# Patient Record
Sex: FEMALE | Race: BLACK OR AFRICAN AMERICAN | NOT HISPANIC OR LATINO | Employment: FULL TIME | URBAN - METROPOLITAN AREA
[De-identification: names, ages, dates, MRNs, and addresses within clinical notes are randomized per-mention and may not be internally consistent; named-entity substitution may affect disease eponyms.]

---

## 2017-02-13 DIAGNOSIS — G89.29 CHRONIC LEFT-SIDED LOW BACK PAIN WITH LEFT-SIDED SCIATICA: ICD-10-CM

## 2017-02-13 DIAGNOSIS — I10 UNCONTROLLED HYPERTENSION: ICD-10-CM

## 2017-02-13 DIAGNOSIS — M54.42 CHRONIC LEFT-SIDED LOW BACK PAIN WITH LEFT-SIDED SCIATICA: ICD-10-CM

## 2017-02-13 RX ORDER — GABAPENTIN 300 MG/1
300 CAPSULE ORAL 2 TIMES DAILY
Qty: 60 CAPSULE | Refills: 0 | Status: SHIPPED | OUTPATIENT
Start: 2017-02-13 | End: 2017-04-07

## 2017-02-13 RX ORDER — HYDROCHLOROTHIAZIDE 25 MG/1
25 TABLET ORAL DAILY
Qty: 90 TABLET | Refills: 0 | Status: SHIPPED | OUTPATIENT
Start: 2017-02-13 | End: 2017-03-03 | Stop reason: SDUPTHER

## 2017-02-13 RX ORDER — AMLODIPINE BESYLATE 10 MG/1
10 TABLET ORAL DAILY
Qty: 90 TABLET | Refills: 0 | Status: SHIPPED | OUTPATIENT
Start: 2017-02-13 | End: 2017-03-03 | Stop reason: SDUPTHER

## 2017-02-13 NOTE — TELEPHONE ENCOUNTER
----- Message from Dulce Maria Morel sent at 2/13/2017  9:29 AM CST -----  Contact: self  Refill : amlodipine (NORVASC) 10 MG tablet              gabapentin (NEURONTIN) 300 MG capsule     hydrochlorothiazide (HYDRODIURIL) 25 MG tablet     Pharmacy : [place] [id number] [address]

## 2017-02-14 DIAGNOSIS — I10 UNCONTROLLED HYPERTENSION: ICD-10-CM

## 2017-02-14 RX ORDER — LISINOPRIL 40 MG/1
40 TABLET ORAL DAILY
Qty: 90 TABLET | Refills: 0 | Status: SHIPPED | OUTPATIENT
Start: 2017-02-14 | End: 2017-03-03 | Stop reason: SDUPTHER

## 2017-02-14 NOTE — TELEPHONE ENCOUNTER
----- Message from Bladimir Corral sent at 2/13/2017  9:41 AM CST -----  Contact: Self/[phone]  Patient is requesting a Referral to schedule with Opthalmology. Thank you.

## 2017-02-14 NOTE — TELEPHONE ENCOUNTER
Pt requesting referral to opthalmology for routine eye exam; also need refill of lisinopril; LOV- 9/22/16; has OV scheduled for 3/3/17

## 2017-02-16 ENCOUNTER — TELEPHONE (OUTPATIENT)
Dept: FAMILY MEDICINE | Facility: CLINIC | Age: 57
End: 2017-02-16

## 2017-02-22 ENCOUNTER — OFFICE VISIT (OUTPATIENT)
Dept: OPTOMETRY | Facility: CLINIC | Age: 57
End: 2017-02-22
Payer: OTHER GOVERNMENT

## 2017-02-22 DIAGNOSIS — H25.13 NUCLEAR SCLEROTIC CATARACT OF BOTH EYES: ICD-10-CM

## 2017-02-22 DIAGNOSIS — H35.033 HYPERTENSIVE RETINOPATHY OF BOTH EYES: ICD-10-CM

## 2017-02-22 DIAGNOSIS — H52.4 BILATERAL PRESBYOPIA: ICD-10-CM

## 2017-02-22 DIAGNOSIS — H16.142 SPK (SUPERFICIAL PUNCTATE KERATITIS), LEFT: Primary | ICD-10-CM

## 2017-02-22 DIAGNOSIS — H47.393 CUP TO DISC ASYMMETRY, BILATERAL: ICD-10-CM

## 2017-02-22 PROCEDURE — 99212 OFFICE O/P EST SF 10 MIN: CPT | Mod: PBBFAC | Performed by: OPTOMETRIST

## 2017-02-22 PROCEDURE — 99999 PR PBB SHADOW E&M-EST. PATIENT-LVL II: CPT | Mod: PBBFAC,,, | Performed by: OPTOMETRIST

## 2017-02-22 PROCEDURE — 92004 COMPRE OPH EXAM NEW PT 1/>: CPT | Mod: S$PBB,,, | Performed by: OPTOMETRIST

## 2017-02-22 RX ORDER — ERYTHROMYCIN 5 MG/G
OINTMENT OPHTHALMIC 3 TIMES DAILY
Qty: 3.5 G | Refills: 0 | Status: SHIPPED | OUTPATIENT
Start: 2017-02-22 | End: 2017-03-01

## 2017-02-22 NOTE — PROGRESS NOTES
"HPI     Ms. Linda was referred by Yajaira Soria MD for hypertensive eye exam     However, she is also experiencing an eye problem OS that she needs   addressed today.    Patient complains of yesterday around noon of feeling a sharp pain in OS,   follow by the feeling of something is in eye and the feeling of pressure   on the back of the eye. She reports feeling "tightness" of left eye, pain   around and behind left eye, pain is worse in the sunlight.    Pt also reports blurry near vision without glasses, improved with OTC   readers (she uses about +1.50 to +2.00 OTC readers), pt is concerned if   the OTC readers is best for her eyes.    Upon questioning, she does report eye strain and burning of eyes during   sustained computer use at work. She has to remove glasses and walk around   a little.    (-)drops  (+)pain 8 OS   (-)flashes  (-)floaters  (-)diplopia    Diabetic no  LBS n/a  No results found for: HGBA1C    OCULAR HISTORY  Last Eye Exam 02/08/13 Dr. Davenport   (-)eye surgery   (-)eye injury   (-)diagnosed or treated for any eye conditions or diseases none     FAMILY HISTORY  (-)Glaucoma none  (-)Macular Degeneration none          Last edited by Dagmar Vargas, OD on 2/22/2017  4:51 PM.         Assessment /Plan     For exam results, see Encounter Report.    SPK (superficial punctate keratitis), left   Cause of pain and photophobia OS. Start erythromycin ointment TID OS (alternatively, can use Systane 1-2 times per day and erythromycin QHS OS if jorge makes vision too blurry) for 1-2 weeks. RTC 2 weeks for f/u, or sooner if symptoms worsen.   Also recommended pt use artificial tears prn during sustained computer use.  -     erythromycin (ROMYCIN) ophthalmic ointment; Place into the left eye 3 (three) times daily.  Dispense: 3.5 g; Refill: 0    Hypertensive retinopathy of both eyes   Uncontrolled HTN. Mild retinopathy based on arteriolar narrowing and a/v crossing changes, not previously noted in 2013. Patient " educated on findings and discussed risk of retinal vascular occlusions. Monitor yearly.    Nuclear sclerotic cataract of both eyes   Very mild OU. Monitor.    Bilateral presbyopia   Refraction not performed today because of SPK OS. RTC in about 2 weeks for refraction. Okay to continue OTC readers, but try increasing to +2.25 if needed.d    Cup to disc asymmetry, bilateral   C/d ratio OD>OS, stable vs 2013. Patient educated on findings, explained nature of glaucoma and potential to progress to permanent loss of peripheral vision if untreated, offered baseline HVF/OCT vs monitoring, pt opts to monitor. Consider HVF/OCT in the future if changes in clinical appearance noted. Monitor in 1 year.       RTC 2 weeks for f/u and refraction (scheduled for 03/06/17)

## 2017-02-22 NOTE — Clinical Note
Dear Dr. Soria,  Thank you for referring Ms. Linda for a hypertensive eye examination. She has mild hypertensive retinopathy in both eyes that was not noted at her last eye exam in 2013. Please let me know if you have questions.  Sincerely, Dagmar Vargas OD

## 2017-02-22 NOTE — LETTER
February 22, 2017      Yajaira Soria MD  [address] [place] [address]           [facility]  [address]  [facility] [address]  Phone: [phone]          Patient: Erma Linda   MR Number: [id number]   YOB: 1960   Date of Visit: 2/22/2017       Dear Dr. Yajaira Soria:    Thank you for referring Erma Linda to me for evaluation. Attached you will find relevant portions of my assessment and plan of care.    If you have questions, please do not hesitate to call me. I look forward to following Erma Linda along with you.    Sincerely,    Dagmar Vargas, OD    Enclosure  CC:  No Recipients    If you would like to receive this communication electronically, please contact [email or web address] or [phone] to request more information on [employer] access.    For providers and/or their staff who would like to refer a patient to [facility], please contact us through our one-stop-shop provider referral line, [facility], at [phone].    If you feel you have received this communication in error or would no longer like to receive these types of communications, please e-mail [email or web address]

## 2017-02-22 NOTE — PATIENT INSTRUCTIONS
Please use the erythromycin ointment in the left eye: 3 times per day, for 1 week. If this makes your vision too blurry, then just use it at bedtime. Alternatively you can use Systane drops: 1 drop, 2-3 times per day, in the left eye.     Please let me know if your eye does not feel better in 1 week.      Dagmar Vargas, NICKOLAS

## 2017-02-22 NOTE — MR AVS SNAPSHOT
[facility]  [address]  [facility] [address]  Phone: [phone]                  Erma Linda   2017 3:40 PM   Office Visit    Description:  Female : 1960   Provider:  Dagmar Vargas OD   Department:  [facility]           Reason for Visit     Hypertensive Eye Exam           Diagnoses this Visit        Comments    SPK (superficial punctate keratitis), left    -  Primary            To Do List           Future Appointments        Provider Department Dept Phone    3/3/2017 3:00 PM Yajaira Soria MD [facility] [phone]      Goals (5 Years of Data)     None       These Medications        Disp Refills Start End    erythromycin (ROMYCIN) ophthalmic ointment 3.5 g 0 2017 3/1/2017    Place into the left eye 3 (three) times daily. - Left Eye    Pharmacy: [facility] - [place], [place] - [address] Ph #: [phone]         [facility] On Call     [facility] On Call Nurse Care Line -  Assistance  Registered nurses in the [facility] On Call Center provide clinical advisement, health education, appointment booking, and other advisory services.  Call for this free service at [phone].             Medications           Message regarding Medications     Verify the changes and/or additions to your medication regime listed below are the same as discussed with your clinician today.  If any of these changes or additions are incorrect, please notify your healthcare provider.        START taking these NEW medications        Refills    erythromycin (ROMYCIN) ophthalmic ointment 0    Sig: Place into the left eye 3 (three) times daily.    Class: Normal    Route: Left Eye           Verify that the below list of medications is an accurate representation of the medications you are currently taking.  If none reported, the list may be blank. If incorrect, please contact your healthcare provider. Carry this list with you in  case of emergency.           Current Medications     amlodipine (NORVASC) 10 MG tablet Take 1 tablet (10 mg total) by mouth once daily.    clobetasol 0.05% (TEMOVATE) 0.05 % Oint Apply topically 2 (two) times daily.    conjugated estrogens (PREMARIN) vaginal cream Place 0.5 g vaginally twice a week.    hydrochlorothiazide (HYDRODIURIL) 25 MG tablet Take 1 tablet (25 mg total) by mouth once daily.    lisinopril (PRINIVIL,ZESTRIL) 40 MG tablet Take 1 tablet (40 mg total) by mouth once daily.    erythromycin (ROMYCIN) ophthalmic ointment Place into the left eye 3 (three) times daily.    gabapentin (NEURONTIN) 300 MG capsule Take 1 capsule (300 mg total) by mouth 2 (two) times daily.           Clinical Reference Information           Allergies as of 2/22/2017     Hydralazine Analogues      Immunizations Administered on Date of Encounter - 2/22/2017     None      Instructions    Please use the erythromycin ointment in the left eye: 3 times per day, for 1 week. If this makes your vision too blurry, then just use it at bedtime. Alternatively you can use Systane drops: 1 drop, 2-3 times per day, in the left eye.     Please let me know if your eye does not feel better in 1 week.      Dagmar Vargas OD        Language Assistance Services     ATTENTION: Language assistance services are available, free of charge. Please call [phone].      ATENCIÓN: Si habla español, tiene a becerra disposición servicios gratuitos de asistencia lingüística. Llame al [phone].     [facility] Ý: N?u b?n nói Ti?ng Vi?t, có các d?ch v? h? tr? ngôn ng? mi?n phí dành cho b?n. G?i s? [phone].         [facility] complies with applicable Federal civil rights laws and does not discriminate on the basis of race, color, national origin, age, disability, or sex.

## 2017-03-03 ENCOUNTER — OFFICE VISIT (OUTPATIENT)
Dept: FAMILY MEDICINE | Facility: CLINIC | Age: 57
End: 2017-03-03
Payer: OTHER GOVERNMENT

## 2017-03-03 VITALS
DIASTOLIC BLOOD PRESSURE: 74 MMHG | BODY MASS INDEX: 29.5 KG/M2 | HEIGHT: 64 IN | SYSTOLIC BLOOD PRESSURE: 128 MMHG | TEMPERATURE: 98 F | WEIGHT: 172.81 LBS | RESPIRATION RATE: 16 BRPM | HEART RATE: 100 BPM | OXYGEN SATURATION: 97 %

## 2017-03-03 DIAGNOSIS — Z00.00 WELL ADULT EXAM: Primary | ICD-10-CM

## 2017-03-03 DIAGNOSIS — I10 ESSENTIAL HYPERTENSION, BENIGN: ICD-10-CM

## 2017-03-03 DIAGNOSIS — Z12.31 ENCOUNTER FOR SCREENING MAMMOGRAM FOR BREAST CANCER: ICD-10-CM

## 2017-03-03 DIAGNOSIS — B18.2 HEP C W/O COMA, CHRONIC: ICD-10-CM

## 2017-03-03 DIAGNOSIS — I10 UNCONTROLLED HYPERTENSION: ICD-10-CM

## 2017-03-03 PROCEDURE — 99213 OFFICE O/P EST LOW 20 MIN: CPT | Mod: PBBFAC,PO | Performed by: FAMILY MEDICINE

## 2017-03-03 PROCEDURE — 99396 PREV VISIT EST AGE 40-64: CPT | Mod: S$PBB,,, | Performed by: FAMILY MEDICINE

## 2017-03-03 PROCEDURE — 99999 PR PBB SHADOW E&M-EST. PATIENT-LVL III: CPT | Mod: PBBFAC,,, | Performed by: FAMILY MEDICINE

## 2017-03-03 RX ORDER — HYDROCHLOROTHIAZIDE 25 MG/1
25 TABLET ORAL DAILY
Qty: 90 TABLET | Refills: 1 | Status: SHIPPED | OUTPATIENT
Start: 2017-03-03 | End: 2017-07-07 | Stop reason: SDUPTHER

## 2017-03-03 RX ORDER — LISINOPRIL 40 MG/1
40 TABLET ORAL DAILY
Qty: 90 TABLET | Refills: 1 | Status: SHIPPED | OUTPATIENT
Start: 2017-03-03 | End: 2017-07-07 | Stop reason: SDUPTHER

## 2017-03-03 RX ORDER — AMLODIPINE BESYLATE 10 MG/1
10 TABLET ORAL DAILY
Qty: 90 TABLET | Refills: 1 | Status: SHIPPED | OUTPATIENT
Start: 2017-03-03 | End: 2017-07-07 | Stop reason: SDUPTHER

## 2017-03-03 NOTE — PROGRESS NOTES
Subjective:       Patient ID: Erma Linda is a 56 y.o. female.    Chief Complaint: Annual Exam    HPI: Patient is a 56-year-old female here for annual exam.  She has chronic stable hypertension.  No cardiac complaints.  Patient went untreated hepatitis C for several years.  Patient failed to follow-up for treatment.  Review of Systems   Constitutional: Positive for fatigue. Negative for appetite change, chills, diaphoresis and fever.   HENT: Negative for hearing loss, sinus pressure and trouble swallowing.    Eyes: Positive for pain and visual disturbance.   Respiratory: Negative for cough, chest tightness, shortness of breath and wheezing.    Cardiovascular: Negative for chest pain, palpitations and leg swelling.   Gastrointestinal: Negative for abdominal pain, blood in stool, constipation, diarrhea, nausea and vomiting.   Genitourinary: Negative for difficulty urinating, dysuria, hematuria, menstrual problem, pelvic pain and vaginal discharge.   Musculoskeletal: Positive for back pain. Negative for joint swelling and neck pain.   Skin: Negative for rash.   Neurological: Negative for dizziness, numbness and headaches.        Left index finger pain   Hematological: Negative for adenopathy. Does not bruise/bleed easily.   Psychiatric/Behavioral: Negative for dysphoric mood and sleep disturbance. The patient is not nervous/anxious.        Objective:      Physical Exam   Constitutional: She is oriented to person, place, and time. She appears well-developed and well-nourished.   Eyes: No scleral icterus.   Neck: Normal range of motion. Neck supple. No thyromegaly present.   Cardiovascular: Normal rate and regular rhythm.  Exam reveals no gallop and no friction rub.    No murmur heard.  Pulmonary/Chest: Effort normal and breath sounds normal. She has no wheezes. She has no rales.   Abdominal: Soft. Bowel sounds are normal. She exhibits no distension and no mass. There is no hepatosplenomegaly. There is no  tenderness.   Musculoskeletal: She exhibits no edema.   Lymphadenopathy:     She has no cervical adenopathy.     She has no axillary adenopathy.        Right: No supraclavicular adenopathy present.        Left: No supraclavicular adenopathy present.   Neurological: She is alert and oriented to person, place, and time.   Skin: Skin is warm and dry. No rash noted.   Psychiatric: She has a normal mood and affect. Her behavior is normal.         Assessment:       1. Well adult exam    2. Hep C w/o coma, chronic    3. Essential hypertension, benign    4. Encounter for screening mammogram for breast cancer    5. Uncontrolled hypertension        Plan:       Well adult exam  Encourage diet and exercise.  Scheduled screening mammogram    Hep C w/o coma, chronic  Untreated hepatitis C.  -     Ambulatory Referral to Hepatology  -     HEPATITIS C RNA, QUANTITATIVE, PCR; Future; Expected date: 3/3/17  -     Hepatic function panel; Future; Expected date: 3/3/17    Essential hypertension, benign  -     Lipid panel; Future; Expected date: 3/3/17    Uncontrolled hypertension  -     amlodipine (NORVASC) 10 MG tablet; Take 1 tablet (10 mg total) by mouth once daily.  Dispense: 90 tablet; Refill: 1  -     hydrochlorothiazide (HYDRODIURIL) 25 MG tablet; Take 1 tablet (25 mg total) by mouth once daily.  Dispense: 90 tablet; Refill: 1  -     lisinopril (PRINIVIL,ZESTRIL) 40 MG tablet; Take 1 tablet (40 mg total) by mouth once daily.  Dispense: 90 tablet; Refill: 1            No Follow-up on file.

## 2017-03-07 ENCOUNTER — DOCUMENTATION ONLY (OUTPATIENT)
Dept: TRANSPLANT | Facility: CLINIC | Age: 57
End: 2017-03-07

## 2017-03-07 ENCOUNTER — TELEPHONE (OUTPATIENT)
Dept: HEPATOLOGY | Facility: CLINIC | Age: 57
End: 2017-03-07

## 2017-03-07 NOTE — NURSING
Pt records reviewed.   Pt will be referred to Hepatology.    Initial referral received  from the [facility].   Referring Provider/diagnosis   Yajaira Soria MD   Diagnosis: Hep C w/o coma, chronic     Pt was followed in  2015 by Ruth.    Referral letter sent to provider and patient.

## 2017-03-07 NOTE — TELEPHONE ENCOUNTER
----- Message from Sari Redmond sent at 3/7/2017  9:02 AM CST -----  Pt records reviewed.   Pt will be referred to Hepatology.    [facility]   Referring Provider/diagnosis   Yajaira Soria MD  Diagnosis: Hep C w/o coma, chronic    Pt was followed in  2015 by Ruth.    Referral letter sent to provider and patient.

## 2017-03-07 NOTE — TELEPHONE ENCOUNTER
Chart reviewed. HCV RNA and genotype 1a noted. Pt was initially referred in 8/2015. Pre-visit tests were done but pt was a no show for consult. (She was also a no show in 2012).    Spoke to pt. Offered to schedule her for consult. Pt states she had a death in her family and asked that I call back in 2 weeks.  Will try calling pt again to schedule consult.

## 2017-03-07 NOTE — LETTER
March 7, 2017    Yajaira Soria MD  [address] [place] [address]      Dear Dr. Soria    Patient: Erma Linda   MR Number: [id number]   YOB: 1960     Thank you for the referral of Erma Linda to the [facility] program. An initial appointment will be scheduled for your patient with one of our Hepatologists.      Thank you again for your trust in our program.  If there is anything we can do for you or your staff, please feel free to contact us.        Sincerely,        [facility]  [address] [place] [id number]  [phone]

## 2017-03-07 NOTE — LETTER
March 7, 2017    Erma Linda  [address]   [facility] [address]      Dear Erma Linda:    Your doctor has referred you to the [facility]. You will be contacted by our office and an initial appointment will then be scheduled for you.    We look forward to seeing you soon. If you have any further questions, please contact us at [phone].       Sincerely,        [facility]   [address] [place] [address]  [phone]

## 2017-03-08 ENCOUNTER — TELEPHONE (OUTPATIENT)
Dept: FAMILY MEDICINE | Facility: CLINIC | Age: 57
End: 2017-03-08

## 2017-03-09 ENCOUNTER — TELEPHONE (OUTPATIENT)
Dept: OPTOMETRY | Facility: CLINIC | Age: 57
End: 2017-03-09

## 2017-03-09 NOTE — TELEPHONE ENCOUNTER
----- Message from Dagmar Vargas OD sent at 3/9/2017  3:40 PM CST -----  Regarding: call pt  Hello,    This patient did not show for her f/u appointment and refraction on 03/06/17. Please call her to offer to reschedule.    Thank you,  Dagmar Vargas OD

## 2017-03-13 ENCOUNTER — OFFICE VISIT (OUTPATIENT)
Dept: FAMILY MEDICINE | Facility: CLINIC | Age: 57
End: 2017-03-13
Payer: OTHER GOVERNMENT

## 2017-03-13 VITALS
DIASTOLIC BLOOD PRESSURE: 72 MMHG | HEIGHT: 64 IN | HEART RATE: 82 BPM | SYSTOLIC BLOOD PRESSURE: 130 MMHG | WEIGHT: 169.56 LBS | TEMPERATURE: 98 F | OXYGEN SATURATION: 98 % | BODY MASS INDEX: 28.95 KG/M2 | RESPIRATION RATE: 12 BRPM

## 2017-03-13 DIAGNOSIS — F43.21 GRIEVING: Primary | ICD-10-CM

## 2017-03-13 DIAGNOSIS — F41.9 ANXIETY: ICD-10-CM

## 2017-03-13 DIAGNOSIS — M62.838 TRAPEZIUS MUSCLE SPASM: ICD-10-CM

## 2017-03-13 PROCEDURE — 99999 PR PBB SHADOW E&M-EST. PATIENT-LVL III: CPT | Mod: PBBFAC,,, | Performed by: FAMILY MEDICINE

## 2017-03-13 PROCEDURE — 99214 OFFICE O/P EST MOD 30 MIN: CPT | Mod: S$PBB,,, | Performed by: FAMILY MEDICINE

## 2017-03-13 PROCEDURE — 99213 OFFICE O/P EST LOW 20 MIN: CPT | Mod: PBBFAC,PO | Performed by: FAMILY MEDICINE

## 2017-03-13 RX ORDER — TRAZODONE HYDROCHLORIDE 50 MG/1
50 TABLET ORAL NIGHTLY
Qty: 30 TABLET | Refills: 11 | Status: SHIPPED | OUTPATIENT
Start: 2017-03-13 | End: 2017-09-05

## 2017-03-13 RX ORDER — NAPROXEN 500 MG/1
500 TABLET ORAL 2 TIMES DAILY WITH MEALS
Qty: 30 TABLET | Refills: 0 | Status: SHIPPED | OUTPATIENT
Start: 2017-03-13 | End: 2017-09-05

## 2017-03-13 NOTE — MR AVS SNAPSHOT
[place] [facility] [address] [place] [address]  Phone: [phone]  Fax: [phone]                  Erma Linda   3/13/2017 10:00 AM   Office Visit    Description:  Female : 1960   Provider:  Sabino Burton MD   Department:  [facility]           Reason for Visit     Stress           Diagnoses this Visit        Comments    Grieving    -  Primary     Anxiety         Trapezius muscle spasm                To Do List           Goals (5 Years of Data)     None      Follow-Up and Disposition     Return in about 4 weeks (around 4/10/2017).       These Medications        Disp Refills Start End    trazodone (DESYREL) 50 MG tablet 30 tablet 11 3/13/2017 3/13/2018    Take 1 tablet (50 mg total) by mouth every evening. - Oral    Pharmacy: [facility] - [place], [place] - [address] Ph #: [phone]       naproxen (NAPROSYN) 500 MG tablet 30 tablet 0 3/13/2017     Take 1 tablet (500 mg total) by mouth 2 (two) times daily with meals. - Oral    Pharmacy: [facility] - [place], [place] - [address] Ph #: [phone]         [facility] On Call     [facility] On Call Nurse Care Line -  Assistance  Registered nurses in the [facility] On Call Center provide clinical advisement, health education, appointment booking, and other advisory services.  Call for this free service at [phone].             Medications           Message regarding Medications     Verify the changes and/or additions to your medication regime listed below are the same as discussed with your clinician today.  If any of these changes or additions are incorrect, please notify your healthcare provider.        START taking these NEW medications        Refills    trazodone (DESYREL) 50 MG tablet 11    Sig: Take 1 tablet (50 mg total) by mouth every evening.    Class: Normal    Route: Oral    naproxen (NAPROSYN) 500 MG tablet 0    Sig: Take 1 tablet (500 mg total) by  "mouth 2 (two) times daily with meals.    Class: Normal    Route: Oral           Verify that the below list of medications is an accurate representation of the medications you are currently taking.  If none reported, the list may be blank. If incorrect, please contact your healthcare provider. Carry this list with you in case of emergency.           Current Medications     amlodipine (NORVASC) 10 MG tablet Take 1 tablet (10 mg total) by mouth once daily.    clobetasol 0.05% (TEMOVATE) 0.05 % Oint Apply topically 2 (two) times daily.    conjugated estrogens (PREMARIN) vaginal cream Place 0.5 g vaginally twice a week.    gabapentin (NEURONTIN) 300 MG capsule Take 1 capsule (300 mg total) by mouth 2 (two) times daily.    hydrochlorothiazide (HYDRODIURIL) 25 MG tablet Take 1 tablet (25 mg total) by mouth once daily.    lisinopril (PRINIVIL,ZESTRIL) 40 MG tablet Take 1 tablet (40 mg total) by mouth once daily.    naproxen (NAPROSYN) 500 MG tablet Take 1 tablet (500 mg total) by mouth 2 (two) times daily with meals.    trazodone (DESYREL) 50 MG tablet Take 1 tablet (50 mg total) by mouth every evening.           Clinical Reference Information           Your Vitals Were     BP Pulse Temp Resp Height Weight    130/72 (BP Location: Left arm, Patient Position: Sitting, BP Method: Manual) 82 97.9 °F (36.6 °C) (Oral) 12 5' 4" (1.626 m) 76.9 kg (169 lb 8.5 oz)    SpO2 BMI             98% 29.1 kg/m2         Blood Pressure          Most Recent Value    BP  130/72      Allergies as of 3/13/2017     Hydralazine Analogues      Immunizations Administered on Date of Encounter - 3/13/2017     None      Language Assistance Services     ATTENTION: Language assistance services are available, free of charge. Please call [phone].      ATENCIÓN: Si michelle karon, tiene a becerra disposición servicios gratuitos de asistencia lingüística. Llame al [phone].     CHÚ Ý: N?u b?n nói Ti?ng Vi?t, có các d?ch v? h? tr? ngôn ng? mi?n phí dành " benita bustos?nKsenia RIVERA?i s? [phone].         [facility] complies with applicable Federal civil rights laws and does not discriminate on the basis of race, color, national origin, age, disability, or sex.

## 2017-03-13 NOTE — PROGRESS NOTES
"Subjective:       Patient ID: Erma Linda is a 57 y.o. female.    Chief Complaint: Stress (found sister )    HPI    Pt found her sister dead in her house 9 days ago, and since then, she has been grieving a/w feeling nervous, and unable to sleep. No issues with anxiety or depression before.  The nervous or anxious feeling is constant.     She also feels a "tightness" in her L shoulder, back and chest that started yesterday. This worsened this morning. This tightness is constant, and is not aggravated by exertion or improve with rest.   Moderate relief with tylenol.     Current Outpatient Prescriptions on File Prior to Visit   Medication Sig Dispense Refill    amlodipine (NORVASC) 10 MG tablet Take 1 tablet (10 mg total) by mouth once daily. 90 tablet 1    clobetasol 0.05% (TEMOVATE) 0.05 % Oint Apply topically 2 (two) times daily. 30 g 3    conjugated estrogens (PREMARIN) vaginal cream Place 0.5 g vaginally twice a week. 45 g 1    gabapentin (NEURONTIN) 300 MG capsule Take 1 capsule (300 mg total) by mouth 2 (two) times daily. 60 capsule 0    hydrochlorothiazide (HYDRODIURIL) 25 MG tablet Take 1 tablet (25 mg total) by mouth once daily. 90 tablet 1    lisinopril (PRINIVIL,ZESTRIL) 40 MG tablet Take 1 tablet (40 mg total) by mouth once daily. 90 tablet 1    [DISCONTINUED] lisinopril-hydrochlorothiazide (PRINZIDE,ZESTORETIC) 10-12.5 mg per tablet TAKE TWO TABLETS BY MOUTH EVERY DAY 60 tablet 0     No current facility-administered medications on file prior to visit.        Review of Systems   Constitutional: Negative for chills and fever.   Respiratory: Positive for chest tightness. Negative for choking and shortness of breath.    Cardiovascular: Positive for chest pain. Negative for palpitations and leg swelling.       Objective:     Vitals:    17 0952   BP: 130/72   Pulse: 82   Resp: 12   Temp: 97.9 °F (36.6 °C)        Physical Exam   Constitutional: She appears well-developed. No " distress.   HENT:   Head: Normocephalic and atraumatic.   Eyes: Conjunctivae are normal. Right eye exhibits no discharge. Left eye exhibits no discharge. No scleral icterus.   Cardiovascular: Normal rate, regular rhythm and normal heart sounds.  Exam reveals no gallop and no friction rub.    No murmur heard.  Pulmonary/Chest: Effort normal and breath sounds normal. No respiratory distress. She has no wheezes. She has no rales.   Musculoskeletal:        Cervical back: She exhibits tenderness and spasm. She exhibits normal range of motion, no bony tenderness, no swelling, no edema, no deformity, no laceration, no pain and normal pulse.        Back:         Arms:  Neurological: She is alert.   Skin: She is not diaphoretic.   Psychiatric: She has a normal mood and affect.   Vitals reviewed.      Assessment:       1. Grieving    2. Anxiety    3. Trapezius muscle spasm        Plan:       Erma was seen today for stress.    Diagnoses and all orders for this visit:    Grieving  Pt is going through the natural grieving process, but is having anxiousness that is greatly inhibiting her ability to take care of things that she must due to her sisters demise.     Anxiety  -     trazodone (DESYREL) 50 MG tablet; Take 1 tablet (50 mg total) by mouth every evening.  Pt has significant stressors resulting in anxiousness and inability to sleep. Short term rx for trazadone to help with this difficult time.     Trapezius muscle spasm  -     naproxen (NAPROSYN) 500 MG tablet; Take 1 tablet (500 mg total) by mouth 2 (two) times daily with meals.  - Pts hx and pex suggest muscle strain/spasm of L trapezius and L pec muscles. Pt advised to take NSAIDs and magnesium to help relieve sxs. Pt to return if sxs have not improved in 2 weeks.   - ER precautions given for signs consistent with angina. Cp or tightness that worsens with exertion or is a/w SOB.           Return in about 4 weeks (around 4/10/2017).        Pt verbalized understanding and  agreed with our plan.

## 2017-03-24 ENCOUNTER — OFFICE VISIT (OUTPATIENT)
Dept: FAMILY MEDICINE | Facility: CLINIC | Age: 57
End: 2017-03-24
Payer: OTHER GOVERNMENT

## 2017-03-24 VITALS
BODY MASS INDEX: 28.64 KG/M2 | HEART RATE: 97 BPM | HEIGHT: 64 IN | WEIGHT: 167.75 LBS | RESPIRATION RATE: 16 BRPM | SYSTOLIC BLOOD PRESSURE: 130 MMHG | DIASTOLIC BLOOD PRESSURE: 96 MMHG | OXYGEN SATURATION: 99 % | TEMPERATURE: 98 F

## 2017-03-24 DIAGNOSIS — F32.A DEPRESSION, UNSPECIFIED DEPRESSION TYPE: Primary | ICD-10-CM

## 2017-03-24 DIAGNOSIS — F43.21 GRIEVING: ICD-10-CM

## 2017-03-24 PROCEDURE — 99213 OFFICE O/P EST LOW 20 MIN: CPT | Mod: PBBFAC,PO | Performed by: FAMILY MEDICINE

## 2017-03-24 PROCEDURE — 99999 PR PBB SHADOW E&M-EST. PATIENT-LVL III: CPT | Mod: PBBFAC,,, | Performed by: FAMILY MEDICINE

## 2017-03-24 PROCEDURE — 99214 OFFICE O/P EST MOD 30 MIN: CPT | Mod: S$PBB,,, | Performed by: FAMILY MEDICINE

## 2017-03-24 NOTE — LETTER
March 24, 2017    Erma Linda  [address]   [place] [address]             [facility] [address] [place] [address]  Phone: [phone]  Fax: [phone] To Whom It May Concern,      Erma Linda    Was treated here on 03/24/2017    Cleared to return to work on 3/27/17    No Restrictions    Please feel free to contact my office if you have any questions or concerns.             Sabino Burton MD

## 2017-03-24 NOTE — MR AVS SNAPSHOT
[facility]  [address]  [place] [address]  Phone: [phone]  Fax: [phone]                  Erma Linda   3/24/2017 8:40 AM   Office Visit    Description:  Female : 1960   Provider:  Sabino Burton MD   Department:  [facility]           Reason for Visit     Anxiety     Dizziness                To Do List           Goals (5 Years of Data)     None      [facility]     [facility] On Call Nurse Care Line -  Assistance  Registered nurses in the [facility] On Call Center provide clinical advisement, health education, appointment booking, and other advisory services.  Call for this free service at [phone].             Medications           Message regarding Medications     Verify the changes and/or additions to your medication regime listed below are the same as discussed with your clinician today.  If any of these changes or additions are incorrect, please notify your healthcare provider.             Verify that the below list of medications is an accurate representation of the medications you are currently taking.  If none reported, the list may be blank. If incorrect, please contact your healthcare provider. Carry this list with you in case of emergency.           Current Medications     amlodipine (NORVASC) 10 MG tablet Take 1 tablet (10 mg total) by mouth once daily.    clobetasol 0.05% (TEMOVATE) 0.05 % Oint Apply topically 2 (two) times daily.    hydrochlorothiazide (HYDRODIURIL) 25 MG tablet Take 1 tablet (25 mg total) by mouth once daily.    lisinopril (PRINIVIL,ZESTRIL) 40 MG tablet Take 1 tablet (40 mg total) by mouth once daily.    naproxen (NAPROSYN) 500 MG tablet Take 1 tablet (500 mg total) by mouth 2 (two) times daily with meals.    trazodone (DESYREL) 50 MG tablet Take 1 tablet (50 mg total) by mouth every evening.    conjugated estrogens (PREMARIN) vaginal cream Place 0.5 g vaginally twice a week.    gabapentin  "(NEURONTIN) 300 MG capsule Take 1 capsule (300 mg total) by mouth 2 (two) times daily.           Clinical Reference Information           Your Vitals Were     BP Pulse Temp Resp Height Weight    130/96 (BP Location: Left arm, Patient Position: Sitting, BP Method: Manual) 97 98.2 °F (36.8 °C) (Oral) 16 5' 4" (1.626 m) 76.1 kg (167 lb 12.3 oz)    SpO2 BMI             99% 28.8 kg/m2         Blood Pressure          Most Recent Value    BP  (!)  130/96      Allergies as of 3/24/2017     Hydralazine Analogues      Immunizations Administered on Date of Encounter - 3/24/2017     None      Language Assistance Services     ATTENTION: Language assistance services are available, free of charge. Please call [phone].      ATENCIÓN: Si michelle karon, tiene a becerra disposición servicios gratuitos de asistencia lingüística. Llame al [phone].     CHÚ Ý: N?u b?n nói Ti?ng Vi?t, có các d?ch v? h? tr? ngôn ng? mi?n phí dành cho b?n. G?i s? [phone].         [facility] complies with applicable Federal civil rights laws and does not discriminate on the basis of race, color, national origin, age, disability, or sex.        "

## 2017-03-24 NOTE — PROGRESS NOTES
Subjective:       Patient ID: Erma Linda is a 57 y.o. female.    Chief Complaint: Anxiety (follow up ) and Dizziness (follow up)    HPI     Anxiety with sleep disturbance - pt is doing better with her sleep with trazodone. She is still having difficulty with resuming her normal activity. She does not want to get up, or eat, so is suffering from anhedonia. Pt is currently divulging to her friends and counselor which helps.     Now, the burden of handling her sister's affairs has been her biggest problem. She excused herself from this process yesterday.           Current Outpatient Prescriptions on File Prior to Visit   Medication Sig Dispense Refill    amlodipine (NORVASC) 10 MG tablet Take 1 tablet (10 mg total) by mouth once daily. 90 tablet 1    clobetasol 0.05% (TEMOVATE) 0.05 % Oint Apply topically 2 (two) times daily. 30 g 3    hydrochlorothiazide (HYDRODIURIL) 25 MG tablet Take 1 tablet (25 mg total) by mouth once daily. 90 tablet 1    lisinopril (PRINIVIL,ZESTRIL) 40 MG tablet Take 1 tablet (40 mg total) by mouth once daily. 90 tablet 1    naproxen (NAPROSYN) 500 MG tablet Take 1 tablet (500 mg total) by mouth 2 (two) times daily with meals. 30 tablet 0    trazodone (DESYREL) 50 MG tablet Take 1 tablet (50 mg total) by mouth every evening. 30 tablet 11    conjugated estrogens (PREMARIN) vaginal cream Place 0.5 g vaginally twice a week. 45 g 1    gabapentin (NEURONTIN) 300 MG capsule Take 1 capsule (300 mg total) by mouth 2 (two) times daily. 60 capsule 0    [DISCONTINUED] lisinopril-hydrochlorothiazide (PRINZIDE,ZESTORETIC) 10-12.5 mg per tablet TAKE TWO TABLETS BY MOUTH EVERY DAY 60 tablet 0     No current facility-administered medications on file prior to visit.        Review of Systems   Psychiatric/Behavioral: Positive for dysphoric mood. Negative for self-injury and suicidal ideas. The patient is nervous/anxious.        Objective:     Vitals:    03/24/17 0854   BP: (!) 130/96   Pulse:  97   Resp: 16   Temp: 98.2 °F (36.8 °C)        Physical Exam   Constitutional: She appears well-developed. No distress.   HENT:   Head: Normocephalic and atraumatic.   Eyes: Conjunctivae are normal. No scleral icterus.   Pulmonary/Chest: Effort normal.   Neurological: She is alert.   Skin: She is not diaphoretic.   Psychiatric: Her behavior is normal.   Depressed mood   Vitals reviewed.      Assessment:       1. Depression, unspecified depression type    2. Grieving        Plan:       Erma was seen today for anxiety and dizziness.    Diagnoses and all orders for this visit:    Depression, unspecified depression type    Grieving    Normal process of grieving was reviewed. I was encouraged that patient has begun to delegate some of the estate affairs to others to lessen the burden on herself, and that she wants to return to work. I offered counseling or mediccation support which she declined.      Will keep close follow up. No Si or HI        Return in about 2 weeks (around 4/7/2017) for anxiety, grieving .        Pt verbalized understanding and agreed with our plan.     At least 25 minutes were spent today with the patient in the office, which more than 50% of the time was spent on evaluation and counseling regarding The primary encounter diagnosis was Depression, unspecified depression type. A diagnosis of Grieving was also pertinent to this visit..

## 2017-04-04 ENCOUNTER — TELEPHONE (OUTPATIENT)
Dept: HEPATOLOGY | Facility: CLINIC | Age: 57
End: 2017-04-04

## 2017-04-07 ENCOUNTER — OFFICE VISIT (OUTPATIENT)
Dept: FAMILY MEDICINE | Facility: CLINIC | Age: 57
End: 2017-04-07
Payer: COMMERCIAL

## 2017-04-07 VITALS
DIASTOLIC BLOOD PRESSURE: 84 MMHG | BODY MASS INDEX: 28.95 KG/M2 | RESPIRATION RATE: 16 BRPM | TEMPERATURE: 98 F | HEART RATE: 85 BPM | WEIGHT: 169.56 LBS | HEIGHT: 64 IN | SYSTOLIC BLOOD PRESSURE: 120 MMHG | OXYGEN SATURATION: 98 %

## 2017-04-07 DIAGNOSIS — F43.21 GRIEVING: Primary | ICD-10-CM

## 2017-04-07 DIAGNOSIS — F41.9 ANXIETY: ICD-10-CM

## 2017-04-07 PROCEDURE — 3079F DIAST BP 80-89 MM HG: CPT | Mod: S$GLB,,, | Performed by: FAMILY MEDICINE

## 2017-04-07 PROCEDURE — 1160F RVW MEDS BY RX/DR IN RCRD: CPT | Mod: S$GLB,,, | Performed by: FAMILY MEDICINE

## 2017-04-07 PROCEDURE — 3074F SYST BP LT 130 MM HG: CPT | Mod: S$GLB,,, | Performed by: FAMILY MEDICINE

## 2017-04-07 PROCEDURE — 99214 OFFICE O/P EST MOD 30 MIN: CPT | Mod: S$GLB,,, | Performed by: FAMILY MEDICINE

## 2017-04-07 PROCEDURE — 99999 PR PBB SHADOW E&M-EST. PATIENT-LVL III: CPT | Mod: PBBFAC,,, | Performed by: FAMILY MEDICINE

## 2017-04-07 RX ORDER — AMLODIPINE BESYLATE 10 MG/1
TABLET ORAL
COMMUNITY
Start: 2017-04-01 | End: 2017-04-07 | Stop reason: SDUPTHER

## 2017-04-07 NOTE — MR AVS SNAPSHOT
[facility]  [address]  [place] [address]  Phone: [phone]  Fax: [phone]                  Erma Linda   2017 8:40 AM   Office Visit    Description:  Female : 1960   Provider:  Sabino Burton MD   Department:  [facility]           Reason for Visit     Anxiety     Grieving           Diagnoses this Visit        Comments    Grieving    -  Primary     Anxiety                To Do List           Goals (5 Years of Data)     None      Follow-Up and Disposition     Return if symptoms worsen or fail to improve.      [facility] On Call     [facility] On Call Nurse Care Line -  Assistance  Unless otherwise directed by your provider, please contact [facility], our nurse care line that is available for  assistance.     Registered nurses in the [facility] On Call Center provide: appointment scheduling, clinical advisement, health education, and other advisory services.  Call: [phone] (toll free)               Medications           Message regarding Medications     Verify the changes and/or additions to your medication regime listed below are the same as discussed with your clinician today.  If any of these changes or additions are incorrect, please notify your healthcare provider.        STOP taking these medications     gabapentin (NEURONTIN) 300 MG capsule Take 1 capsule (300 mg total) by mouth 2 (two) times daily.           Verify that the below list of medications is an accurate representation of the medications you are currently taking.  If none reported, the list may be blank. If incorrect, please contact your healthcare provider. Carry this list with you in case of emergency.           Current Medications     amlodipine (NORVASC) 10 MG tablet Take 1 tablet (10 mg total) by mouth once daily.    clobetasol 0.05% (TEMOVATE) 0.05 % Oint Apply topically 2 (two) times daily.    conjugated estrogens (PREMARIN) vaginal cream Place 0.5 g  "vaginally twice a week.    hydrochlorothiazide (HYDRODIURIL) 25 MG tablet Take 1 tablet (25 mg total) by mouth once daily.    lisinopril (PRINIVIL,ZESTRIL) 40 MG tablet Take 1 tablet (40 mg total) by mouth once daily.    naproxen (NAPROSYN) 500 MG tablet Take 1 tablet (500 mg total) by mouth 2 (two) times daily with meals.    trazodone (DESYREL) 50 MG tablet Take 1 tablet (50 mg total) by mouth every evening.           Clinical Reference Information           Your Vitals Were     BP Pulse Temp Resp Height Weight    120/84 (BP Location: Left arm, Patient Position: Sitting, BP Method: Manual) 85 97.8 °F (36.6 °C) (Oral) 16 5' 4" (1.626 m) 76.9 kg (169 lb 8.5 oz)    SpO2 BMI             98% 29.1 kg/m2         Blood Pressure          Most Recent Value    BP  120/84      Allergies as of 4/7/2017     Hydralazine Analogues      Immunizations Administered on Date of Encounter - 4/7/2017     None      Language Assistance Services     ATTENTION: Language assistance services are available, free of charge. Please call [phone].      ATENCIÓN: Si michelle brantley, tiene a becerra disposición servicios gratuitos de asistencia lingüística. Llame al [phone].     HEATH Ý: N?u b?n nói Ti?ng Vi?t, có các d?ch v? h? tr? ngôn ng? mi?n phí dành cho b?n. G?i s? [phone].         [facility] complies with applicable Federal civil rights laws and does not discriminate on the basis of race, color, national origin, age, disability, or sex.        "

## 2017-04-07 NOTE — PROGRESS NOTES
Subjective:       Patient ID: Erma Linda is a 57 y.o. female.    Chief Complaint: Anxiety (2 weeks follow up ) and Grieving    HPI     Anxiety and Grieving -  Pt has gone back to work, and she is doing much better. Work has helped to distract her in a positive way. She has plenty of support throught friends and family.     She is here today for [facility] paperwork to be filled out for the time that she missed.     Pt is no longer taking trazodone nightly, but on a prn basis.           Current Outpatient Prescriptions on File Prior to Visit   Medication Sig Dispense Refill    amlodipine (NORVASC) 10 MG tablet Take 1 tablet (10 mg total) by mouth once daily. 90 tablet 1    clobetasol 0.05% (TEMOVATE) 0.05 % Oint Apply topically 2 (two) times daily. 30 g 3    conjugated estrogens (PREMARIN) vaginal cream Place 0.5 g vaginally twice a week. 45 g 1    hydrochlorothiazide (HYDRODIURIL) 25 MG tablet Take 1 tablet (25 mg total) by mouth once daily. 90 tablet 1    lisinopril (PRINIVIL,ZESTRIL) 40 MG tablet Take 1 tablet (40 mg total) by mouth once daily. 90 tablet 1    naproxen (NAPROSYN) 500 MG tablet Take 1 tablet (500 mg total) by mouth 2 (two) times daily with meals. 30 tablet 0    trazodone (DESYREL) 50 MG tablet Take 1 tablet (50 mg total) by mouth every evening. 30 tablet 11    [DISCONTINUED] gabapentin (NEURONTIN) 300 MG capsule Take 1 capsule (300 mg total) by mouth 2 (two) times daily. 60 capsule 0    [DISCONTINUED] lisinopril-hydrochlorothiazide (PRINZIDE,ZESTORETIC) 10-12.5 mg per tablet TAKE TWO TABLETS BY MOUTH EVERY DAY 60 tablet 0     No current facility-administered medications on file prior to visit.        Review of Systems    Objective:     Vitals:    04/07/17 0850   BP: 120/84   Pulse: 85   Resp: 16   Temp: 97.8 °F (36.6 °C)        Physical Exam    Assessment:       1. Grieving    2. Anxiety        Plan:       Erma was seen today for anxiety and grieving.    Diagnoses and all orders for  this visit:    Overall, pt is doing well as can be expected after such a tragic loss. She is back at work and is surrounded by a healthy support system. No other intervention needed today.     Continue trazodone prn.     FMLA paperwork filled out today.     Grieving    Anxiety          Return if symptoms worsen or fail to improve.        Pt verbalized understanding and agreed with our plan.     At least 25 minutes were spent today with the patient in the office, which more than 50% of the time was spent on evaluation and counseling regarding The primary encounter diagnosis was Grieving. A diagnosis of Anxiety was also pertinent to this visit..

## 2017-04-11 ENCOUNTER — TELEPHONE (OUTPATIENT)
Dept: FAMILY MEDICINE | Facility: CLINIC | Age: 57
End: 2017-04-11

## 2017-04-11 NOTE — TELEPHONE ENCOUNTER
----- Message from Arianna Wheatley sent at 4/11/2017 10:13 AM CDT -----  Patient is calling regarding FMLA forms. There is some information she needs added. Please call patient at [phone] Thank you!

## 2017-04-11 NOTE — TELEPHONE ENCOUNTER
Pt was seen 4/7/17, [place] papers were [place]; pt states section for diagnosis wasn't filled out; I informed her Dr Burton is on vacation this week and will be back Monday; pt verbalized understanding; states she will bring paperwork in on monday

## 2017-07-07 ENCOUNTER — OFFICE VISIT (OUTPATIENT)
Dept: FAMILY MEDICINE | Facility: CLINIC | Age: 57
End: 2017-07-07
Payer: COMMERCIAL

## 2017-07-07 ENCOUNTER — LAB VISIT (OUTPATIENT)
Dept: LAB | Facility: HOSPITAL | Age: 57
End: 2017-07-07
Attending: FAMILY MEDICINE
Payer: COMMERCIAL

## 2017-07-07 VITALS
RESPIRATION RATE: 16 BRPM | BODY MASS INDEX: 29.13 KG/M2 | SYSTOLIC BLOOD PRESSURE: 136 MMHG | HEART RATE: 88 BPM | OXYGEN SATURATION: 98 % | HEIGHT: 64 IN | WEIGHT: 170.63 LBS | DIASTOLIC BLOOD PRESSURE: 86 MMHG | TEMPERATURE: 99 F

## 2017-07-07 DIAGNOSIS — R07.89 CHEST TIGHTNESS: ICD-10-CM

## 2017-07-07 DIAGNOSIS — I10 BENIGN ESSENTIAL HYPERTENSION: ICD-10-CM

## 2017-07-07 DIAGNOSIS — R53.1 WEAKNESS: Primary | ICD-10-CM

## 2017-07-07 DIAGNOSIS — R11.0 NAUSEA: ICD-10-CM

## 2017-07-07 DIAGNOSIS — B18.2 HEP C W/O COMA, CHRONIC: ICD-10-CM

## 2017-07-07 DIAGNOSIS — I10 ESSENTIAL HYPERTENSION, BENIGN: ICD-10-CM

## 2017-07-07 DIAGNOSIS — R53.1 WEAKNESS: ICD-10-CM

## 2017-07-07 LAB
ALBUMIN SERPL BCP-MCNC: 3.4 G/DL
ALP SERPL-CCNC: 86 U/L
ALT SERPL W/O P-5'-P-CCNC: 74 U/L
ANION GAP SERPL CALC-SCNC: 11 MMOL/L
AST SERPL-CCNC: 75 U/L
BASOPHILS # BLD AUTO: 0.06 K/UL
BASOPHILS NFR BLD: 0.7 %
BILIRUB SERPL-MCNC: 0.4 MG/DL
BUN SERPL-MCNC: 13 MG/DL
CALCIUM SERPL-MCNC: 10.2 MG/DL
CHLORIDE SERPL-SCNC: 104 MMOL/L
CHOLEST/HDLC SERPL: 3 {RATIO}
CO2 SERPL-SCNC: 28 MMOL/L
CREAT SERPL-MCNC: 1 MG/DL
DIFFERENTIAL METHOD: ABNORMAL
EOSINOPHIL # BLD AUTO: 0.2 K/UL
EOSINOPHIL NFR BLD: 2.6 %
ERYTHROCYTE [DISTWIDTH] IN BLOOD BY AUTOMATED COUNT: 13.5 %
EST. GFR  (AFRICAN AMERICAN): >60 ML/MIN/1.73 M^2
EST. GFR  (NON AFRICAN AMERICAN): >60 ML/MIN/1.73 M^2
GLUCOSE SERPL-MCNC: 76 MG/DL
HCT VFR BLD AUTO: 38.8 %
HDL/CHOLESTEROL RATIO: 33.3 %
HDLC SERPL-MCNC: 180 MG/DL
HDLC SERPL-MCNC: 60 MG/DL
HGB BLD-MCNC: 12.5 G/DL
LDLC SERPL CALC-MCNC: 105.6 MG/DL
LYMPHOCYTES # BLD AUTO: 2.4 K/UL
LYMPHOCYTES NFR BLD: 28.8 %
MCH RBC QN AUTO: 30.3 PG
MCHC RBC AUTO-ENTMCNC: 32.2 %
MCV RBC AUTO: 94 FL
MONOCYTES # BLD AUTO: 1.1 K/UL
MONOCYTES NFR BLD: 13 %
NEUTROPHILS # BLD AUTO: 4.5 K/UL
NEUTROPHILS NFR BLD: 54.5 %
NONHDLC SERPL-MCNC: 120 MG/DL
PLATELET # BLD AUTO: 269 K/UL
PMV BLD AUTO: 11 FL
POTASSIUM SERPL-SCNC: 4.2 MMOL/L
PROT SERPL-MCNC: 9.2 G/DL
RBC # BLD AUTO: 4.12 M/UL
SODIUM SERPL-SCNC: 143 MMOL/L
TRIGL SERPL-MCNC: 72 MG/DL
WBC # BLD AUTO: 8.23 K/UL

## 2017-07-07 PROCEDURE — 80061 LIPID PANEL: CPT

## 2017-07-07 PROCEDURE — 93010 ELECTROCARDIOGRAM REPORT: CPT | Mod: S$GLB,,, | Performed by: INTERNAL MEDICINE

## 2017-07-07 PROCEDURE — 93005 ELECTROCARDIOGRAM TRACING: CPT | Mod: S$GLB,,, | Performed by: FAMILY MEDICINE

## 2017-07-07 PROCEDURE — 87522 HEPATITIS C REVRS TRNSCRPJ: CPT

## 2017-07-07 PROCEDURE — 99214 OFFICE O/P EST MOD 30 MIN: CPT | Mod: PBBFAC,PO | Performed by: PHYSICIAN ASSISTANT

## 2017-07-07 PROCEDURE — 81002 URINALYSIS NONAUTO W/O SCOPE: CPT | Mod: S$GLB,,, | Performed by: PHYSICIAN ASSISTANT

## 2017-07-07 PROCEDURE — 85025 COMPLETE CBC W/AUTO DIFF WBC: CPT

## 2017-07-07 PROCEDURE — 99999 PR PBB SHADOW E&M-EST. PATIENT-LVL IV: CPT | Mod: PBBFAC,,, | Performed by: PHYSICIAN ASSISTANT

## 2017-07-07 PROCEDURE — 36415 COLL VENOUS BLD VENIPUNCTURE: CPT | Mod: PO

## 2017-07-07 PROCEDURE — 99214 OFFICE O/P EST MOD 30 MIN: CPT | Mod: S$GLB,,, | Performed by: PHYSICIAN ASSISTANT

## 2017-07-07 PROCEDURE — 80053 COMPREHEN METABOLIC PANEL: CPT

## 2017-07-07 RX ORDER — LISINOPRIL 40 MG/1
40 TABLET ORAL DAILY
Qty: 90 TABLET | Refills: 1 | Status: SHIPPED | OUTPATIENT
Start: 2017-07-07 | End: 2018-05-03 | Stop reason: SDUPTHER

## 2017-07-07 RX ORDER — AMLODIPINE BESYLATE 10 MG/1
10 TABLET ORAL DAILY
Qty: 90 TABLET | Refills: 1 | Status: SHIPPED | OUTPATIENT
Start: 2017-07-07 | End: 2018-05-03 | Stop reason: SDUPTHER

## 2017-07-07 RX ORDER — HYDROCHLOROTHIAZIDE 25 MG/1
25 TABLET ORAL DAILY
Qty: 90 TABLET | Refills: 1 | Status: SHIPPED | OUTPATIENT
Start: 2017-07-07 | End: 2018-05-03 | Stop reason: SDUPTHER

## 2017-07-07 RX ORDER — ONDANSETRON 4 MG/1
4 TABLET, FILM COATED ORAL 2 TIMES DAILY
Qty: 30 TABLET | Refills: 0 | Status: SHIPPED | OUTPATIENT
Start: 2017-07-07 | End: 2017-09-05

## 2017-07-07 NOTE — PROGRESS NOTES
Subjective:       Patient ID: Erma Linda is a 57 y.o. female.    Chief Complaint: Dizziness (x5 days with chest tightness and headache )    Dizziness:   Chronicity:  New  Onset:  In the past 7 days (3 days)  Frequency:  Constantly  Dizziness characteristics: weakness, tired.   Associated symptoms: headaches, nausea, weakness and visual disturbances.no fever, no panic, no facial weakness, no slurred speech, no numbness in extremities and no chest pain.  Aggravated by:  Nothing  Treatments tried: NSAIDs.no head trauma, no head trauma and no environmental allergies.    Review of Systems   Constitutional: Positive for fatigue. Negative for fever.   Cardiovascular: Negative for chest pain.   Gastrointestinal: Positive for nausea.   Allergic/Immunologic: Negative for environmental allergies.   Neurological: Positive for dizziness, weakness and headaches.   Psychiatric/Behavioral: Positive for sleep disturbance. The patient is nervous/anxious.        Objective:      Physical Exam   Constitutional: She appears well-developed and well-nourished.   HENT:   Head: Normocephalic and atraumatic.   Eyes: Conjunctivae and EOM are normal. Pupils are equal, round, and reactive to light.   Cardiovascular: Normal rate and regular rhythm.    No murmur heard.  Pulmonary/Chest: Effort normal and breath sounds normal. She exhibits no tenderness.   Abdominal: Soft. Bowel sounds are normal. She exhibits no distension. There is no tenderness.   Neurological: She displays a negative Romberg sign. Coordination and gait normal.   Tongue midline, facial expressions equal bilaterally,  normal heel to shin,      Skin: Skin is warm and dry. She is not diaphoretic.   Psychiatric: She has a normal mood and affect. Her behavior is normal.   Vitals reviewed.      Assessment:       1. Weakness    2. Chest tightness    3. Nausea    4. Benign essential hypertension        Plan:         Erma was seen today for dizziness.    Diagnoses and all  orders for this visit:    Weakness  -     POCT URINE DIPSTICK WITHOUT MICROSCOPE  -     IN OFFICE EKG 12-LEAD (to Muse)  -     Comprehensive metabolic panel; Future  -     CBC auto differential; Future  -     Normal physical exam. No orthostatic hypotension. Will assess labs. Pt advised to go to ED over the weekend if symptoms worsen or she may go to [facility] if things change  -     Advised pt to take trazodone for sleep, she refused needing medication for anxiety     Chest tightness  -     IN OFFICE EKG 12-LEAD (to Muse), no change from prior     Nausea  -     ondansetron (ZOFRAN) 4 MG tablet; Take 1 tablet (4 mg total) by mouth 2 (two) times daily as needed    Benign essential hypertension  -     amlodipine (NORVASC) 10 MG tablet; Take 1 tablet (10 mg total) by mouth once daily.  -     hydrochlorothiazide (HYDRODIURIL) 25 MG tablet; Take 1 tablet (25 mg total) by mouth once daily.  -     lisinopril (PRINIVIL,ZESTRIL) 40 MG tablet; Take 1 tablet (40 mg total) by mouth once daily.  -     Refills today, controlled

## 2017-07-10 ENCOUNTER — TELEPHONE (OUTPATIENT)
Dept: FAMILY MEDICINE | Facility: CLINIC | Age: 57
End: 2017-07-10

## 2017-07-10 ENCOUNTER — LAB VISIT (OUTPATIENT)
Dept: LAB | Facility: HOSPITAL | Age: 57
End: 2017-07-10
Attending: FAMILY MEDICINE
Payer: COMMERCIAL

## 2017-07-10 DIAGNOSIS — R77.9 ELEVATED SERUM PROTEIN LEVEL: Primary | ICD-10-CM

## 2017-07-10 DIAGNOSIS — R77.9 ELEVATED SERUM PROTEIN LEVEL: ICD-10-CM

## 2017-07-10 DIAGNOSIS — R82.998 URINE LEUKOCYTES: Primary | ICD-10-CM

## 2017-07-10 LAB
BILIRUB SERPL-MCNC: ABNORMAL MG/DL
BLOOD URINE, POC: ABNORMAL
COLOR, POC UA: ABNORMAL
GLUCOSE UR QL STRIP: NORMAL
KETONES UR QL STRIP: ABNORMAL
LEUKOCYTE ESTERASE URINE, POC: ABNORMAL
NITRITE, POC UA: ABNORMAL
PH, POC UA: 7
PROTEIN, POC: ABNORMAL
SPECIFIC GRAVITY, POC UA: 1.01
UROBILINOGEN, POC UA: 1

## 2017-07-10 PROCEDURE — 84165 PROTEIN E-PHORESIS SERUM: CPT | Mod: 26,,, | Performed by: PATHOLOGY

## 2017-07-10 PROCEDURE — 36415 COLL VENOUS BLD VENIPUNCTURE: CPT | Mod: PO

## 2017-07-10 PROCEDURE — 84165 PROTEIN E-PHORESIS SERUM: CPT

## 2017-07-10 NOTE — TELEPHONE ENCOUNTER
Pt returned call; informed of results and recommendations; verbalized understanding; will be in today to do labs; pt states she is still having symptoms but it has improved

## 2017-07-10 NOTE — TELEPHONE ENCOUNTER
Please inform pt her protein level is elevated, further lab work is needed. Also her liver enzymes have elevated further, recommend f/u with her hepatologist. Verify if patient is still having symptoms. She is not anemic

## 2017-07-11 ENCOUNTER — TELEPHONE (OUTPATIENT)
Dept: FAMILY MEDICINE | Facility: CLINIC | Age: 57
End: 2017-07-11

## 2017-07-11 LAB
ALBUMIN SERPL ELPH-MCNC: 3.73 G/DL
ALPHA1 GLOB SERPL ELPH-MCNC: 0.42 G/DL
ALPHA2 GLOB SERPL ELPH-MCNC: 1.08 G/DL
B-GLOBULIN SERPL ELPH-MCNC: 1.09 G/DL
GAMMA GLOB SERPL ELPH-MCNC: 2.38 G/DL
PATHOLOGIST INTERPRETATION SPE: NORMAL
PROT SERPL-MCNC: 8.7 G/DL

## 2017-07-11 NOTE — TELEPHONE ENCOUNTER
----- Message from Kaylie Rasmussen sent at 7/11/2017  3:37 PM CDT -----  Contact: self  Pt returning call. Please call [phone]... Thanks

## 2017-07-11 NOTE — TELEPHONE ENCOUNTER
----- Message from Alma Mosqueda PA-C sent at 7/11/2017  2:10 PM CDT -----  Protein levels abnormal Dr. Soria recommends referral to hematology/oncology

## 2017-07-12 LAB
HCV LOG: 5.8 LOG (10) IU/ML
HCV RNA QUANT PCR: ABNORMAL IU/ML
HCV, QUALITATIVE: DETECTED IU/ML

## 2017-07-13 ENCOUNTER — TELEPHONE (OUTPATIENT)
Dept: FAMILY MEDICINE | Facility: CLINIC | Age: 57
End: 2017-07-13

## 2017-07-13 NOTE — TELEPHONE ENCOUNTER
..Patient notified of results as noted below , patient verbalized understanding. I gave patient number to hepatology to schedule

## 2017-07-13 NOTE — TELEPHONE ENCOUNTER
----- Message from Alma Mosqueda PA-C sent at 7/13/2017  7:57 AM CDT -----  Patient needs to get in with hepatology. Liver function is worsening, needs consult for Hep C treatment. Looks like they tried calling her multiple times to schedule

## 2017-07-17 ENCOUNTER — TELEPHONE (OUTPATIENT)
Dept: HEPATOLOGY | Facility: CLINIC | Age: 57
End: 2017-07-17

## 2017-07-17 DIAGNOSIS — B18.2 HEP C W/O COMA, CHRONIC: Primary | ICD-10-CM

## 2017-07-17 NOTE — TELEPHONE ENCOUNTER
----- Message from Selena Jeffers RN sent at 7/14/2017  8:42 AM CDT -----  Calling to schedule appt please call

## 2017-07-17 NOTE — TELEPHONE ENCOUNTER
Chart reviewed. HCV RNA and genotype 1a noted. CBC and CMP done 7/7. Pt was initially referred in 8/2015. Pre-visit tests were done but pt was a no show for consult. (She was also a no show in 2012). Another referral was received in 3/2017 but I was unable to reach pt.    Returned call to pt today about scheduling consult.  U/s, fibroscan, and clinic visit scheduled on 8/11. Reminder mailed.

## 2017-08-02 ENCOUNTER — LAB VISIT (OUTPATIENT)
Dept: LAB | Facility: HOSPITAL | Age: 57
End: 2017-08-02
Attending: FAMILY MEDICINE
Payer: COMMERCIAL

## 2017-08-02 ENCOUNTER — NURSE TRIAGE (OUTPATIENT)
Dept: ADMINISTRATIVE | Facility: CLINIC | Age: 57
End: 2017-08-02

## 2017-08-02 ENCOUNTER — OFFICE VISIT (OUTPATIENT)
Dept: FAMILY MEDICINE | Facility: CLINIC | Age: 57
End: 2017-08-02
Payer: COMMERCIAL

## 2017-08-02 VITALS
HEART RATE: 80 BPM | TEMPERATURE: 98 F | OXYGEN SATURATION: 99 % | SYSTOLIC BLOOD PRESSURE: 146 MMHG | DIASTOLIC BLOOD PRESSURE: 84 MMHG

## 2017-08-02 DIAGNOSIS — R07.9 LEFT SIDED CHEST PAIN: ICD-10-CM

## 2017-08-02 DIAGNOSIS — R07.9 LEFT SIDED CHEST PAIN: Primary | ICD-10-CM

## 2017-08-02 DIAGNOSIS — R07.89 CHEST WALL PAIN: Primary | ICD-10-CM

## 2017-08-02 LAB
ALBUMIN SERPL BCP-MCNC: 3.6 G/DL
ALP SERPL-CCNC: 81 U/L
ALT SERPL W/O P-5'-P-CCNC: 56 U/L
ANION GAP SERPL CALC-SCNC: 8 MMOL/L
AST SERPL-CCNC: 52 U/L
BASOPHILS # BLD AUTO: 0.04 K/UL
BASOPHILS NFR BLD: 0.5 %
BILIRUB SERPL-MCNC: 0.5 MG/DL
BUN SERPL-MCNC: 12 MG/DL
CALCIUM SERPL-MCNC: 10.3 MG/DL
CHLORIDE SERPL-SCNC: 104 MMOL/L
CO2 SERPL-SCNC: 32 MMOL/L
CREAT SERPL-MCNC: 0.9 MG/DL
D DIMER PPP IA.FEU-MCNC: 0.32 MG/L FEU
DIFFERENTIAL METHOD: NORMAL
EOSINOPHIL # BLD AUTO: 0.1 K/UL
EOSINOPHIL NFR BLD: 1.9 %
ERYTHROCYTE [DISTWIDTH] IN BLOOD BY AUTOMATED COUNT: 13.4 %
EST. GFR  (AFRICAN AMERICAN): >60 ML/MIN/1.73 M^2
EST. GFR  (NON AFRICAN AMERICAN): >60 ML/MIN/1.73 M^2
GLUCOSE SERPL-MCNC: 76 MG/DL
HCT VFR BLD AUTO: 38.8 %
HGB BLD-MCNC: 12.4 G/DL
LYMPHOCYTES # BLD AUTO: 2.2 K/UL
LYMPHOCYTES NFR BLD: 29.4 %
MCH RBC QN AUTO: 30.3 PG
MCHC RBC AUTO-ENTMCNC: 32 G/DL
MCV RBC AUTO: 95 FL
MONOCYTES # BLD AUTO: 0.9 K/UL
MONOCYTES NFR BLD: 11.7 %
NEUTROPHILS # BLD AUTO: 4.2 K/UL
NEUTROPHILS NFR BLD: 56.2 %
PLATELET # BLD AUTO: 276 K/UL
PMV BLD AUTO: 10.2 FL
POTASSIUM SERPL-SCNC: 4.1 MMOL/L
PROT SERPL-MCNC: 9.8 G/DL
RBC # BLD AUTO: 4.09 M/UL
SODIUM SERPL-SCNC: 144 MMOL/L
TROPONIN I SERPL DL<=0.01 NG/ML-MCNC: <0.006 NG/ML
WBC # BLD AUTO: 7.41 K/UL

## 2017-08-02 PROCEDURE — 99213 OFFICE O/P EST LOW 20 MIN: CPT | Mod: PBBFAC,PN,25 | Performed by: FAMILY MEDICINE

## 2017-08-02 PROCEDURE — 99999 PR PBB SHADOW E&M-EST. PATIENT-LVL III: CPT | Mod: PBBFAC,,, | Performed by: FAMILY MEDICINE

## 2017-08-02 PROCEDURE — 93010 ELECTROCARDIOGRAM REPORT: CPT | Mod: S$GLB,,, | Performed by: INTERNAL MEDICINE

## 2017-08-02 PROCEDURE — 99214 OFFICE O/P EST MOD 30 MIN: CPT | Mod: S$GLB,,, | Performed by: FAMILY MEDICINE

## 2017-08-02 PROCEDURE — 85379 FIBRIN DEGRADATION QUANT: CPT

## 2017-08-02 PROCEDURE — 93005 ELECTROCARDIOGRAM TRACING: CPT | Mod: PBBFAC,PN | Performed by: FAMILY MEDICINE

## 2017-08-02 PROCEDURE — 36415 COLL VENOUS BLD VENIPUNCTURE: CPT

## 2017-08-02 PROCEDURE — 85025 COMPLETE CBC W/AUTO DIFF WBC: CPT

## 2017-08-02 PROCEDURE — 80053 COMPREHEN METABOLIC PANEL: CPT

## 2017-08-02 PROCEDURE — 84484 ASSAY OF TROPONIN QUANT: CPT

## 2017-08-02 RX ORDER — METHYLPREDNISOLONE 4 MG/1
TABLET ORAL
Qty: 1 PACKAGE | Refills: 0 | Status: SHIPPED | OUTPATIENT
Start: 2017-08-02 | End: 2017-08-25

## 2017-08-02 RX ORDER — TIZANIDINE 4 MG/1
4 TABLET ORAL EVERY 6 HOURS PRN
Qty: 30 TABLET | Refills: 0 | Status: SHIPPED | OUTPATIENT
Start: 2017-08-02 | End: 2017-08-12

## 2017-08-02 NOTE — PROGRESS NOTES
Routine Office Visit    Patient Name: Erma Linda    : 1960  MRN: [id number]    Subjective:  Erma is a 57 y.o. female who presents today for:    1. Chest pain  Patient presenting with 5 hours of continuous left sided chest pain that started at 6AM this morning when she reached for juice.  She has not had any diaphoresis, shortness of breath, weakness, or numbness.  She states that the pain is made worse with deep inspiration and will actually bring her to tears.  She has not taken anything to relieve the pain.  She did not go to be evaluated in the ED.  She has listed in her medications that she is on estrogen cream, but she states she never took the medication.     Past Medical History  Past Medical History:   Diagnosis Date    Hypertension        Past Surgical History  Past Surgical History:   Procedure Laterality Date     SECTION, CLASSIC      KNEE ARTHROSCOPY W/ LASER      R    TUBAL LIGATION         Family History  Family History   Problem Relation Age of Onset    Heart disease Mother     Glaucoma Neg Hx        Social History  Social History     Social History    Marital status:      Spouse name: N/A    Number of children: N/A    Years of education: N/A     Occupational History    Not on file.     Social History Main Topics    Smoking status: Never Smoker    Smokeless tobacco: Never Used    Alcohol use No    Drug use: No    Sexual activity: Not Currently     Partners: Male     Birth control/ protection: Post-menopausal      Comment: 16  with same partner 35 years      Other Topics Concern    Not on file     Social History Narrative    No narrative on file       Current Medications  Current Outpatient Prescriptions on File Prior to Visit   Medication Sig Dispense Refill    amlodipine (NORVASC) 10 MG tablet Take 1 tablet (10 mg total) by mouth once daily. 90 tablet 1    clobetasol 0.05% (TEMOVATE) 0.05 % Oint Apply topically 2 (two) times daily. 30 g  3    hydrochlorothiazide (HYDRODIURIL) 25 MG tablet Take 1 tablet (25 mg total) by mouth once daily. 90 tablet 1    lisinopril (PRINIVIL,ZESTRIL) 40 MG tablet Take 1 tablet (40 mg total) by mouth once daily. 90 tablet 1    naproxen (NAPROSYN) 500 MG tablet Take 1 tablet (500 mg total) by mouth 2 (two) times daily with meals. 30 tablet 0    ondansetron (ZOFRAN) 4 MG tablet Take 1 tablet (4 mg total) by mouth 2 (two) times daily. 30 tablet 0    trazodone (DESYREL) 50 MG tablet Take 1 tablet (50 mg total) by mouth every evening. 30 tablet 11    [DISCONTINUED] conjugated estrogens (PREMARIN) vaginal cream Place 0.5 g vaginally twice a week. 45 g 1    [DISCONTINUED] lisinopril-hydrochlorothiazide (PRINZIDE,ZESTORETIC) 10-12.5 mg per tablet TAKE TWO TABLETS BY MOUTH EVERY DAY 60 tablet 0     No current facility-administered medications on file prior to visit.        Allergies   Review of patient's allergies indicates:   Allergen Reactions    Hydralazine analogues Palpitations       Review of Systems (Pertinent positives)  Review of Systems   Constitutional: Negative.    HENT: Negative.    Eyes: Negative.    Respiratory: Negative.    Cardiovascular: Positive for chest pain.   Gastrointestinal: Negative.    Skin: Negative.    Neurological: Negative.          BP (!) 146/84 (BP Location: Left arm, Patient Position: Sitting, BP Method: Manual)   Pulse 80   Temp 98.2 °F (36.8 °C) (Oral)     GENERAL APPEARANCE: in no apparent distress and well developed and well nourished  HEENT: PERRL, EOMI, Sclera clear, anicteric, Oropharynx clear, no lesions, Neck supple with midline trachea  NECK: normal, supple, no adenopathy, thyroid normal in size  RESPIRATORY: appears well, vitals normal, no respiratory distress, acyanotic, normal RR, chest clear, no wheezing, crepitations, rhonchi, normal symmetric air entry  HEART: regular rate and rhythm, S1, S2 normal, no murmur, click, rub or gallop.    ABDOMEN: abdomen is soft without  tenderness, no masses, no hernias, no organomegaly, no rebound, no guarding. Suprapubic tenderness absent. No CVA tenderness.  MS:  No pain on palpation of back, side or chest; pain with deep inspiration  SKIN: no rashes, no wounds, no other lesions  PSYCH: Alert, oriented x 3, thought content appropriate, speech normal, pleasant and cooperative, good eye contact, well groomed    Assessment/Plan:  Erma Linda is a 57 y.o. female who presents today for :    Erma was seen today for chest pain.    Diagnoses and all orders for this visit:    Left sided chest pain  -     EKG 12-lead  -     CBC auto differential; Future  -     Comprehensive metabolic panel; Future  -     D dimer, quantitative; Future  -     Cancel: CTA Chest Non Coronary; Future  -     Troponin I; Future      1.  EKG shows no acute changes when compared with previous EKG's  2.  Labs to be done today  3.  CT discussed, but patient would rather do labs first  4.  VSS  5.  Patient told that labs and imaging were needed to make sure nothing serious was going on  7.  She is to go to the ED should symptoms worsen.  Patient was not pleased that I could not give her pain medication in the office.  I explained to her that I had to r/o the more critical causes of her pain before giving her something that could mask symptoms      Arley Maynard MD

## 2017-08-02 NOTE — TELEPHONE ENCOUNTER
"   Started in chest this am when bent over. Left side of breast hurting. Took aspirin. easied up after thirty minutes. Unsure if above or bwlow nipple line. Took naproxen.     Reason for Disposition   [1] Chest pain lasts > 5 minutes AND [2] described as crushing, pressure-like, or heavy    Answer Assessment - Initial Assessment Questions  1. LOCATION: "Where does it hurt?"        Chest feels tight. No SOB. Hurts with movmt.   2. RADIATION: "Does the pain go anywhere else?" (e.g., into neck, jaw, arms, back)      Radiated to back   3. ONSET: "When did the chest pain begin?" (Minutes, hours or days)      0635  4. PATTERN "Does the pain come and go, or has it been constant since it started?"  "Does it get worse with exertion?"      Back pain now constant  5. DURATION: "How long does it last" (e.g., seconds, minutes, hours)     CP constant   6. SEVERITY: "How bad is the pain?"  (e.g., Scale 1-10; mild, moderate, or severe)     - MILD (1-3): doesn't interfere with normal activities      - MODERATE (4-7): interferes with normal activities or awakens from sleep     - SEVERE (8-10): excruciating pain, unable to do any normal activities       10   7. CARDIAC RISK FACTORS: "Do you have any history of heart problems or risk factors for heart disease?" (e.g., prior heart attack, angina; high blood pressure, diabetes, being overweight, high cholesterol, smoking, or strong family history of heart disease)     HBP, parents and sibs pblm with heart   8. PULMONARY RISK FACTORS: "Do you have any history of lung disease?"  (e.g., blood clots in lung, asthma, emphysema, birth control pills)    No   9. CAUSE: "What do you think is causing the chest pain?"     Unsure   10. OTHER SYMPTOMS: "Do you have any other symptoms?" (e.g., dizziness, nausea, vomiting, sweating, fever, difficulty breathing, cough)     Lightheaded. Dizzy    Protocols used: ST CHEST PAIN-A-AH  rec EMS due to crushing CP, level 10. pt states that her  can " drive her over to ER. Call back with questions.

## 2017-08-03 ENCOUNTER — TELEPHONE (OUTPATIENT)
Dept: FAMILY MEDICINE | Facility: CLINIC | Age: 57
End: 2017-08-03

## 2017-08-03 DIAGNOSIS — R77.9 ELEVATED SERUM PROTEIN LEVEL: Primary | ICD-10-CM

## 2017-08-03 NOTE — TELEPHONE ENCOUNTER
I see a referral for the hepatologist that is scheduled 8/11/2017. I don't see a referral for Hem.

## 2017-08-03 NOTE — TELEPHONE ENCOUNTER
----- Message from Yajaira Soria MD sent at 7/24/2017 10:23 AM CDT -----  Please verify if hematology referral scheduled

## 2017-08-03 NOTE — TELEPHONE ENCOUNTER
----- Message from Arianna Wheatley sent at 8/2/2017  3:47 PM CDT -----  Patient would like to speak with nurse regarding labs. Please call at [phone] thank you!

## 2017-08-03 NOTE — TELEPHONE ENCOUNTER
----- Message from Arley Maynard MD sent at 8/2/2017  2:38 PM CDT -----  Please let patient know that all of her labs were normal.  I will send in a prescription for her pain.    Thanks,  Dr. Maynard

## 2017-08-11 ENCOUNTER — TELEPHONE (OUTPATIENT)
Dept: HEPATOLOGY | Facility: CLINIC | Age: 57
End: 2017-08-11

## 2017-08-11 ENCOUNTER — OFFICE VISIT (OUTPATIENT)
Dept: HEPATOLOGY | Facility: CLINIC | Age: 57
End: 2017-08-11
Payer: COMMERCIAL

## 2017-08-11 ENCOUNTER — PROCEDURE VISIT (OUTPATIENT)
Dept: HEPATOLOGY | Facility: CLINIC | Age: 57
End: 2017-08-11
Attending: INTERNAL MEDICINE
Payer: COMMERCIAL

## 2017-08-11 ENCOUNTER — HOSPITAL ENCOUNTER (OUTPATIENT)
Dept: RADIOLOGY | Facility: HOSPITAL | Age: 57
Discharge: HOME | End: 2017-08-11
Attending: INTERNAL MEDICINE
Payer: COMMERCIAL

## 2017-08-11 VITALS
WEIGHT: 168.88 LBS | TEMPERATURE: 98 F | SYSTOLIC BLOOD PRESSURE: 146 MMHG | BODY MASS INDEX: 28.83 KG/M2 | HEART RATE: 95 BPM | HEIGHT: 64 IN | OXYGEN SATURATION: 100 % | DIASTOLIC BLOOD PRESSURE: 65 MMHG

## 2017-08-11 DIAGNOSIS — B18.2 HEP C W/O COMA, CHRONIC: ICD-10-CM

## 2017-08-11 DIAGNOSIS — Z71.89 ENCOUNTER FOR MEDICATION COUNSELING: Primary | ICD-10-CM

## 2017-08-11 DIAGNOSIS — B18.2 CHRONIC HEPATITIS C WITHOUT HEPATIC COMA: Primary | ICD-10-CM

## 2017-08-11 DIAGNOSIS — B18.2 CHRONIC HEPATITIS C WITHOUT HEPATIC COMA: ICD-10-CM

## 2017-08-11 PROCEDURE — 3008F BODY MASS INDEX DOCD: CPT | Mod: S$GLB,,, | Performed by: PHYSICIAN ASSISTANT

## 2017-08-11 PROCEDURE — 91200 LIVER ELASTOGRAPHY: CPT | Mod: S$GLB,,, | Performed by: PHYSICIAN ASSISTANT

## 2017-08-11 PROCEDURE — 3077F SYST BP >= 140 MM HG: CPT | Mod: S$GLB,,, | Performed by: PHYSICIAN ASSISTANT

## 2017-08-11 PROCEDURE — 99204 OFFICE O/P NEW MOD 45 MIN: CPT | Mod: 25,S$GLB,, | Performed by: PHYSICIAN ASSISTANT

## 2017-08-11 PROCEDURE — 99999 PR PBB SHADOW E&M-EST. PATIENT-LVL III: CPT | Mod: PBBFAC,,, | Performed by: PHYSICIAN ASSISTANT

## 2017-08-11 PROCEDURE — 76700 US EXAM ABDOM COMPLETE: CPT | Mod: 26,,, | Performed by: RADIOLOGY

## 2017-08-11 PROCEDURE — 76700 US EXAM ABDOM COMPLETE: CPT | Mod: TC

## 2017-08-11 PROCEDURE — 3078F DIAST BP <80 MM HG: CPT | Mod: S$GLB,,, | Performed by: PHYSICIAN ASSISTANT

## 2017-08-11 RX ORDER — LEDIPASVIR AND SOFOSBUVIR 90; 400 MG/1; MG/1
1 TABLET, FILM COATED ORAL DAILY
Qty: 28 TABLET | Refills: 2 | Status: SHIPPED | OUTPATIENT
Start: 2017-08-11 | End: 2017-09-01 | Stop reason: SDUPTHER

## 2017-08-11 NOTE — LETTER
August 11, 2017      Yajaira Soria MD  [address] [facility] [place] [address]           [facility]  [address]  [facility] [address]  Phone: [phone]  Fax: [phone]          Patient: Erma Linda   MR Number: [id number]   YOB: 1960   Date of Visit: 8/11/2017       Dear Dr. Yajaira Soria:    Thank you for referring Erma Linda to me for evaluation. Attached you will find relevant portions of my assessment and plan of care.    If you have questions, please do not hesitate to call me. I look forward to following Erma Linda along with you.    Sincerely,    Socorro Sebastian PA-C    Enclosure  CC:  No Recipients    If you would like to receive this communication electronically, please contact [email or web address] or [phone] to request more information on National Institutes of Health (NIH) Link access.    For providers and/or their staff who would like to refer a patient to [facility], please contact us through our one-stop-shop provider referral line, [facility], at [phone].    If you feel you have received this communication in error or would no longer like to receive these types of communications, please e-mail [email or web address]

## 2017-08-11 NOTE — PROGRESS NOTES
HEPATOLOGY CLINIC VISIT NOTE    REFERRING PROVIDER:  Yajaira Soria MD    CHIEF COMPLAINT: Hepatitis C    HISTORY: Patient is a 57 y.o. who has been re-referred to hepatology by her PCP.  Referred in 2015, but never seen.  She reports she has known about her Hepatitis C for a long while, but no liver evaluation until now.  Pt has treatment naive Genotype 1a Chronic Hepatitis C w/ viral load = 625,357 IU/mL.  Fibroscan done today suggests mild scarring at F2 fibrosis.  Fibrosure  in 2015 = F0-1.   Abdominal Ultrasound today is unremarkable.  Recent labs show mildly elevated transaminases and normal synthetic liver function (no INR)     Alcohol assessment:  No & no history of heavy drinking in the past.     Pt has no complaints today and feels well he denies fevers, weight change,  lymphadenopathy,  rashes, joint pains, dark urine, abdominal fluid accumulation, yellowing/jaundice of skin or eyes, vomiting of blood, black stool, confusion or  tremors.      Hepatitis C risk factors:   --h/o blood transfusion: no   --IVDrug use:  No   --nasal drug use:  No   --tattoos: no  --contact with anyone known to have HCV:   had hep C and is cured.   --hi risk occupation:  No      PMH/PSH/Social history:  Entered into epic  Past Medical History:   Diagnosis Date    Hypertension      Past Surgical History:   Procedure Laterality Date     SECTION, CLASSIC      KNEE ARTHROSCOPY W/ LASER      R    TUBAL LIGATION       Social   Lives w/  and 2 daughters (27 & 28)      FHX:   No fhx of liver cancer or liver disease.     ALLERGIES AND MEDICATIONS: reviewed in epic    ROS: see HPI     PE:  WDWN, NAD  Alert, oriented and pleasant.   There were no vitals filed for this visit.  HEENT: ncat, eomi, no scleral icterus; normal rom of neck, no lymphadenopathy  Heart: regular rate and rhythm  Lungs: clear bilaterally, chest symmetric with respirations. No wheezes rhonchi or rales.   Abdomen: + bs, ntnd. No  organomegaly  Neuro/psych: no asterixis, oriented x3, mood and affect appropriate.   Skin: warm and dry, no c/c/e. No mccormick erythema.  No spider telangectasia     RECENT LABS:  Lab Results   Component Value Date    WBC 7.41 2017    HGB 12.4 2017     2017    AST 52 (H) 2017    ALT 56 (H) 2017    CREATININE 0.9 2017    BILITOT 0.5 2017    INR 1.0 2016    ALBUMIN 3.6 2017     No results found for: HEPAIGG    Hepatitis B Surface Ag   Date Value Ref Range Status   2012 Negative Negative Final     Comment:     If further testing is needed, please contact the Blood Bank  within 3 days.     No results found for: HEPBCAB  No results found for: HEPBSAB    There is no immunization history on file for this patient.    Results for CHAD SMITH (MRN [id number]) as of 2017 10:34   Ref. Range 2012 08:04 2015 08:40 2015 08:41 2017 15:33   HCV Log Latest Ref Range: <1.08 Log (10) IU/mL  5.89 (H)  5.80 (H)   HCV RNA Quant PCR Latest Ref Range: <12 IU/mL  771,798 (H)  625,357 (H)   HCV, Qualitative Latest Ref Range: Not detected IU/mL  DETECTED (A)  DETECTED (A)   Hepatitis C Genotype Unknown 1a      Hepatitis C RNA-PCR Latest Ref Range: NEGATIVE  POSITIVE (A)        RECENT IMAGING:  Impression       Normal exam.    ______________________________________     Electronically signed by resident: SURAJ ROBLES MD  Date: 17  Time: 10:01     PROCEDURES:   Procedures Fibroscan Procedure      Name: Chad Smith  Date of Procedure : 2017   :: Socorro Sebastian PA-C  Diagnosis: HCV     Probe: M     Fibroscan readin.8 KPa     Fibrosis:F2      CAP readin dB/m     Steatosis: < S1          Electronically signed by Socorro Sebastian PA-C at 2017 10:30 AM      ASSESSMENT: 58yo female w/   1. CHRONIC HEPATITIS C, GENOTYPE 1a, tx naive   hcvrna quant = 625,357 IU/mL   normal synthetic liver function (no INR) & elevated  transaminases   Fibroscan = F2  Duration of infection:  Unclear   Concurrent alcohol use: none                    EDUCATION DISCUSSION    The natural history of Hepatitis C, including potential progression to cirrhosis was reviewed.   We discussed the increased progression of liver disease secondary to alcohol use; patient was advised to avoid alcohol completely.   Transmission of Hepatitis C was reviewed, including possible sexual transmission. Sexual contacts should be screened.   Risk of vertical transmission of Hepatitis C from mother to baby was reviewed. Children should be screened.   Patient should avoid sharing personal products such as razors, toothbrushes, etc.   We reviewed that vaccination against Hepatitis A and Hepatitis B is recommended if patient does not already have immunity.  Recommend avoiding raw seafood.  Limit acetaminophen to 2000mg daily.     This pt is interested in pursuing Hep C treatment and  I think is a great candidate. particularly in light of their co-morbidities     Today we reviewed the following basic information about the regimen  Harvoni (combination pill of 2 DAA's) for genotype 1 Hepatitis C   Discussed that HCV tx will not reverse cirrhosis  Dosing:  once daily,  with or without food, same time each day, compliance w/ dosing highly stressed!  Duration of tx:  12  weeks.   Discussed SVR rates and relapse rates    Most common side effects in clinical trials: fatigue, headache, nausea, diarrhea and insomina.   Discussed must discuss w/ us before using any medications for heartburn--OTC or prescribed.   -- Antacids - must be  from Harvoni by 4 hours  --Herbal / alternative therapies must be avoided    We reviewed the Patient information in the [employer] PI and  was given a copy of this as well.     We discussed that medications will be sent to pharmacy here at [facility] and they will request insurance authorization.    Discussed this process could take 2-3 weeks.    Pharmacist will notify when they here back from insurance.   If request for treatment is approved:   --Pt will contact me before starting so that labs and f/u can be arranged and appropriate times.   --Labs 6 weeks after starting treatment and wk 12.      Cure is defined when Hepatitis C remains negative 3 months following end of treatment      PLAN:   1. Request insurance authorization for HCV treatment w/ Harvoni 12 week regimen  --pt is given handout on harvoni  --request pharmacy to review for DDI  &  Notify us of insurance decision   --if treatment approved, pt will contact me back w/ treatment start date so appropriate labs can be ordered.   --labs will be arranged at week 6 & 12 of treatment (standing orders entered)  2. Labs due INR,  HIV (consented) HAVIGG, HBsAg, HBsAb, HepBcore Ab-- Vaccines for Hepatitis A and B will be recommended if not immune  3. F/U appt: next week to complete PREP C     Thank you for your kind consult, will keep you updated on our progress.

## 2017-08-11 NOTE — PROCEDURES
Procedures Fibroscan Procedure     Name: Erma Linda  Date of Procedure : 2017   :: Socorro Sebastian PA-C  Diagnosis: HCV    Probe: M    Fibroscan readin.8 KPa    Fibrosis:F2     CAP readin dB/m    Steatosis: < S1

## 2017-08-11 NOTE — TELEPHONE ENCOUNTER
Please call pt.   Recommend vaccines for Hep B, pls arrange to start on day of clinic visit---she should be returning to see me next week to do prepC   hiv screen negative.

## 2017-08-14 ENCOUNTER — EPISODE CHANGES (OUTPATIENT)
Dept: HEPATOLOGY | Facility: CLINIC | Age: 57
End: 2017-08-14

## 2017-08-14 ENCOUNTER — TELEPHONE (OUTPATIENT)
Dept: PHARMACY | Facility: CLINIC | Age: 57
End: 2017-08-14

## 2017-08-14 DIAGNOSIS — B18.2 CHRONIC HEPATITIS C WITHOUT HEPATIC COMA: ICD-10-CM

## 2017-08-14 NOTE — TELEPHONE ENCOUNTER
Message from provider mailed to patient.  Vaccine series scheduled as ordered; reminder notices mailed.

## 2017-08-16 NOTE — TELEPHONE ENCOUNTER
Per Elvia/BR:     1)  Erma Maddi; MRN [id number]  *Called [facility] on 8/15/17 @ [phone] @ 3:44 PM  *PA submitted verbally; spoke with Slime  *Case ID # [id number]  *Case is pending for 12 weeks, gave additional information for 12 weeks of treatment  *[facility] fax number given for all communication

## 2017-08-22 NOTE — TELEPHONE ENCOUNTER
Socorro,    Specialty Pharmacy has requested authorization for Harvoni x 12 weeks for Ms Linda which has been denied. She HAS however been approved for 8 weeks course treatment through 10/10/17. In order to receive 12 weeks course treatment, her plan requires pretreatment VL >6million.      Please advise on how you wish to proceed.      Mateo Mata, PharmD, [facility]  [facility]  [phone]

## 2017-08-24 DIAGNOSIS — Z12.11 COLON CANCER SCREENING: ICD-10-CM

## 2017-08-25 ENCOUNTER — OFFICE VISIT (OUTPATIENT)
Dept: OPTOMETRY | Facility: CLINIC | Age: 57
End: 2017-08-25
Payer: COMMERCIAL

## 2017-08-25 DIAGNOSIS — H16.142 SPK (SUPERFICIAL PUNCTATE KERATITIS), LEFT: ICD-10-CM

## 2017-08-25 DIAGNOSIS — H02.889 MEIBOMIAN GLAND DYSFUNCTION: Primary | ICD-10-CM

## 2017-08-25 PROCEDURE — 99999 PR PBB SHADOW E&M-EST. PATIENT-LVL II: CPT | Mod: PBBFAC,,, | Performed by: OPTOMETRIST

## 2017-08-25 PROCEDURE — 92012 INTRM OPH EXAM EST PATIENT: CPT | Mod: S$GLB,,, | Performed by: OPTOMETRIST

## 2017-08-25 RX ORDER — ERYTHROMYCIN 5 MG/G
OINTMENT OPHTHALMIC NIGHTLY
Qty: 3.5 G | Refills: 2 | Status: SHIPPED | OUTPATIENT
Start: 2017-08-25 | End: 2017-09-01

## 2017-08-25 NOTE — PATIENT INSTRUCTIONS
MEIBOMITIS    Your eyes look dry today. Your eyes are dry not because you're missing the watery portion of your tear film but because you're missing an oily component. The oil component keeps the tears from evaporating and provides stability to the tears.    This portion of the tears is produced by the Meibomian glands which are located just behind the base of the eyelashes. Your Meibomian glands are clogged because of a condition called Meibomitis. Meibomitis is caused by chronic inflammation inside the glands thought to be a reaction to the normal bacteria that live on your skin.     As this is a chronic condition the goal of treatment is to reduce your symptoms and the inflammation under control. The initial treatment is as follows:     - Warm Compresses: Heat a wash cloth by running it under hot water. Then hold this wash cloth over your closed eyelids and allow your eyelids to absorb the heat. After 20-30 seconds once the wash cloth cools down you'll need to heat it up again.  (An alternative heat source is a gel pack or microwavable eye mask, warmed in the microwave or in a dish of water,  wrapped with a warm wet washcloth). You want to get 10 minutes of warmth to your eyelids, so if the compress feels cool before the end of 10 minutes you should reheat it. You should consistently do this 2 times a day.     - Artificial tears: Some good Artificial tear drops to try are Blink, Refresh Optive, Systane Balance, Thera Tears or Genteal. You should use these consistently 2-3 times a day more if you need them. It's important to use these when in breezy environments or when doing prolonged near work such as reading or computer. The goal is to prevent your eyes from drying rather than instilling them once your eye is already dry.     - Omega 3 Supplement: 1000 mg of good quality fish oil (labeled DHA and/or EPA).   Fish oil, flax seed oil or omega 3 supplements have found to be beneficial in Meibomitis and dry eye  syndrome. There are a lot of choices out there and not one single product has been shown to be more beneficial than others.    It usually takes 1-2 months of treatment before you'll notice a difference. However some will continue to have problems even with this therapy. If you continue to have problems please let me know.

## 2017-08-25 NOTE — TELEPHONE ENCOUNTER
Appeal is submitted  Urgent request; response is pending    Mateo Mata, PharmD, [facility]  [facility]  [phone]

## 2017-08-25 NOTE — PROGRESS NOTES
HPI     Ms. Erma Linda is here for an urgent visit (possible foreign   body OS)    She has foreign body sensation OS for the past few hours, started while   she was reading at work. She rubbed her eye and symptoms worsened, now   experiencing pain 6/10 severity and light sensitivity. She does not   remember getting anything in her eye. Pt states for relief pt put a few   drops of Visine in OS.    Pt also c/o blurry near vision in the last 2 weeks while she was taking a   z-pack pt states she had to increase her OTC reader power to +3.00.    +Drops: Visine used only once today OS   +Pain score level 6 OS  -Tearing  +Sensitivity to light OS  -Flashes  -Floaters    OCULAR HISTORY  Last Eye Exam 02/22/17 with Dr. Vargas   (-)eye surgery   H/O SPK (superficial punctate keratitis), left  Hypertensive retinopathy OU  Cataracts OU  Cup to disc asymmetry (OD>OS)    FAMILY HISTORY  (-)Glaucoma none         Last edited by Dagmar Vargas, OD on 8/25/2017 12:14 PM. (History)            Assessment /Plan     For exam results, see Encounter Report.    Meibomian gland dysfunction  SPK (superficial punctate keratitis), left   Bilateral. Capped glands and SPK OS cause of foreign body sensation. Recommended pt re-start oil-based artificial tears TID-QID OU. Also start warm compresses for 10 minutes BID OU, and Erythromycin ointment QHS-BID OS for a few days (discontinue when symptoms resolve). RTC 2 weeks for f/u, or RTC if symptoms have not improved in a few days.    -     erythromycin (ROMYCIN) ophthalmic ointment; Place into the left eye every evening.  Dispense: 3.5 g; Refill: 2      RTC 2 weeks for f/u and refraction (scheduled for 09/15/17)

## 2017-08-31 NOTE — TELEPHONE ENCOUNTER
DOCUMENTATION ONLY  [employer] appeal overturned on 08/31/2017 x 12 weeks.  Approval date: 08/15/2017 to 11/06/2017  PA# [id number] & [id number]  Co-pay: $150    Preferred Specialty Pharmacy:   [facility]:   Telephone: [phone] or [phone]  Fax: [phone]    **Forward to patient assistance for co-pay**

## 2017-09-01 ENCOUNTER — EPISODE CHANGES (OUTPATIENT)
Dept: HEPATOLOGY | Facility: CLINIC | Age: 57
End: 2017-09-01

## 2017-09-01 RX ORDER — LEDIPASVIR AND SOFOSBUVIR 90; 400 MG/1; MG/1
1 TABLET, FILM COATED ORAL DAILY
Qty: 28 TABLET | Refills: 2 | Status: SHIPPED | OUTPATIENT
Start: 2017-09-01 | End: 2018-02-01 | Stop reason: ALTCHOICE

## 2017-09-01 RX ORDER — LEDIPASVIR AND SOFOSBUVIR 90; 400 MG/1; MG/1
1 TABLET, FILM COATED ORAL DAILY
Qty: 28 TABLET | Refills: 1 | Status: SHIPPED | OUTPATIENT
Start: 2017-09-01 | End: 2017-09-01 | Stop reason: SDUPTHER

## 2017-09-01 NOTE — TELEPHONE ENCOUNTER
[employer] Pay Card  $5.00  Effective 9-1-17 though 3-1-18  ID [id number]  RXBIN [id number]  [employer]  [facility] [id number]

## 2017-09-05 RX ORDER — MULTIVITAMIN
1 TABLET ORAL DAILY
COMMUNITY

## 2017-09-05 NOTE — TELEPHONE ENCOUNTER
Initial Harvoni 90/400mg consult completed on 17. Harvoni 90/400mg will be shipped on date to be determined by [facility] to arrive at patient's home $5 copay. Patient will start Harvoni 90/400mg on date to be determined. Address confirmed, CC on file. Confirmed 2 patient identifiers - name and . Therapy Appropriate.    Harvoni- Take one tablet by mouth daily x 12 weeks  Counseling was reviewed:   1. Patient MUST take Harvoni at the SAME time every day.   2. Patient MUST avoid acid reducers without consulting with myself or provider first. Antacids are to be spaced out at least 4 hours apart from Harvoni.    3. Side effects include headaches and fatigue.   Headache: Patient may treat with OTC remedies. If Tylenol is used, dose should  not exceed 2000mg per day.    DDI: Medication list reviewed and potential DDIs addressed. No DDIs or allergies noted. Patient MUST contact myself or provider prior to starting any new OTC, herbal, or prescription drugs to avoid potential DDIs.    Discussed the importance of staying well hydrated while on therapy. Compliance stressed - patient to take missed doses as soon as remembered, but NOT to take 2 doses in one day. Patient will report questions or concerns to myself or practitioner. Patient verbalizes understanding. Patient plans to start Harvoni on date to be determined. Consultation included: indication; goals of treatment; administration; storage and handling; side effects; how to handle side effects; the importance of compliance; how to handle missed doses; the importance of laboratory monitoring; the importance of keeping all follow up appointments.  Patient understands to report any medication changes to [facility] and provider. All questions answered and addressed to patients satisfaction.   Patient is aware to contact provider office for final clearance prior to medication initiation.      Mateo Mata, Tamela, [facility]  [facility]  [phone]

## 2017-09-06 ENCOUNTER — EPISODE CHANGES (OUTPATIENT)
Dept: HEPATOLOGY | Facility: CLINIC | Age: 57
End: 2017-09-06

## 2017-09-06 ENCOUNTER — TELEPHONE (OUTPATIENT)
Dept: HEPATOLOGY | Facility: CLINIC | Age: 57
End: 2017-09-06

## 2017-09-06 NOTE — TELEPHONE ENCOUNTER
----- Message from Liseth Sanders sent at 9/6/2017 10:37 AM CDT -----  Contact: Pt  can be reached at [phone]  Pt is calling to reschedule her appt.  Sorry it showed no schedules available.      Thank you!

## 2017-09-07 ENCOUNTER — OFFICE VISIT (OUTPATIENT)
Dept: HEPATOLOGY | Facility: CLINIC | Age: 57
End: 2017-09-07
Payer: COMMERCIAL

## 2017-09-07 VITALS
DIASTOLIC BLOOD PRESSURE: 68 MMHG | RESPIRATION RATE: 18 BRPM | WEIGHT: 169.06 LBS | HEIGHT: 64 IN | OXYGEN SATURATION: 99 % | TEMPERATURE: 98 F | SYSTOLIC BLOOD PRESSURE: 140 MMHG | HEART RATE: 103 BPM | BODY MASS INDEX: 28.86 KG/M2

## 2017-09-07 DIAGNOSIS — B18.2 CHRONIC HEPATITIS C WITHOUT HEPATIC COMA: Primary | ICD-10-CM

## 2017-09-07 DIAGNOSIS — Z71.89 ENCOUNTER FOR MEDICATION COUNSELING: ICD-10-CM

## 2017-09-07 PROCEDURE — 99999 PR PBB SHADOW E&M-EST. PATIENT-LVL III: CPT | Mod: PBBFAC,,, | Performed by: PHYSICIAN ASSISTANT

## 2017-09-07 PROCEDURE — 3008F BODY MASS INDEX DOCD: CPT | Mod: S$GLB,,, | Performed by: PHYSICIAN ASSISTANT

## 2017-09-07 PROCEDURE — 3078F DIAST BP <80 MM HG: CPT | Mod: S$GLB,,, | Performed by: PHYSICIAN ASSISTANT

## 2017-09-07 PROCEDURE — 99213 OFFICE O/P EST LOW 20 MIN: CPT | Mod: S$GLB,,, | Performed by: PHYSICIAN ASSISTANT

## 2017-09-07 PROCEDURE — 3077F SYST BP >= 140 MM HG: CPT | Mod: S$GLB,,, | Performed by: PHYSICIAN ASSISTANT

## 2017-09-07 NOTE — PROGRESS NOTES
HEPATOLOGY CLINIC VISIT NOTE    REFERRING PROVIDER:  Yajaira Soria MD    CHIEF COMPLAINT: Hepatitis C    HISTORY: Patient is a 57 y.o. AA female  who has been re-referred to hepatology by her PCP.  Referred in 2015, but never seen.  She reports she has known about her Hepatitis C for a long while, but no liver evaluation until now.  Pt has treatment naive Genotype 1a Chronic Hepatitis C w/ viral load = 625,357 IU/mL.  Fibroscan done at last visit suggests mild scarring at F2 fibrosis.  Fibrosure  in 2015 = F0-1.   Recent Abdominal Ultrasound unremarkable.  Recent labs show mildly elevated transaminases and normal synthetic liver function.      Alcohol assessment:  No & no history of heavy drinking in the past.     We had requested 12 wks of Harvoni, but only approved for 8wks, so appeal was written on her behalf and decision was overturned approving 12 weeks.  She is here today to complete for medication discussion and to complete  PREP C.  The Rx has been faxed to [facility] specialty pharmacy.      She  has no complaints today and feels well he denies fevers, weight change,  lymphadenopathy,  rashes, joint pains, dark urine, abdominal fluid accumulation, yellowing/jaundice of skin or eyes, vomiting of blood, black stool, confusion or  tremors.      Hepatitis C risk factors:   --h/o blood transfusion: no   --IVDrug use:  No   --nasal drug use:  No   --tattoos: no  --contact with anyone known to have HCV:   had hep C and is cured.   --hi risk occupation:  No      PMH/PSH/Social history:  Entered into epic  Past Medical History:   Diagnosis Date    Hypertension      Past Surgical History:   Procedure Laterality Date     SECTION, CLASSIC      KNEE ARTHROSCOPY W/ LASER      R    TUBAL LIGATION       Social   Lives w/  and 2 daughters (27 & 28)      FHX:   No fhx of liver cancer or liver disease.     ALLERGIES AND MEDICATIONS: reviewed in epic    ROS: see HPI     PE:  MARIBELN, NAD  Alert,  oriented and pleasant.   Vitals:    17 0911   BP: (!) 140/68   Pulse: 103   Resp: 18   Temp: 97.6 °F (36.4 °C)   HEENT: ncat, eomi, no scleral icterus; normal rom of neck  Lungs:  chest symmetric with respirations.   Neuro/psych: no asterixis, oriented x3, mood and affect appropriate.   Skin: warm and dry, no c/c/e. No mccormick erythema.  No spider telangectasia     RECENT LABS:  Lab Results   Component Value Date    WBC 7.41 2017    HGB 12.4 2017     2017    AST 52 (H) 2017    ALT 56 (H) 2017    CREATININE 0.9 2017    BILITOT 0.5 2017    INR 1.0 2017    ALBUMIN 3.6 2017     Hepatitis A Antibody IgG   Date Value Ref Range Status   2017 Positive (A)  Final       Hepatitis B Surface Ag   Date Value Ref Range Status   2017 Negative  Final     Hep B Core Total Ab   Date Value Ref Range Status   2017 Negative  Final     Hep B S Ab   Date Value Ref Range Status   2017 Negative  Final       There is no immunization history on file for this patient.    Results for CHAD SMITH (MRN [id number]) as of 2017 10:34   Ref. Range 2012 08:04 2015 08:40 2015 08:41 2017 15:33   HCV Log Latest Ref Range: <1.08 Log (10) IU/mL  5.89 (H)  5.80 (H)   HCV RNA Quant PCR Latest Ref Range: <12 IU/mL  771,798 (H)  625,357 (H)   HCV, Qualitative Latest Ref Range: Not detected IU/mL  DETECTED (A)  DETECTED (A)   Hepatitis C Genotype Unknown 1a      Hepatitis C RNA-PCR Latest Ref Range: NEGATIVE  POSITIVE (A)        RECENT IMAGING:  Impression       Normal exam.    ______________________________________     Electronically signed by resident: SURAJ ROBLES MD  Date: 17  Time: 10:01     PROCEDURES:   Procedures Fibroscan Procedure      Name: Chad Smith  Date of Procedure : 2017   :: Socorro Sebastian PA-C  Diagnosis: HCV     Probe: M     Fibroscan readin.8 KPa     Fibrosis:F2      CAP readin  dB/m     Steatosis: < S1          Electronically signed by Socorro Sebastian PA-C at 8/11/2017 10:30 AM      ASSESSMENT: 58yo female w/   1. CHRONIC HEPATITIS C, GENOTYPE 1a, tx naive   hcvrna quant = 625,357 IU/mL   normal synthetic liver function (no INR) & elevated transaminases   Fibroscan = F2  Has immunity to Hep A, but lacks immunity to Hep B   Duration of infection:  Unclear   Concurrent alcohol use: none                    EDUCATION DISCUSSION    Today we reviewed the following basic information about the regimen  Harvoni (combination pill of 2 DAA's) for genotype 1 Hepatitis C   Discussed that HCV tx will not reverse cirrhosis  Dosing:  once daily,  with or without food, same time each day, compliance w/ dosing highly stressed!  Duration of tx:  12  weeks.   Discussed SVR rates and relapse rates    Most common side effects in clinical trials: fatigue, headache, nausea, diarrhea and insomina.   Discussed must discuss w/ us before using any medications for heartburn--OTC or prescribed.   -- Antacids - must be  from Harvoni by 4 hours  --Herbal / alternative therapies must be avoided    We reviewed the Patient information in the Benjamin PI and  was given a copy of this as well.       --Pt will contact me before starting so that labs and f/u can be arranged and appropriate times.   --Labs 6 weeks after starting treatment and wk 12.      Cure is defined when Hepatitis C remains negative 3 months following end of treatment      We completed the Psychosocial Readiness Evaluation and Preparation for Hepatitis C treatment online today in clinic.(30minute evaluation)      Pt demonstrates treatment readiness and I strongly believe she will  adhere to the treatment regimen.      In my opinion, i have no reservations regarding moving forward with treatment and I am highly confident that she will go on to achieve sustained virologic response.     PLAN:   1.  Pt will call me when in receipt of medication.  (Benjamin was faxed to [facility] pharmacy, pt has the number)   --labs to be arranged at week 6 & 12 of treatment (standing orders in)   2.  Start and continue w/ vaccines for Hepatitis B   3. F/u appt 3 months out from treatment, sooner if needed.       Thank you for your kind consult, will keep you updated on our progress.

## 2017-09-08 ENCOUNTER — INITIAL CONSULT (OUTPATIENT)
Dept: HEMATOLOGY/ONCOLOGY | Facility: CLINIC | Age: 57
End: 2017-09-08
Payer: COMMERCIAL

## 2017-09-08 ENCOUNTER — LAB VISIT (OUTPATIENT)
Dept: LAB | Facility: HOSPITAL | Age: 57
End: 2017-09-08
Attending: INTERNAL MEDICINE
Payer: COMMERCIAL

## 2017-09-08 VITALS
BODY MASS INDEX: 29.18 KG/M2 | TEMPERATURE: 98 F | HEART RATE: 91 BPM | SYSTOLIC BLOOD PRESSURE: 126 MMHG | WEIGHT: 170 LBS | OXYGEN SATURATION: 99 % | DIASTOLIC BLOOD PRESSURE: 65 MMHG

## 2017-09-08 DIAGNOSIS — D89.0 POLYCLONAL HYPERGAMMAGLOBULINEMIA: Primary | ICD-10-CM

## 2017-09-08 DIAGNOSIS — D89.0 POLYCLONAL HYPERGAMMAGLOBULINEMIA: ICD-10-CM

## 2017-09-08 LAB
ALBUMIN SERPL BCP-MCNC: 3.5 G/DL
ALP SERPL-CCNC: 105 U/L
ALT SERPL W/O P-5'-P-CCNC: 58 U/L
ANION GAP SERPL CALC-SCNC: 11 MMOL/L
AST SERPL-CCNC: 62 U/L
BASOPHILS # BLD AUTO: 0.07 K/UL
BASOPHILS NFR BLD: 0.7 %
BILIRUB SERPL-MCNC: 0.4 MG/DL
BUN SERPL-MCNC: 18 MG/DL
CALCIUM SERPL-MCNC: 10.2 MG/DL
CHLORIDE SERPL-SCNC: 100 MMOL/L
CO2 SERPL-SCNC: 30 MMOL/L
CREAT SERPL-MCNC: 0.9 MG/DL
DIFFERENTIAL METHOD: ABNORMAL
EOSINOPHIL # BLD AUTO: 0.1 K/UL
EOSINOPHIL NFR BLD: 1.4 %
ERYTHROCYTE [DISTWIDTH] IN BLOOD BY AUTOMATED COUNT: 13.4 %
EST. GFR  (AFRICAN AMERICAN): >60 ML/MIN/1.73 M^2
EST. GFR  (NON AFRICAN AMERICAN): >60 ML/MIN/1.73 M^2
GLUCOSE SERPL-MCNC: 82 MG/DL
HCT VFR BLD AUTO: 39.2 %
HGB BLD-MCNC: 12.8 G/DL
LYMPHOCYTES # BLD AUTO: 2.8 K/UL
LYMPHOCYTES NFR BLD: 28.7 %
MCH RBC QN AUTO: 30.3 PG
MCHC RBC AUTO-ENTMCNC: 32.7 G/DL
MCV RBC AUTO: 93 FL
MONOCYTES # BLD AUTO: 1.1 K/UL
MONOCYTES NFR BLD: 11.7 %
NEUTROPHILS # BLD AUTO: 5.5 K/UL
NEUTROPHILS NFR BLD: 57.3 %
PLATELET # BLD AUTO: 281 K/UL
PMV BLD AUTO: 10.6 FL
POTASSIUM SERPL-SCNC: 3.6 MMOL/L
PROT SERPL-MCNC: 9.4 G/DL
RBC # BLD AUTO: 4.23 M/UL
SODIUM SERPL-SCNC: 141 MMOL/L
WBC # BLD AUTO: 9.59 K/UL

## 2017-09-08 PROCEDURE — 99204 OFFICE O/P NEW MOD 45 MIN: CPT | Mod: S$GLB,,, | Performed by: INTERNAL MEDICINE

## 2017-09-08 PROCEDURE — 3078F DIAST BP <80 MM HG: CPT | Mod: S$GLB,,, | Performed by: INTERNAL MEDICINE

## 2017-09-08 PROCEDURE — 82784 ASSAY IGA/IGD/IGG/IGM EACH: CPT | Mod: 59

## 2017-09-08 PROCEDURE — 84165 PROTEIN E-PHORESIS SERUM: CPT | Mod: 26,,, | Performed by: PATHOLOGY

## 2017-09-08 PROCEDURE — 99999 PR PBB SHADOW E&M-EST. PATIENT-LVL III: CPT | Mod: PBBFAC,,, | Performed by: INTERNAL MEDICINE

## 2017-09-08 PROCEDURE — 36415 COLL VENOUS BLD VENIPUNCTURE: CPT

## 2017-09-08 PROCEDURE — 83520 IMMUNOASSAY QUANT NOS NONAB: CPT

## 2017-09-08 PROCEDURE — 3008F BODY MASS INDEX DOCD: CPT | Mod: S$GLB,,, | Performed by: INTERNAL MEDICINE

## 2017-09-08 PROCEDURE — 85025 COMPLETE CBC W/AUTO DIFF WBC: CPT

## 2017-09-08 PROCEDURE — 84165 PROTEIN E-PHORESIS SERUM: CPT

## 2017-09-08 PROCEDURE — 3074F SYST BP LT 130 MM HG: CPT | Mod: S$GLB,,, | Performed by: INTERNAL MEDICINE

## 2017-09-08 PROCEDURE — 80053 COMPREHEN METABOLIC PANEL: CPT

## 2017-09-08 NOTE — PROGRESS NOTES
Subjective:       Patient ID: Erma Linda is a 57 y.o. female.    Chief Complaint: Follow-up    HPI   REASON FOR CONSULTATION; Abnormal SPEP  REFERRING  PHYSICIAN: Yajaira Soria MD    Patient is a 49-year-old female past medical history of HTN and  hepatitis C seen today in consultation for abnormal SPEP.  Patient is unaware of abnormal lab findings.  Patient initially thought she was here for follow-up for hepatology.  He is followed by hepatology at [facility] and Harvoni  therapy is planned.  She has no complaints today.  Appetite and weight are stable.  No fevers or night sweats.  No recent infections.  No history of anemia.  No kidney dysfunction.  She has never undergone a screening colonoscopy and is not interested in undergoing the procedure.  Her screening mammograms are up-to-date.  Review of laboratory data reveals abnormal SPEP dating back to at least  which reveals a polyclonal hypergammaglobulinemia. She  is here for further evaluation    Past Medical History:   Diagnosis Date    Hypertension          Past Surgical History:   Procedure Laterality Date     SECTION, CLASSIC      KNEE ARTHROSCOPY W/ LASER  2000    R    TUBAL LIGATION       Review of Systems   Constitutional: Negative for appetite change, fatigue, fever and unexpected weight change.   HENT: Negative for mouth sores and nosebleeds.    Eyes: Negative for visual disturbance.   Respiratory: Negative for cough and shortness of breath.    Cardiovascular: Negative for chest pain and leg swelling.   Gastrointestinal: Negative for abdominal pain, blood in stool, constipation, diarrhea, nausea and vomiting.   Genitourinary: Negative for frequency and hematuria.   Musculoskeletal: Negative for arthralgias and back pain.   Skin: Negative for rash.   Neurological: Negative for dizziness, seizures, weakness, light-headedness, numbness and headaches.   Hematological: Negative for adenopathy.   Psychiatric/Behavioral: The patient is  not nervous/anxious.        Objective:       Vitals:    09/08/17 1314   BP: 126/65   BP Location: Right arm   Patient Position: Sitting   BP Method: Medium (Automatic)   Pulse: 91   Temp: 98.3 °F (36.8 °C)   TempSrc: Oral   SpO2: 99%   Weight: 77.1 kg (169 lb 15.6 oz)       Physical Exam   Constitutional: She is oriented to person, place, and time. She appears well-developed and well-nourished.   HENT:   Head: Normocephalic.   Mouth/Throat: No oropharyngeal exudate.   Eyes: Conjunctivae and EOM are normal.   Neck: Normal range of motion. Neck supple.   Cardiovascular: Normal rate, regular rhythm, normal heart sounds and intact distal pulses.    No murmur heard.  Pulmonary/Chest: Breath sounds normal.   Abdominal: Soft. Bowel sounds are normal. She exhibits no distension. There is no tenderness.   Musculoskeletal: Normal range of motion. She exhibits no edema.   Neurological: She is alert and oriented to person, place, and time. No cranial nerve deficit.   Skin: Skin is warm and dry. No rash noted. No erythema.   Psychiatric: She has a normal mood and affect.       SPEP 7/10/2017   Increased total protein.   Increased gamma globulin, polyclonal  Results for CHAD SMITH (MRN [id number]) as of 9/17/2017 17:43   Ref. Range 8/2/2017 12:58   WBC Latest Ref Range: 3.90 - 12.70 K/uL 7.41   RBC Latest Ref Range: 4.00 - 5.40 M/uL 4.09   Hemoglobin Latest Ref Range: 12.0 - 16.0 g/dL 12.4   Hematocrit Latest Ref Range: 37.0 - 48.5 % 38.8   MCV Latest Ref Range: 82 - 98 fL 95   MCH Latest Ref Range: 27.0 - 31.0 pg 30.3   MCHC Latest Ref Range: 32.0 - 36.0 g/dL 32.0   RDW Latest Ref Range: 11.5 - 14.5 % 13.4   Platelets Latest Ref Range: 150 - 350 K/uL 276     Assessment:       1. Polyclonal hypergammaglobulinemia        Plan:    In summary, she is a 57-year-old past medical history of hypertension, chronic hepatitis C with a polyclonal gammopathy likely secondary to underlying chronic liver disease.  She has a  long-standing history of polyclonal gammopathy. Reactive and inflammatory processes are common causes. Liver disease is  the most common cause of polyclonal gammopathy.  Other less common causes including infections and hematological malignancies.   Plan testing today including SPEP, quantitative immunoglobulin CBC and CMP .  Patient will be notified of results.  Otherwise, no intervention recommended. She will  follow up prn. All  questions posed answered to patient's satisfaction    Thank you for allowing me to  participate in the care  of your patient    Cc: Yajaira Soria M.D.

## 2017-09-08 NOTE — LETTER
September 11, 2017      Yajaira Soria MD  [address] [place] [address]           [facility]  [address] [facility] [address] [place] [address]  Phone: [phone]          Patient: Erma Linda   MR Number: [id number]   YOB: 1960   Date of Visit: 9/8/2017       Dear Dr. Yajaira Soria:    Thank you for referring Erma Linda to me for evaluation. Attached you will find relevant portions of my assessment and plan of care.    If you have questions, please do not hesitate to call me. I look forward to following Erma Linda along with you.    Sincerely,    Oseas Jamil    Enclosure  CC:  No Recipients    If you would like to receive this communication electronically, please contact [email or web address] or [phone] to request more information on [employer] access.    For providers and/or their staff who would like to refer a patient to [facility], please contact us through our one-stop-shop provider referral line, Blossom Hagen, at [phone].    If you feel you have received this communication in error or would no longer like to receive these types of communications, please e-mail [email or web address]

## 2017-09-09 LAB
IGA SERPL-MCNC: 377 MG/DL
IGG SERPL-MCNC: 2339 MG/DL
IGM SERPL-MCNC: 189 MG/DL

## 2017-09-11 LAB
ALBUMIN SERPL ELPH-MCNC: 4 G/DL
ALPHA1 GLOB SERPL ELPH-MCNC: 0.45 G/DL
ALPHA2 GLOB SERPL ELPH-MCNC: 1.14 G/DL
B-GLOBULIN SERPL ELPH-MCNC: 1.14 G/DL
GAMMA GLOB SERPL ELPH-MCNC: 2.27 G/DL
KAPPA LC SER QL IA: 3.86 MG/DL
KAPPA LC/LAMBDA SER IA: 1.26
LAMBDA LC SER QL IA: 3.06 MG/DL
PATHOLOGIST INTERPRETATION SPE: NORMAL
PROT SERPL-MCNC: 9 G/DL

## 2017-09-15 ENCOUNTER — OFFICE VISIT (OUTPATIENT)
Dept: OPTOMETRY | Facility: CLINIC | Age: 57
End: 2017-09-15
Payer: COMMERCIAL

## 2017-09-15 ENCOUNTER — CLINICAL SUPPORT (OUTPATIENT)
Dept: INFECTIOUS DISEASES | Facility: CLINIC | Age: 57
End: 2017-09-15
Payer: COMMERCIAL

## 2017-09-15 DIAGNOSIS — H02.889 MEIBOMIAN GLAND DYSFUNCTION: Primary | ICD-10-CM

## 2017-09-15 DIAGNOSIS — B18.2 CHRONIC HEPATITIS C WITHOUT HEPATIC COMA: ICD-10-CM

## 2017-09-15 DIAGNOSIS — H16.143 SPK (SUPERFICIAL PUNCTATE KERATITIS), BILATERAL: ICD-10-CM

## 2017-09-15 PROCEDURE — 99999 PR PBB SHADOW E&M-EST. PATIENT-LVL II: CPT | Mod: PBBFAC,,, | Performed by: OPTOMETRIST

## 2017-09-15 PROCEDURE — 90746 HEPB VACCINE 3 DOSE ADULT IM: CPT | Mod: S$GLB,,, | Performed by: INTERNAL MEDICINE

## 2017-09-15 PROCEDURE — 92012 INTRM OPH EXAM EST PATIENT: CPT | Mod: S$GLB,,, | Performed by: OPTOMETRIST

## 2017-09-15 PROCEDURE — 90471 IMMUNIZATION ADMIN: CPT | Mod: S$GLB,,, | Performed by: INTERNAL MEDICINE

## 2017-09-15 NOTE — PROGRESS NOTES
HPI     Ms. Erma Linda is here for 2 week dry eye follow up and   refraction    Pt still c/o OU feeling scratchy and itchy OS>OD   Pt states that she disconiniued use of the erythromycin jorge because it   seem to edgard her itchy eye symptoms worse, Pt using the systane BID OU     Pt used ointment for about 1 week, but it caused itchiness.  Light sensitivity improved, but has not fully resolved.     +Drops: Systane Ultra BID OU  +Pain score level 3 OS only last visit at level 6  -Tearing  +Sensitivity to light OS has improved since last visit   -Flashes  -Floaters    OCULAR HISTORY  Last Eye Exam 08/25/17 with Dr. Vargas   (-)eye surgery   +H/O SPK (superficial punctate keratitis), left  +Hypertensive retinopathy OU  +Cataracts OU  +Cup to disc asymmetry (OD>OS)  +Meibomian gland dysfunction  +SPK     FAMILY HISTORY  (-)Glaucoma none         Last edited by Dagmar Vargas, OD on 9/15/2017 11:59 AM. (History)            Assessment /Plan     For exam results, see Encounter Report.    Meibomian gland dysfunction   Bilateral. Cause of superficial punctate keratitis OU (worsened vs last visit).    Recommended she switch to Systane Balance and increase to TID-QID OU. Also continue warm compresses for 10 minutes BID OU. Also recommended omega-3 supplement.   Pt defers refraction today due to dry eyes. Recheck in 1 month.      RTC 1 month for dry eye f/u and refraction (scheduled for 10/16/17)

## 2017-09-15 NOTE — PROGRESS NOTES
Pt received the Hepatitis B vaccination (the first dose in the series). Vaccination handout provided. Pt tolerated injection well. Return appts for the remainder of the series provided and printed. Pt left the unit in NAD.

## 2017-09-18 ENCOUNTER — EPISODE CHANGES (OUTPATIENT)
Dept: HEPATOLOGY | Facility: CLINIC | Age: 57
End: 2017-09-18

## 2017-09-19 ENCOUNTER — TELEPHONE (OUTPATIENT)
Dept: HEPATOLOGY | Facility: CLINIC | Age: 57
End: 2017-09-19

## 2017-09-19 NOTE — TELEPHONE ENCOUNTER
Pt w/ GT1a HCV   fibroscan = F2   Previously naive to tx  Starting harvoni 12wks  on  9/18/17     Rx sent to [facility]     Please arrange labs   Wk 6 --oct 27, cmp & hcv   Wk 12 -- dec 11, cmp & hcv     Reminders :   Take Harvoni once daily , same time each day   -- Antacids (TUMS/ ROLAIDS / OSCAR SELTZER)  - must be  from Harvoni by 4 hours  --Herbal / alternative therapies must be avoided  --notify us of any new medications

## 2017-10-27 ENCOUNTER — LAB VISIT (OUTPATIENT)
Dept: LAB | Facility: HOSPITAL | Age: 57
End: 2017-10-27
Attending: FAMILY MEDICINE
Payer: COMMERCIAL

## 2017-10-27 ENCOUNTER — EPISODE CHANGES (OUTPATIENT)
Dept: HEPATOLOGY | Facility: CLINIC | Age: 57
End: 2017-10-27

## 2017-10-27 DIAGNOSIS — B18.2 CHRONIC HEPATITIS C WITHOUT HEPATIC COMA: ICD-10-CM

## 2017-10-27 LAB
ALBUMIN SERPL BCP-MCNC: 3.4 G/DL
ALP SERPL-CCNC: 93 U/L
ALT SERPL W/O P-5'-P-CCNC: 17 U/L
ANION GAP SERPL CALC-SCNC: 11 MMOL/L
AST SERPL-CCNC: 20 U/L
BILIRUB SERPL-MCNC: 0.4 MG/DL
BUN SERPL-MCNC: 14 MG/DL
CALCIUM SERPL-MCNC: 10.4 MG/DL
CHLORIDE SERPL-SCNC: 102 MMOL/L
CO2 SERPL-SCNC: 27 MMOL/L
CREAT SERPL-MCNC: 1 MG/DL
EST. GFR  (AFRICAN AMERICAN): >60 ML/MIN/1.73 M^2
EST. GFR  (NON AFRICAN AMERICAN): >60 ML/MIN/1.73 M^2
GLUCOSE SERPL-MCNC: 96 MG/DL
POTASSIUM SERPL-SCNC: 3.5 MMOL/L
PROT SERPL-MCNC: 9.2 G/DL
SODIUM SERPL-SCNC: 140 MMOL/L

## 2017-10-27 PROCEDURE — 80053 COMPREHEN METABOLIC PANEL: CPT

## 2017-10-27 PROCEDURE — 87522 HEPATITIS C REVRS TRNSCRPJ: CPT

## 2017-10-27 PROCEDURE — 36415 COLL VENOUS BLD VENIPUNCTURE: CPT | Mod: PO

## 2017-11-02 ENCOUNTER — TELEPHONE (OUTPATIENT)
Dept: HEPATOLOGY | Facility: CLINIC | Age: 57
End: 2017-11-02

## 2017-11-02 DIAGNOSIS — B18.2 CHRONIC HEPATITIS C WITHOUT HEPATIC COMA: ICD-10-CM

## 2017-11-02 LAB
HCV LOG: <1.08 LOG (10) IU/ML
HCV RNA QUANT PCR: <12 IU/ML
HCV, QUALITATIVE: DETECTED IU/ML

## 2017-11-02 NOTE — TELEPHONE ENCOUNTER
Pt is currently week  6  Harvoni recommended  12  Week regimen  GT1a, Fibroscan = F2    Lab Results   Component Value Date    AST 20 10/27/2017    ALT 17 10/27/2017    CREATININE 1.0 10/27/2017     hcv < 12 DETECTED   No components found for: HCVQUAL    Please call pt.   Liver enzymes now normal. Medication is working.   HEP C Viral count is almost completely negative!!  Continue w/  Harvoni once daily. Same time each day, no skipping or missing doses.   -- Antacids (TUMS/ ROLAIDS / OSCAR SELTZER)  - must be  from Harvoni by 4 hours  --nothing additional for heartburn w/o talking with us first.   --no herbals.   Upcoming labs:   Wk 12 -- dec 11,   Arrange labs--  12/22, 6 wks out:  cmp & hcv  3/5, 12 wks out: cmp & hcv    thanks

## 2017-11-06 ENCOUNTER — EPISODE CHANGES (OUTPATIENT)
Dept: HEPATOLOGY | Facility: CLINIC | Age: 57
End: 2017-11-06

## 2017-11-06 NOTE — TELEPHONE ENCOUNTER
Attempt made to reach patient.  Message from provider left on her VM and mailed to her.  Unable to sched 1/22 and 3/5 labs because account blocked.  I asked patient to contact financial ASAP.

## 2017-11-13 ENCOUNTER — TELEPHONE (OUTPATIENT)
Dept: HEPATOLOGY | Facility: CLINIC | Age: 57
End: 2017-11-13

## 2017-11-13 NOTE — TELEPHONE ENCOUNTER
----- Message from Selena Beatty sent at 11/13/2017  1:00 PM CST -----  Contact: [facility] pharmacy   Calling to get the total therapy length for the Harvoni         Please call    Ask for a pharmacist

## 2017-11-13 NOTE — TELEPHONE ENCOUNTER
[facility] contacted and Rx info reviewed; auth needs extension because it  on .  I spoke with Haresh Valentino and this info relayed.  She will attempt to extend auth.  I spoke with patient and this info relayed.  I stressed that therapy was ordered X 12 wks and that she should setup shipment ASAP.

## 2017-12-01 ENCOUNTER — TELEPHONE (OUTPATIENT)
Dept: HEPATOLOGY | Facility: CLINIC | Age: 57
End: 2017-12-01

## 2017-12-01 NOTE — TELEPHONE ENCOUNTER
----- Message from Roxy Pena sent at 12/1/2017  9:09 AM CST -----  Contact: Pt  Pt is calling to speak with nurse in regards to labs and can be reached at [phone].    Thank you

## 2017-12-11 ENCOUNTER — LAB VISIT (OUTPATIENT)
Dept: LAB | Facility: HOSPITAL | Age: 57
End: 2017-12-11
Attending: FAMILY MEDICINE
Payer: COMMERCIAL

## 2017-12-11 DIAGNOSIS — B18.2 CHRONIC HEPATITIS C WITHOUT HEPATIC COMA: ICD-10-CM

## 2017-12-11 LAB
ALBUMIN SERPL BCP-MCNC: 3.4 G/DL
ALP SERPL-CCNC: 97 U/L
ALT SERPL W/O P-5'-P-CCNC: 13 U/L
ANION GAP SERPL CALC-SCNC: 12 MMOL/L
AST SERPL-CCNC: 21 U/L
BILIRUB SERPL-MCNC: 0.4 MG/DL
BUN SERPL-MCNC: 14 MG/DL
CALCIUM SERPL-MCNC: 10.2 MG/DL
CHLORIDE SERPL-SCNC: 107 MMOL/L
CO2 SERPL-SCNC: 26 MMOL/L
CREAT SERPL-MCNC: 0.9 MG/DL
EST. GFR  (AFRICAN AMERICAN): >60 ML/MIN/1.73 M^2
EST. GFR  (NON AFRICAN AMERICAN): >60 ML/MIN/1.73 M^2
GLUCOSE SERPL-MCNC: 78 MG/DL
POTASSIUM SERPL-SCNC: 3.8 MMOL/L
PROT SERPL-MCNC: 9.4 G/DL
SODIUM SERPL-SCNC: 145 MMOL/L

## 2017-12-11 PROCEDURE — 87522 HEPATITIS C REVRS TRNSCRPJ: CPT

## 2017-12-11 PROCEDURE — 80053 COMPREHEN METABOLIC PANEL: CPT

## 2017-12-14 LAB
HCV LOG: <1.08 LOG (10) IU/ML
HCV RNA QUANT PCR: <12 IU/ML
HCV, QUALITATIVE: NOT DETECTED IU/ML

## 2017-12-15 ENCOUNTER — TELEPHONE (OUTPATIENT)
Dept: HEPATOLOGY | Facility: CLINIC | Age: 57
End: 2017-12-15

## 2017-12-15 NOTE — TELEPHONE ENCOUNTER
Pt is currently week  12 of  Harvoni recommended  12  Week regimen  GT1a, Fibroscan = F2    Lab Results   Component Value Date    AST 21 12/11/2017    ALT 13 12/11/2017    CREATININE 0.9 12/11/2017     hcv < 12 DETECTED   No components found for: HCVQUAL    Please call pt.   Liver enzymes remain normal &   HEP C Viral count is completely negative  Should be about done w/ treatment   If not take one daily until all are gone.   Small risk that Hep C can return beyond this point.   Remind of  labs--  12/22, 6 wks out:  cmp & hcv  3/5, 12 wks out: cmp & hcv    thanks

## 2017-12-19 ENCOUNTER — EPISODE CHANGES (OUTPATIENT)
Dept: HEPATOLOGY | Facility: CLINIC | Age: 57
End: 2017-12-19

## 2017-12-19 NOTE — TELEPHONE ENCOUNTER
Pt made aware of this via msg left onher VM also new lab appts cannot be made at this time due to the fact her account is blocked however we will send out those appts when we can make them to the pt along with this note, episode updated in pts chart also today.

## 2017-12-20 ENCOUNTER — TELEPHONE (OUTPATIENT)
Dept: HEPATOLOGY | Facility: CLINIC | Age: 57
End: 2017-12-20

## 2017-12-20 NOTE — TELEPHONE ENCOUNTER
Called pt to let her know her results from [facility] recent labwork however pt states she was having some issues with blurry vision discussed with the nurse and [facility], msg sent to provider also on this matter.

## 2018-01-31 ENCOUNTER — LAB VISIT (OUTPATIENT)
Dept: LAB | Facility: HOSPITAL | Age: 58
End: 2018-01-31
Attending: FAMILY MEDICINE
Payer: COMMERCIAL

## 2018-01-31 DIAGNOSIS — B18.2 CHRONIC HEPATITIS C WITHOUT HEPATIC COMA: ICD-10-CM

## 2018-01-31 LAB
ALBUMIN SERPL BCP-MCNC: 3.6 G/DL
ALP SERPL-CCNC: 79 U/L
ALT SERPL W/O P-5'-P-CCNC: 27 U/L
ANION GAP SERPL CALC-SCNC: 11 MMOL/L
AST SERPL-CCNC: 31 U/L
BILIRUB SERPL-MCNC: 0.5 MG/DL
BUN SERPL-MCNC: 8 MG/DL
CALCIUM SERPL-MCNC: 10.3 MG/DL
CHLORIDE SERPL-SCNC: 103 MMOL/L
CO2 SERPL-SCNC: 27 MMOL/L
CREAT SERPL-MCNC: 0.9 MG/DL
EST. GFR  (AFRICAN AMERICAN): >60 ML/MIN/1.73 M^2
EST. GFR  (NON AFRICAN AMERICAN): >60 ML/MIN/1.73 M^2
GLUCOSE SERPL-MCNC: 82 MG/DL
POTASSIUM SERPL-SCNC: 3.8 MMOL/L
PROT SERPL-MCNC: 9.4 G/DL
SODIUM SERPL-SCNC: 141 MMOL/L

## 2018-01-31 PROCEDURE — 87522 HEPATITIS C REVRS TRNSCRPJ: CPT

## 2018-01-31 PROCEDURE — 36415 COLL VENOUS BLD VENIPUNCTURE: CPT | Mod: PO

## 2018-01-31 PROCEDURE — 80053 COMPREHEN METABOLIC PANEL: CPT

## 2018-02-01 ENCOUNTER — TELEPHONE (OUTPATIENT)
Dept: HEPATOLOGY | Facility: CLINIC | Age: 58
End: 2018-02-01

## 2018-02-01 LAB
HCV LOG: <1.08 LOG (10) IU/ML
HCV RNA QUANT PCR: <12 IU/ML
HCV, QUALITATIVE: NOT DETECTED IU/ML

## 2018-02-02 NOTE — TELEPHONE ENCOUNTER
Pt informed via msg left on pts VM today asked pt to cotnact us with any further questions or concerns

## 2018-03-16 ENCOUNTER — LAB VISIT (OUTPATIENT)
Dept: LAB | Facility: HOSPITAL | Age: 58
End: 2018-03-16
Payer: COMMERCIAL

## 2018-03-16 DIAGNOSIS — B18.2 CHRONIC HEPATITIS C WITHOUT HEPATIC COMA: ICD-10-CM

## 2018-03-16 LAB
ALBUMIN SERPL BCP-MCNC: 3.5 G/DL
ALP SERPL-CCNC: 91 U/L
ALT SERPL W/O P-5'-P-CCNC: 14 U/L
ANION GAP SERPL CALC-SCNC: 13 MMOL/L
AST SERPL-CCNC: 18 U/L
BILIRUB SERPL-MCNC: 0.3 MG/DL
BUN SERPL-MCNC: 14 MG/DL
CALCIUM SERPL-MCNC: 10.9 MG/DL
CHLORIDE SERPL-SCNC: 103 MMOL/L
CO2 SERPL-SCNC: 29 MMOL/L
CREAT SERPL-MCNC: 0.9 MG/DL
EST. GFR  (AFRICAN AMERICAN): >60 ML/MIN/1.73 M^2
EST. GFR  (NON AFRICAN AMERICAN): >60 ML/MIN/1.73 M^2
GLUCOSE SERPL-MCNC: 93 MG/DL
POTASSIUM SERPL-SCNC: 4.7 MMOL/L
PROT SERPL-MCNC: 9.4 G/DL
SODIUM SERPL-SCNC: 145 MMOL/L

## 2018-03-16 PROCEDURE — 80053 COMPREHEN METABOLIC PANEL: CPT

## 2018-03-16 PROCEDURE — 87522 HEPATITIS C REVRS TRNSCRPJ: CPT

## 2018-03-22 ENCOUNTER — TELEPHONE (OUTPATIENT)
Dept: HEPATOLOGY | Facility: CLINIC | Age: 58
End: 2018-03-22

## 2018-03-22 DIAGNOSIS — Z86.19 HISTORY OF HEPATITIS C: Primary | ICD-10-CM

## 2018-03-22 LAB
HCV LOG: <1.08 LOG (10) IU/ML
HCV RNA QUANT PCR: <12 IU/ML
HCV, QUALITATIVE: NOT DETECTED IU/ML

## 2018-03-22 NOTE — TELEPHONE ENCOUNTER
HCV LAB REVIEW  SVR 12    Pertinent labs:  HCV RNA: <12, not detected    Results discussed with patient     Next labs due / pls schedule:  HCV RNA in 12 weeks- SVR 24: 05/28/18  HCV RNA in 1 year: 03/05/19

## 2018-03-23 ENCOUNTER — EPISODE CHANGES (OUTPATIENT)
Dept: HEPATOLOGY | Facility: CLINIC | Age: 58
End: 2018-03-23

## 2018-04-09 ENCOUNTER — EPISODE CHANGES (OUTPATIENT)
Dept: HEPATOLOGY | Facility: CLINIC | Age: 58
End: 2018-04-09

## 2018-05-03 ENCOUNTER — OFFICE VISIT (OUTPATIENT)
Dept: FAMILY MEDICINE | Facility: CLINIC | Age: 58
End: 2018-05-03
Payer: COMMERCIAL

## 2018-05-03 VITALS
BODY MASS INDEX: 28.24 KG/M2 | HEIGHT: 64 IN | SYSTOLIC BLOOD PRESSURE: 160 MMHG | TEMPERATURE: 98 F | OXYGEN SATURATION: 99 % | DIASTOLIC BLOOD PRESSURE: 98 MMHG | WEIGHT: 165.38 LBS | HEART RATE: 88 BPM | RESPIRATION RATE: 20 BRPM

## 2018-05-03 DIAGNOSIS — I10 BENIGN ESSENTIAL HYPERTENSION: ICD-10-CM

## 2018-05-03 DIAGNOSIS — H93.19 TINNITUS, UNSPECIFIED LATERALITY: ICD-10-CM

## 2018-05-03 DIAGNOSIS — Z12.39 BREAST CANCER SCREENING: Primary | ICD-10-CM

## 2018-05-03 DIAGNOSIS — I10 UNCONTROLLED HYPERTENSION: ICD-10-CM

## 2018-05-03 PROCEDURE — 3080F DIAST BP >= 90 MM HG: CPT | Mod: CPTII,S$GLB,, | Performed by: FAMILY MEDICINE

## 2018-05-03 PROCEDURE — 99999 PR PBB SHADOW E&M-EST. PATIENT-LVL III: CPT | Mod: PBBFAC,,, | Performed by: FAMILY MEDICINE

## 2018-05-03 PROCEDURE — 3008F BODY MASS INDEX DOCD: CPT | Mod: CPTII,S$GLB,, | Performed by: FAMILY MEDICINE

## 2018-05-03 PROCEDURE — 3077F SYST BP >= 140 MM HG: CPT | Mod: CPTII,S$GLB,, | Performed by: FAMILY MEDICINE

## 2018-05-03 PROCEDURE — 99214 OFFICE O/P EST MOD 30 MIN: CPT | Mod: S$GLB,,, | Performed by: FAMILY MEDICINE

## 2018-05-03 RX ORDER — LOSARTAN POTASSIUM 100 MG/1
100 TABLET ORAL DAILY
Qty: 30 TABLET | Refills: 2 | Status: SHIPPED | OUTPATIENT
Start: 2018-05-03 | End: 2018-05-03 | Stop reason: SDUPTHER

## 2018-05-03 RX ORDER — AMLODIPINE BESYLATE 10 MG/1
10 TABLET ORAL DAILY
Qty: 90 TABLET | Refills: 1 | Status: SHIPPED | OUTPATIENT
Start: 2018-05-03 | End: 2019-01-24 | Stop reason: SDUPTHER

## 2018-05-03 RX ORDER — HYDROCHLOROTHIAZIDE 25 MG/1
25 TABLET ORAL DAILY
Qty: 90 TABLET | Refills: 1 | Status: SHIPPED | OUTPATIENT
Start: 2018-05-03 | End: 2018-06-19

## 2018-05-03 RX ORDER — LISINOPRIL 40 MG/1
40 TABLET ORAL DAILY
Qty: 90 TABLET | Refills: 1 | Status: SHIPPED | OUTPATIENT
Start: 2018-05-03 | End: 2018-05-03

## 2018-05-03 RX ORDER — LOSARTAN POTASSIUM 100 MG/1
100 TABLET ORAL DAILY
Qty: 30 TABLET | Refills: 2 | Status: SHIPPED | OUTPATIENT
Start: 2018-05-03 | End: 2018-06-19

## 2018-05-03 NOTE — PROGRESS NOTES
"Chief Complaint   Patient presents with    Tinnitus     right    Hypertension    Medication Refill       HPI  Erma Linda is a 58 y.o. female with multiple medical diagnoses as listed in the medical history and problem list that presents for tinnitus.     Tinnitus - 1 month hx, sporadic, "humming sensation", no pain, no travel, no URI symptoms.     HTN - BP well controlled at home, out of meds today. No CP, HA, followed by [facility] for meibomian gland dysfunction, although states vision has worsened since.     Pt is known to me and was last seen by me on Visit date not found.    PAST MEDICAL HISTORY:  Past Medical History:   Diagnosis Date    History of hepatitis C; S/p RX with SVR 12 documented 2018 3/22/2018    Hypertension        PAST SURGICAL HISTORY:  Past Surgical History:   Procedure Laterality Date     SECTION, CLASSIC      KNEE ARTHROSCOPY W/ LASER      R    TUBAL LIGATION         SOCIAL HISTORY:  Social History     Social History    Marital status:      Spouse name: N/A    Number of children: N/A    Years of education: N/A     Occupational History    Not on file.     Social History Main Topics    Smoking status: Never Smoker    Smokeless tobacco: Never Used    Alcohol use No    Drug use: No    Sexual activity: Not Currently     Partners: Male     Birth control/ protection: Post-menopausal      Comment: 16  with same partner 35 years      Other Topics Concern    Not on file     Social History Narrative    No narrative on file       FAMILY HISTORY:  Family History   Problem Relation Age of Onset    Heart disease Mother     Glaucoma Neg Hx        ALLERGIES AND MEDICATIONS: updated and reviewed.  Review of patient's allergies indicates:   Allergen Reactions    Hydralazine analogues Palpitations     Current Outpatient Prescriptions   Medication Sig Dispense Refill    amLODIPine (NORVASC) 10 MG tablet Take 1 tablet (10 mg total) by mouth once daily. " "90 tablet 1    hydroCHLOROthiazide (HYDRODIURIL) 25 MG tablet Take 1 tablet (25 mg total) by mouth once daily. 90 tablet 1    multivitamin (ONE DAILY MULTIVITAMIN) per tablet Take 1 tablet by mouth once daily.      losartan (COZAAR) 100 MG tablet Take 1 tablet (100 mg total) by mouth once daily. 30 tablet 2     No current facility-administered medications for this visit.        ROS  Review of Systems   Constitutional: Negative for activity change, appetite change and fever.   HENT: Positive for tinnitus. Negative for congestion and sore throat.    Eyes: Positive for visual disturbance.   Respiratory: Negative for cough and shortness of breath.    Cardiovascular: Negative for chest pain.   Gastrointestinal: Negative for abdominal pain, diarrhea, nausea and vomiting.   Endocrine: Negative.    Genitourinary: Negative for dysuria.   Musculoskeletal: Negative for arthralgias and back pain.   Skin: Negative for rash.   Allergic/Immunologic: Negative.    Neurological: Negative for dizziness, weakness and headaches.   Hematological: Negative.    Psychiatric/Behavioral: Negative for agitation and confusion.       Physical Exam  Vitals:    05/03/18 1513   BP: (!) 160/98   Pulse: 88   Resp: 20   Temp: 98.4 °F (36.9 °C)    Body mass index is 28.38 kg/m².  Weight: 75 kg (165 lb 5.5 oz)   Height: 5' 4" (162.6 cm)     Physical Exam   Constitutional: She is oriented to person, place, and time. She appears well-developed and well-nourished.   HENT:   Head: Normocephalic and atraumatic.   Eyes: Conjunctivae and EOM are normal. Pupils are equal, round, and reactive to light.   Neck: Normal range of motion. Neck supple.   Cardiovascular: Normal rate, regular rhythm and normal heart sounds.    Pulmonary/Chest: Effort normal and breath sounds normal.   Abdominal: Soft. Bowel sounds are normal.   Musculoskeletal: Normal range of motion.   Neurological: She is alert and oriented to person, place, and time. She has normal reflexes. "   Skin: Skin is warm and dry.   Psychiatric: She has a normal mood and affect. Her behavior is normal. Judgment and thought content normal.       Health Maintenance       Date Due Completion Date    Mammogram 08/25/2017 8/25/2015    Override on 8/28/2014: Declined (patient will wait till end of year)    TETANUS VACCINE 05/03/2019 (Originally 3/11/1978) ---    Influenza Vaccine 08/01/2018 3/13/2017 (Declined)    Override on 3/13/2017: Declined    Override on 2/17/2016: Declined    Override on 12/3/2014: Declined    Override on 10/3/2013: Declined    Pap Smear with HPV Cotest 09/30/2019 9/30/2016    Override on 10/3/2013: Declined    Lipid Panel 07/07/2022 7/7/2017    Colonoscopy 10/03/2023 10/3/2013 (Declined)    Override on 10/3/2013: Declined          Assessment & Plan    Breast cancer screening   - Mammogram to be completed later this year    Benign essential hypertension  -     hydroCHLOROthiazide (HYDRODIURIL) 25 MG tablet; Take 1 tablet (25 mg total) by mouth once daily.  Dispense: 90 tablet; Refill: 1  -     amLODIPine (NORVASC) 10 MG tablet; Take 1 tablet (10 mg total) by mouth once daily.  Dispense: 90 tablet; Refill: 1  - Continue current medication regimen as prescribed  - Return for BP check, monitor BP closely    Uncontrolled hypertension  -     losartan (COZAAR) 100 MG tablet; Take 1 tablet (100 mg total) by mouth once daily.  Dispense: 30 tablet; Refill: 2  - Continue current medication regimen as prescribed    Tinnitus, unspecified laterality  -     losartan (COZAAR) 100 MG tablet; Take 1 tablet (100 mg total) by mouth once daily.  Dispense: 30 tablet; Refill: 2  - Will change BP medications at this time, d/c ACE at this time        Follow-up in about 4 weeks (around 5/31/2018), or if symptoms worsen or fail to improve.

## 2018-05-08 ENCOUNTER — HOSPITAL ENCOUNTER (OUTPATIENT)
Dept: RADIOLOGY | Facility: HOSPITAL | Age: 58
Discharge: HOME | End: 2018-05-08
Attending: FAMILY MEDICINE
Payer: COMMERCIAL

## 2018-05-08 DIAGNOSIS — Z12.31 ENCOUNTER FOR SCREENING MAMMOGRAM FOR BREAST CANCER: ICD-10-CM

## 2018-05-08 PROCEDURE — 77067 SCR MAMMO BI INCL CAD: CPT | Mod: 26,,, | Performed by: RADIOLOGY

## 2018-05-08 PROCEDURE — 77067 SCR MAMMO BI INCL CAD: CPT | Mod: TC

## 2018-05-08 PROCEDURE — 77063 BREAST TOMOSYNTHESIS BI: CPT | Mod: 26,,, | Performed by: RADIOLOGY

## 2018-05-28 ENCOUNTER — LAB VISIT (OUTPATIENT)
Dept: LAB | Facility: HOSPITAL | Age: 58
End: 2018-05-28
Attending: FAMILY MEDICINE
Payer: COMMERCIAL

## 2018-05-28 DIAGNOSIS — Z86.19 HISTORY OF HEPATITIS C: ICD-10-CM

## 2018-05-28 PROCEDURE — 36415 COLL VENOUS BLD VENIPUNCTURE: CPT | Mod: PO

## 2018-05-28 PROCEDURE — 87522 HEPATITIS C REVRS TRNSCRPJ: CPT

## 2018-05-30 LAB
HCV LOG: <1.08 LOG (10) IU/ML
HCV RNA QUANT PCR: <12 IU/ML
HCV, QUALITATIVE: NOT DETECTED IU/ML

## 2018-05-31 ENCOUNTER — TELEPHONE (OUTPATIENT)
Dept: HEPATOLOGY | Facility: CLINIC | Age: 58
End: 2018-05-31

## 2018-05-31 NOTE — TELEPHONE ENCOUNTER
HCV LAB REVIEW  SVR 24  F2    Pertinent labs:  HCV RNA: <12, not detected    Will mail letter    Next labs due / pls schedule:  HCV RNA in 1 year: 03/05/19

## 2018-06-15 ENCOUNTER — TELEPHONE (OUTPATIENT)
Dept: FAMILY MEDICINE | Facility: CLINIC | Age: 58
End: 2018-06-15

## 2018-06-15 NOTE — TELEPHONE ENCOUNTER
----- Message from Tyra Sow sent at 6/15/2018  8:23 AM CDT -----  Contact: self  Pt called stating losartan (COZAAR) 100 MG tablet is causing body aches, light headiness and dizziness. Contact pt for additional information at [phone].    Thanks-

## 2018-06-15 NOTE — TELEPHONE ENCOUNTER
Pt was seen on 5/3/18 (Dr Chavez) with c/o ringing in ear and blood pressure medication was changed to losartan; pt states she is now having body aches, dizziness and lightheadedness; she did schedule OV with you for 6/27/18

## 2018-06-19 ENCOUNTER — OFFICE VISIT (OUTPATIENT)
Dept: FAMILY MEDICINE | Facility: CLINIC | Age: 58
End: 2018-06-19
Payer: COMMERCIAL

## 2018-06-19 VITALS
SYSTOLIC BLOOD PRESSURE: 156 MMHG | DIASTOLIC BLOOD PRESSURE: 98 MMHG | HEART RATE: 96 BPM | OXYGEN SATURATION: 99 % | TEMPERATURE: 99 F | HEIGHT: 64 IN | BODY MASS INDEX: 29.59 KG/M2 | WEIGHT: 173.31 LBS | RESPIRATION RATE: 16 BRPM

## 2018-06-19 DIAGNOSIS — H93.11 TINNITUS OF RIGHT EAR: Primary | ICD-10-CM

## 2018-06-19 DIAGNOSIS — I10 HYPERTENSION, ESSENTIAL, BENIGN: ICD-10-CM

## 2018-06-19 PROCEDURE — 99213 OFFICE O/P EST LOW 20 MIN: CPT | Mod: S$GLB,,, | Performed by: PHYSICIAN ASSISTANT

## 2018-06-19 PROCEDURE — 3008F BODY MASS INDEX DOCD: CPT | Mod: CPTII,S$GLB,, | Performed by: PHYSICIAN ASSISTANT

## 2018-06-19 PROCEDURE — 3080F DIAST BP >= 90 MM HG: CPT | Mod: CPTII,S$GLB,, | Performed by: PHYSICIAN ASSISTANT

## 2018-06-19 PROCEDURE — 99999 PR PBB SHADOW E&M-EST. PATIENT-LVL IV: CPT | Mod: PBBFAC,,, | Performed by: PHYSICIAN ASSISTANT

## 2018-06-19 PROCEDURE — 3077F SYST BP >= 140 MM HG: CPT | Mod: CPTII,S$GLB,, | Performed by: PHYSICIAN ASSISTANT

## 2018-06-19 RX ORDER — LISINOPRIL AND HYDROCHLOROTHIAZIDE 10; 12.5 MG/1; MG/1
1 TABLET ORAL DAILY
Qty: 30 TABLET | Refills: 0 | Status: SHIPPED | OUTPATIENT
Start: 2018-06-19 | End: 2018-07-26 | Stop reason: SDUPTHER

## 2018-06-20 NOTE — PROGRESS NOTES
Subjective:       Patient ID: Erma Linda is a 58 y.o. female.    Chief Complaint: Tinnitus (for 2 months on adn off)    HPI: 57 yo female presents for tinnitus f/u. At last visit on 5/3 pts lisinopril/HCTZ was changes to losartan/HCTZ due to concern of lisinopril causing tinnitus. No change in tinnitus with medication change. Pt states since med change, she has been feeling lightheaded, and having mild foot swelling. Her pressure remains stable at home, averaging in the 120s/80s. She states her pressure usually goes up when going to the doctor. She denies chest pain, SOB.     Review of Systems   Respiratory: Negative for shortness of breath.    Cardiovascular: Positive for leg swelling. Negative for chest pain.   Neurological: Positive for light-headedness.       Objective:      Physical Exam   Constitutional: She appears well-developed and well-nourished. No distress.   HENT:   Head: Normocephalic and atraumatic.   Right Ear: Tympanic membrane and ear canal normal.   Left Ear: Tympanic membrane and ear canal normal.   Eyes: Conjunctivae and EOM are normal. Pupils are equal, round, and reactive to light.   Cardiovascular: Normal rate and regular rhythm.    No temporal bruits    Pulmonary/Chest: Effort normal and breath sounds normal.   Musculoskeletal:   1+ edema bilateral lower extremities    Neurological:   Tongue midline, facial expressions equal bilaterally, normal rapid hand movements, normal heel to shin, normal finger to nose     Skin: Skin is warm and dry. She is not diaphoretic.   Psychiatric: She has a normal mood and affect. Her behavior is normal.   Vitals reviewed.      Assessment:       1. Tinnitus of right ear    2. Hypertension, essential, benign        Plan:         Erma was seen today for tinnitus.    Diagnoses and all orders for this visit:    Tinnitus of right ear  -     Ambulatory referral to ENT    Hypertension, essential, benign  -     lisinopril-hydrochlorothiazide  (PRINZIDASHLEY,ZESTORETIC) 10-12.5 mg per tablet; Take 1 tablet by mouth once daily.  -     Resume old medication, f/u next week with PCP. Advised her to bring home machine to compare and use home readings.   -     Elevate feet often and cut back on salt.

## 2018-06-25 ENCOUNTER — TELEPHONE (OUTPATIENT)
Dept: FAMILY MEDICINE | Facility: CLINIC | Age: 58
End: 2018-06-25

## 2018-06-27 ENCOUNTER — LAB VISIT (OUTPATIENT)
Dept: LAB | Facility: HOSPITAL | Age: 58
End: 2018-06-27
Attending: FAMILY MEDICINE
Payer: COMMERCIAL

## 2018-06-27 ENCOUNTER — OFFICE VISIT (OUTPATIENT)
Dept: FAMILY MEDICINE | Facility: CLINIC | Age: 58
End: 2018-06-27
Payer: COMMERCIAL

## 2018-06-27 VITALS
WEIGHT: 172.63 LBS | BODY MASS INDEX: 29.47 KG/M2 | HEART RATE: 94 BPM | RESPIRATION RATE: 17 BRPM | SYSTOLIC BLOOD PRESSURE: 154 MMHG | TEMPERATURE: 98 F | HEIGHT: 64 IN | OXYGEN SATURATION: 98 % | DIASTOLIC BLOOD PRESSURE: 86 MMHG

## 2018-06-27 DIAGNOSIS — E83.52 HYPERCALCEMIA: Primary | ICD-10-CM

## 2018-06-27 DIAGNOSIS — E83.52 HYPERCALCEMIA: ICD-10-CM

## 2018-06-27 DIAGNOSIS — I10 ESSENTIAL HYPERTENSION, BENIGN: ICD-10-CM

## 2018-06-27 LAB
BASOPHILS # BLD AUTO: 0.07 K/UL
BASOPHILS NFR BLD: 0.9 %
CHOLEST SERPL-MCNC: 162 MG/DL
CHOLEST/HDLC SERPL: 2.7 {RATIO}
DIFFERENTIAL METHOD: ABNORMAL
EOSINOPHIL # BLD AUTO: 0.1 K/UL
EOSINOPHIL NFR BLD: 1.6 %
ERYTHROCYTE [DISTWIDTH] IN BLOOD BY AUTOMATED COUNT: 14.1 %
HCT VFR BLD AUTO: 38 %
HDLC SERPL-MCNC: 60 MG/DL
HDLC SERPL: 37 %
HGB BLD-MCNC: 11.6 G/DL
IMM GRANULOCYTES # BLD AUTO: 0.03 K/UL
IMM GRANULOCYTES NFR BLD AUTO: 0.4 %
LDLC SERPL CALC-MCNC: 91.6 MG/DL
LYMPHOCYTES # BLD AUTO: 2.4 K/UL
LYMPHOCYTES NFR BLD: 29.7 %
MCH RBC QN AUTO: 29.2 PG
MCHC RBC AUTO-ENTMCNC: 30.5 G/DL
MCV RBC AUTO: 96 FL
MONOCYTES # BLD AUTO: 1 K/UL
MONOCYTES NFR BLD: 12.2 %
NEUTROPHILS # BLD AUTO: 4.5 K/UL
NEUTROPHILS NFR BLD: 55.2 %
NONHDLC SERPL-MCNC: 102 MG/DL
NRBC BLD-RTO: 0 /100 WBC
PLATELET # BLD AUTO: 259 K/UL
PMV BLD AUTO: 11.2 FL
PTH-INTACT SERPL-MCNC: 61 PG/ML
RBC # BLD AUTO: 3.97 M/UL
TRIGL SERPL-MCNC: 52 MG/DL
WBC # BLD AUTO: 8.14 K/UL

## 2018-06-27 PROCEDURE — 3079F DIAST BP 80-89 MM HG: CPT | Mod: CPTII,S$GLB,, | Performed by: FAMILY MEDICINE

## 2018-06-27 PROCEDURE — 85025 COMPLETE CBC W/AUTO DIFF WBC: CPT

## 2018-06-27 PROCEDURE — 99214 OFFICE O/P EST MOD 30 MIN: CPT | Mod: S$GLB,,, | Performed by: FAMILY MEDICINE

## 2018-06-27 PROCEDURE — 83970 ASSAY OF PARATHORMONE: CPT

## 2018-06-27 PROCEDURE — 3008F BODY MASS INDEX DOCD: CPT | Mod: CPTII,S$GLB,, | Performed by: FAMILY MEDICINE

## 2018-06-27 PROCEDURE — 3077F SYST BP >= 140 MM HG: CPT | Mod: CPTII,S$GLB,, | Performed by: FAMILY MEDICINE

## 2018-06-27 PROCEDURE — 99999 PR PBB SHADOW E&M-EST. PATIENT-LVL III: CPT | Mod: PBBFAC,,, | Performed by: FAMILY MEDICINE

## 2018-06-27 PROCEDURE — 36415 COLL VENOUS BLD VENIPUNCTURE: CPT | Mod: PO

## 2018-06-27 PROCEDURE — 80061 LIPID PANEL: CPT

## 2018-06-27 NOTE — PROGRESS NOTES
Subjective:       Patient ID: Erma Linda     Chief Complaint: Hypertension (follow up ); Back Pain (off and on for past 1-2 weeks ago ); Dizziness; and Foot Swelling      Luci Linda is a 58 y.o. female. Here for follow up chronic HTN.  Reports LBP x 2 weeks.  No recent injury.  Hads DDD lumbar spine.  Exercises regularly.  Patient with hypercalcemia and admits to drinking 24 oz milk per day.    Review of patient's allergies indicates:   Allergen Reactions    Hydralazine analogues Palpitations       Current Outpatient Prescriptions:     amLODIPine (NORVASC) 10 MG tablet, Take 1 tablet (10 mg total) by mouth once daily., Disp: 90 tablet, Rfl: 1    lisinopril-hydrochlorothiazide (PRINZIDE,ZESTORETIC) 10-12.5 mg per tablet, Take 1 tablet by mouth once daily., Disp: 30 tablet, Rfl: 0    multivitamin (ONE DAILY MULTIVITAMIN) per tablet, Take 1 tablet by mouth once daily., Disp: , Rfl:     Past Medical History:   Diagnosis Date    History of hepatitis C; S/p RX with SVR 12 documented 03/2018 3/22/2018    Hypertension      Review of Systems   Constitutional: Negative for unexpected weight change.   Eyes: Negative for visual disturbance.   Respiratory: Negative for shortness of breath.    Cardiovascular: Positive for leg swelling (feet). Negative for chest pain and palpitations.   Musculoskeletal: Positive for back pain.   Neurological: Positive for light-headedness. Negative for dizziness, weakness, numbness and headaches.       Objective:      Physical Exam   Constitutional: She is oriented to person, place, and time. She appears well-developed and well-nourished.   HENT:   Head: Normocephalic.   Neck: Normal range of motion. Neck supple. No thyromegaly present.   Cardiovascular: Normal rate, regular rhythm and normal heart sounds.    No murmur heard.  Pulmonary/Chest: Effort normal and breath sounds normal. No respiratory distress. She has no wheezes. She has no rales.   Abdominal: Soft. Bowel  sounds are normal. She exhibits no distension and no mass. There is no tenderness.   Musculoskeletal: She exhibits no edema.   Lymphadenopathy:     She has no cervical adenopathy.   Neurological: She is alert and oriented to person, place, and time.   Skin: Skin is warm and dry. No rash noted.   Psychiatric: She has a normal mood and affect.       Assessment:       1. Hypercalcemia    2. Essential hypertension, benign        Plan:       Erma was seen today for hypertension, back pain, dizziness and foot swelling.    Diagnoses and all orders for this visit:    Hypercalcemia  Decrease calcium intake in diet  -     PTH, intact; Future    Essential hypertension, benign  BP not at goal.  Return to clinic for BP check in 2 weeks.  -     CBC auto differential; Future  -     Lipid panel; Future

## 2018-06-28 ENCOUNTER — TELEPHONE (OUTPATIENT)
Dept: FAMILY MEDICINE | Facility: CLINIC | Age: 58
End: 2018-06-28

## 2018-06-28 NOTE — TELEPHONE ENCOUNTER
----- Message from Yajaira Soria MD sent at 6/28/2018  4:38 PM CDT -----  Elevated BP at visit.   Patient is return for nurse BP check in 1-2 weeks.

## 2018-06-29 ENCOUNTER — TELEPHONE (OUTPATIENT)
Dept: FAMILY MEDICINE | Facility: CLINIC | Age: 58
End: 2018-06-29

## 2018-07-09 ENCOUNTER — CLINICAL SUPPORT (OUTPATIENT)
Dept: FAMILY MEDICINE | Facility: CLINIC | Age: 58
End: 2018-07-09
Payer: COMMERCIAL

## 2018-07-09 ENCOUNTER — TELEPHONE (OUTPATIENT)
Dept: FAMILY MEDICINE | Facility: CLINIC | Age: 58
End: 2018-07-09

## 2018-07-09 VITALS — DIASTOLIC BLOOD PRESSURE: 82 MMHG | SYSTOLIC BLOOD PRESSURE: 138 MMHG

## 2018-07-09 DIAGNOSIS — I10 ESSENTIAL HYPERTENSION, BENIGN: Primary | ICD-10-CM

## 2018-07-09 DIAGNOSIS — M54.5 LOW BACK PAIN, UNSPECIFIED BACK PAIN LATERALITY, UNSPECIFIED CHRONICITY, WITH SCIATICA PRESENCE UNSPECIFIED: Primary | ICD-10-CM

## 2018-07-09 PROCEDURE — 99999 PR PBB SHADOW E&M-EST. PATIENT-LVL I: CPT | Mod: PBBFAC,,,

## 2018-07-09 NOTE — TELEPHONE ENCOUNTER
Pt came in for bp check; states she discussed exercises forher back at [facility]; she is interested in aquatic therapy at [facility]; please enter referral

## 2018-07-09 NOTE — PROGRESS NOTES
Erma Linda 58 y.o. female is here today for Blood Pressure check.   History of HTN yes.    Review of patient's allergies indicates:   Allergen Reactions    Hydralazine analogues Palpitations     Creatinine   Date Value Ref Range Status   03/16/2018 0.9 0.5 - 1.4 mg/dL Final     Sodium   Date Value Ref Range Status   03/16/2018 145 136 - 145 mmol/L Final     Potassium   Date Value Ref Range Status   03/16/2018 4.7 3.5 - 5.1 mmol/L Final   ]  Patient verifies taking blood pressure medications on a regular basis at the same time of the day.     Current Outpatient Prescriptions:     amLODIPine (NORVASC) 10 MG tablet, Take 1 tablet (10 mg total) by mouth once daily., Disp: 90 tablet, Rfl: 1    lisinopril-hydrochlorothiazide (PRINZIDE,ZESTORETIC) 10-12.5 mg per tablet, Take 1 tablet by mouth once daily., Disp: 30 tablet, Rfl: 0    multivitamin (ONE DAILY MULTIVITAMIN) per tablet, Take 1 tablet by mouth once daily., Disp: , Rfl:   Does patient have record of home blood pressure readings no. Readings have been averaging not done.   Last dose of blood pressure medication was taken at 6am today.  Patient is asymptomatic.   Complains of no complaints.    Vitals:    07/09/18 0834   BP: 138/82   BP Location: Right arm   Patient Position: Sitting   BP Method: Medium (Manual)         Dr. Soria informed of nurse visit.

## 2018-07-26 DIAGNOSIS — I10 HYPERTENSION, ESSENTIAL, BENIGN: ICD-10-CM

## 2018-07-26 RX ORDER — LISINOPRIL AND HYDROCHLOROTHIAZIDE 10; 12.5 MG/1; MG/1
1 TABLET ORAL DAILY
Qty: 30 TABLET | Refills: 0 | Status: SHIPPED | OUTPATIENT
Start: 2018-07-26 | End: 2018-09-10 | Stop reason: SDUPTHER

## 2018-08-24 DIAGNOSIS — Z12.11 COLON CANCER SCREENING: ICD-10-CM

## 2018-09-10 DIAGNOSIS — I10 HYPERTENSION, ESSENTIAL, BENIGN: ICD-10-CM

## 2018-09-11 RX ORDER — LISINOPRIL AND HYDROCHLOROTHIAZIDE 10; 12.5 MG/1; MG/1
1 TABLET ORAL DAILY
Qty: 30 TABLET | Refills: 2 | Status: SHIPPED | OUTPATIENT
Start: 2018-09-11 | End: 2019-01-24

## 2018-10-31 ENCOUNTER — LAB VISIT (OUTPATIENT)
Dept: LAB | Facility: HOSPITAL | Age: 58
End: 2018-10-31
Attending: FAMILY MEDICINE
Payer: COMMERCIAL

## 2018-10-31 ENCOUNTER — OFFICE VISIT (OUTPATIENT)
Dept: FAMILY MEDICINE | Facility: CLINIC | Age: 58
End: 2018-10-31
Payer: COMMERCIAL

## 2018-10-31 VITALS
RESPIRATION RATE: 12 BRPM | BODY MASS INDEX: 28.79 KG/M2 | WEIGHT: 168.63 LBS | HEART RATE: 91 BPM | HEIGHT: 64 IN | DIASTOLIC BLOOD PRESSURE: 92 MMHG | SYSTOLIC BLOOD PRESSURE: 134 MMHG | OXYGEN SATURATION: 97 % | TEMPERATURE: 98 F

## 2018-10-31 DIAGNOSIS — G89.29 CHRONIC RIGHT-SIDED LOW BACK PAIN WITHOUT SCIATICA: Primary | ICD-10-CM

## 2018-10-31 DIAGNOSIS — M54.50 CHRONIC RIGHT-SIDED LOW BACK PAIN WITHOUT SCIATICA: Primary | ICD-10-CM

## 2018-10-31 DIAGNOSIS — G89.29 CHRONIC RIGHT-SIDED LOW BACK PAIN WITHOUT SCIATICA: ICD-10-CM

## 2018-10-31 DIAGNOSIS — R35.0 INCREASED FREQUENCY OF URINATION: ICD-10-CM

## 2018-10-31 DIAGNOSIS — M54.50 CHRONIC RIGHT-SIDED LOW BACK PAIN WITHOUT SCIATICA: ICD-10-CM

## 2018-10-31 LAB
BACTERIA #/AREA URNS HPF: ABNORMAL /HPF
BILIRUB UR QL STRIP: NEGATIVE
CLARITY UR: CLEAR
COLOR UR: ABNORMAL
GLUCOSE UR QL STRIP: NEGATIVE
HGB UR QL STRIP: NEGATIVE
KETONES UR QL STRIP: NEGATIVE
LEUKOCYTE ESTERASE UR QL STRIP: ABNORMAL
MICROSCOPIC COMMENT: ABNORMAL
NITRITE UR QL STRIP: NEGATIVE
PH UR STRIP: 6 [PH] (ref 5–8)
PROT UR QL STRIP: NEGATIVE
SP GR UR STRIP: 1 (ref 1–1.03)
SQUAMOUS #/AREA URNS HPF: 5 /HPF
URN SPEC COLLECT METH UR: ABNORMAL
UROBILINOGEN UR STRIP-ACNC: NEGATIVE EU/DL
WBC #/AREA URNS HPF: 30 /HPF (ref 0–5)

## 2018-10-31 PROCEDURE — 3080F DIAST BP >= 90 MM HG: CPT | Mod: CPTII,S$GLB,, | Performed by: FAMILY MEDICINE

## 2018-10-31 PROCEDURE — 3075F SYST BP GE 130 - 139MM HG: CPT | Mod: CPTII,S$GLB,, | Performed by: FAMILY MEDICINE

## 2018-10-31 PROCEDURE — 3008F BODY MASS INDEX DOCD: CPT | Mod: CPTII,S$GLB,, | Performed by: FAMILY MEDICINE

## 2018-10-31 PROCEDURE — 99999 PR PBB SHADOW E&M-EST. PATIENT-LVL III: CPT | Mod: PBBFAC,,, | Performed by: FAMILY MEDICINE

## 2018-10-31 PROCEDURE — 81000 URINALYSIS NONAUTO W/SCOPE: CPT

## 2018-10-31 PROCEDURE — 99214 OFFICE O/P EST MOD 30 MIN: CPT | Mod: S$GLB,,, | Performed by: FAMILY MEDICINE

## 2018-10-31 RX ORDER — TIZANIDINE 4 MG/1
4 TABLET ORAL EVERY 8 HOURS
Qty: 30 TABLET | Refills: 0 | Status: SHIPPED | OUTPATIENT
Start: 2018-10-31 | End: 2018-11-10

## 2018-10-31 NOTE — PROGRESS NOTES
Routine Office Visit    Patient Name: Erma Linda    : 1960  MRN: [id number]    Subjective:  Erma is a 58 y.o. female who presents today for:    1. Right sided lower back pain  Patient presenting with continuous low back pain since July.  She states that she saw her PCP at that time and was told it was muscular.  Since that time, she has developed increased frequency with urination and pressure to urinate when the back pain intensifies.  No hematuria or dysuria.  The pain does not radiate down the RLE.  She does get intermittent tingling in right lateral foot.  No weakness or incontinence.      Past Medical History  Past Medical History:   Diagnosis Date    History of hepatitis C; S/p RX with SVR 12 documented 2018 3/22/2018    Hypertension        Past Surgical History  Past Surgical History:   Procedure Laterality Date     SECTION, CLASSIC      KNEE ARTHROSCOPY W/ LASER      R    TUBAL LIGATION         Family History  Family History   Problem Relation Age of Onset    Heart disease Mother     Glaucoma Neg Hx        Social History  Social History     Socioeconomic History    Marital status:      Spouse name: Not on file    Number of children: Not on file    Years of education: Not on file    Highest education level: Not on file   Social Needs    Financial resource strain: Not on file    Food insecurity - worry: Not on file    Food insecurity - inability: Not on file    Transportation needs - medical: Not on file    Transportation needs - non-medical: Not on file   Occupational History    Not on file   Tobacco Use    Smoking status: Never Smoker    Smokeless tobacco: Never Used   Substance and Sexual Activity    Alcohol use: No    Drug use: No    Sexual activity: Not Currently     Partners: Male     Birth control/protection: Post-menopausal     Comment: 16  with same partner 35 years    Other Topics Concern    Not on file   Social History Narrative     Not on file       Current Medications  Current Outpatient Medications on File Prior to Visit   Medication Sig Dispense Refill    amLODIPine (NORVASC) 10 MG tablet Take 1 tablet (10 mg total) by mouth once daily. 90 tablet 1    lisinopril-hydrochlorothiazide (PRINZIDE,ZESTORETIC) 10-12.5 mg per tablet TAKE 1 TABLET BY MOUTH ONCE DAILY 30 tablet 2    multivitamin (ONE DAILY MULTIVITAMIN) per tablet Take 1 tablet by mouth once daily.       No current facility-administered medications on file prior to visit.        Allergies   Review of patient's allergies indicates:   Allergen Reactions    Hydralazine analogues Palpitations       Review of Systems (Pertinent positives)  Review of Systems   Constitutional: Negative.    HENT: Negative.    Eyes: Negative.    Respiratory: Negative.    Cardiovascular: Negative.    Gastrointestinal: Negative.    Genitourinary: Positive for frequency. Negative for dysuria and hematuria.   Musculoskeletal: Positive for back pain and myalgias.   Skin: Negative.          There were no vitals taken for this visit.    GENERAL APPEARANCE: in no apparent distress and well developed and well nourished  HEENT: PERRL, EOMI, Sclera clear, anicteric, Oropharynx clear, no lesions, Neck supple with midline trachea  NECK: normal, supple, no adenopathy, thyroid normal in size  RESPIRATORY: appears well, vitals normal, no respiratory distress, acyanotic, normal RR, chest clear, no wheezing, crepitations, rhonchi, normal symmetric air entry  HEART: regular rate and rhythm, S1, S2 normal, no murmur, click, rub or gallop.    ABDOMEN: abdomen is soft without tenderness, no masses, no hernias, no organomegaly, no rebound, no guarding. Suprapubic tenderness absent. No CVA tenderness.  MS:  Pain on palpation of right psoas muscle; no pain on palpation of spinous processes  SKIN: no rashes, no wounds, no other lesions  PSYCH: Alert, oriented x 3, thought content appropriate, speech normal, pleasant and  cooperative, good eye contact, well groomed average intelligence.    Assessment/Plan:  Erma Linda is a 58 y.o. female who presents today for :    Diagnoses and all orders for this visit:    Chronic right-sided low back pain without sciatica  -     Urinalysis; Future  -     CBC auto differential; Future  -     Comprehensive metabolic panel; Future  -     tiZANidine (ZANAFLEX) 4 MG tablet; Take 1 tablet (4 mg total) by mouth every 8 (eight) hours. for 10 days    Increased frequency of urination  -     Urinalysis; Future  -     CBC auto differential; Future  -     Comprehensive metabolic panel; Future      1.  Labs to be done today  2.  Muscle relaxer to help with pain in conjuction with NSAID  3.  If labs are negative will refer to healthy back  4.  She is to follow up with PCP  5.  She is to go to the ED for any sudden weakness or incontinence      Arley Maynard MD

## 2018-11-06 DIAGNOSIS — N30.01 ACUTE CYSTITIS WITH HEMATURIA: Primary | ICD-10-CM

## 2018-11-06 RX ORDER — NITROFURANTOIN 25; 75 MG/1; MG/1
100 CAPSULE ORAL 2 TIMES DAILY
Qty: 10 CAPSULE | Refills: 0 | Status: SHIPPED | OUTPATIENT
Start: 2018-11-06 | End: 2018-11-11

## 2018-11-09 DIAGNOSIS — L29.9 ITCHING: Primary | ICD-10-CM

## 2018-11-09 RX ORDER — HYDROXYZINE PAMOATE 25 MG/1
25 CAPSULE ORAL EVERY 8 HOURS PRN
Qty: 30 CAPSULE | Refills: 1 | Status: SHIPPED | OUTPATIENT
Start: 2018-11-09 | End: 2022-02-01

## 2018-11-09 NOTE — PROGRESS NOTES
Patient called and itching is still present.  She has suffered with this for months.  Blood work reviewed and no indications as to the cause of the itching.  She has not started macrobid for UTI, so not related to medication.  Will start on hydroxyzine at bedtime, but can take up to 3 times a day if needed.  Warned of sedative effects      Arley Maynard MD

## 2019-01-24 ENCOUNTER — LAB VISIT (OUTPATIENT)
Dept: LAB | Facility: HOSPITAL | Age: 59
End: 2019-01-24
Attending: INTERNAL MEDICINE
Payer: COMMERCIAL

## 2019-01-24 ENCOUNTER — OFFICE VISIT (OUTPATIENT)
Dept: FAMILY MEDICINE | Facility: CLINIC | Age: 59
End: 2019-01-24
Payer: COMMERCIAL

## 2019-01-24 VITALS
DIASTOLIC BLOOD PRESSURE: 90 MMHG | BODY MASS INDEX: 28.83 KG/M2 | WEIGHT: 168.88 LBS | TEMPERATURE: 98 F | HEIGHT: 64 IN | OXYGEN SATURATION: 98 % | SYSTOLIC BLOOD PRESSURE: 142 MMHG | HEART RATE: 105 BPM | RESPIRATION RATE: 18 BRPM

## 2019-01-24 DIAGNOSIS — M54.50 CHRONIC RIGHT-SIDED LOW BACK PAIN WITHOUT SCIATICA: ICD-10-CM

## 2019-01-24 DIAGNOSIS — E55.9 VITAMIN D INSUFFICIENCY: ICD-10-CM

## 2019-01-24 DIAGNOSIS — I10 ESSENTIAL HYPERTENSION, BENIGN: Primary | ICD-10-CM

## 2019-01-24 DIAGNOSIS — M47.812 FACET ARTHRITIS OF CERVICAL REGION: ICD-10-CM

## 2019-01-24 DIAGNOSIS — G62.9 NEUROPATHY: ICD-10-CM

## 2019-01-24 DIAGNOSIS — G89.29 CHRONIC RIGHT-SIDED LOW BACK PAIN WITHOUT SCIATICA: ICD-10-CM

## 2019-01-24 PROBLEM — M51.369 DEGENERATIVE LUMBAR DISC: Status: ACTIVE | Noted: 2019-01-24

## 2019-01-24 PROBLEM — M51.36 DEGENERATIVE LUMBAR DISC: Status: ACTIVE | Noted: 2019-01-24

## 2019-01-24 LAB
25(OH)D3+25(OH)D2 SERPL-MCNC: 27 NG/ML
ESTIMATED AVG GLUCOSE: 94 MG/DL
FOLATE SERPL-MCNC: 11.7 NG/ML
HBA1C MFR BLD HPLC: 4.9 %
TSH SERPL DL<=0.005 MIU/L-ACNC: 0.85 UIU/ML
VIT B12 SERPL-MCNC: 366 PG/ML

## 2019-01-24 PROCEDURE — 99214 PR OFFICE/OUTPT VISIT, EST, LEVL IV, 30-39 MIN: ICD-10-PCS | Mod: S$GLB,,, | Performed by: INTERNAL MEDICINE

## 2019-01-24 PROCEDURE — 82306 VITAMIN D 25 HYDROXY: CPT

## 2019-01-24 PROCEDURE — 3077F SYST BP >= 140 MM HG: CPT | Mod: CPTII,S$GLB,, | Performed by: INTERNAL MEDICINE

## 2019-01-24 PROCEDURE — 3080F PR MOST RECENT DIASTOLIC BLOOD PRESSURE >= 90 MM HG: ICD-10-PCS | Mod: CPTII,S$GLB,, | Performed by: INTERNAL MEDICINE

## 2019-01-24 PROCEDURE — 36415 COLL VENOUS BLD VENIPUNCTURE: CPT | Mod: PO

## 2019-01-24 PROCEDURE — 82746 ASSAY OF FOLIC ACID SERUM: CPT

## 2019-01-24 PROCEDURE — 99999 PR PBB SHADOW E&M-EST. PATIENT-LVL III: CPT | Mod: PBBFAC,,, | Performed by: INTERNAL MEDICINE

## 2019-01-24 PROCEDURE — 83036 HEMOGLOBIN GLYCOSYLATED A1C: CPT

## 2019-01-24 PROCEDURE — 99214 OFFICE O/P EST MOD 30 MIN: CPT | Mod: S$GLB,,, | Performed by: INTERNAL MEDICINE

## 2019-01-24 PROCEDURE — 3008F BODY MASS INDEX DOCD: CPT | Mod: CPTII,S$GLB,, | Performed by: INTERNAL MEDICINE

## 2019-01-24 PROCEDURE — 99999 PR PBB SHADOW E&M-EST. PATIENT-LVL III: ICD-10-PCS | Mod: PBBFAC,,, | Performed by: INTERNAL MEDICINE

## 2019-01-24 PROCEDURE — 84443 ASSAY THYROID STIM HORMONE: CPT

## 2019-01-24 PROCEDURE — 3008F PR BODY MASS INDEX (BMI) DOCUMENTED: ICD-10-PCS | Mod: CPTII,S$GLB,, | Performed by: INTERNAL MEDICINE

## 2019-01-24 PROCEDURE — 82607 VITAMIN B-12: CPT

## 2019-01-24 PROCEDURE — 3077F PR MOST RECENT SYSTOLIC BLOOD PRESSURE >= 140 MM HG: ICD-10-PCS | Mod: CPTII,S$GLB,, | Performed by: INTERNAL MEDICINE

## 2019-01-24 PROCEDURE — 3080F DIAST BP >= 90 MM HG: CPT | Mod: CPTII,S$GLB,, | Performed by: INTERNAL MEDICINE

## 2019-01-24 RX ORDER — AMLODIPINE BESYLATE 10 MG/1
10 TABLET ORAL DAILY
Qty: 90 TABLET | Refills: 1 | Status: SHIPPED | OUTPATIENT
Start: 2019-01-24 | End: 2019-10-31 | Stop reason: SDUPTHER

## 2019-01-24 RX ORDER — LISINOPRIL AND HYDROCHLOROTHIAZIDE 12.5; 2 MG/1; MG/1
1 TABLET ORAL DAILY
Qty: 90 TABLET | Refills: 1 | Status: SHIPPED | OUTPATIENT
Start: 2019-01-24 | End: 2019-10-31 | Stop reason: SDUPTHER

## 2019-01-24 NOTE — PROGRESS NOTES
SUBJECTIVE     Chief Complaint   Patient presents with    Medication Refill       HPI  Erma Linda is a 58 y.o. female with multiple medical diagnoses as listed in the medical history and problem list that presents for follow-up for medication refill for HTN. Pt has been doing well since her last visit. She is fully compliant with meds and denies any adverse side effects. Pt is compliant with a low Na diet, but does not exercise routinely with plans to start soon. She does not check her BP at home. Pt presents for refills today.    PAST MEDICAL HISTORY:  Past Medical History:   Diagnosis Date    History of hepatitis C; S/p RX with SVR 12 documented 2018 3/22/2018    Hypertension        PAST SURGICAL HISTORY:  Past Surgical History:   Procedure Laterality Date     SECTION, CLASSIC      KNEE ARTHROSCOPY W/ LASER      R    TUBAL LIGATION         SOCIAL HISTORY:  Social History     Socioeconomic History    Marital status:      Spouse name: Not on file    Number of children: Not on file    Years of education: Not on file    Highest education level: Not on file   Social Needs    Financial resource strain: Not on file    Food insecurity - worry: Not on file    Food insecurity - inability: Not on file    Transportation needs - medical: Not on file    Transportation needs - non-medical: Not on file   Occupational History    Not on file   Tobacco Use    Smoking status: Never Smoker    Smokeless tobacco: Never Used   Substance and Sexual Activity    Alcohol use: No    Drug use: No    Sexual activity: Not Currently     Partners: Male     Birth control/protection: Post-menopausal     Comment: 16  with same partner 35 years    Other Topics Concern    Not on file   Social History Narrative    Not on file       FAMILY HISTORY:  Family History   Problem Relation Age of Onset    Heart disease Mother     Glaucoma Neg Hx        ALLERGIES AND MEDICATIONS: updated and  "reviewed.  Review of patient's allergies indicates:   Allergen Reactions    Hydralazine analogues Palpitations     Current Outpatient Medications   Medication Sig Dispense Refill    amLODIPine (NORVASC) 10 MG tablet Take 1 tablet (10 mg total) by mouth once daily. 90 tablet 1    hydrOXYzine pamoate (VISTARIL) 25 MG Cap Take 1 capsule (25 mg total) by mouth every 8 (eight) hours as needed. 30 capsule 1    multivitamin (ONE DAILY MULTIVITAMIN) per tablet Take 1 tablet by mouth once daily.      lisinopril-hydrochlorothiazide (PRINZIDE,ZESTORETIC) 20-12.5 mg per tablet Take 1 tablet by mouth once daily. 90 tablet 1     No current facility-administered medications for this visit.        ROS  Review of Systems   Constitutional: Negative for chills and fever.   HENT: Negative for hearing loss and sore throat.    Eyes: Negative for visual disturbance.   Respiratory: Negative for cough and shortness of breath.    Cardiovascular: Negative for chest pain, palpitations and leg swelling.   Gastrointestinal: Negative for abdominal pain, constipation, diarrhea, nausea and vomiting.   Genitourinary: Negative for dysuria, frequency and urgency.   Musculoskeletal: Positive for back pain. Negative for arthralgias, joint swelling and myalgias.   Skin: Negative for rash and wound.   Neurological: Positive for numbness (neuropathy at R 5th MTP). Negative for headaches.   Psychiatric/Behavioral: Negative for agitation and confusion. The patient is not nervous/anxious.          OBJECTIVE     Physical Exam  Vitals:    01/24/19 0808   BP: (!) 142/90   Pulse: 105   Resp: 18   Temp: 98.3 °F (36.8 °C)    Body mass index is 28.99 kg/m².  Weight: 76.6 kg (168 lb 14 oz)   Height: 5' 4" (162.6 cm)     Physical Exam   Constitutional: She is oriented to person, place, and time. She appears well-developed and well-nourished. No distress.   HENT:   Head: Normocephalic and atraumatic.   Right Ear: External ear normal.   Left Ear: External ear " normal.   Nose: Nose normal.   Mouth/Throat: Oropharynx is clear and moist.   Eyes: Conjunctivae and EOM are normal. Right eye exhibits no discharge. Left eye exhibits no discharge. No scleral icterus.   Neck: Normal range of motion. Neck supple. No JVD present. No tracheal deviation present.   Cardiovascular: Normal rate, regular rhythm and intact distal pulses. Exam reveals no gallop and no friction rub.   No murmur heard.  Pulmonary/Chest: Effort normal and breath sounds normal. No respiratory distress. She has no wheezes.   Abdominal: Soft. Bowel sounds are normal. She exhibits no distension and no mass. There is no tenderness. There is no rebound and no guarding.   Musculoskeletal: Normal range of motion. She exhibits no edema, tenderness or deformity.   Neurological: She is alert and oriented to person, place, and time. She exhibits normal muscle tone. Coordination normal.   Skin: Skin is warm and dry. No rash noted. No erythema.   Psychiatric: She has a normal mood and affect. Her behavior is normal. Judgment and thought content normal.         Health Maintenance       Date Due Completion Date    Pneumococcal Vaccine (Medium Risk) (1 of 1 - PPSV23) 03/11/1978 ---    Influenza Vaccine 08/01/2018 3/13/2017 (Declined)    Override on 3/13/2017: Declined    Override on 2/17/2016: Declined    Override on 12/3/2014: Declined    Override on 10/3/2013: Declined    TETANUS VACCINE 05/03/2019 (Originally 3/11/1977) ---    Pap Smear with HPV Cotest 09/30/2019 9/30/2016    Override on 10/3/2013: Declined    Mammogram 05/08/2020 5/8/2018    Override on 8/28/2014: Declined (patient will wait till end of year)    Lipid Panel 06/27/2023 6/27/2018    Colonoscopy 10/03/2023 10/3/2013 (Declined)    Override on 10/3/2013: Declined            ASSESSMENT     58 y.o. female with     1. Essential hypertension, benign    2. Chronic right-sided low back pain without sciatica    3. Neuropathy    4. Facet arthritis of cervical region     5. Vitamin D insufficiency        PLAN:     1. Essential hypertension, benign  - BP elevated above goal of <140/90  - Will increase Lisinopril 10-12.5 to 20-12.5  - amLODIPine (NORVASC) 10 MG tablet; Take 1 tablet (10 mg total) by mouth once daily.  Dispense: 90 tablet; Refill: 1  - lisinopril-hydrochlorothiazide (PRINZIDE,ZESTORETIC) 20-12.5 mg per tablet; Take 1 tablet by mouth once daily.  Dispense: 90 tablet; Refill: 1  - Monitor    2. Chronic right-sided low back pain without sciatica  - Pt encouraged to apply ice packs 2-3 times daily at 10 minute intervals x 72 hours, then okay to change to heating compress with care not to burn her self; she  voiced understanding   - Continue NSAIDs prn pain  - X-Ray Lumbar Spine AP And Lateral; Future    3. Neuropathy  - Unknown etiology; eval with labs as below  - Vitamin B12; Future  - Folate; Future  - TSH; Future  - Hemoglobin A1c; Future    4. Facet arthritis of cervical region  - Stable; no acute issues  - The current medical regimen is effective;  continue present plan and medications.    5. Vitamin D insufficiency  - Pt to take OTC Vit D 2,000 units po q24  - Vitamin D; Future        RTC in 4 week nurse visit BP check     Marry Howard MD  01/24/2019 8:20 AM        No Follow-up on file.

## 2019-01-25 PROBLEM — E55.9 VITAMIN D INSUFFICIENCY: Status: ACTIVE | Noted: 2019-01-25

## 2019-02-25 ENCOUNTER — CLINICAL SUPPORT (OUTPATIENT)
Dept: FAMILY MEDICINE | Facility: CLINIC | Age: 59
End: 2019-02-25
Payer: COMMERCIAL

## 2019-02-25 VITALS — DIASTOLIC BLOOD PRESSURE: 88 MMHG | SYSTOLIC BLOOD PRESSURE: 138 MMHG

## 2019-02-25 DIAGNOSIS — I10 ESSENTIAL HYPERTENSION, BENIGN: Primary | ICD-10-CM

## 2019-02-25 PROCEDURE — 99999 PR PBB SHADOW E&M-EST. PATIENT-LVL I: CPT | Mod: PBBFAC,,,

## 2019-02-25 PROCEDURE — 99499 UNLISTED E&M SERVICE: CPT | Mod: S$GLB,,, | Performed by: INTERNAL MEDICINE

## 2019-02-25 PROCEDURE — 99999 PR PBB SHADOW E&M-EST. PATIENT-LVL I: ICD-10-PCS | Mod: PBBFAC,,,

## 2019-02-25 PROCEDURE — 99499 NO LOS: ICD-10-PCS | Mod: S$GLB,,, | Performed by: INTERNAL MEDICINE

## 2019-02-25 NOTE — PROGRESS NOTES
Erma Linda 58 y.o. female is here today for Blood Pressure check.   History of HTN yes.    Review of patient's allergies indicates:   Allergen Reactions    Hydralazine analogues Palpitations     Creatinine   Date Value Ref Range Status   10/31/2018 0.8 0.5 - 1.4 mg/dL Final     Sodium   Date Value Ref Range Status   10/31/2018 139 136 - 145 mmol/L Final     Potassium   Date Value Ref Range Status   10/31/2018 3.8 3.5 - 5.1 mmol/L Final   ]  Patient verifies taking blood pressure medications on a regular basis at the same time of the day.     Current Outpatient Medications:     amLODIPine (NORVASC) 10 MG tablet, Take 1 tablet (10 mg total) by mouth once daily., Disp: 90 tablet, Rfl: 1    hydrOXYzine pamoate (VISTARIL) 25 MG Cap, Take 1 capsule (25 mg total) by mouth every 8 (eight) hours as needed., Disp: 30 capsule, Rfl: 1    lisinopril-hydrochlorothiazide (PRINZIDE,ZESTORETIC) 20-12.5 mg per tablet, Take 1 tablet by mouth once daily., Disp: 90 tablet, Rfl: 1    multivitamin (ONE DAILY MULTIVITAMIN) per tablet, Take 1 tablet by mouth once daily., Disp: , Rfl:   Does patient have record of home blood pressure readings no. Readings have been averaging  - no readings.   Last dose of blood pressure medication was taken at 6:30 am  2/25/2019.  Patient is asymptomatic.   Complains of  - no complaints.      150/90 right arm, sitting, manual    138/88 right arm, sitting, manual    Dr. Howard informed of nurse visit. Patient informed, per Dr. Howard, to continue current medication regimen and to follow up with PCP.  Patient verbalized understanding.

## 2019-04-16 ENCOUNTER — OFFICE VISIT (OUTPATIENT)
Dept: FAMILY MEDICINE | Facility: CLINIC | Age: 59
End: 2019-04-16
Payer: COMMERCIAL

## 2019-04-16 VITALS
RESPIRATION RATE: 18 BRPM | HEART RATE: 86 BPM | WEIGHT: 159.19 LBS | HEIGHT: 64 IN | TEMPERATURE: 99 F | OXYGEN SATURATION: 97 % | SYSTOLIC BLOOD PRESSURE: 132 MMHG | DIASTOLIC BLOOD PRESSURE: 90 MMHG | BODY MASS INDEX: 27.18 KG/M2

## 2019-04-16 DIAGNOSIS — R21 RASH AND NONSPECIFIC SKIN ERUPTION: Primary | ICD-10-CM

## 2019-04-16 DIAGNOSIS — R20.0 NUMBNESS OF TONGUE: ICD-10-CM

## 2019-04-16 PROCEDURE — 3008F PR BODY MASS INDEX (BMI) DOCUMENTED: ICD-10-PCS | Mod: CPTII,S$GLB,, | Performed by: INTERNAL MEDICINE

## 2019-04-16 PROCEDURE — 99214 OFFICE O/P EST MOD 30 MIN: CPT | Mod: 25,S$GLB,, | Performed by: INTERNAL MEDICINE

## 2019-04-16 PROCEDURE — 3080F PR MOST RECENT DIASTOLIC BLOOD PRESSURE >= 90 MM HG: ICD-10-PCS | Mod: CPTII,S$GLB,, | Performed by: INTERNAL MEDICINE

## 2019-04-16 PROCEDURE — 3075F SYST BP GE 130 - 139MM HG: CPT | Mod: CPTII,S$GLB,, | Performed by: INTERNAL MEDICINE

## 2019-04-16 PROCEDURE — 99214 PR OFFICE/OUTPT VISIT, EST, LEVL IV, 30-39 MIN: ICD-10-PCS | Mod: 25,S$GLB,, | Performed by: INTERNAL MEDICINE

## 2019-04-16 PROCEDURE — 96372 THER/PROPH/DIAG INJ SC/IM: CPT | Mod: 59,S$GLB,, | Performed by: INTERNAL MEDICINE

## 2019-04-16 PROCEDURE — 3075F PR MOST RECENT SYSTOLIC BLOOD PRESS GE 130-139MM HG: ICD-10-PCS | Mod: CPTII,S$GLB,, | Performed by: INTERNAL MEDICINE

## 2019-04-16 PROCEDURE — 3008F BODY MASS INDEX DOCD: CPT | Mod: CPTII,S$GLB,, | Performed by: INTERNAL MEDICINE

## 2019-04-16 PROCEDURE — 96372 PR INJECTION,THERAP/PROPH/DIAG2ST, IM OR SUBCUT: ICD-10-PCS | Mod: 59,S$GLB,, | Performed by: INTERNAL MEDICINE

## 2019-04-16 PROCEDURE — 99999 PR PBB SHADOW E&M-EST. PATIENT-LVL III: CPT | Mod: PBBFAC,,, | Performed by: INTERNAL MEDICINE

## 2019-04-16 PROCEDURE — 3080F DIAST BP >= 90 MM HG: CPT | Mod: CPTII,S$GLB,, | Performed by: INTERNAL MEDICINE

## 2019-04-16 PROCEDURE — 99999 PR PBB SHADOW E&M-EST. PATIENT-LVL III: ICD-10-PCS | Mod: PBBFAC,,, | Performed by: INTERNAL MEDICINE

## 2019-04-16 RX ORDER — METHYLPREDNISOLONE ACETATE 40 MG/ML
40 INJECTION, SUSPENSION INTRA-ARTICULAR; INTRALESIONAL; INTRAMUSCULAR; SOFT TISSUE ONCE
Status: COMPLETED | OUTPATIENT
Start: 2019-04-16 | End: 2019-04-16

## 2019-04-16 RX ORDER — METHYLPREDNISOLONE 4 MG/1
TABLET ORAL
Qty: 1 PACKAGE | Refills: 0 | Status: SHIPPED | OUTPATIENT
Start: 2019-04-17 | End: 2019-05-09

## 2019-04-16 RX ADMIN — METHYLPREDNISOLONE ACETATE 40 MG: 40 INJECTION, SUSPENSION INTRA-ARTICULAR; INTRALESIONAL; INTRAMUSCULAR; SOFT TISSUE at 04:04

## 2019-04-16 NOTE — PROGRESS NOTES
"SUBJECTIVE     Chief Complaint   Patient presents with    Rash       HPI  Erma Linda is a 59 y.o. female with multiple medical diagnoses as listed in the medical history and problem list that presents for evaluation of a rash x 3 weeks. Pt reports she is "breaking out with bumps." The rash is itchy, but she denies any pain. She has a rash on her B/L upper ext, BLE, back, etc. Symptoms started with an itch only initially, but have now progressed to some sores. She has been taking Benadryl without relief of symptoms. No one else in the home as a rash/itch. Pt also reports some associated numbness to the lower lip, but denies any swelling/throat closing. Denies any recent changes to foods/beverages, meds, body washes, lotions, detergents, soaps, etc.     PAST MEDICAL HISTORY:  Past Medical History:   Diagnosis Date    History of hepatitis C; S/p RX with SVR 12 documented 2018 3/22/2018    Hypertension        PAST SURGICAL HISTORY:  Past Surgical History:   Procedure Laterality Date     SECTION, CLASSIC      KNEE ARTHROSCOPY W/ LASER      R    TUBAL LIGATION         SOCIAL HISTORY:  Social History     Socioeconomic History    Marital status:      Spouse name: Not on file    Number of children: Not on file    Years of education: Not on file    Highest education level: Not on file   Occupational History    Not on file   Social Needs    Financial resource strain: Not on file    Food insecurity:     Worry: Not on file     Inability: Not on file    Transportation needs:     Medical: Not on file     Non-medical: Not on file   Tobacco Use    Smoking status: Never Smoker    Smokeless tobacco: Never Used   Substance and Sexual Activity    Alcohol use: No    Drug use: No    Sexual activity: Not Currently     Partners: Male     Birth control/protection: Post-menopausal     Comment: 16  with same partner 35 years    Lifestyle    Physical activity:     Days per week: Not " on file     Minutes per session: Not on file    Stress: Not on file   Relationships    Social connections:     Talks on phone: Not on file     Gets together: Not on file     Attends Mor[employer] service: Not on file     Active member of club or organization: Not on file     Attends meetings of clubs or organizations: Not on file     Relationship status: Not on file   Other Topics Concern    Not on file   Social History Narrative    Not on file       FAMILY HISTORY:  Family History   Problem Relation Age of Onset    Heart disease Mother     Glaucoma Neg Hx        ALLERGIES AND MEDICATIONS: updated and reviewed.  Review of patient's allergies indicates:   Allergen Reactions    Hydralazine analogues Palpitations     Current Outpatient Medications   Medication Sig Dispense Refill    amLODIPine (NORVASC) 10 MG tablet Take 1 tablet (10 mg total) by mouth once daily. 90 tablet 1    lisinopril-hydrochlorothiazide (PRINZIDE,ZESTORETIC) 20-12.5 mg per tablet Take 1 tablet by mouth once daily. 90 tablet 1    multivitamin (ONE DAILY MULTIVITAMIN) per tablet Take 1 tablet by mouth once daily.      hydrOXYzine pamoate (VISTARIL) 25 MG Cap Take 1 capsule (25 mg total) by mouth every 8 (eight) hours as needed. 30 capsule 1    [START ON 4/17/2019] methylPREDNISolone (MEDROL DOSEPACK) 4 mg tablet use as directed 1 Package 0     No current facility-administered medications for this visit.        ROS  Review of Systems   Constitutional: Positive for unexpected weight change. Negative for chills and fever.   HENT: Negative for hearing loss and sore throat.    Eyes: Negative for visual disturbance.   Respiratory: Negative for cough and shortness of breath.    Cardiovascular: Negative for chest pain, palpitations and leg swelling.   Gastrointestinal: Negative for abdominal pain, constipation, diarrhea, nausea and vomiting.   Genitourinary: Negative for dysuria, frequency and urgency.   Musculoskeletal: Negative for arthralgias,  "joint swelling and myalgias.   Skin: Positive for rash. Negative for wound.   Neurological: Negative for headaches.   Psychiatric/Behavioral: Negative for agitation and confusion. The patient is not nervous/anxious.          OBJECTIVE     Physical Exam  Vitals:    04/16/19 1600   BP: (!) 132/90   Pulse: 86   Resp: 18   Temp: 98.6 °F (37 °C)    Body mass index is 27.32 kg/m².  Weight: 72.2 kg (159 lb 2.8 oz)   Height: 5' 4" (162.6 cm)     Physical Exam   Constitutional: She is oriented to person, place, and time. She appears well-developed and well-nourished. No distress.   HENT:   Head: Normocephalic and atraumatic.   Right Ear: External ear normal.   Left Ear: External ear normal.   Nose: Nose normal.   Mouth/Throat: Oropharynx is clear and moist.   Eyes: Conjunctivae and EOM are normal. Right eye exhibits no discharge. Left eye exhibits no discharge. No scleral icterus.   Neck: Normal range of motion. Neck supple. No JVD present. No tracheal deviation present.   Cardiovascular: Normal rate, regular rhythm and intact distal pulses. Exam reveals no gallop and no friction rub.   No murmur heard.  Pulmonary/Chest: Effort normal and breath sounds normal. No respiratory distress. She has no wheezes.   Abdominal: Soft. Bowel sounds are normal. She exhibits no distension and no mass. There is no tenderness. There is no rebound and no guarding.   Musculoskeletal: Normal range of motion. She exhibits no edema, tenderness or deformity.   Neurological: She is alert and oriented to person, place, and time. She exhibits normal muscle tone. Coordination normal.   Skin: Skin is warm and dry. No rash noted. No erythema.   Papular rash over B/L upper ext and BLE covered by eschar   Psychiatric: She has a normal mood and affect. Her behavior is normal. Judgment and thought content normal.         Health Maintenance       Date Due Completion Date    Pneumococcal Vaccine (Medium Risk) (1 of 1 - PPSV23) 03/11/1978 ---    Influenza " Vaccine 08/01/2018 3/13/2017 (Declined)    Override on 3/13/2017: Declined    Override on 2/17/2016: Declined    Override on 12/3/2014: Declined    Override on 10/3/2013: Declined    Zoster Vaccine 03/11/2019 ---    TETANUS VACCINE 05/03/2019 (Originally 3/11/1978) ---    Pap Smear with HPV Cotest 09/30/2019 9/30/2016    Override on 10/3/2013: Declined    Mammogram 05/08/2020 5/8/2018    Override on 8/28/2014: Declined (patient will wait till end of year)    Lipid Panel 06/27/2023 6/27/2018    Colonoscopy 10/03/2023 10/3/2013 (Declined)    Override on 10/3/2013: Declined            ASSESSMENT     59 y.o. female with     1. Rash and nonspecific skin eruption    2. Numbness of tongue        PLAN:     1. Rash and nonspecific skin eruption  - Pt to start po steroids tomorrow since injection given today  - Advised to monitor all things for potential allergy; low threshold to refer to Allergy for further eval  - methylPREDNISolone acetate injection 40 mg  - methylPREDNISolone (MEDROL DOSEPACK) 4 mg tablet; use as directed  Dispense: 1 Package; Refill: 0    2. Numbness of tongue  - As above  - methylPREDNISolone acetate injection 40 mg  - methylPREDNISolone (MEDROL DOSEPACK) 4 mg tablet; use as directed  Dispense: 1 Package; Refill: 0        RTC in 1-2 weeks as needed for any acute worsening of current condition or failure to improve       Marry Howard MD  04/16/2019 4:05 PM        No follow-ups on file.

## 2019-05-09 ENCOUNTER — OFFICE VISIT (OUTPATIENT)
Dept: FAMILY MEDICINE | Facility: CLINIC | Age: 59
End: 2019-05-09
Payer: COMMERCIAL

## 2019-05-09 VITALS
RESPIRATION RATE: 18 BRPM | WEIGHT: 157.19 LBS | TEMPERATURE: 98 F | OXYGEN SATURATION: 98 % | HEIGHT: 64 IN | HEART RATE: 95 BPM | SYSTOLIC BLOOD PRESSURE: 130 MMHG | DIASTOLIC BLOOD PRESSURE: 85 MMHG | BODY MASS INDEX: 26.84 KG/M2

## 2019-05-09 DIAGNOSIS — R21 RASH AND NONSPECIFIC SKIN ERUPTION: Primary | ICD-10-CM

## 2019-05-09 DIAGNOSIS — I10 ESSENTIAL HYPERTENSION, BENIGN: ICD-10-CM

## 2019-05-09 PROCEDURE — 3075F SYST BP GE 130 - 139MM HG: CPT | Mod: CPTII,S$GLB,, | Performed by: INTERNAL MEDICINE

## 2019-05-09 PROCEDURE — 3075F PR MOST RECENT SYSTOLIC BLOOD PRESS GE 130-139MM HG: ICD-10-PCS | Mod: CPTII,S$GLB,, | Performed by: INTERNAL MEDICINE

## 2019-05-09 PROCEDURE — 3079F DIAST BP 80-89 MM HG: CPT | Mod: CPTII,S$GLB,, | Performed by: INTERNAL MEDICINE

## 2019-05-09 PROCEDURE — 99214 OFFICE O/P EST MOD 30 MIN: CPT | Mod: S$GLB,,, | Performed by: INTERNAL MEDICINE

## 2019-05-09 PROCEDURE — 3079F PR MOST RECENT DIASTOLIC BLOOD PRESSURE 80-89 MM HG: ICD-10-PCS | Mod: CPTII,S$GLB,, | Performed by: INTERNAL MEDICINE

## 2019-05-09 PROCEDURE — 99999 PR PBB SHADOW E&M-EST. PATIENT-LVL III: CPT | Mod: PBBFAC,,, | Performed by: INTERNAL MEDICINE

## 2019-05-09 PROCEDURE — 99999 PR PBB SHADOW E&M-EST. PATIENT-LVL III: ICD-10-PCS | Mod: PBBFAC,,, | Performed by: INTERNAL MEDICINE

## 2019-05-09 PROCEDURE — 3008F PR BODY MASS INDEX (BMI) DOCUMENTED: ICD-10-PCS | Mod: CPTII,S$GLB,, | Performed by: INTERNAL MEDICINE

## 2019-05-09 PROCEDURE — 3008F BODY MASS INDEX DOCD: CPT | Mod: CPTII,S$GLB,, | Performed by: INTERNAL MEDICINE

## 2019-05-09 PROCEDURE — 99214 PR OFFICE/OUTPT VISIT, EST, LEVL IV, 30-39 MIN: ICD-10-PCS | Mod: S$GLB,,, | Performed by: INTERNAL MEDICINE

## 2019-05-09 RX ORDER — BETAMETHASONE DIPROPIONATE 0.5 MG/G
OINTMENT TOPICAL 2 TIMES DAILY
Qty: 45 G | Refills: 2 | Status: SHIPPED | OUTPATIENT
Start: 2019-05-09 | End: 2022-02-01

## 2019-05-09 NOTE — PROGRESS NOTES
SUBJECTIVE     Chief Complaint   Patient presents with    bumps over general body area     areas itchy       HPI  Erma Linda is a 59 y.o. female with multiple medical diagnoses as listed in the medical history and problem list that presents for rash x 1-2 weeks. Pt reports her rash completely resolved with steroid treatment, but it is now gradually returning. The rash is itchy with papules noted on the B/L upper ext and BLE. Denies any recent changes to foods/beverages, meds, body washes, lotions, detergents, soaps, etc.     PAST MEDICAL HISTORY:  Past Medical History:   Diagnosis Date    History of hepatitis C; S/p RX with SVR 12 documented 2018 3/22/2018    Hypertension        PAST SURGICAL HISTORY:  Past Surgical History:   Procedure Laterality Date     SECTION, CLASSIC      KNEE ARTHROSCOPY W/ LASER      R    TUBAL LIGATION         SOCIAL HISTORY:  Social History     Socioeconomic History    Marital status:      Spouse name: Not on file    Number of children: Not on file    Years of education: Not on file    Highest education level: Not on file   Occupational History    Not on file   Social Needs    Financial resource strain: Not on file    Food insecurity:     Worry: Not on file     Inability: Not on file    Transportation needs:     Medical: Not on file     Non-medical: Not on file   Tobacco Use    Smoking status: Never Smoker    Smokeless tobacco: Never Used   Substance and Sexual Activity    Alcohol use: No    Drug use: No    Sexual activity: Not Currently     Partners: Male     Birth control/protection: Post-menopausal     Comment: 16  with same partner 35 years    Lifestyle    Physical activity:     Days per week: Not on file     Minutes per session: Not on file    Stress: Not on file   Relationships    Social connections:     Talks on phone: Not on file     Gets together: Not on file     Attends Restoration service: Not on file     Active  member of club or organization: Not on file     Attends meetings of clubs or organizations: Not on file     Relationship status: Not on file   Other Topics Concern    Not on file   Social History Narrative    Not on file       FAMILY HISTORY:  Family History   Problem Relation Age of Onset    Heart disease Mother     Glaucoma Neg Hx        ALLERGIES AND MEDICATIONS: updated and reviewed.  Review of patient's allergies indicates:   Allergen Reactions    Hydralazine analogues Palpitations     Current Outpatient Medications   Medication Sig Dispense Refill    amLODIPine (NORVASC) 10 MG tablet Take 1 tablet (10 mg total) by mouth once daily. 90 tablet 1    betamethasone dipropionate (DIPROLENE) 0.05 % ointment Apply topically 2 (two) times daily. 45 g 2    hydrOXYzine pamoate (VISTARIL) 25 MG Cap Take 1 capsule (25 mg total) by mouth every 8 (eight) hours as needed. 30 capsule 1    lisinopril-hydrochlorothiazide (PRINZIDE,ZESTORETIC) 20-12.5 mg per tablet Take 1 tablet by mouth once daily. 90 tablet 1    multivitamin (ONE DAILY MULTIVITAMIN) per tablet Take 1 tablet by mouth once daily.       No current facility-administered medications for this visit.        ROS  Review of Systems   Constitutional: Negative for chills and fever.   HENT: Negative for hearing loss and sore throat.    Eyes: Negative for visual disturbance.   Respiratory: Negative for cough and shortness of breath.    Cardiovascular: Negative for chest pain, palpitations and leg swelling.   Gastrointestinal: Negative for abdominal pain, constipation, diarrhea, nausea and vomiting.   Genitourinary: Negative for dysuria, frequency and urgency.   Musculoskeletal: Negative for arthralgias, joint swelling and myalgias.   Skin: Positive for rash. Negative for wound.   Neurological: Negative for headaches.   Psychiatric/Behavioral: Negative for agitation and confusion. The patient is not nervous/anxious.          OBJECTIVE     Physical Exam  Vitals:     "05/09/19 1658   BP: 130/85   Pulse:    Resp:    Temp:     Body mass index is 26.98 kg/m².  Weight: 71.3 kg (157 lb 3 oz)   Height: 5' 4" (162.6 cm)     Physical Exam   Constitutional: She is oriented to person, place, and time. She appears well-developed and well-nourished. No distress.   HENT:   Head: Normocephalic and atraumatic.   Right Ear: External ear normal.   Left Ear: External ear normal.   Nose: Nose normal.   Mouth/Throat: Oropharynx is clear and moist.   Eyes: Conjunctivae and EOM are normal. Right eye exhibits no discharge. Left eye exhibits no discharge. No scleral icterus.   Neck: Normal range of motion. Neck supple. No JVD present. No tracheal deviation present.   Pulmonary/Chest: Effort normal. No respiratory distress.   Musculoskeletal: Normal range of motion. She exhibits no deformity.   Neurological: She is alert and oriented to person, place, and time. She exhibits normal muscle tone. Coordination normal.   Skin: Skin is warm and dry. Rash (skin colored papules on dorsum of hands and RLE with some covered by eschar) noted. No erythema.   Psychiatric: She has a normal mood and affect. Her behavior is normal. Judgment and thought content normal.         Health Maintenance       Date Due Completion Date    TETANUS VACCINE 03/11/1977 ---    Pneumococcal Vaccine (Medium Risk) (1 of 1 - PPSV23) 03/11/1978 ---    Shingles Vaccine (1 of 2) 03/11/2009 ---    Influenza Vaccine 08/01/2019 3/13/2017 (Declined)    Override on 3/13/2017: Declined    Override on 2/17/2016: Declined    Override on 12/3/2014: Declined    Override on 10/3/2013: Declined    Pap Smear with HPV Cotest 09/30/2019 9/30/2016    Override on 10/3/2013: Declined    Mammogram 05/08/2020 5/8/2018    Override on 8/28/2014: Declined (patient will wait till end of year)    Lipid Panel 06/27/2023 6/27/2018    Colonoscopy 10/03/2023 10/3/2013 (Declined)    Override on 10/3/2013: Declined            ASSESSMENT     59 y.o. female with     1. Rash " and nonspecific skin eruption    2. Essential hypertension, benign        PLAN:     1. Rash and nonspecific skin eruption  - Unknown etiology, so plan for Derm eval  - betamethasone dipropionate (DIPROLENE) 0.05 % ointment; Apply topically 2 (two) times daily.  Dispense: 45 g; Refill: 2  - Ambulatory referral to Dermatology    2. Essential hypertension, benign  -  Advised to maintain a low Na diet(<2g/day), exercise, and keep BP log to present to next visit  - The current medical regimen is effective;  continue present plan and medications.        RTC in 3 months     Marry Howard MD  05/09/2019 4:55 PM        No follow-ups on file.

## 2019-08-28 DIAGNOSIS — Z12.11 COLON CANCER SCREENING: ICD-10-CM

## 2019-10-31 ENCOUNTER — OFFICE VISIT (OUTPATIENT)
Dept: FAMILY MEDICINE | Facility: CLINIC | Age: 59
End: 2019-10-31
Payer: COMMERCIAL

## 2019-10-31 VITALS
OXYGEN SATURATION: 99 % | HEART RATE: 90 BPM | RESPIRATION RATE: 15 BRPM | TEMPERATURE: 98 F | DIASTOLIC BLOOD PRESSURE: 100 MMHG | HEIGHT: 64 IN | BODY MASS INDEX: 28.07 KG/M2 | SYSTOLIC BLOOD PRESSURE: 140 MMHG | WEIGHT: 164.44 LBS

## 2019-10-31 DIAGNOSIS — I10 ESSENTIAL HYPERTENSION, BENIGN: ICD-10-CM

## 2019-10-31 DIAGNOSIS — Z01.419 WELL WOMAN EXAM: Primary | ICD-10-CM

## 2019-10-31 PROCEDURE — 3077F SYST BP >= 140 MM HG: CPT | Mod: CPTII,S$GLB,, | Performed by: PHYSICIAN ASSISTANT

## 2019-10-31 PROCEDURE — 99214 PR OFFICE/OUTPT VISIT, EST, LEVL IV, 30-39 MIN: ICD-10-PCS | Mod: S$GLB,,, | Performed by: PHYSICIAN ASSISTANT

## 2019-10-31 PROCEDURE — 3008F PR BODY MASS INDEX (BMI) DOCUMENTED: ICD-10-PCS | Mod: CPTII,S$GLB,, | Performed by: PHYSICIAN ASSISTANT

## 2019-10-31 PROCEDURE — 3077F PR MOST RECENT SYSTOLIC BLOOD PRESSURE >= 140 MM HG: ICD-10-PCS | Mod: CPTII,S$GLB,, | Performed by: PHYSICIAN ASSISTANT

## 2019-10-31 PROCEDURE — 3080F PR MOST RECENT DIASTOLIC BLOOD PRESSURE >= 90 MM HG: ICD-10-PCS | Mod: CPTII,S$GLB,, | Performed by: PHYSICIAN ASSISTANT

## 2019-10-31 PROCEDURE — 3008F BODY MASS INDEX DOCD: CPT | Mod: CPTII,S$GLB,, | Performed by: PHYSICIAN ASSISTANT

## 2019-10-31 PROCEDURE — 99214 OFFICE O/P EST MOD 30 MIN: CPT | Mod: S$GLB,,, | Performed by: PHYSICIAN ASSISTANT

## 2019-10-31 PROCEDURE — 99999 PR PBB SHADOW E&M-EST. PATIENT-LVL IV: CPT | Mod: PBBFAC,,, | Performed by: PHYSICIAN ASSISTANT

## 2019-10-31 PROCEDURE — 3080F DIAST BP >= 90 MM HG: CPT | Mod: CPTII,S$GLB,, | Performed by: PHYSICIAN ASSISTANT

## 2019-10-31 PROCEDURE — 99999 PR PBB SHADOW E&M-EST. PATIENT-LVL IV: ICD-10-PCS | Mod: PBBFAC,,, | Performed by: PHYSICIAN ASSISTANT

## 2019-10-31 RX ORDER — AMLODIPINE BESYLATE 10 MG/1
10 TABLET ORAL DAILY
Qty: 90 TABLET | Refills: 1 | Status: SHIPPED | OUTPATIENT
Start: 2019-10-31 | End: 2020-05-11

## 2019-10-31 RX ORDER — LISINOPRIL AND HYDROCHLOROTHIAZIDE 12.5; 2 MG/1; MG/1
1 TABLET ORAL DAILY
Qty: 90 TABLET | Refills: 1 | Status: SHIPPED | OUTPATIENT
Start: 2019-10-31 | End: 2020-05-11

## 2019-10-31 RX ORDER — CHOLECALCIFEROL (VITAMIN D3) 25 MCG
1000 TABLET ORAL DAILY
COMMUNITY

## 2019-10-31 RX ORDER — PNV NO.95/FERROUS FUM/FOLIC AC 28MG-0.8MG
500 TABLET ORAL DAILY
COMMUNITY

## 2019-11-01 NOTE — PROGRESS NOTES
Subjective:       Patient ID: Erma Linda is a 59 y.o. female with multiple medical diagnoses as listed in the medical history and problem list that presents for Hypertension  .    Chief Complaint: Hypertension      Hypertension   This is a chronic problem. The current episode started more than 1 year ago. The problem has been waxing and waning since onset. The problem is uncontrolled (stress right now with  and sick family members ). Associated symptoms include anxiety (stress). Pertinent negatives include no headaches, palpitations, peripheral edema or shortness of breath. Risk factors for coronary artery disease include stress. Past treatments include ACE inhibitors, diuretics and calcium channel blockers. The current treatment provides moderate improvement.        Review of Systems   Respiratory: Negative for shortness of breath.    Cardiovascular: Negative for palpitations.   Neurological: Negative for headaches.         PAST MEDICAL HISTORY:  Past Medical History:   Diagnosis Date    History of hepatitis C; S/p RX with SVR 12 documented 03/2018 3/22/2018    Hypertension        SOCIAL HISTORY:  Social History     Socioeconomic History    Marital status:      Spouse name: Not on file    Number of children: Not on file    Years of education: Not on file    Highest education level: Not on file   Occupational History    Not on file   Social Needs    Financial resource strain: Not on file    Food insecurity:     Worry: Not on file     Inability: Not on file    Transportation needs:     Medical: Not on file     Non-medical: Not on file   Tobacco Use    Smoking status: Never Smoker    Smokeless tobacco: Never Used   Substance and Sexual Activity    Alcohol use: No    Drug use: No    Sexual activity: Not Currently     Partners: Male     Birth control/protection: Post-menopausal     Comment: 4/7/17  with same partner 35 years    Lifestyle    Physical activity:     Days per  "week: Not on file     Minutes per session: Not on file    Stress: Not on file   Relationships    Social connections:     Talks on phone: Not on file     Gets together: Not on file     Attends Rastafari service: Not on file     Active member of club or organization: Not on file     Attends meetings of clubs or organizations: Not on file     Relationship status: Not on file   Other Topics Concern    Not on file   Social History Narrative    Not on file       ALLERGIES AND MEDICATIONS: updated and reviewed.  Review of patient's allergies indicates:   Allergen Reactions    Hydralazine analogues Palpitations     Current Outpatient Medications   Medication Sig Dispense Refill    amLODIPine (NORVASC) 10 MG tablet Take 1 tablet (10 mg total) by mouth once daily. 90 tablet 1    cyanocobalamin (VITAMIN B-12) 100 MCG tablet Take 500 mcg by mouth once daily.      lisinopril-hydrochlorothiazide (PRINZIDE,ZESTORETIC) 20-12.5 mg per tablet Take 1 tablet by mouth once daily. 90 tablet 1    multivitamin (ONE DAILY MULTIVITAMIN) per tablet Take 1 tablet by mouth once daily.      vitamin D (VITAMIN D3) 1000 units Tab Take 1,000 Units by mouth once daily.      betamethasone dipropionate (DIPROLENE) 0.05 % ointment Apply topically 2 (two) times daily. 45 g 2    hydrOXYzine pamoate (VISTARIL) 25 MG Cap Take 1 capsule (25 mg total) by mouth every 8 (eight) hours as needed. 30 capsule 1     No current facility-administered medications for this visit.          Objective:   BP (!) 140/100   Pulse 90   Temp 98.3 °F (36.8 °C) (Oral)   Resp 15   Ht 5' 4" (1.626 m)   Wt 74.6 kg (164 lb 7.4 oz)   SpO2 99%   BMI 28.23 kg/m²      Physical Exam   Constitutional: She is oriented to person, place, and time. No distress.   HENT:   Head: Normocephalic and atraumatic.   Cardiovascular: Normal rate and regular rhythm.   Pulmonary/Chest: Effort normal and breath sounds normal.   Neurological: She is alert and oriented to person, place, " and time.   Psychiatric:   Tired              Assessment:       1. Well woman exam    2. Essential hypertension, benign        Plan:       Well woman exam  -     Ambulatory referral to Obstetrics / Gynecology    Essential hypertension, benign  -     lisinopril-hydrochlorothiazide (PRINZIDE,ZESTORETIC) 20-12.5 mg per tablet; Take 1 tablet by mouth once daily.  Dispense: 90 tablet; Refill: 1  -     amLODIPine (NORVASC) 10 MG tablet; Take 1 tablet (10 mg total) by mouth once daily.  Dispense: 90 tablet; Refill: 1  -     Comprehensive metabolic panel; Future; Expected date: 10/31/2019  -     Lipid panel; Future; Expected date: 10/31/2019            No follow-ups on file.

## 2019-11-10 ENCOUNTER — PATIENT OUTREACH (OUTPATIENT)
Dept: ADMINISTRATIVE | Facility: OTHER | Age: 59
End: 2019-11-10

## 2019-11-14 ENCOUNTER — LAB VISIT (OUTPATIENT)
Dept: LAB | Facility: HOSPITAL | Age: 59
End: 2019-11-14
Attending: INTERNAL MEDICINE
Payer: COMMERCIAL

## 2019-11-14 ENCOUNTER — CLINICAL SUPPORT (OUTPATIENT)
Dept: FAMILY MEDICINE | Facility: CLINIC | Age: 59
End: 2019-11-14
Payer: COMMERCIAL

## 2019-11-14 VITALS — SYSTOLIC BLOOD PRESSURE: 134 MMHG | DIASTOLIC BLOOD PRESSURE: 82 MMHG

## 2019-11-14 DIAGNOSIS — I10 BENIGN ESSENTIAL HTN: Primary | ICD-10-CM

## 2019-11-14 DIAGNOSIS — I10 ESSENTIAL HYPERTENSION, BENIGN: ICD-10-CM

## 2019-11-14 LAB
ALBUMIN SERPL BCP-MCNC: 3.9 G/DL (ref 3.5–5.2)
ALP SERPL-CCNC: 87 U/L (ref 55–135)
ALT SERPL W/O P-5'-P-CCNC: 15 U/L (ref 10–44)
ANION GAP SERPL CALC-SCNC: 9 MMOL/L (ref 8–16)
AST SERPL-CCNC: 23 U/L (ref 10–40)
BILIRUB SERPL-MCNC: 0.4 MG/DL (ref 0.1–1)
BUN SERPL-MCNC: 9 MG/DL (ref 6–20)
CALCIUM SERPL-MCNC: 10.6 MG/DL (ref 8.7–10.5)
CHLORIDE SERPL-SCNC: 105 MMOL/L (ref 95–110)
CHOLEST SERPL-MCNC: 180 MG/DL (ref 120–199)
CHOLEST/HDLC SERPL: 2.7 {RATIO} (ref 2–5)
CO2 SERPL-SCNC: 28 MMOL/L (ref 23–29)
CREAT SERPL-MCNC: 0.9 MG/DL (ref 0.5–1.4)
EST. GFR  (AFRICAN AMERICAN): >60 ML/MIN/1.73 M^2
EST. GFR  (NON AFRICAN AMERICAN): >60 ML/MIN/1.73 M^2
GLUCOSE SERPL-MCNC: 105 MG/DL (ref 70–110)
HDLC SERPL-MCNC: 66 MG/DL (ref 40–75)
HDLC SERPL: 36.7 % (ref 20–50)
LDLC SERPL CALC-MCNC: 107 MG/DL (ref 63–159)
NONHDLC SERPL-MCNC: 114 MG/DL
POTASSIUM SERPL-SCNC: 4 MMOL/L (ref 3.5–5.1)
PROT SERPL-MCNC: 9.3 G/DL (ref 6–8.4)
SODIUM SERPL-SCNC: 142 MMOL/L (ref 136–145)
TRIGL SERPL-MCNC: 35 MG/DL (ref 30–150)

## 2019-11-14 PROCEDURE — 80061 LIPID PANEL: CPT

## 2019-11-14 PROCEDURE — 99499 NO LOS: ICD-10-PCS | Mod: S$GLB,,, | Performed by: INTERNAL MEDICINE

## 2019-11-14 PROCEDURE — 80053 COMPREHEN METABOLIC PANEL: CPT

## 2019-11-14 PROCEDURE — 99499 UNLISTED E&M SERVICE: CPT | Mod: S$GLB,,, | Performed by: INTERNAL MEDICINE

## 2019-11-14 NOTE — PROGRESS NOTES
Erma Linda 59 y.o. female is here today for Blood Pressure check.   History of HTN yes.    Review of patient's allergies indicates:   Allergen Reactions    Hydralazine analogues Palpitations     Creatinine   Date Value Ref Range Status   10/31/2018 0.8 0.5 - 1.4 mg/dL Final     Sodium   Date Value Ref Range Status   10/31/2018 139 136 - 145 mmol/L Final     Potassium   Date Value Ref Range Status   10/31/2018 3.8 3.5 - 5.1 mmol/L Final   ]  Patient verifies taking blood pressure medications on a regular basis at the same time of the day.     Current Outpatient Medications:     amLODIPine (NORVASC) 10 MG tablet, Take 1 tablet (10 mg total) by mouth once daily., Disp: 90 tablet, Rfl: 1    betamethasone dipropionate (DIPROLENE) 0.05 % ointment, Apply topically 2 (two) times daily., Disp: 45 g, Rfl: 2    cyanocobalamin (VITAMIN B-12) 100 MCG tablet, Take 500 mcg by mouth once daily., Disp: , Rfl:     hydrOXYzine pamoate (VISTARIL) 25 MG Cap, Take 1 capsule (25 mg total) by mouth every 8 (eight) hours as needed., Disp: 30 capsule, Rfl: 1    lisinopril-hydrochlorothiazide (PRINZIDE,ZESTORETIC) 20-12.5 mg per tablet, Take 1 tablet by mouth once daily., Disp: 90 tablet, Rfl: 1    multivitamin (ONE DAILY MULTIVITAMIN) per tablet, Take 1 tablet by mouth once daily., Disp: , Rfl:     vitamin D (VITAMIN D3) 1000 units Tab, Take 1,000 Units by mouth once daily., Disp: , Rfl:   Does patient have record of home blood pressure readings no.   Last dose of blood pressure medication was taken at 5:00am.  Patient is asymptomatic.   BP- 150/94.      ,   .    Blood pressure reading after 15 minutes was 134/82.  Dr. Howard notified.

## 2019-11-15 DIAGNOSIS — E83.52 HYPERCALCEMIA: Primary | ICD-10-CM

## 2020-01-27 NOTE — TELEPHONE ENCOUNTER
Agree with tx plan   HCV LAB REVIEW  SVR 6    Pertinent labs:  CMP: stable   HCV RNA: <12, not detected    pls call pt:  Labs look good. HCV was not detected. We will check it in 6 weeks to determine a cure. There is still a small chance virus could return. FINGERS CROSSED!    Next labs due / pls schedule:  3/5, 12 wks out: cmp & hcv

## 2020-03-12 ENCOUNTER — LAB VISIT (OUTPATIENT)
Dept: LAB | Facility: HOSPITAL | Age: 60
End: 2020-03-12
Payer: COMMERCIAL

## 2020-03-12 ENCOUNTER — OFFICE VISIT (OUTPATIENT)
Dept: FAMILY MEDICINE | Facility: CLINIC | Age: 60
End: 2020-03-12
Payer: COMMERCIAL

## 2020-03-12 VITALS
HEART RATE: 92 BPM | OXYGEN SATURATION: 97 % | DIASTOLIC BLOOD PRESSURE: 84 MMHG | BODY MASS INDEX: 28.95 KG/M2 | WEIGHT: 169.56 LBS | SYSTOLIC BLOOD PRESSURE: 138 MMHG | TEMPERATURE: 98 F | HEIGHT: 64 IN

## 2020-03-12 DIAGNOSIS — E83.52 HYPERCALCEMIA: ICD-10-CM

## 2020-03-12 DIAGNOSIS — N30.00 ACUTE CYSTITIS WITHOUT HEMATURIA: Primary | ICD-10-CM

## 2020-03-12 DIAGNOSIS — Z86.19 HISTORY OF HEPATITIS C: ICD-10-CM

## 2020-03-12 LAB
BACTERIA #/AREA URNS HPF: ABNORMAL /HPF
BILIRUB SERPL-MCNC: NEGATIVE MG/DL
BILIRUB UR QL STRIP: NEGATIVE
BLOOD URINE, POC: 50
CLARITY UR: ABNORMAL
COLOR UR: YELLOW
COLOR, POC UA: NORMAL
GLUCOSE UR QL STRIP: NEGATIVE
GLUCOSE UR QL STRIP: NORMAL
HGB UR QL STRIP: ABNORMAL
HYALINE CASTS #/AREA URNS LPF: 0 /LPF
KETONES UR QL STRIP: NEGATIVE
KETONES UR QL STRIP: NEGATIVE
LEUKOCYTE ESTERASE UR QL STRIP: ABNORMAL
LEUKOCYTE ESTERASE URINE, POC: NORMAL
MICROSCOPIC COMMENT: ABNORMAL
NITRITE UR QL STRIP: POSITIVE
NITRITE, POC UA: POSITIVE
PH UR STRIP: 6 [PH] (ref 5–8)
PH, POC UA: 5
PROT UR QL STRIP: ABNORMAL
PROTEIN, POC: NORMAL
RBC #/AREA URNS HPF: 5 /HPF (ref 0–4)
SP GR UR STRIP: 1.01 (ref 1–1.03)
SPECIFIC GRAVITY, POC UA: 1.01
SQUAMOUS #/AREA URNS HPF: 2 /HPF
URN SPEC COLLECT METH UR: ABNORMAL
UROBILINOGEN UR STRIP-ACNC: ABNORMAL EU/DL
UROBILINOGEN, POC UA: NORMAL
WBC #/AREA URNS HPF: >100 /HPF (ref 0–5)

## 2020-03-12 PROCEDURE — 83970 ASSAY OF PARATHORMONE: CPT

## 2020-03-12 PROCEDURE — 82306 VITAMIN D 25 HYDROXY: CPT

## 2020-03-12 PROCEDURE — 87088 URINE BACTERIA CULTURE: CPT

## 2020-03-12 PROCEDURE — 99999 PR PBB SHADOW E&M-EST. PATIENT-LVL III: CPT | Mod: PBBFAC,,, | Performed by: INTERNAL MEDICINE

## 2020-03-12 PROCEDURE — 99999 PR PBB SHADOW E&M-EST. PATIENT-LVL III: ICD-10-PCS | Mod: PBBFAC,,, | Performed by: INTERNAL MEDICINE

## 2020-03-12 PROCEDURE — 87086 URINE CULTURE/COLONY COUNT: CPT

## 2020-03-12 PROCEDURE — 81000 URINALYSIS NONAUTO W/SCOPE: CPT

## 2020-03-12 PROCEDURE — 99213 OFFICE O/P EST LOW 20 MIN: CPT | Mod: PBBFAC,PO | Performed by: INTERNAL MEDICINE

## 2020-03-12 PROCEDURE — 99214 OFFICE O/P EST MOD 30 MIN: CPT | Mod: S$GLB,,, | Performed by: INTERNAL MEDICINE

## 2020-03-12 PROCEDURE — 99214 PR OFFICE/OUTPT VISIT, EST, LEVL IV, 30-39 MIN: ICD-10-PCS | Mod: S$GLB,,, | Performed by: INTERNAL MEDICINE

## 2020-03-12 PROCEDURE — 81001 URINALYSIS AUTO W/SCOPE: CPT | Mod: PBBFAC,PO | Performed by: INTERNAL MEDICINE

## 2020-03-12 PROCEDURE — 87077 CULTURE AEROBIC IDENTIFY: CPT

## 2020-03-12 PROCEDURE — 87186 SC STD MICRODIL/AGAR DIL: CPT

## 2020-03-12 RX ORDER — AMOXICILLIN AND CLAVULANATE POTASSIUM 875; 125 MG/1; MG/1
1 TABLET, FILM COATED ORAL EVERY 12 HOURS
Qty: 10 TABLET | Refills: 0 | Status: SHIPPED | OUTPATIENT
Start: 2020-03-12 | End: 2020-03-17

## 2020-03-12 NOTE — PROGRESS NOTES
Patient declines flu and pneu vaccine today.  Informed patient shingles and tetanus vaccine overdue.

## 2020-03-12 NOTE — PROGRESS NOTES
SUBJECTIVE     No chief complaint on file.      HPI  Erma Linda is a 60 y.o. female with multiple medical diagnoses as listed in the medical history and problem list that presents for evaluation for UTI symptoms x 2 weeks. Pt reports dysuria increased urinary frequency foul smell cloudiness. Pt does not have fever, chills, or night sweats. Pt has been taking Ibuprofen or Tylenol with improvement of symptoms.    LLE- x 2.5 weeks. Pain is sharp at a 10/10 at the ankle with radiation up into the L knee intermittently with symptoms worse at night. Pt denies any preceding trauma, falls, or missteps. Pt reports changing positions and above meds help ease her pain.     PAST MEDICAL HISTORY:  Past Medical History:   Diagnosis Date    History of hepatitis C; S/p RX with SVR 12 documented 2018 3/22/2018    Hypertension        PAST SURGICAL HISTORY:  Past Surgical History:   Procedure Laterality Date     SECTION, CLASSIC      KNEE ARTHROSCOPY W/ LASER      R    TUBAL LIGATION         SOCIAL HISTORY:  Social History     Socioeconomic History    Marital status:      Spouse name: Not on file    Number of children: Not on file    Years of education: Not on file    Highest education level: Not on file   Occupational History    Not on file   Social Needs    Financial resource strain: Not on file    Food insecurity:     Worry: Not on file     Inability: Not on file    Transportation needs:     Medical: Not on file     Non-medical: Not on file   Tobacco Use    Smoking status: Never Smoker    Smokeless tobacco: Never Used   Substance and Sexual Activity    Alcohol use: No    Drug use: No    Sexual activity: Not Currently     Partners: Male     Birth control/protection: Post-menopausal     Comment: 16  with same partner 35 years    Lifestyle    Physical activity:     Days per week: Not on file     Minutes per session: Not on file    Stress: Not at all   Relationships     Social connections:     Talks on phone: Not on file     Gets together: Not on file     Attends Shinto service: Not on file     Active member of club or organization: Not on file     Attends meetings of clubs or organizations: Not on file     Relationship status: Not on file   Other Topics Concern    Not on file   Social History Narrative    Not on file       FAMILY HISTORY:  Family History   Problem Relation Age of Onset    Heart disease Mother     Glaucoma Neg Hx        ALLERGIES AND MEDICATIONS: updated and reviewed.  Review of patient's allergies indicates:   Allergen Reactions    Hydralazine analogues Palpitations     Current Outpatient Medications   Medication Sig Dispense Refill    amLODIPine (NORVASC) 10 MG tablet Take 1 tablet (10 mg total) by mouth once daily. 90 tablet 1    betamethasone dipropionate (DIPROLENE) 0.05 % ointment Apply topically 2 (two) times daily. 45 g 2    cyanocobalamin (VITAMIN B-12) 100 MCG tablet Take 500 mcg by mouth once daily.      hydrOXYzine pamoate (VISTARIL) 25 MG Cap Take 1 capsule (25 mg total) by mouth every 8 (eight) hours as needed. 30 capsule 1    lisinopril-hydrochlorothiazide (PRINZIDE,ZESTORETIC) 20-12.5 mg per tablet Take 1 tablet by mouth once daily. 90 tablet 1    multivitamin (ONE DAILY MULTIVITAMIN) per tablet Take 1 tablet by mouth once daily.      vitamin D (VITAMIN D3) 1000 units Tab Take 1,000 Units by mouth once daily.      amoxicillin-clavulanate 875-125mg (AUGMENTIN) 875-125 mg per tablet Take 1 tablet by mouth every 12 (twelve) hours. for 5 days 10 tablet 0     No current facility-administered medications for this visit.        ROS  Review of Systems   Constitutional: Negative for chills and fever.   HENT: Negative for hearing loss and sore throat.    Eyes: Negative for visual disturbance.   Respiratory: Negative for cough and shortness of breath.    Cardiovascular: Negative for chest pain, palpitations and leg swelling.   Gastrointestinal:  "Negative for abdominal pain, constipation, diarrhea, nausea and vomiting.   Genitourinary: Positive for dysuria and frequency. Negative for urgency.   Musculoskeletal: Negative for arthralgias, joint swelling and myalgias.        LLE pain   Skin: Negative for rash and wound.   Neurological: Negative for headaches.   Psychiatric/Behavioral: Negative for agitation and confusion. The patient is not nervous/anxious.          OBJECTIVE     Physical Exam  Vitals:    03/12/20 1431   BP: 138/84   Pulse: 92   Temp: 97.7 °F (36.5 °C)    Body mass index is 29.1 kg/m².  Weight: 76.9 kg (169 lb 8.5 oz)   Height: 5' 4" (162.6 cm)     Physical Exam   Constitutional: She is oriented to person, place, and time. She appears well-developed and well-nourished. No distress.   HENT:   Head: Normocephalic and atraumatic.   Right Ear: External ear normal.   Left Ear: External ear normal.   Nose: Nose normal.   Mouth/Throat: Oropharynx is clear and moist.   Eyes: Conjunctivae and EOM are normal. Right eye exhibits no discharge. Left eye exhibits no discharge. No scleral icterus.   Neck: Normal range of motion. Neck supple. No JVD present. No tracheal deviation present.   Cardiovascular: Normal rate, regular rhythm and intact distal pulses. Exam reveals no gallop and no friction rub.   No murmur heard.  Pulmonary/Chest: Effort normal and breath sounds normal. No respiratory distress. She has no wheezes.   Abdominal: Soft. Bowel sounds are normal. She exhibits no distension and no mass. There is no tenderness. There is no rebound, no guarding and no CVA tenderness.   Musculoskeletal: Normal range of motion. She exhibits edema (1+ pitting edema to BLE) and tenderness (L lateral shin). She exhibits no deformity.   Neurological: She is alert and oriented to person, place, and time. She exhibits normal muscle tone. Coordination normal.   Skin: Skin is warm and dry. No rash noted. No erythema.   Psychiatric: She has a normal mood and affect. Her " behavior is normal. Judgment and thought content normal.         Health Maintenance       Date Due Completion Date    TETANUS VACCINE 03/11/1977 ---    Pneumococcal Vaccine (Medium Risk) (1 of 1 - PPSV23) 03/11/1978 ---    Shingles Vaccine (1 of 2) 03/11/2009 ---    Influenza Vaccine (1) 09/01/2019 ---    Pap Smear with HPV Cotest 09/30/2019 9/30/2016    Override on 10/3/2013: Declined    Mammogram 05/08/2020 5/8/2018    Override on 8/28/2014: Declined (patient will wait till end of year)    Colonoscopy 10/03/2023 10/3/2013 (Declined)    Override on 10/3/2013: Declined    Lipid Panel 11/14/2024 11/14/2019            ASSESSMENT     60 y.o. female with     1. Acute cystitis without hematuria    2. Hypercalcemia    3. History of hepatitis C; S/p RX with SVR 12 documented 03/2018        PLAN:     1. Acute cystitis without hematuria  - Pt advised to take antibiotics to completion  - amoxicillin-clavulanate 875-125mg (AUGMENTIN) 875-125 mg per tablet; Take 1 tablet by mouth every 12 (twelve) hours. for 5 days  Dispense: 10 tablet; Refill: 0  - POCT urinalysis, dipstick or tablet reag  - Urinalysis  - Urine culture    2. Hypercalcemia  - suspect vitamin-D deficiency as cause of her left lower extremity pain; check labs as below  - Vitamin D; Future  - PTH, intact; Future    3. History of hepatitis C; S/p RX with SVR 12 documented 03/2018  - Stable; no acute issues  - Monitor        RTC in 1-2 weeks as needed for any acute worsening of current condition or failure to improve       Marry Howard MD  03/12/2020 2:56 PM        No follow-ups on file.

## 2020-03-13 DIAGNOSIS — E21.0 PRIMARY HYPERPARATHYROIDISM: Primary | ICD-10-CM

## 2020-03-13 LAB
25(OH)D3+25(OH)D2 SERPL-MCNC: 40 NG/ML (ref 30–96)
PTH-INTACT SERPL-MCNC: 86.9 PG/ML (ref 9–77)

## 2020-03-14 LAB — BACTERIA UR CULT: ABNORMAL

## 2020-03-19 ENCOUNTER — TELEPHONE (OUTPATIENT)
Dept: FAMILY MEDICINE | Facility: CLINIC | Age: 60
End: 2020-03-19

## 2020-05-09 DIAGNOSIS — I10 ESSENTIAL HYPERTENSION, BENIGN: ICD-10-CM

## 2020-05-11 RX ORDER — LISINOPRIL AND HYDROCHLOROTHIAZIDE 12.5; 2 MG/1; MG/1
1 TABLET ORAL DAILY
Qty: 90 TABLET | Refills: 0 | Status: SHIPPED | OUTPATIENT
Start: 2020-05-11 | End: 2020-08-24

## 2020-05-11 RX ORDER — AMLODIPINE BESYLATE 10 MG/1
TABLET ORAL
Qty: 90 TABLET | Refills: 0 | Status: SHIPPED | OUTPATIENT
Start: 2020-05-11 | End: 2020-08-24

## 2020-05-18 ENCOUNTER — TELEPHONE (OUTPATIENT)
Dept: FAMILY MEDICINE | Facility: CLINIC | Age: 60
End: 2020-05-18

## 2020-05-18 NOTE — TELEPHONE ENCOUNTER
----- Message from Angela Bradley sent at 5/18/2020  2:39 PM CDT -----  Contact: CHAD SMITH [[id number]]  Type: RX Refill Request    Who Called: CHAD SMITH [[id number]]    RX Name and Strength:  amLODIPine (NORVASC) 10 MG tablet  lisinopriL-hydrochlorothiazide (PRINZIDE,ZESTORETIC) 20-12.5 mg per tablet    Preferred Pharmacy with phone number: [id number] - [place], [place] - [address]    Best Call Back Number: [phone]    Additional Information: N/A

## 2020-05-18 NOTE — TELEPHONE ENCOUNTER
LOV  3/12/2020  Last refill lisinopril-hctz 5/11/2020                  Amlodipine 5/11/2020    Patient informed that scripts were sent to the pharmacy.

## 2020-05-22 DIAGNOSIS — Z12.39 BREAST CANCER SCREENING: ICD-10-CM

## 2020-05-25 ENCOUNTER — PATIENT OUTREACH (OUTPATIENT)
Dept: ADMINISTRATIVE | Facility: HOSPITAL | Age: 60
End: 2020-05-25

## 2020-05-26 ENCOUNTER — PATIENT OUTREACH (OUTPATIENT)
Dept: ADMINISTRATIVE | Facility: HOSPITAL | Age: 60
End: 2020-05-26

## 2020-05-26 ENCOUNTER — PATIENT MESSAGE (OUTPATIENT)
Dept: ADMINISTRATIVE | Facility: HOSPITAL | Age: 60
End: 2020-05-26

## 2020-05-26 DIAGNOSIS — Z12.31 ENCOUNTER FOR SCREENING MAMMOGRAM FOR MALIGNANT NEOPLASM OF BREAST: ICD-10-CM

## 2020-05-26 DIAGNOSIS — Z11.51 SCREENING FOR HPV (HUMAN PAPILLOMAVIRUS): Primary | ICD-10-CM

## 2020-05-26 DIAGNOSIS — Z12.12 SCREENING FOR COLORECTAL CANCER: ICD-10-CM

## 2020-05-26 DIAGNOSIS — Z12.4 PAP SMEAR FOR CERVICAL CANCER SCREENING: ICD-10-CM

## 2020-05-26 DIAGNOSIS — Z12.11 SCREENING FOR COLORECTAL CANCER: ICD-10-CM

## 2020-06-26 ENCOUNTER — TELEPHONE (OUTPATIENT)
Dept: FAMILY MEDICINE | Facility: CLINIC | Age: 60
End: 2020-06-26

## 2020-06-26 NOTE — TELEPHONE ENCOUNTER
----- Message from Kathleen Moore sent at 6/26/2020  8:12 AM CDT -----  Type:  Sooner Appointment Request    Patient is requesting a sooner appointment.  Patient declined first available appointment listed as well as another facility and provider .  Patient will not accept being placed on the waitlist and is requesting a message be sent to doctor.    Name of Caller:  pt     When is the first available appointment? 07/11 with Dr. Howard 07/01 with Ms. Valentino.     Symptoms: left leg pain that is worsening.     Would the patient rather a call back or a response via [facility]? Call back     Best Call Back Number: [phone]    Additional Information: Pt asking to come in today

## 2020-07-13 ENCOUNTER — OFFICE VISIT (OUTPATIENT)
Dept: FAMILY MEDICINE | Facility: CLINIC | Age: 60
End: 2020-07-13
Payer: COMMERCIAL

## 2020-07-13 VITALS
SYSTOLIC BLOOD PRESSURE: 138 MMHG | TEMPERATURE: 98 F | HEIGHT: 64 IN | BODY MASS INDEX: 28.91 KG/M2 | OXYGEN SATURATION: 99 % | HEART RATE: 99 BPM | DIASTOLIC BLOOD PRESSURE: 88 MMHG | WEIGHT: 169.31 LBS

## 2020-07-13 DIAGNOSIS — E21.0 PRIMARY HYPERPARATHYROIDISM: ICD-10-CM

## 2020-07-13 DIAGNOSIS — M79.605 LEFT LEG PAIN: Primary | ICD-10-CM

## 2020-07-13 PROCEDURE — 99214 OFFICE O/P EST MOD 30 MIN: CPT | Mod: S$GLB,,, | Performed by: INTERNAL MEDICINE

## 2020-07-13 PROCEDURE — 99214 PR OFFICE/OUTPT VISIT, EST, LEVL IV, 30-39 MIN: ICD-10-PCS | Mod: S$GLB,,, | Performed by: INTERNAL MEDICINE

## 2020-07-13 PROCEDURE — 3079F DIAST BP 80-89 MM HG: CPT | Mod: CPTII,S$GLB,, | Performed by: INTERNAL MEDICINE

## 2020-07-13 PROCEDURE — 99999 PR PBB SHADOW E&M-EST. PATIENT-LVL III: ICD-10-PCS | Mod: PBBFAC,,, | Performed by: INTERNAL MEDICINE

## 2020-07-13 PROCEDURE — 3008F BODY MASS INDEX DOCD: CPT | Mod: CPTII,S$GLB,, | Performed by: INTERNAL MEDICINE

## 2020-07-13 PROCEDURE — 99999 PR PBB SHADOW E&M-EST. PATIENT-LVL III: CPT | Mod: PBBFAC,,, | Performed by: INTERNAL MEDICINE

## 2020-07-13 PROCEDURE — 3075F SYST BP GE 130 - 139MM HG: CPT | Mod: CPTII,S$GLB,, | Performed by: INTERNAL MEDICINE

## 2020-07-13 PROCEDURE — 3075F PR MOST RECENT SYSTOLIC BLOOD PRESS GE 130-139MM HG: ICD-10-PCS | Mod: CPTII,S$GLB,, | Performed by: INTERNAL MEDICINE

## 2020-07-13 PROCEDURE — 3008F PR BODY MASS INDEX (BMI) DOCUMENTED: ICD-10-PCS | Mod: CPTII,S$GLB,, | Performed by: INTERNAL MEDICINE

## 2020-07-13 PROCEDURE — 3079F PR MOST RECENT DIASTOLIC BLOOD PRESSURE 80-89 MM HG: ICD-10-PCS | Mod: CPTII,S$GLB,, | Performed by: INTERNAL MEDICINE

## 2020-07-13 RX ORDER — GABAPENTIN 300 MG/1
300 CAPSULE ORAL NIGHTLY
Qty: 30 CAPSULE | Refills: 0 | Status: SHIPPED | OUTPATIENT
Start: 2020-07-13 | End: 2022-02-01

## 2020-07-13 NOTE — PROGRESS NOTES
SUBJECTIVE     Chief Complaint   Patient presents with    Leg Pain     left       HPI  Erma Linda is a 60 y.o. female with multiple medical diagnoses as listed in the medical history and problem list that presents for evaluation of LLE pain x 4 months. Pain is sharp at a 15-20/10 in patches at the L lateral shin, anterior shin, lateral ankle and dorsum of L forefoot without radiation. Pain occurs intermittently with symptoms only at night. Her pain has worsened since last week and she notes varicose veins at the L lower lateral thigh. Pt denies any preceding trauma, falls, or missteps. Pt reports changing positions help ease her pain. She has been taking Ibuprofen and Tylenol without relief. +edema to L foot last week, which is now resolved.    PAST MEDICAL HISTORY:  Past Medical History:   Diagnosis Date    History of hepatitis C; S/p RX with SVR 12 documented 2018 3/22/2018    Hypertension        PAST SURGICAL HISTORY:  Past Surgical History:   Procedure Laterality Date     SECTION, CLASSIC      KNEE ARTHROSCOPY W/ LASER      R    TUBAL LIGATION         SOCIAL HISTORY:  Social History     Socioeconomic History    Marital status:      Spouse name: Not on file    Number of children: Not on file    Years of education: Not on file    Highest education level: Not on file   Occupational History    Not on file   Social Needs    Financial resource strain: Not on file    Food insecurity     Worry: Not on file     Inability: Not on file    Transportation needs     Medical: Not on file     Non-medical: Not on file   Tobacco Use    Smoking status: Never Smoker    Smokeless tobacco: Never Used   Substance and Sexual Activity    Alcohol use: No    Drug use: No    Sexual activity: Not Currently     Partners: Male     Birth control/protection: Post-menopausal     Comment: 16  with same partner 35 years    Lifestyle    Physical activity     Days per week: Not on file      Minutes per session: Not on file    Stress: Not at all   Relationships    Social connections     Talks on phone: Not on file     Gets together: Not on file     Attends Hoahaoism service: Not on file     Active member of club or organization: Not on file     Attends meetings of clubs or organizations: Not on file     Relationship status: Not on file   Other Topics Concern    Not on file   Social History Narrative    Not on file       FAMILY HISTORY:  Family History   Problem Relation Age of Onset    Heart disease Mother     Glaucoma Neg Hx        ALLERGIES AND MEDICATIONS: updated and reviewed.  Review of patient's allergies indicates:   Allergen Reactions    Hydralazine analogues Palpitations     Current Outpatient Medications   Medication Sig Dispense Refill    amLODIPine (NORVASC) 10 MG tablet Take 1 tablet by mouth once daily 90 tablet 0    betamethasone dipropionate (DIPROLENE) 0.05 % ointment Apply topically 2 (two) times daily. 45 g 2    cyanocobalamin (VITAMIN B-12) 100 MCG tablet Take 500 mcg by mouth once daily.      hydrOXYzine pamoate (VISTARIL) 25 MG Cap Take 1 capsule (25 mg total) by mouth every 8 (eight) hours as needed. 30 capsule 1    lisinopriL-hydrochlorothiazide (PRINZIDE,ZESTORETIC) 20-12.5 mg per tablet Take 1 tablet by mouth once daily 90 tablet 0    multivitamin (ONE DAILY MULTIVITAMIN) per tablet Take 1 tablet by mouth once daily.      vitamin D (VITAMIN D3) 1000 units Tab Take 1,000 Units by mouth once daily.      gabapentin (NEURONTIN) 300 MG capsule Take 1 capsule (300 mg total) by mouth every evening. 30 capsule 0     No current facility-administered medications for this visit.        ROS  Review of Systems   Constitutional: Negative for chills and fever.   HENT: Negative for hearing loss and sore throat.    Eyes: Negative for visual disturbance.   Respiratory: Negative for cough and shortness of breath.    Cardiovascular: Negative for chest pain, palpitations and leg  "swelling.   Gastrointestinal: Negative for abdominal pain, constipation, diarrhea, nausea and vomiting.   Genitourinary: Negative for dysuria, frequency and urgency.   Musculoskeletal: Negative for arthralgias, joint swelling and myalgias.        LLE pain   Skin: Negative for rash and wound.   Neurological: Negative for headaches.   Psychiatric/Behavioral: Negative for agitation and confusion. The patient is not nervous/anxious.          OBJECTIVE     Physical Exam  Vitals:    07/13/20 0843   BP: 138/88   Pulse: 99   Temp: 98.2 °F (36.8 °C)    Body mass index is 29.06 kg/m².  Weight: 76.8 kg (169 lb 5 oz)   Height: 5' 4" (162.6 cm)     Physical Exam  Constitutional:       General: She is not in acute distress.     Appearance: She is well-developed.   HENT:      Head: Normocephalic and atraumatic.      Right Ear: External ear normal.      Left Ear: External ear normal.      Nose: Nose normal.   Eyes:      General: No scleral icterus.        Right eye: No discharge.         Left eye: No discharge.      Conjunctiva/sclera: Conjunctivae normal.   Neck:      Musculoskeletal: Normal range of motion and neck supple.      Vascular: No JVD.      Trachea: No tracheal deviation.   Cardiovascular:      Rate and Rhythm: Normal rate and regular rhythm.      Heart sounds: No murmur. No friction rub. No gallop.    Pulmonary:      Effort: Pulmonary effort is normal. No respiratory distress.      Breath sounds: Normal breath sounds. No wheezing.   Abdominal:      General: Bowel sounds are normal. There is no distension.      Palpations: Abdomen is soft. There is no mass.      Tenderness: There is no abdominal tenderness. There is no guarding or rebound.   Musculoskeletal: Normal range of motion.         General: No tenderness or deformity.   Skin:     General: Skin is warm and dry.      Findings: No erythema or rash.      Comments: Varicose veins to L lower lateral thigh   Neurological:      Mental Status: She is alert and oriented " to person, place, and time.      Motor: No abnormal muscle tone.      Coordination: Coordination normal.   Psychiatric:         Behavior: Behavior normal.         Thought Content: Thought content normal.         Judgment: Judgment normal.           Health Maintenance       Date Due Completion Date    TETANUS VACCINE 03/11/1977 ---    Pneumococcal Vaccine (Medium Risk) (1 of 1 - PPSV23) 03/11/1978 ---    Shingles Vaccine (1 of 2) 03/11/2009 ---    Colorectal Cancer Screening 03/11/2009 ---    Cervical Cancer Screening 09/30/2019 9/30/2016    Mammogram 05/08/2020 5/8/2018    Influenza Vaccine (1) 09/01/2020 ---    Lipid Panel 11/14/2024 11/14/2019            ASSESSMENT     60 y.o. female with     1. Left leg pain    2. Primary hyperparathyroidism        PLAN:     1. Left leg pain  - Unknown etiology, but will start trial Gabapentin; ?RLS; proceed with U/S as below for varicose veins  - gabapentin (NEURONTIN) 300 MG capsule; Take 1 capsule (300 mg total) by mouth every evening.  Dispense: 30 capsule; Refill: 0  - US Lower Extremity Veins Left; Future    2. Primary hyperparathyroidism  - Stable; no acute issues  - Pt advised to f/u with Endocrinology as previously recommended        RTC in 4 weeks for repeat assessment of current treatment plan       Marry Howard MD  07/13/2020 8:46 AM        No follow-ups on file.

## 2020-09-04 DIAGNOSIS — Z12.11 COLON CANCER SCREENING: ICD-10-CM

## 2020-10-05 ENCOUNTER — PATIENT MESSAGE (OUTPATIENT)
Dept: ADMINISTRATIVE | Facility: HOSPITAL | Age: 60
End: 2020-10-05

## 2020-10-27 ENCOUNTER — HOSPITAL ENCOUNTER (OUTPATIENT)
Dept: RADIOLOGY | Facility: HOSPITAL | Age: 60
Discharge: HOME | End: 2020-10-27
Attending: FAMILY MEDICINE
Payer: COMMERCIAL

## 2020-10-27 ENCOUNTER — HOSPITAL ENCOUNTER (OUTPATIENT)
Dept: RADIOLOGY | Facility: HOSPITAL | Age: 60
Discharge: HOME | End: 2020-10-27
Attending: INTERNAL MEDICINE
Payer: COMMERCIAL

## 2020-10-27 DIAGNOSIS — M79.605 LEFT LEG PAIN: ICD-10-CM

## 2020-10-27 DIAGNOSIS — Z12.39 BREAST CANCER SCREENING: ICD-10-CM

## 2020-10-27 PROCEDURE — 77063 MAMMO DIGITAL SCREENING BILAT WITH TOMOSYNTHESIS_CAD: ICD-10-PCS | Mod: 26,,, | Performed by: RADIOLOGY

## 2020-10-27 PROCEDURE — 93971 EXTREMITY STUDY: CPT | Mod: 26,LT,, | Performed by: RADIOLOGY

## 2020-10-27 PROCEDURE — 93971 EXTREMITY STUDY: CPT | Mod: TC,LT

## 2020-10-27 PROCEDURE — 77063 BREAST TOMOSYNTHESIS BI: CPT | Mod: 26,,, | Performed by: RADIOLOGY

## 2020-10-27 PROCEDURE — 77067 SCR MAMMO BI INCL CAD: CPT | Mod: TC,PO

## 2020-10-27 PROCEDURE — 93971 US LOWER EXTREMITY VEINS LEFT: ICD-10-PCS | Mod: 26,LT,, | Performed by: RADIOLOGY

## 2020-10-27 PROCEDURE — 77067 SCR MAMMO BI INCL CAD: CPT | Mod: 26,,, | Performed by: RADIOLOGY

## 2020-10-27 PROCEDURE — 77067 MAMMO DIGITAL SCREENING BILAT WITH TOMOSYNTHESIS_CAD: ICD-10-PCS | Mod: 26,,, | Performed by: RADIOLOGY

## 2020-10-30 ENCOUNTER — PATIENT MESSAGE (OUTPATIENT)
Dept: ADMINISTRATIVE | Facility: HOSPITAL | Age: 60
End: 2020-10-30

## 2020-11-04 ENCOUNTER — PATIENT MESSAGE (OUTPATIENT)
Dept: ADMINISTRATIVE | Facility: HOSPITAL | Age: 60
End: 2020-11-04

## 2021-01-05 ENCOUNTER — PATIENT MESSAGE (OUTPATIENT)
Dept: ADMINISTRATIVE | Facility: HOSPITAL | Age: 61
End: 2021-01-05

## 2021-01-29 ENCOUNTER — OFFICE VISIT (OUTPATIENT)
Dept: FAMILY MEDICINE | Facility: CLINIC | Age: 61
End: 2021-01-29
Payer: COMMERCIAL

## 2021-01-29 VITALS
BODY MASS INDEX: 28.85 KG/M2 | HEART RATE: 79 BPM | DIASTOLIC BLOOD PRESSURE: 80 MMHG | OXYGEN SATURATION: 98 % | HEIGHT: 64 IN | TEMPERATURE: 99 F | RESPIRATION RATE: 18 BRPM | SYSTOLIC BLOOD PRESSURE: 122 MMHG | WEIGHT: 169 LBS

## 2021-01-29 DIAGNOSIS — R30.0 DYSURIA: Primary | ICD-10-CM

## 2021-01-29 DIAGNOSIS — E21.0 PRIMARY HYPERPARATHYROIDISM: ICD-10-CM

## 2021-01-29 DIAGNOSIS — L03.211 CELLULITIS OF FACE: ICD-10-CM

## 2021-01-29 DIAGNOSIS — I10 ESSENTIAL HYPERTENSION, BENIGN: ICD-10-CM

## 2021-01-29 PROCEDURE — 99214 OFFICE O/P EST MOD 30 MIN: CPT | Mod: S$GLB,,, | Performed by: FAMILY MEDICINE

## 2021-01-29 PROCEDURE — 3079F DIAST BP 80-89 MM HG: CPT | Mod: CPTII,S$GLB,, | Performed by: FAMILY MEDICINE

## 2021-01-29 PROCEDURE — 3079F PR MOST RECENT DIASTOLIC BLOOD PRESSURE 80-89 MM HG: ICD-10-PCS | Mod: CPTII,S$GLB,, | Performed by: FAMILY MEDICINE

## 2021-01-29 PROCEDURE — 1125F AMNT PAIN NOTED PAIN PRSNT: CPT | Mod: S$GLB,,, | Performed by: FAMILY MEDICINE

## 2021-01-29 PROCEDURE — 3008F BODY MASS INDEX DOCD: CPT | Mod: CPTII,S$GLB,, | Performed by: FAMILY MEDICINE

## 2021-01-29 PROCEDURE — 3074F SYST BP LT 130 MM HG: CPT | Mod: CPTII,S$GLB,, | Performed by: FAMILY MEDICINE

## 2021-01-29 PROCEDURE — 99999 PR PBB SHADOW E&M-EST. PATIENT-LVL III: CPT | Mod: PBBFAC,,, | Performed by: FAMILY MEDICINE

## 2021-01-29 PROCEDURE — 3074F PR MOST RECENT SYSTOLIC BLOOD PRESSURE < 130 MM HG: ICD-10-PCS | Mod: CPTII,S$GLB,, | Performed by: FAMILY MEDICINE

## 2021-01-29 PROCEDURE — 3008F PR BODY MASS INDEX (BMI) DOCUMENTED: ICD-10-PCS | Mod: CPTII,S$GLB,, | Performed by: FAMILY MEDICINE

## 2021-01-29 PROCEDURE — 99214 PR OFFICE/OUTPT VISIT, EST, LEVL IV, 30-39 MIN: ICD-10-PCS | Mod: S$GLB,,, | Performed by: FAMILY MEDICINE

## 2021-01-29 PROCEDURE — 99999 PR PBB SHADOW E&M-EST. PATIENT-LVL III: ICD-10-PCS | Mod: PBBFAC,,, | Performed by: FAMILY MEDICINE

## 2021-01-29 PROCEDURE — 1125F PR PAIN SEVERITY QUANTIFIED, PAIN PRESENT: ICD-10-PCS | Mod: S$GLB,,, | Performed by: FAMILY MEDICINE

## 2021-01-29 RX ORDER — SULFAMETHOXAZOLE AND TRIMETHOPRIM 800; 160 MG/1; MG/1
1 TABLET ORAL 2 TIMES DAILY
Qty: 20 TABLET | Refills: 0 | Status: SHIPPED | OUTPATIENT
Start: 2021-01-29 | End: 2021-02-08

## 2021-02-01 ENCOUNTER — PATIENT OUTREACH (OUTPATIENT)
Dept: ADMINISTRATIVE | Facility: OTHER | Age: 61
End: 2021-02-01

## 2021-02-02 ENCOUNTER — LAB VISIT (OUTPATIENT)
Dept: LAB | Facility: HOSPITAL | Age: 61
End: 2021-02-02
Attending: HOSPITALIST
Payer: COMMERCIAL

## 2021-02-02 ENCOUNTER — OFFICE VISIT (OUTPATIENT)
Dept: ENDOCRINOLOGY | Facility: CLINIC | Age: 61
End: 2021-02-02
Payer: COMMERCIAL

## 2021-02-02 VITALS
HEART RATE: 89 BPM | SYSTOLIC BLOOD PRESSURE: 152 MMHG | WEIGHT: 174.88 LBS | DIASTOLIC BLOOD PRESSURE: 93 MMHG | BODY MASS INDEX: 30.02 KG/M2 | TEMPERATURE: 98 F

## 2021-02-02 DIAGNOSIS — E21.0 PRIMARY HYPERPARATHYROIDISM: Primary | ICD-10-CM

## 2021-02-02 DIAGNOSIS — E55.9 VITAMIN D INSUFFICIENCY: ICD-10-CM

## 2021-02-02 DIAGNOSIS — E21.0 PRIMARY HYPERPARATHYROIDISM: ICD-10-CM

## 2021-02-02 DIAGNOSIS — I10 ESSENTIAL HYPERTENSION: ICD-10-CM

## 2021-02-02 DIAGNOSIS — R77.9 ABNORMALITY OF PLASMA PROTEIN: ICD-10-CM

## 2021-02-02 DIAGNOSIS — E83.52 HYPERCALCEMIA: ICD-10-CM

## 2021-02-02 DIAGNOSIS — G89.29 CHRONIC PAIN OF LEFT ANKLE: ICD-10-CM

## 2021-02-02 DIAGNOSIS — M25.572 CHRONIC PAIN OF LEFT ANKLE: ICD-10-CM

## 2021-02-02 LAB
ALBUMIN SERPL BCP-MCNC: 3.6 G/DL (ref 3.5–5.2)
ALP SERPL-CCNC: 93 U/L (ref 55–135)
ALT SERPL W/O P-5'-P-CCNC: 13 U/L (ref 10–44)
ANION GAP SERPL CALC-SCNC: 7 MMOL/L (ref 8–16)
AST SERPL-CCNC: 19 U/L (ref 10–40)
BILIRUB SERPL-MCNC: 0.3 MG/DL (ref 0.1–1)
BUN SERPL-MCNC: 10 MG/DL (ref 8–23)
CALCIUM SERPL-MCNC: 9.5 MG/DL (ref 8.7–10.5)
CHLORIDE SERPL-SCNC: 108 MMOL/L (ref 95–110)
CO2 SERPL-SCNC: 26 MMOL/L (ref 23–29)
CREAT SERPL-MCNC: 0.9 MG/DL (ref 0.5–1.4)
EST. GFR  (AFRICAN AMERICAN): >60 ML/MIN/1.73 M^2
EST. GFR  (NON AFRICAN AMERICAN): >60 ML/MIN/1.73 M^2
GLUCOSE SERPL-MCNC: 84 MG/DL (ref 70–110)
POTASSIUM SERPL-SCNC: 4.5 MMOL/L (ref 3.5–5.1)
PROT SERPL-MCNC: 8.7 G/DL (ref 6–8.4)
PTH-INTACT SERPL-MCNC: 68.2 PG/ML (ref 9–77)
SODIUM SERPL-SCNC: 141 MMOL/L (ref 136–145)

## 2021-02-02 PROCEDURE — 80053 COMPREHEN METABOLIC PANEL: CPT

## 2021-02-02 PROCEDURE — 3080F DIAST BP >= 90 MM HG: CPT | Mod: CPTII,S$GLB,, | Performed by: HOSPITALIST

## 2021-02-02 PROCEDURE — 3077F PR MOST RECENT SYSTOLIC BLOOD PRESSURE >= 140 MM HG: ICD-10-PCS | Mod: CPTII,S$GLB,, | Performed by: HOSPITALIST

## 2021-02-02 PROCEDURE — 3008F BODY MASS INDEX DOCD: CPT | Mod: CPTII,S$GLB,, | Performed by: HOSPITALIST

## 2021-02-02 PROCEDURE — 1125F AMNT PAIN NOTED PAIN PRSNT: CPT | Mod: S$GLB,,, | Performed by: HOSPITALIST

## 2021-02-02 PROCEDURE — 83970 ASSAY OF PARATHORMONE: CPT

## 2021-02-02 PROCEDURE — 3077F SYST BP >= 140 MM HG: CPT | Mod: CPTII,S$GLB,, | Performed by: HOSPITALIST

## 2021-02-02 PROCEDURE — 36415 COLL VENOUS BLD VENIPUNCTURE: CPT | Mod: PN

## 2021-02-02 PROCEDURE — 99204 OFFICE O/P NEW MOD 45 MIN: CPT | Mod: S$GLB,,, | Performed by: HOSPITALIST

## 2021-02-02 PROCEDURE — 99204 PR OFFICE/OUTPT VISIT, NEW, LEVL IV, 45-59 MIN: ICD-10-PCS | Mod: S$GLB,,, | Performed by: HOSPITALIST

## 2021-02-02 PROCEDURE — 82306 VITAMIN D 25 HYDROXY: CPT

## 2021-02-02 PROCEDURE — 99999 PR PBB SHADOW E&M-EST. PATIENT-LVL IV: ICD-10-PCS | Mod: PBBFAC,,, | Performed by: HOSPITALIST

## 2021-02-02 PROCEDURE — 3008F PR BODY MASS INDEX (BMI) DOCUMENTED: ICD-10-PCS | Mod: CPTII,S$GLB,, | Performed by: HOSPITALIST

## 2021-02-02 PROCEDURE — 3080F PR MOST RECENT DIASTOLIC BLOOD PRESSURE >= 90 MM HG: ICD-10-PCS | Mod: CPTII,S$GLB,, | Performed by: HOSPITALIST

## 2021-02-02 PROCEDURE — 99999 PR PBB SHADOW E&M-EST. PATIENT-LVL IV: CPT | Mod: PBBFAC,,, | Performed by: HOSPITALIST

## 2021-02-02 PROCEDURE — 1125F PR PAIN SEVERITY QUANTIFIED, PAIN PRESENT: ICD-10-PCS | Mod: S$GLB,,, | Performed by: HOSPITALIST

## 2021-02-02 RX ORDER — LISINOPRIL 40 MG/1
40 TABLET ORAL DAILY
Qty: 30 TABLET | Refills: 11 | Status: SHIPPED | OUTPATIENT
Start: 2021-02-02 | End: 2022-08-13 | Stop reason: SDUPTHER

## 2021-02-03 ENCOUNTER — PATIENT MESSAGE (OUTPATIENT)
Dept: ENDOCRINOLOGY | Facility: CLINIC | Age: 61
End: 2021-02-03

## 2021-02-03 LAB — 25(OH)D3+25(OH)D2 SERPL-MCNC: 27 NG/ML (ref 30–96)

## 2021-02-04 ENCOUNTER — PATIENT MESSAGE (OUTPATIENT)
Dept: FAMILY MEDICINE | Facility: CLINIC | Age: 61
End: 2021-02-04

## 2021-02-04 ENCOUNTER — TELEPHONE (OUTPATIENT)
Dept: FAMILY MEDICINE | Facility: CLINIC | Age: 61
End: 2021-02-04

## 2021-04-06 ENCOUNTER — PATIENT MESSAGE (OUTPATIENT)
Dept: ADMINISTRATIVE | Facility: HOSPITAL | Age: 61
End: 2021-04-06

## 2021-04-08 ENCOUNTER — IMMUNIZATION (OUTPATIENT)
Dept: OBSTETRICS AND GYNECOLOGY | Facility: CLINIC | Age: 61
End: 2021-04-08
Payer: COMMERCIAL

## 2021-04-08 DIAGNOSIS — Z23 NEED FOR VACCINATION: Primary | ICD-10-CM

## 2021-04-08 PROCEDURE — 0001A COVID-19, MRNA, LNP-S, PF, 30 MCG/0.3 ML DOSE VACCINE: CPT | Mod: CV19,S$GLB,, | Performed by: FAMILY MEDICINE

## 2021-04-08 PROCEDURE — 91300 COVID-19, MRNA, LNP-S, PF, 30 MCG/0.3 ML DOSE VACCINE: CPT | Mod: S$GLB,,, | Performed by: FAMILY MEDICINE

## 2021-04-08 PROCEDURE — 0001A COVID-19, MRNA, LNP-S, PF, 30 MCG/0.3 ML DOSE VACCINE: ICD-10-PCS | Mod: CV19,S$GLB,, | Performed by: FAMILY MEDICINE

## 2021-04-08 PROCEDURE — 91300 COVID-19, MRNA, LNP-S, PF, 30 MCG/0.3 ML DOSE VACCINE: ICD-10-PCS | Mod: S$GLB,,, | Performed by: FAMILY MEDICINE

## 2021-04-27 ENCOUNTER — HOSPITAL ENCOUNTER (OUTPATIENT)
Dept: RADIOLOGY | Facility: CLINIC | Age: 61
Discharge: HOME | End: 2021-04-27
Attending: HOSPITALIST
Payer: COMMERCIAL

## 2021-04-27 DIAGNOSIS — E21.0 PRIMARY HYPERPARATHYROIDISM: ICD-10-CM

## 2021-04-27 PROCEDURE — 77080 DXA BONE DENSITY AXIAL: CPT | Mod: 26,,, | Performed by: INTERNAL MEDICINE

## 2021-04-27 PROCEDURE — 77080 DXA BONE DENSITY AXIAL: CPT | Mod: TC,PO

## 2021-04-27 PROCEDURE — 77080 DEXA BONE DENSITY SPINE HIP: ICD-10-PCS | Mod: 26,,, | Performed by: INTERNAL MEDICINE

## 2021-04-28 ENCOUNTER — PATIENT OUTREACH (OUTPATIENT)
Dept: ADMINISTRATIVE | Facility: OTHER | Age: 61
End: 2021-04-28

## 2021-05-04 ENCOUNTER — OFFICE VISIT (OUTPATIENT)
Dept: ENDOCRINOLOGY | Facility: CLINIC | Age: 61
End: 2021-05-04
Payer: COMMERCIAL

## 2021-05-04 VITALS
DIASTOLIC BLOOD PRESSURE: 85 MMHG | TEMPERATURE: 99 F | HEART RATE: 97 BPM | BODY MASS INDEX: 28.67 KG/M2 | WEIGHT: 167 LBS | SYSTOLIC BLOOD PRESSURE: 141 MMHG

## 2021-05-04 DIAGNOSIS — M81.0 OSTEOPOROSIS, UNSPECIFIED OSTEOPOROSIS TYPE, UNSPECIFIED PATHOLOGICAL FRACTURE PRESENCE: Primary | ICD-10-CM

## 2021-05-04 DIAGNOSIS — E21.0 PRIMARY HYPERPARATHYROIDISM: ICD-10-CM

## 2021-05-04 DIAGNOSIS — E55.9 VITAMIN D INSUFFICIENCY: ICD-10-CM

## 2021-05-04 DIAGNOSIS — I10 ESSENTIAL HYPERTENSION, BENIGN: ICD-10-CM

## 2021-05-04 PROCEDURE — 1126F PR PAIN SEVERITY QUANTIFIED, NO PAIN PRESENT: ICD-10-PCS | Mod: S$GLB,,, | Performed by: HOSPITALIST

## 2021-05-04 PROCEDURE — 3008F PR BODY MASS INDEX (BMI) DOCUMENTED: ICD-10-PCS | Mod: CPTII,S$GLB,, | Performed by: HOSPITALIST

## 2021-05-04 PROCEDURE — 99999 PR PBB SHADOW E&M-EST. PATIENT-LVL III: CPT | Mod: PBBFAC,,, | Performed by: HOSPITALIST

## 2021-05-04 PROCEDURE — 99999 PR PBB SHADOW E&M-EST. PATIENT-LVL III: ICD-10-PCS | Mod: PBBFAC,,, | Performed by: HOSPITALIST

## 2021-05-04 PROCEDURE — 1126F AMNT PAIN NOTED NONE PRSNT: CPT | Mod: S$GLB,,, | Performed by: HOSPITALIST

## 2021-05-04 PROCEDURE — 3008F BODY MASS INDEX DOCD: CPT | Mod: CPTII,S$GLB,, | Performed by: HOSPITALIST

## 2021-05-04 PROCEDURE — 99214 PR OFFICE/OUTPT VISIT, EST, LEVL IV, 30-39 MIN: ICD-10-PCS | Mod: S$GLB,,, | Performed by: HOSPITALIST

## 2021-05-04 PROCEDURE — 99214 OFFICE O/P EST MOD 30 MIN: CPT | Mod: S$GLB,,, | Performed by: HOSPITALIST

## 2021-05-14 ENCOUNTER — IMMUNIZATION (OUTPATIENT)
Dept: OBSTETRICS AND GYNECOLOGY | Facility: CLINIC | Age: 61
End: 2021-05-14
Payer: COMMERCIAL

## 2021-05-14 DIAGNOSIS — Z23 NEED FOR VACCINATION: Primary | ICD-10-CM

## 2021-05-14 PROCEDURE — 0002A COVID-19, MRNA, LNP-S, PF, 30 MCG/0.3 ML DOSE VACCINE: CPT | Mod: PBBFAC | Performed by: FAMILY MEDICINE

## 2021-05-14 PROCEDURE — 91300 COVID-19, MRNA, LNP-S, PF, 30 MCG/0.3 ML DOSE VACCINE: CPT | Mod: PBBFAC | Performed by: FAMILY MEDICINE

## 2021-05-28 ENCOUNTER — PATIENT MESSAGE (OUTPATIENT)
Dept: FAMILY MEDICINE | Facility: CLINIC | Age: 61
End: 2021-05-28

## 2021-05-28 DIAGNOSIS — I10 ESSENTIAL HYPERTENSION, BENIGN: ICD-10-CM

## 2021-05-28 RX ORDER — AMLODIPINE BESYLATE 10 MG/1
10 TABLET ORAL DAILY
Qty: 60 TABLET | Refills: 0 | Status: SHIPPED | OUTPATIENT
Start: 2021-05-28 | End: 2021-08-03

## 2021-07-06 ENCOUNTER — PATIENT MESSAGE (OUTPATIENT)
Dept: ADMINISTRATIVE | Facility: HOSPITAL | Age: 61
End: 2021-07-06

## 2021-08-02 ENCOUNTER — TELEPHONE (OUTPATIENT)
Dept: FAMILY MEDICINE | Facility: CLINIC | Age: 61
End: 2021-08-02

## 2021-08-02 DIAGNOSIS — R30.0 DYSURIA: Primary | ICD-10-CM

## 2021-08-03 ENCOUNTER — OFFICE VISIT (OUTPATIENT)
Dept: FAMILY MEDICINE | Facility: CLINIC | Age: 61
End: 2021-08-03
Payer: COMMERCIAL

## 2021-08-03 ENCOUNTER — LAB VISIT (OUTPATIENT)
Dept: LAB | Facility: HOSPITAL | Age: 61
End: 2021-08-03
Attending: INTERNAL MEDICINE
Payer: COMMERCIAL

## 2021-08-03 DIAGNOSIS — N30.00 ACUTE CYSTITIS WITHOUT HEMATURIA: Primary | ICD-10-CM

## 2021-08-03 DIAGNOSIS — R30.0 DYSURIA: ICD-10-CM

## 2021-08-03 LAB
BACTERIA #/AREA URNS HPF: ABNORMAL /HPF
BILIRUB UR QL STRIP: NEGATIVE
CLARITY UR: CLEAR
COLOR UR: YELLOW
GLUCOSE UR QL STRIP: NEGATIVE
HGB UR QL STRIP: NEGATIVE
KETONES UR QL STRIP: NEGATIVE
LEUKOCYTE ESTERASE UR QL STRIP: ABNORMAL
MICROSCOPIC COMMENT: ABNORMAL
NITRITE UR QL STRIP: NEGATIVE
PH UR STRIP: 7 [PH] (ref 5–8)
PROT UR QL STRIP: NEGATIVE
RBC #/AREA URNS HPF: 3 /HPF (ref 0–4)
SP GR UR STRIP: 1.01 (ref 1–1.03)
SQUAMOUS #/AREA URNS HPF: 3 /HPF
URN SPEC COLLECT METH UR: ABNORMAL
UROBILINOGEN UR STRIP-ACNC: NEGATIVE EU/DL
WBC #/AREA URNS HPF: 18 /HPF (ref 0–5)
WBC CLUMPS URNS QL MICRO: ABNORMAL

## 2021-08-03 PROCEDURE — 1159F PR MEDICATION LIST DOCUMENTED IN MEDICAL RECORD: ICD-10-PCS | Mod: CPTII,,, | Performed by: INTERNAL MEDICINE

## 2021-08-03 PROCEDURE — 81000 URINALYSIS NONAUTO W/SCOPE: CPT | Performed by: INTERNAL MEDICINE

## 2021-08-03 PROCEDURE — 1160F PR REVIEW ALL MEDS BY PRESCRIBER/CLIN PHARMACIST DOCUMENTED: ICD-10-PCS | Mod: CPTII,,, | Performed by: INTERNAL MEDICINE

## 2021-08-03 PROCEDURE — 87086 URINE CULTURE/COLONY COUNT: CPT | Performed by: INTERNAL MEDICINE

## 2021-08-03 PROCEDURE — 99214 PR OFFICE/OUTPT VISIT, EST, LEVL IV, 30-39 MIN: ICD-10-PCS | Mod: 95,,, | Performed by: INTERNAL MEDICINE

## 2021-08-03 PROCEDURE — 87186 SC STD MICRODIL/AGAR DIL: CPT | Performed by: INTERNAL MEDICINE

## 2021-08-03 PROCEDURE — 99214 OFFICE O/P EST MOD 30 MIN: CPT | Mod: 95,,, | Performed by: INTERNAL MEDICINE

## 2021-08-03 PROCEDURE — 87077 CULTURE AEROBIC IDENTIFY: CPT | Performed by: INTERNAL MEDICINE

## 2021-08-03 PROCEDURE — 1160F RVW MEDS BY RX/DR IN RCRD: CPT | Mod: CPTII,,, | Performed by: INTERNAL MEDICINE

## 2021-08-03 PROCEDURE — 1159F MED LIST DOCD IN RCRD: CPT | Mod: CPTII,,, | Performed by: INTERNAL MEDICINE

## 2021-08-03 PROCEDURE — 87088 URINE BACTERIA CULTURE: CPT | Performed by: INTERNAL MEDICINE

## 2021-08-03 RX ORDER — AMOXICILLIN AND CLAVULANATE POTASSIUM 875; 125 MG/1; MG/1
1 TABLET, FILM COATED ORAL EVERY 12 HOURS
Qty: 20 TABLET | Refills: 0 | Status: SHIPPED | OUTPATIENT
Start: 2021-08-03 | End: 2021-08-13

## 2021-08-05 LAB — BACTERIA UR CULT: ABNORMAL

## 2021-08-25 ENCOUNTER — HOSPITAL ENCOUNTER (EMERGENCY)
Facility: HOSPITAL | Age: 61
Discharge: HOME | End: 2021-08-25
Attending: EMERGENCY MEDICINE
Payer: COMMERCIAL

## 2021-08-25 VITALS
OXYGEN SATURATION: 100 % | TEMPERATURE: 99 F | WEIGHT: 165 LBS | BODY MASS INDEX: 28.17 KG/M2 | RESPIRATION RATE: 14 BRPM | HEART RATE: 87 BPM | DIASTOLIC BLOOD PRESSURE: 85 MMHG | SYSTOLIC BLOOD PRESSURE: 165 MMHG | HEIGHT: 64 IN

## 2021-08-25 DIAGNOSIS — R07.9 CHEST PAIN, UNSPECIFIED TYPE: Primary | ICD-10-CM

## 2021-08-25 DIAGNOSIS — R07.9 CHEST PAIN: ICD-10-CM

## 2021-08-25 DIAGNOSIS — R60.0 PERIPHERAL EDEMA: ICD-10-CM

## 2021-08-25 DIAGNOSIS — M79.89 LEFT LEG SWELLING: ICD-10-CM

## 2021-08-25 LAB
ALBUMIN SERPL-MCNC: 3.6 G/DL (ref 3.3–5.5)
ALP SERPL-CCNC: 110 U/L (ref 42–141)
BILIRUB SERPL-MCNC: 0.5 MG/DL (ref 0.2–1.6)
BUN SERPL-MCNC: 11 MG/DL (ref 7–22)
CALCIUM SERPL-MCNC: 10.1 MG/DL (ref 8–10.3)
CHLORIDE SERPL-SCNC: 105 MMOL/L (ref 98–108)
CREAT SERPL-MCNC: 0.8 MG/DL (ref 0.6–1.2)
GLUCOSE SERPL-MCNC: 100 MG/DL (ref 73–118)
POC ALT (SGPT): 17 U/L (ref 10–47)
POC AST (SGOT): 28 U/L (ref 11–38)
POC B-TYPE NATRIURETIC PEPTIDE: 56.1 PG/ML (ref 0–100)
POC CARDIAC TROPONIN I: 0 NG/ML
POC D-DI: 610 NG/ML (ref 0–450)
POC TCO2: 28 MMOL/L (ref 18–33)
POTASSIUM BLD-SCNC: 4.1 MMOL/L (ref 3.6–5.1)
PROTEIN, POC: 9.1 G/DL (ref 6.4–8.1)
SAMPLE: NORMAL
SODIUM BLD-SCNC: 145 MMOL/L (ref 128–145)

## 2021-08-25 PROCEDURE — 25500020 PHARM REV CODE 255: Mod: ER | Performed by: EMERGENCY MEDICINE

## 2021-08-25 PROCEDURE — 25000242 PHARM REV CODE 250 ALT 637 W/ HCPCS: Mod: ER | Performed by: EMERGENCY MEDICINE

## 2021-08-25 PROCEDURE — 93010 ELECTROCARDIOGRAM REPORT: CPT | Mod: ,,, | Performed by: INTERNAL MEDICINE

## 2021-08-25 PROCEDURE — 80053 COMPREHEN METABOLIC PANEL: CPT | Mod: ER

## 2021-08-25 PROCEDURE — 25000003 PHARM REV CODE 250: Mod: ER | Performed by: EMERGENCY MEDICINE

## 2021-08-25 PROCEDURE — 85379 FIBRIN DEGRADATION QUANT: CPT | Mod: ER

## 2021-08-25 PROCEDURE — 93010 EKG 12-LEAD: ICD-10-PCS | Mod: ,,, | Performed by: INTERNAL MEDICINE

## 2021-08-25 PROCEDURE — 83880 ASSAY OF NATRIURETIC PEPTIDE: CPT | Mod: ER

## 2021-08-25 PROCEDURE — 84484 ASSAY OF TROPONIN QUANT: CPT | Mod: ER

## 2021-08-25 PROCEDURE — 85025 COMPLETE CBC W/AUTO DIFF WBC: CPT | Mod: ER

## 2021-08-25 PROCEDURE — 99285 EMERGENCY DEPT VISIT HI MDM: CPT | Mod: 25,ER

## 2021-08-25 PROCEDURE — 93005 ELECTROCARDIOGRAM TRACING: CPT | Mod: ER

## 2021-08-25 RX ORDER — HYDROCHLOROTHIAZIDE 12.5 MG/1
12.5 TABLET ORAL DAILY
Qty: 10 TABLET | Refills: 11 | Status: SHIPPED | OUTPATIENT
Start: 2021-08-25 | End: 2022-02-01 | Stop reason: SDUPTHER

## 2021-08-25 RX ORDER — NITROGLYCERIN 0.4 MG/1
0.4 TABLET SUBLINGUAL
Status: DISPENSED | OUTPATIENT
Start: 2021-08-25 | End: 2021-08-25

## 2021-08-25 RX ORDER — ASPIRIN 325 MG
325 TABLET ORAL
Status: COMPLETED | OUTPATIENT
Start: 2021-08-25 | End: 2021-08-25

## 2021-08-25 RX ADMIN — NITROGLYCERIN 0.4 MG: 0.4 TABLET, ORALLY DISINTEGRATING SUBLINGUAL at 06:08

## 2021-08-25 RX ADMIN — ASPIRIN 325 MG ORAL TABLET 325 MG: 325 PILL ORAL at 06:08

## 2021-08-25 RX ADMIN — IOHEXOL 100 ML: 350 INJECTION, SOLUTION INTRAVENOUS at 07:08

## 2021-10-04 ENCOUNTER — PATIENT MESSAGE (OUTPATIENT)
Dept: ADMINISTRATIVE | Facility: HOSPITAL | Age: 61
End: 2021-10-04

## 2021-11-10 ENCOUNTER — TELEPHONE (OUTPATIENT)
Dept: FAMILY MEDICINE | Facility: CLINIC | Age: 61
End: 2021-11-10
Payer: COMMERCIAL

## 2021-11-10 DIAGNOSIS — Z12.31 ENCOUNTER FOR SCREENING MAMMOGRAM FOR BREAST CANCER: Primary | ICD-10-CM

## 2021-11-22 ENCOUNTER — OFFICE VISIT (OUTPATIENT)
Dept: FAMILY MEDICINE | Facility: CLINIC | Age: 61
End: 2021-11-22
Payer: COMMERCIAL

## 2021-11-22 VITALS
HEIGHT: 64 IN | WEIGHT: 166.44 LBS | DIASTOLIC BLOOD PRESSURE: 84 MMHG | TEMPERATURE: 98 F | HEART RATE: 107 BPM | SYSTOLIC BLOOD PRESSURE: 132 MMHG | BODY MASS INDEX: 28.42 KG/M2 | OXYGEN SATURATION: 95 %

## 2021-11-22 DIAGNOSIS — M25.361 KNEE INSTABILITY, RIGHT: ICD-10-CM

## 2021-11-22 DIAGNOSIS — M25.562 CHRONIC PAIN OF BOTH KNEES: Primary | ICD-10-CM

## 2021-11-22 DIAGNOSIS — Z12.4 PAP SMEAR FOR CERVICAL CANCER SCREENING: ICD-10-CM

## 2021-11-22 DIAGNOSIS — N95.1 MENOPAUSAL VAGINAL DRYNESS: ICD-10-CM

## 2021-11-22 DIAGNOSIS — H53.8 BLURRED VISION: ICD-10-CM

## 2021-11-22 DIAGNOSIS — Z12.11 ENCOUNTER FOR SCREENING COLONOSCOPY: ICD-10-CM

## 2021-11-22 DIAGNOSIS — M25.561 CHRONIC PAIN OF BOTH KNEES: Primary | ICD-10-CM

## 2021-11-22 DIAGNOSIS — L80 VITILIGO: ICD-10-CM

## 2021-11-22 DIAGNOSIS — G89.29 CHRONIC PAIN OF BOTH KNEES: Primary | ICD-10-CM

## 2021-11-22 PROCEDURE — 99213 OFFICE O/P EST LOW 20 MIN: CPT | Mod: S$GLB,,, | Performed by: INTERNAL MEDICINE

## 2021-11-22 PROCEDURE — 4010F PR ACE/ARB THEARPY RXD/TAKEN: ICD-10-PCS | Mod: CPTII,S$GLB,, | Performed by: INTERNAL MEDICINE

## 2021-11-22 PROCEDURE — 99213 PR OFFICE/OUTPT VISIT, EST, LEVL III, 20-29 MIN: ICD-10-PCS | Mod: S$GLB,,, | Performed by: INTERNAL MEDICINE

## 2021-11-22 PROCEDURE — 99999 PR PBB SHADOW E&M-EST. PATIENT-LVL IV: ICD-10-PCS | Mod: PBBFAC,,, | Performed by: INTERNAL MEDICINE

## 2021-11-22 PROCEDURE — 4010F ACE/ARB THERAPY RXD/TAKEN: CPT | Mod: CPTII,S$GLB,, | Performed by: INTERNAL MEDICINE

## 2021-11-22 PROCEDURE — 99999 PR PBB SHADOW E&M-EST. PATIENT-LVL IV: CPT | Mod: PBBFAC,,, | Performed by: INTERNAL MEDICINE

## 2021-11-29 ENCOUNTER — HOSPITAL ENCOUNTER (OUTPATIENT)
Dept: RADIOLOGY | Facility: HOSPITAL | Age: 61
Discharge: HOME | End: 2021-11-29
Attending: INTERNAL MEDICINE
Payer: COMMERCIAL

## 2021-11-29 DIAGNOSIS — M25.561 CHRONIC PAIN OF BOTH KNEES: ICD-10-CM

## 2021-11-29 DIAGNOSIS — M25.361 KNEE INSTABILITY, RIGHT: ICD-10-CM

## 2021-11-29 DIAGNOSIS — G89.29 CHRONIC PAIN OF BOTH KNEES: ICD-10-CM

## 2021-11-29 DIAGNOSIS — M25.562 CHRONIC PAIN OF BOTH KNEES: ICD-10-CM

## 2021-11-29 PROCEDURE — 73564 X-RAY EXAM KNEE 4 OR MORE: CPT | Mod: 26,,, | Performed by: RADIOLOGY

## 2021-11-29 PROCEDURE — 73564 X-RAY EXAM KNEE 4 OR MORE: CPT | Mod: TC,50,FY

## 2021-11-29 PROCEDURE — 73564 XR KNEE COMP 4 OR MORE VIEWS BILAT: ICD-10-PCS | Mod: 26,,, | Performed by: RADIOLOGY

## 2021-11-30 DIAGNOSIS — M25.562 CHRONIC PAIN OF BOTH KNEES: Primary | ICD-10-CM

## 2021-11-30 DIAGNOSIS — G89.29 CHRONIC PAIN OF BOTH KNEES: Primary | ICD-10-CM

## 2021-11-30 DIAGNOSIS — M25.561 CHRONIC PAIN OF BOTH KNEES: Primary | ICD-10-CM

## 2021-12-03 ENCOUNTER — TELEPHONE (OUTPATIENT)
Dept: FAMILY MEDICINE | Facility: CLINIC | Age: 61
End: 2021-12-03
Payer: COMMERCIAL

## 2021-12-07 ENCOUNTER — PATIENT OUTREACH (OUTPATIENT)
Dept: ADMINISTRATIVE | Facility: OTHER | Age: 61
End: 2021-12-07
Payer: COMMERCIAL

## 2021-12-08 ENCOUNTER — OFFICE VISIT (OUTPATIENT)
Dept: ORTHOPEDICS | Facility: CLINIC | Age: 61
End: 2021-12-08
Payer: COMMERCIAL

## 2021-12-08 ENCOUNTER — HOSPITAL ENCOUNTER (OUTPATIENT)
Dept: RADIOLOGY | Facility: HOSPITAL | Age: 61
Discharge: HOME | End: 2021-12-08
Attending: ORTHOPAEDIC SURGERY
Payer: COMMERCIAL

## 2021-12-08 VITALS — WEIGHT: 163.25 LBS | BODY MASS INDEX: 28.93 KG/M2 | HEIGHT: 63 IN

## 2021-12-08 DIAGNOSIS — G89.29 CHRONIC PAIN OF BOTH KNEES: ICD-10-CM

## 2021-12-08 DIAGNOSIS — M25.562 CHRONIC PAIN OF BOTH KNEES: ICD-10-CM

## 2021-12-08 DIAGNOSIS — M25.561 CHRONIC PAIN OF BOTH KNEES: ICD-10-CM

## 2021-12-08 PROCEDURE — 73562 X-RAY EXAM OF KNEE 3: CPT | Mod: TC,50

## 2021-12-08 PROCEDURE — 99999 PR PBB SHADOW E&M-EST. PATIENT-LVL III: CPT | Mod: PBBFAC,,, | Performed by: ORTHOPAEDIC SURGERY

## 2021-12-08 PROCEDURE — 73562 XR KNEE ORTHO BILAT: ICD-10-PCS | Mod: 26,,, | Performed by: RADIOLOGY

## 2021-12-08 PROCEDURE — 73562 X-RAY EXAM OF KNEE 3: CPT | Mod: 26,,, | Performed by: RADIOLOGY

## 2021-12-08 PROCEDURE — 4010F ACE/ARB THERAPY RXD/TAKEN: CPT | Mod: CPTII,S$GLB,, | Performed by: ORTHOPAEDIC SURGERY

## 2021-12-08 PROCEDURE — 99204 OFFICE O/P NEW MOD 45 MIN: CPT | Mod: S$GLB,,, | Performed by: ORTHOPAEDIC SURGERY

## 2021-12-08 PROCEDURE — 99999 PR PBB SHADOW E&M-EST. PATIENT-LVL III: ICD-10-PCS | Mod: PBBFAC,,, | Performed by: ORTHOPAEDIC SURGERY

## 2021-12-08 PROCEDURE — 4010F PR ACE/ARB THEARPY RXD/TAKEN: ICD-10-PCS | Mod: CPTII,S$GLB,, | Performed by: ORTHOPAEDIC SURGERY

## 2021-12-08 PROCEDURE — 99204 PR OFFICE/OUTPT VISIT, NEW, LEVL IV, 45-59 MIN: ICD-10-PCS | Mod: S$GLB,,, | Performed by: ORTHOPAEDIC SURGERY

## 2021-12-08 RX ORDER — MELOXICAM 15 MG/1
15 TABLET ORAL DAILY
Qty: 30 TABLET | Refills: 1 | Status: SHIPPED | OUTPATIENT
Start: 2021-12-08 | End: 2022-01-07

## 2021-12-14 ENCOUNTER — OFFICE VISIT (OUTPATIENT)
Dept: OPTOMETRY | Facility: CLINIC | Age: 61
End: 2021-12-14
Payer: COMMERCIAL

## 2021-12-14 DIAGNOSIS — H35.033 HYPERTENSIVE RETINOPATHY OF BOTH EYES: Primary | ICD-10-CM

## 2021-12-14 DIAGNOSIS — H52.7 REFRACTIVE ERROR: ICD-10-CM

## 2021-12-14 DIAGNOSIS — H25.13 NUCLEAR SCLEROSIS OF BOTH EYES: ICD-10-CM

## 2021-12-14 PROCEDURE — 4010F PR ACE/ARB THEARPY RXD/TAKEN: ICD-10-PCS | Mod: CPTII,S$GLB,, | Performed by: OPTOMETRIST

## 2021-12-14 PROCEDURE — 92015 PR REFRACTION: ICD-10-PCS | Mod: S$GLB,,, | Performed by: OPTOMETRIST

## 2021-12-14 PROCEDURE — 92004 PR EYE EXAM, NEW PATIENT,COMPREHESV: ICD-10-PCS | Mod: S$GLB,,, | Performed by: OPTOMETRIST

## 2021-12-14 PROCEDURE — 92015 DETERMINE REFRACTIVE STATE: CPT | Mod: S$GLB,,, | Performed by: OPTOMETRIST

## 2021-12-14 PROCEDURE — 99999 PR PBB SHADOW E&M-EST. PATIENT-LVL III: CPT | Mod: PBBFAC,,, | Performed by: OPTOMETRIST

## 2021-12-14 PROCEDURE — 4010F ACE/ARB THERAPY RXD/TAKEN: CPT | Mod: CPTII,S$GLB,, | Performed by: OPTOMETRIST

## 2021-12-14 PROCEDURE — 99999 PR PBB SHADOW E&M-EST. PATIENT-LVL III: ICD-10-PCS | Mod: PBBFAC,,, | Performed by: OPTOMETRIST

## 2021-12-14 PROCEDURE — 92004 COMPRE OPH EXAM NEW PT 1/>: CPT | Mod: S$GLB,,, | Performed by: OPTOMETRIST

## 2022-01-26 ENCOUNTER — TELEPHONE (OUTPATIENT)
Dept: FAMILY MEDICINE | Facility: CLINIC | Age: 62
End: 2022-01-26
Payer: COMMERCIAL

## 2022-01-26 DIAGNOSIS — N89.8 VAGINAL DRYNESS: Primary | ICD-10-CM

## 2022-01-26 NOTE — TELEPHONE ENCOUNTER
----- Message from Marlin Fowler sent at 1/26/2022  1:16 PM CST -----  Type: Patient Call Back    Who called:pt    What is the request in detail:pt requesting a referral to gynecology for vaginal dryness. Call pt    Can the clinic reply by [facility]?    Would the patient rather a call back or a response via [facility]? call    Best call back number:[phone] (home)       Additional Information:

## 2022-01-31 ENCOUNTER — TELEPHONE (OUTPATIENT)
Dept: FAMILY MEDICINE | Facility: CLINIC | Age: 62
End: 2022-01-31
Payer: COMMERCIAL

## 2022-02-01 ENCOUNTER — OFFICE VISIT (OUTPATIENT)
Dept: FAMILY MEDICINE | Facility: CLINIC | Age: 62
End: 2022-02-01
Payer: COMMERCIAL

## 2022-02-01 VITALS
HEIGHT: 63 IN | DIASTOLIC BLOOD PRESSURE: 86 MMHG | WEIGHT: 171.31 LBS | SYSTOLIC BLOOD PRESSURE: 138 MMHG | TEMPERATURE: 98 F | BODY MASS INDEX: 30.35 KG/M2 | OXYGEN SATURATION: 97 % | RESPIRATION RATE: 16 BRPM | HEART RATE: 103 BPM

## 2022-02-01 DIAGNOSIS — I10 ESSENTIAL HYPERTENSION, BENIGN: ICD-10-CM

## 2022-02-01 DIAGNOSIS — N39.0 RECURRENT UTI: Primary | ICD-10-CM

## 2022-02-01 LAB
BACTERIA #/AREA URNS HPF: ABNORMAL /HPF
BILIRUB SERPL-MCNC: NEGATIVE MG/DL
BILIRUB UR QL STRIP: NEGATIVE
BLOOD URINE, POC: NEGATIVE
CLARITY UR: CLEAR
CLARITY, POC UA: CLEAR
COLOR UR: COLORLESS
COLOR, POC UA: YELLOW
GLUCOSE UR QL STRIP: NEGATIVE
GLUCOSE UR QL STRIP: NEGATIVE
HGB UR QL STRIP: NEGATIVE
KETONES UR QL STRIP: NEGATIVE
KETONES UR QL STRIP: NEGATIVE
LEUKOCYTE ESTERASE UR QL STRIP: ABNORMAL
LEUKOCYTE ESTERASE URINE, POC: ABNORMAL
MICROSCOPIC COMMENT: ABNORMAL
NITRITE UR QL STRIP: NEGATIVE
NITRITE, POC UA: NEGATIVE
PH UR STRIP: 7 [PH] (ref 5–8)
PH, POC UA: 7
PROT UR QL STRIP: NEGATIVE
PROTEIN, POC: ABNORMAL
RBC #/AREA URNS HPF: 2 /HPF (ref 0–4)
SP GR UR STRIP: 1 (ref 1–1.03)
SPECIFIC GRAVITY, POC UA: 1
SQUAMOUS #/AREA URNS HPF: 3 /HPF
URN SPEC COLLECT METH UR: ABNORMAL
UROBILINOGEN UR STRIP-ACNC: NEGATIVE EU/DL
UROBILINOGEN, POC UA: NEGATIVE
WBC #/AREA URNS HPF: 10 /HPF (ref 0–5)

## 2022-02-01 PROCEDURE — 3079F PR MOST RECENT DIASTOLIC BLOOD PRESSURE 80-89 MM HG: ICD-10-PCS | Mod: CPTII,S$GLB,, | Performed by: NURSE PRACTITIONER

## 2022-02-01 PROCEDURE — 81000 URINALYSIS NONAUTO W/SCOPE: CPT | Performed by: NURSE PRACTITIONER

## 2022-02-01 PROCEDURE — 4010F ACE/ARB THERAPY RXD/TAKEN: CPT | Mod: CPTII,S$GLB,, | Performed by: NURSE PRACTITIONER

## 2022-02-01 PROCEDURE — 87186 SC STD MICRODIL/AGAR DIL: CPT | Performed by: NURSE PRACTITIONER

## 2022-02-01 PROCEDURE — 99999 PR PBB SHADOW E&M-EST. PATIENT-LVL IV: CPT | Mod: PBBFAC,,, | Performed by: NURSE PRACTITIONER

## 2022-02-01 PROCEDURE — 87147 CULTURE TYPE IMMUNOLOGIC: CPT | Performed by: NURSE PRACTITIONER

## 2022-02-01 PROCEDURE — 87077 CULTURE AEROBIC IDENTIFY: CPT | Performed by: NURSE PRACTITIONER

## 2022-02-01 PROCEDURE — 99213 PR OFFICE/OUTPT VISIT, EST, LEVL III, 20-29 MIN: ICD-10-PCS | Mod: S$GLB,,, | Performed by: NURSE PRACTITIONER

## 2022-02-01 PROCEDURE — 81002 POCT URINE DIPSTICK WITHOUT MICROSCOPE: ICD-10-PCS | Mod: S$GLB,,, | Performed by: NURSE PRACTITIONER

## 2022-02-01 PROCEDURE — 99999 PR PBB SHADOW E&M-EST. PATIENT-LVL IV: ICD-10-PCS | Mod: PBBFAC,,, | Performed by: NURSE PRACTITIONER

## 2022-02-01 PROCEDURE — 4010F PR ACE/ARB THEARPY RXD/TAKEN: ICD-10-PCS | Mod: CPTII,S$GLB,, | Performed by: NURSE PRACTITIONER

## 2022-02-01 PROCEDURE — 99213 OFFICE O/P EST LOW 20 MIN: CPT | Mod: S$GLB,,, | Performed by: NURSE PRACTITIONER

## 2022-02-01 PROCEDURE — 3008F BODY MASS INDEX DOCD: CPT | Mod: CPTII,S$GLB,, | Performed by: NURSE PRACTITIONER

## 2022-02-01 PROCEDURE — 3079F DIAST BP 80-89 MM HG: CPT | Mod: CPTII,S$GLB,, | Performed by: NURSE PRACTITIONER

## 2022-02-01 PROCEDURE — 81002 URINALYSIS NONAUTO W/O SCOPE: CPT | Mod: S$GLB,,, | Performed by: NURSE PRACTITIONER

## 2022-02-01 PROCEDURE — 87088 URINE BACTERIA CULTURE: CPT | Performed by: NURSE PRACTITIONER

## 2022-02-01 PROCEDURE — 1159F MED LIST DOCD IN RCRD: CPT | Mod: CPTII,S$GLB,, | Performed by: NURSE PRACTITIONER

## 2022-02-01 PROCEDURE — 1159F PR MEDICATION LIST DOCUMENTED IN MEDICAL RECORD: ICD-10-PCS | Mod: CPTII,S$GLB,, | Performed by: NURSE PRACTITIONER

## 2022-02-01 PROCEDURE — 3075F SYST BP GE 130 - 139MM HG: CPT | Mod: CPTII,S$GLB,, | Performed by: NURSE PRACTITIONER

## 2022-02-01 PROCEDURE — 1160F PR REVIEW ALL MEDS BY PRESCRIBER/CLIN PHARMACIST DOCUMENTED: ICD-10-PCS | Mod: CPTII,S$GLB,, | Performed by: NURSE PRACTITIONER

## 2022-02-01 PROCEDURE — 1160F RVW MEDS BY RX/DR IN RCRD: CPT | Mod: CPTII,S$GLB,, | Performed by: NURSE PRACTITIONER

## 2022-02-01 PROCEDURE — 3008F PR BODY MASS INDEX (BMI) DOCUMENTED: ICD-10-PCS | Mod: CPTII,S$GLB,, | Performed by: NURSE PRACTITIONER

## 2022-02-01 PROCEDURE — 3075F PR MOST RECENT SYSTOLIC BLOOD PRESS GE 130-139MM HG: ICD-10-PCS | Mod: CPTII,S$GLB,, | Performed by: NURSE PRACTITIONER

## 2022-02-01 PROCEDURE — 87086 URINE CULTURE/COLONY COUNT: CPT | Performed by: NURSE PRACTITIONER

## 2022-02-01 RX ORDER — HYDROCHLOROTHIAZIDE 12.5 MG/1
12.5 TABLET ORAL DAILY
Qty: 90 TABLET | Refills: 0 | Status: SHIPPED | OUTPATIENT
Start: 2022-02-01 | End: 2022-08-13 | Stop reason: SDUPTHER

## 2022-02-01 RX ORDER — MELOXICAM 15 MG/1
15 TABLET ORAL DAILY
COMMUNITY
Start: 2022-01-07 | End: 2022-03-21

## 2022-02-01 RX ORDER — NITROFURANTOIN 25; 75 MG/1; MG/1
100 CAPSULE ORAL 2 TIMES DAILY
Qty: 10 CAPSULE | Refills: 0 | Status: SHIPPED | OUTPATIENT
Start: 2022-02-01 | End: 2022-02-06

## 2022-02-01 NOTE — PROGRESS NOTES
"Subjective:      Patient ID: Erma Linda is a 61 y.o. female.  New to me but seen previously in clinic by a fellow provider. Pt presents with recurrent UTI symptoms that began ~2wks ago.     Urinary Tract Infection   This is a recurrent problem. The current episode started 1 to 4 weeks ago. The problem occurs every urination. The problem has been gradually worsening. The quality of the pain is described as aching and burning. The pain is moderate. There has been no fever. She is not sexually active. There is no history of pyelonephritis. Associated symptoms include flank pain, frequency, hesitancy, urgency and withholding. Pertinent negatives include no behavior changes, chills, discharge, hematuria, nausea, sweats, vomiting, weight loss, bubble bath use, constipation or rash. She has tried increased fluids for the symptoms. The treatment provided no relief. Her past medical history is significant for hypertension and recurrent UTIs. There is no history of diabetes mellitus.     Review of Systems   Constitutional: Negative for activity change, appetite change, chills, diaphoresis, fatigue, fever, unexpected weight change and weight loss.   Respiratory: Negative for chest tightness and shortness of breath.    Cardiovascular: Negative for chest pain and palpitations.   Gastrointestinal: Negative for abdominal pain, constipation, diarrhea, nausea and vomiting.   Genitourinary: Positive for decreased urine volume, dysuria, flank pain, frequency, hesitancy, nocturia and urgency. Negative for bladder incontinence, difficulty urinating, hematuria and menstrual problem.   Integumentary:  Negative for rash.   Neurological: Negative for headaches.   All other systems reviewed and are negative.        Objective:     Vitals:    02/01/22 1349   BP: 138/86   Pulse: 103   Resp: 16   Temp: 98.4 °F (36.9 °C)   TempSrc: Oral   SpO2: 97%   Weight: 77.7 kg (171 lb 4.8 oz)   Height: 5' 3" (1.6 m)     Physical Exam  Vitals and " nursing note reviewed.   Constitutional:       General: She is not in acute distress.     Appearance: Normal appearance. She is well-developed, well-groomed and overweight. She is not ill-appearing.   HENT:      Head: Normocephalic and atraumatic.      Right Ear: External ear normal.      Left Ear: External ear normal.      Nose: Nose normal.      Mouth/Throat:      Lips: Pink.      Mouth: Mucous membranes are moist.   Eyes:      General: Lids are normal. Vision grossly intact. Gaze aligned appropriately. No scleral icterus.        Right eye: No discharge.         Left eye: No discharge.      Conjunctiva/sclera: Conjunctivae normal.   Neck:      Trachea: Phonation normal.   Pulmonary:      Effort: Pulmonary effort is normal. No accessory muscle usage or respiratory distress.   Abdominal:      General: Abdomen is flat. There is no distension.      Palpations: Abdomen is soft.      Tenderness: There is no abdominal tenderness. There is no right CVA tenderness or left CVA tenderness.   Musculoskeletal:      Cervical back: Neck supple.   Skin:     General: Skin is warm and dry.      Findings: No rash.   Neurological:      General: No focal deficit present.      Mental Status: She is alert and oriented to person, place, and time. Mental status is at baseline.      Motor: No abnormal muscle tone.      Gait: Gait normal.   Psychiatric:         Attention and Perception: Attention and perception normal.         Mood and Affect: Mood and affect normal.         Speech: Speech normal.         Behavior: Behavior normal. Behavior is cooperative.         Thought Content: Thought content normal.         Cognition and Memory: Cognition and memory normal.         Judgment: Judgment normal.       Assessment and Plan:     1. Recurrent UTI  PLAN: Treatment per orders - also push fluids, may use Pyridium OTC prn. Call or return to clinic prn if these symptoms worsen or fail to improve as anticipated.  Urine dipstick shows positive for  WBC's, positive for protein and positive for leukocytes.   - POCT URINE DIPSTICK WITHOUT MICROSCOPE  - Urinalysis  - Urine Culture High Risk  - nitrofurantoin, macrocrystal-monohydrate, (MACROBID) 100 MG capsule; Take 1 capsule (100 mg total) by mouth 2 (two) times daily. for 5 days  Dispense: 10 capsule; Refill: 0    2. Essential hypertension, benign  Controlled on current regimen, refill provided today  - hydroCHLOROthiazide (HYDRODIURIL) 12.5 MG Tab; Take 1 tablet (12.5 mg total) by mouth once daily.  Dispense: 90 tablet; Refill: 0           JING El, FNP-C  Family/Internal Medicine  [facility]

## 2022-02-03 LAB
BACTERIA UR CULT: ABNORMAL
BACTERIA UR CULT: ABNORMAL

## 2022-02-08 ENCOUNTER — TELEPHONE (OUTPATIENT)
Dept: FAMILY MEDICINE | Facility: CLINIC | Age: 62
End: 2022-02-08
Payer: COMMERCIAL

## 2022-02-08 NOTE — TELEPHONE ENCOUNTER
Patient stated she is feeling much better.  Stated that the symptoms she was experiencing before she is no longer experiencing.  Please be advised.

## 2022-02-08 NOTE — TELEPHONE ENCOUNTER
How is patient feeling after completing macrobid? Urine culture showed multiple organisms, making sure medication worked.

## 2022-02-17 ENCOUNTER — HOSPITAL ENCOUNTER (OUTPATIENT)
Dept: RADIOLOGY | Facility: HOSPITAL | Age: 62
Discharge: HOME | End: 2022-02-17
Attending: INTERNAL MEDICINE
Payer: COMMERCIAL

## 2022-02-17 DIAGNOSIS — Z12.31 ENCOUNTER FOR SCREENING MAMMOGRAM FOR BREAST CANCER: ICD-10-CM

## 2022-02-17 PROCEDURE — 77067 SCR MAMMO BI INCL CAD: CPT | Mod: 26,,, | Performed by: RADIOLOGY

## 2022-02-17 PROCEDURE — 77063 BREAST TOMOSYNTHESIS BI: CPT | Mod: 26,,, | Performed by: RADIOLOGY

## 2022-02-17 PROCEDURE — 77067 SCR MAMMO BI INCL CAD: CPT | Mod: TC,PO

## 2022-02-17 PROCEDURE — 77067 MAMMO DIGITAL SCREENING BILAT WITH TOMO: ICD-10-PCS | Mod: 26,,, | Performed by: RADIOLOGY

## 2022-02-17 PROCEDURE — 77063 MAMMO DIGITAL SCREENING BILAT WITH TOMO: ICD-10-PCS | Mod: 26,,, | Performed by: RADIOLOGY

## 2022-02-28 ENCOUNTER — HOSPITAL ENCOUNTER (OUTPATIENT)
Dept: RADIOLOGY | Facility: OTHER | Age: 62
Discharge: HOME | End: 2022-02-28
Attending: INTERNAL MEDICINE
Payer: COMMERCIAL

## 2022-02-28 DIAGNOSIS — R92.8 ABNORMAL MAMMOGRAM OF LEFT BREAST: ICD-10-CM

## 2022-02-28 PROCEDURE — 77061 BREAST TOMOSYNTHESIS UNI: CPT | Mod: 26,LT,, | Performed by: RADIOLOGY

## 2022-02-28 PROCEDURE — 76642 US BREAST LEFT LIMITED: ICD-10-PCS | Mod: 26,LT,, | Performed by: RADIOLOGY

## 2022-02-28 PROCEDURE — 77065 DX MAMMO INCL CAD UNI: CPT | Mod: 26,LT,, | Performed by: RADIOLOGY

## 2022-02-28 PROCEDURE — 77061 MAMMO DIGITAL DIAGNOSTIC LEFT WITH TOMO: ICD-10-PCS | Mod: 26,LT,, | Performed by: RADIOLOGY

## 2022-02-28 PROCEDURE — 77065 MAMMO DIGITAL DIAGNOSTIC LEFT WITH TOMO: ICD-10-PCS | Mod: 26,LT,, | Performed by: RADIOLOGY

## 2022-02-28 PROCEDURE — 77065 DX MAMMO INCL CAD UNI: CPT | Mod: TC,LT

## 2022-02-28 PROCEDURE — 76642 ULTRASOUND BREAST LIMITED: CPT | Mod: TC,LT

## 2022-02-28 PROCEDURE — 76642 ULTRASOUND BREAST LIMITED: CPT | Mod: 26,LT,, | Performed by: RADIOLOGY

## 2022-03-10 ENCOUNTER — HOSPITAL ENCOUNTER (OUTPATIENT)
Dept: RADIOLOGY | Facility: OTHER | Age: 62
Discharge: HOME | End: 2022-03-10
Attending: INTERNAL MEDICINE
Payer: COMMERCIAL

## 2022-03-10 DIAGNOSIS — N63.0 BREAST MASS: Primary | ICD-10-CM

## 2022-03-10 DIAGNOSIS — R92.8 ABNORMAL MAMMOGRAM: ICD-10-CM

## 2022-03-10 PROCEDURE — 88360 PR  TUMOR IMMUNOHISTOCHEM/MANUAL: ICD-10-PCS | Mod: 26,,, | Performed by: PATHOLOGY

## 2022-03-10 PROCEDURE — 88305 TISSUE EXAM BY PATHOLOGIST: CPT | Performed by: PATHOLOGY

## 2022-03-10 PROCEDURE — 38505 NEEDLE BIOPSY LYMPH NODES: CPT | Mod: ,,, | Performed by: RADIOLOGY

## 2022-03-10 PROCEDURE — 19084 BX BREAST ADD LESION US IMAG: CPT | Mod: LT,,, | Performed by: RADIOLOGY

## 2022-03-10 PROCEDURE — 88360 TUMOR IMMUNOHISTOCHEM/MANUAL: CPT | Performed by: PATHOLOGY

## 2022-03-10 PROCEDURE — 88342 IMHCHEM/IMCYTCHM 1ST ANTB: CPT | Mod: 26,59,, | Performed by: PATHOLOGY

## 2022-03-10 PROCEDURE — 25000003 PHARM REV CODE 250: Performed by: INTERNAL MEDICINE

## 2022-03-10 PROCEDURE — 88342 IMHCHEM/IMCYTCHM 1ST ANTB: CPT | Mod: 59 | Performed by: PATHOLOGY

## 2022-03-10 PROCEDURE — 88342 CHG IMMUNOCYTOCHEMISTRY: ICD-10-PCS | Mod: 26,59,, | Performed by: PATHOLOGY

## 2022-03-10 PROCEDURE — 27201068 US BREAST BIOPSY WITH IMAGING 1ST SITE LEFT

## 2022-03-10 PROCEDURE — 88341 IMHCHEM/IMCYTCHM EA ADD ANTB: CPT | Mod: 26,59,, | Performed by: PATHOLOGY

## 2022-03-10 PROCEDURE — 88305 TISSUE EXAM BY PATHOLOGIST: CPT | Mod: 26,,, | Performed by: PATHOLOGY

## 2022-03-10 PROCEDURE — 88305 TISSUE EXAM BY PATHOLOGIST: ICD-10-PCS | Mod: 26,,, | Performed by: PATHOLOGY

## 2022-03-10 PROCEDURE — 88360 TUMOR IMMUNOHISTOCHEM/MANUAL: CPT | Mod: 26,,, | Performed by: PATHOLOGY

## 2022-03-10 PROCEDURE — 19083 BX BREAST 1ST LESION US IMAG: CPT | Mod: LT,,, | Performed by: RADIOLOGY

## 2022-03-10 PROCEDURE — 19084 US BREAST BIOPSY WITH IMAGING EA ADDITIONAL: ICD-10-PCS | Mod: LT,,, | Performed by: RADIOLOGY

## 2022-03-10 PROCEDURE — 88341 PR IHC OR ICC EACH ADD'L SINGLE ANTIBODY  STAINPR: ICD-10-PCS | Mod: 26,59,, | Performed by: PATHOLOGY

## 2022-03-10 PROCEDURE — 27201068 US BREAST BIOPSY WITH IMAGING EA ADDITIONAL

## 2022-03-10 PROCEDURE — 27201068 US BIOPSY LYMPH NODE AXILLA

## 2022-03-10 PROCEDURE — 38505 US BIOPSY LYMPH NODE AXILLA: ICD-10-PCS | Mod: ,,, | Performed by: RADIOLOGY

## 2022-03-10 PROCEDURE — 88341 IMHCHEM/IMCYTCHM EA ADD ANTB: CPT | Mod: 59 | Performed by: PATHOLOGY

## 2022-03-10 PROCEDURE — 19083 US BREAST BIOPSY WITH IMAGING 1ST SITE LEFT: ICD-10-PCS | Mod: LT,,, | Performed by: RADIOLOGY

## 2022-03-10 RX ORDER — LIDOCAINE HYDROCHLORIDE 10 MG/ML
5 INJECTION INFILTRATION; PERINEURAL ONCE
Status: COMPLETED | OUTPATIENT
Start: 2022-03-10 | End: 2022-03-10

## 2022-03-10 RX ORDER — LIDOCAINE HYDROCHLORIDE AND EPINEPHRINE 20; 10 MG/ML; UG/ML
10 INJECTION, SOLUTION INFILTRATION; PERINEURAL ONCE
Status: COMPLETED | OUTPATIENT
Start: 2022-03-10 | End: 2022-03-10

## 2022-03-10 RX ADMIN — LIDOCAINE HYDROCHLORIDE AND EPINEPHRINE 10 ML: 20; 10 INJECTION, SOLUTION INFILTRATION; PERINEURAL at 02:03

## 2022-03-10 RX ADMIN — LIDOCAINE HYDROCHLORIDE AND EPINEPHRINE 10 ML: 20; 10 INJECTION, SOLUTION INFILTRATION; PERINEURAL at 03:03

## 2022-03-10 RX ADMIN — LIDOCAINE HYDROCHLORIDE 5 ML: 10 INJECTION, SOLUTION EPIDURAL; INFILTRATION; INTRACAUDAL; PERINEURAL at 02:03

## 2022-03-10 RX ADMIN — LIDOCAINE HYDROCHLORIDE 5 ML: 10 INJECTION, SOLUTION EPIDURAL; INFILTRATION; INTRACAUDAL; PERINEURAL at 03:03

## 2022-03-15 ENCOUNTER — TELEPHONE (OUTPATIENT)
Dept: RADIOLOGY | Facility: OTHER | Age: 62
End: 2022-03-15
Payer: COMMERCIAL

## 2022-03-15 ENCOUNTER — TELEPHONE (OUTPATIENT)
Dept: FAMILY MEDICINE | Facility: CLINIC | Age: 62
End: 2022-03-15
Payer: COMMERCIAL

## 2022-03-15 NOTE — TELEPHONE ENCOUNTER
Patient has been notified of biopsy result, so no need for her to schedule with me.  Please ignore previous message.

## 2022-03-15 NOTE — TELEPHONE ENCOUNTER
Please call patient to schedule the next available appointment to discuss her biopsy report.  Feel free to override my schedule for anything as early as tomorrow.

## 2022-03-15 NOTE — TELEPHONE ENCOUNTER
Patient notified of left breast biopsy results per pathology reported on 3/15/2022. Diagnosis: INVASIVE MAMMARY CARCINOMA. Explained to patient results are positive for breast cancer at both mass sites. Lymph node did not show any malignancy.  Instructed on need for consultation with breast surgeon. Questions answered. Appointment with breast surgeon requested. Office will call patient directly to confirm appointment. Patient verbalized understanding. Biopsy site WNL. Encouraged to call [phone] for further questions or concerns.

## 2022-03-16 ENCOUNTER — TELEPHONE (OUTPATIENT)
Dept: SURGERY | Facility: CLINIC | Age: 62
End: 2022-03-16
Payer: COMMERCIAL

## 2022-03-16 ENCOUNTER — PATIENT OUTREACH (OUTPATIENT)
Dept: ADMINISTRATIVE | Facility: OTHER | Age: 62
End: 2022-03-16
Payer: COMMERCIAL

## 2022-03-16 ENCOUNTER — TELEPHONE (OUTPATIENT)
Dept: FAMILY MEDICINE | Facility: CLINIC | Age: 62
End: 2022-03-16
Payer: COMMERCIAL

## 2022-03-16 NOTE — PROGRESS NOTES
LINKS immunization registry updated  Care Everywhere updated  Health Maintenance updated  Chart reviewed for overdue Proactive [facility]) health maintenance testing (CRS, Breast Ca, Diabetic Eye Exam)   Orders entered:N/A  Patient has open case request for colonoscopy.  Fit Kit order not placed

## 2022-03-16 NOTE — TELEPHONE ENCOUNTER
----- Message from Annmarie Bhagat, RN sent at 3/15/2022  4:32 PM CDT -----  Regarding: New Cancer  Hello,    Path dx: Invasive mammary carcinoma x2 sites left breast    Please schedule with breast surgery     Thank you,  CD

## 2022-03-16 NOTE — NURSING
Oncology Navigation   Intake  Date of Diagnosis: 3/10/2022  Cancer Type: Breast  Internal / External Referral: Internal  Referral Source: [facility]  Date of Referral: 3/16/2022  Initial Nurse Navigator Contact: 3/16/2022  Referral to Initial Contact Timeline (days): 0  Date Worked: 3/16/2022  First Appointment Available: 3/21/2022  Appointment Date: 3/21/2022  First Available Date vs. Scheduled Date (days): 0     Treatment  Current Status: Staging work-up    Surgical Oncologist: Dr.Alexa Delong  Consult Date: 3/21/2022    Medical Oncologist: Dr.Zoe Camarillo  Consult Date: 3/21/2022    Radiation Oncologist: Dr.Angela Aranda    Procedures: Biopsy  Biopsy Schedule Date: 3/10/2022          Radiation Oncologist: Dr.Angela Aranda        Acuity      Follow Up  Follow up in about 5 days (around 3/21/2022) for initial consults.

## 2022-03-16 NOTE — TELEPHONE ENCOUNTER
Called & spoke with pt, scheduled for a multi D appt Monday & explained that she will meet the whole team then. Reviewed location & confirmed appts. Provided my direct number should she need anything.

## 2022-03-16 NOTE — TELEPHONE ENCOUNTER
Called pt to inform that she doesn't need an appt today since she already knows the result. Pt asked for size and staging of cancerous tumor, but I informed her more testing was needed. All questions/concerns addressed and pt voiced understanding. Will cancel today's appt.

## 2022-03-17 LAB
FINAL PATHOLOGIC DIAGNOSIS: NORMAL
GROSS: NORMAL
Lab: NORMAL
SUPPLEMENTAL DIAGNOSIS: NORMAL

## 2022-03-21 ENCOUNTER — OFFICE VISIT (OUTPATIENT)
Dept: HEMATOLOGY/ONCOLOGY | Facility: CLINIC | Age: 62
End: 2022-03-21
Payer: COMMERCIAL

## 2022-03-21 ENCOUNTER — OFFICE VISIT (OUTPATIENT)
Dept: SURGERY | Facility: CLINIC | Age: 62
End: 2022-03-21
Payer: COMMERCIAL

## 2022-03-21 ENCOUNTER — OFFICE VISIT (OUTPATIENT)
Dept: RADIATION ONCOLOGY | Facility: CLINIC | Age: 62
End: 2022-03-21
Payer: COMMERCIAL

## 2022-03-21 ENCOUNTER — TELEPHONE (OUTPATIENT)
Dept: SURGERY | Facility: CLINIC | Age: 62
End: 2022-03-21
Payer: COMMERCIAL

## 2022-03-21 VITALS
DIASTOLIC BLOOD PRESSURE: 77 MMHG | DIASTOLIC BLOOD PRESSURE: 77 MMHG | WEIGHT: 165 LBS | HEART RATE: 95 BPM | HEIGHT: 63 IN | WEIGHT: 164.88 LBS | HEIGHT: 63 IN | BODY MASS INDEX: 29.21 KG/M2 | SYSTOLIC BLOOD PRESSURE: 139 MMHG | HEART RATE: 95 BPM | SYSTOLIC BLOOD PRESSURE: 139 MMHG | BODY MASS INDEX: 29.23 KG/M2

## 2022-03-21 DIAGNOSIS — Z17.0 MALIGNANT NEOPLASM OF UPPER-OUTER QUADRANT OF LEFT BREAST IN FEMALE, ESTROGEN RECEPTOR POSITIVE: ICD-10-CM

## 2022-03-21 DIAGNOSIS — C50.412 MALIGNANT NEOPLASM OF UPPER-OUTER QUADRANT OF LEFT BREAST IN FEMALE, ESTROGEN RECEPTOR POSITIVE: ICD-10-CM

## 2022-03-21 DIAGNOSIS — C50.412 CARCINOMA OF UPPER-OUTER QUADRANT OF LEFT BREAST IN FEMALE, ESTROGEN RECEPTOR POSITIVE: Primary | ICD-10-CM

## 2022-03-21 DIAGNOSIS — Z17.0 MALIGNANT NEOPLASM OF UPPER-OUTER QUADRANT OF LEFT BREAST IN FEMALE, ESTROGEN RECEPTOR POSITIVE: Primary | ICD-10-CM

## 2022-03-21 DIAGNOSIS — Z17.0 CARCINOMA OF UPPER-OUTER QUADRANT OF LEFT BREAST IN FEMALE, ESTROGEN RECEPTOR POSITIVE: Primary | ICD-10-CM

## 2022-03-21 DIAGNOSIS — Z01.818 PRE-OP TESTING: ICD-10-CM

## 2022-03-21 DIAGNOSIS — C50.412 MALIGNANT NEOPLASM OF UPPER-OUTER QUADRANT OF LEFT BREAST IN FEMALE, ESTROGEN RECEPTOR POSITIVE: Primary | ICD-10-CM

## 2022-03-21 PROCEDURE — 3078F PR MOST RECENT DIASTOLIC BLOOD PRESSURE < 80 MM HG: ICD-10-PCS | Mod: CPTII,S$GLB,, | Performed by: RADIOLOGY

## 2022-03-21 PROCEDURE — 99205 PR OFFICE/OUTPT VISIT, NEW, LEVL V, 60-74 MIN: ICD-10-PCS | Mod: S$GLB,,, | Performed by: SURGERY

## 2022-03-21 PROCEDURE — 4010F PR ACE/ARB THEARPY RXD/TAKEN: ICD-10-PCS | Mod: CPTII,S$GLB,, | Performed by: SURGERY

## 2022-03-21 PROCEDURE — 99204 OFFICE O/P NEW MOD 45 MIN: CPT | Mod: S$GLB,,, | Performed by: INTERNAL MEDICINE

## 2022-03-21 PROCEDURE — 3078F PR MOST RECENT DIASTOLIC BLOOD PRESSURE < 80 MM HG: ICD-10-PCS | Mod: CPTII,S$GLB,, | Performed by: SURGERY

## 2022-03-21 PROCEDURE — 3078F DIAST BP <80 MM HG: CPT | Mod: CPTII,S$GLB,, | Performed by: RADIOLOGY

## 2022-03-21 PROCEDURE — 3008F BODY MASS INDEX DOCD: CPT | Mod: CPTII,S$GLB,, | Performed by: RADIOLOGY

## 2022-03-21 PROCEDURE — 1160F RVW MEDS BY RX/DR IN RCRD: CPT | Mod: CPTII,S$GLB,, | Performed by: RADIOLOGY

## 2022-03-21 PROCEDURE — 99205 PR OFFICE/OUTPT VISIT, NEW, LEVL V, 60-74 MIN: ICD-10-PCS | Mod: S$GLB,,, | Performed by: RADIOLOGY

## 2022-03-21 PROCEDURE — 99205 OFFICE O/P NEW HI 60 MIN: CPT | Mod: S$GLB,,, | Performed by: SURGERY

## 2022-03-21 PROCEDURE — 4010F ACE/ARB THERAPY RXD/TAKEN: CPT | Mod: CPTII,S$GLB,, | Performed by: RADIOLOGY

## 2022-03-21 PROCEDURE — 99999 PR PBB SHADOW E&M-EST. PATIENT-LVL III: CPT | Mod: PBBFAC,,, | Performed by: RADIOLOGY

## 2022-03-21 PROCEDURE — 3075F PR MOST RECENT SYSTOLIC BLOOD PRESS GE 130-139MM HG: ICD-10-PCS | Mod: CPTII,S$GLB,, | Performed by: SURGERY

## 2022-03-21 PROCEDURE — 99999 PR PBB SHADOW E&M-EST. PATIENT-LVL III: ICD-10-PCS | Mod: PBBFAC,,, | Performed by: RADIOLOGY

## 2022-03-21 PROCEDURE — 3008F BODY MASS INDEX DOCD: CPT | Mod: CPTII,S$GLB,, | Performed by: SURGERY

## 2022-03-21 PROCEDURE — 4010F PR ACE/ARB THEARPY RXD/TAKEN: ICD-10-PCS | Mod: CPTII,S$GLB,, | Performed by: RADIOLOGY

## 2022-03-21 PROCEDURE — 99999 PR PBB SHADOW E&M-EST. PATIENT-LVL III: ICD-10-PCS | Mod: PBBFAC,,, | Performed by: SURGERY

## 2022-03-21 PROCEDURE — 99204 PR OFFICE/OUTPT VISIT, NEW, LEVL IV, 45-59 MIN: ICD-10-PCS | Mod: S$GLB,,, | Performed by: INTERNAL MEDICINE

## 2022-03-21 PROCEDURE — 99999 PR PBB SHADOW E&M-EST. PATIENT-LVL III: CPT | Mod: PBBFAC,,, | Performed by: SURGERY

## 2022-03-21 PROCEDURE — 3075F PR MOST RECENT SYSTOLIC BLOOD PRESS GE 130-139MM HG: ICD-10-PCS | Mod: CPTII,S$GLB,, | Performed by: RADIOLOGY

## 2022-03-21 PROCEDURE — 3075F SYST BP GE 130 - 139MM HG: CPT | Mod: CPTII,S$GLB,, | Performed by: SURGERY

## 2022-03-21 PROCEDURE — 1159F MED LIST DOCD IN RCRD: CPT | Mod: CPTII,S$GLB,, | Performed by: RADIOLOGY

## 2022-03-21 PROCEDURE — 3075F SYST BP GE 130 - 139MM HG: CPT | Mod: CPTII,S$GLB,, | Performed by: RADIOLOGY

## 2022-03-21 PROCEDURE — 1159F PR MEDICATION LIST DOCUMENTED IN MEDICAL RECORD: ICD-10-PCS | Mod: CPTII,S$GLB,, | Performed by: RADIOLOGY

## 2022-03-21 PROCEDURE — 99205 OFFICE O/P NEW HI 60 MIN: CPT | Mod: S$GLB,,, | Performed by: RADIOLOGY

## 2022-03-21 PROCEDURE — 3008F PR BODY MASS INDEX (BMI) DOCUMENTED: ICD-10-PCS | Mod: CPTII,S$GLB,, | Performed by: RADIOLOGY

## 2022-03-21 PROCEDURE — 1160F PR REVIEW ALL MEDS BY PRESCRIBER/CLIN PHARMACIST DOCUMENTED: ICD-10-PCS | Mod: CPTII,S$GLB,, | Performed by: RADIOLOGY

## 2022-03-21 PROCEDURE — 3078F DIAST BP <80 MM HG: CPT | Mod: CPTII,S$GLB,, | Performed by: SURGERY

## 2022-03-21 PROCEDURE — 99999 PR PBB SHADOW E&M-EST. PATIENT-LVL II: CPT | Mod: PBBFAC,,, | Performed by: INTERNAL MEDICINE

## 2022-03-21 PROCEDURE — 3008F PR BODY MASS INDEX (BMI) DOCUMENTED: ICD-10-PCS | Mod: CPTII,S$GLB,, | Performed by: SURGERY

## 2022-03-21 PROCEDURE — 99999 PR PBB SHADOW E&M-EST. PATIENT-LVL II: ICD-10-PCS | Mod: PBBFAC,,, | Performed by: INTERNAL MEDICINE

## 2022-03-21 PROCEDURE — 4010F ACE/ARB THERAPY RXD/TAKEN: CPT | Mod: CPTII,S$GLB,, | Performed by: SURGERY

## 2022-03-21 NOTE — PROGRESS NOTES
Breast Surgery  [facility]  Department of Surgery      REFERRING PROVIDER: Aaareferral Self  No address on file    Chief Complaint: Breast Cancer (New Patient Left Breast Invasive Mammary Carcinoma 3./10/22 .)      Subjective:      Patient ID: Erma Linda is a 62 y.o. female who presents with left breast Invasive Ductal Carcinoma with lobular features    She presented for screening mammogram on 02/17/2022 for yearly scheduled screening.. This identified a focal asymmetry in the left breast. Follow-up mammogram and ultrasound on  02/28/2022 showed 2 masses in the upper outer quadrant of the breast.  One at 02:00 o'clock, 12 cm from nipple measuring 7 x 7 x 6 mm and 1 at the 2 o'clock position, 11 cm from the nipple measuring 6 x 4 x 4 mm. A ultrasound guided biopsies  were performed of the 2 breast lesions on 03/10/2022 with pathology revealing invasive ductal carcinoma of the breast with lobular features.  During the ultrasound there was also identified a lymph node with cortical thickening measuring 7 mm.  This was also biopsied by ultrasound with pathology consistent with benign findings.    Findings at that time were the following:   Lesion 1:    Location:  Left, 02:00 o'clock, 12 cm from the nipple   Clip:  Twirl, in expected position  Tumor size:  0.7 cm   Tumor ndgndrndanddndend:nd nd2nd Estrogen Receptor:  Positive   Progesterone Receptor:  Positive   Her-2 yas:  Negative     Lesion 2:    Location:  Left, 02:00 o'clock, 11 cm from the nipple   Clip:  Ribbon, in expected position  Tumor size:  0.6 cm   Tumor ndgndrndanddndend:nd nd2nd Estrogen Receptor:  Positive   Progesterone Receptor:  Positive   Her-2 yas:  Negative     Patient has not noted a change on breast exam.  Patient denies nipple discharge. Patient denies previous breast biopsy. Patient has a personal history of breast cancer. Family history includes no family history of cancer.    GYN History:  Age of menarche was 15. Age of menopause 39. Patient denies  hormonal therapy. Patient is . Age of first live birth was 18. Patient did not breast feed.      Past Medical History:   Diagnosis Date    Arthritis     History of hepatitis C; S/p RX with SVR 12 documented 2018 3/22/2018    Hypertension     Nuclear sclerosis of both eyes 2021     Past Surgical History:   Procedure Laterality Date     SECTION, CLASSIC      KNEE ARTHROSCOPY W/ LASER      R    TUBAL LIGATION       Current Outpatient Medications on File Prior to Visit   Medication Sig Dispense Refill    amLODIPine (NORVASC) 10 MG tablet Take 1 tablet by mouth once daily 90 tablet 1    hydroCHLOROthiazide (HYDRODIURIL) 12.5 MG Tab Take 1 tablet (12.5 mg total) by mouth once daily. 90 tablet 0    vitamin D (VITAMIN D3) 1000 units Tab Take 1,000 Units by mouth once daily.      cyanocobalamin (VITAMIN B-12) 100 MCG tablet Take 500 mcg by mouth once daily.      lisinopriL (PRINIVIL,ZESTRIL) 40 MG tablet Take 1 tablet (40 mg total) by mouth once daily. 30 tablet 11    meloxicam (MOBIC) 15 MG tablet Take 15 mg by mouth once daily.      multivitamin (THERAGRAN) per tablet Take 1 tablet by mouth once daily.       No current facility-administered medications on file prior to visit.     Social History     Socioeconomic History    Marital status:    Tobacco Use    Smoking status: Never Smoker    Smokeless tobacco: Never Used   Substance and Sexual Activity    Alcohol use: No    Drug use: No    Sexual activity: Not Currently     Partners: Male     Birth control/protection: Post-menopausal     Comment: 16  with same partner 35 years      Family History   Problem Relation Age of Onset    Heart disease Mother     No Known Problems Father     No Known Problems Sister     No Known Problems Brother     No Known Problems Maternal Aunt     No Known Problems Maternal Uncle     No Known Problems Paternal Aunt     No Known Problems Paternal Uncle     No Known Problems  Maternal Grandmother     No Known Problems Maternal Grandfather     No Known Problems Paternal Grandmother     No Known Problems Paternal Grandfather     Glaucoma Neg Hx         Review of Systems   All other systems reviewed and are negative.    Objective:   Wt 74.8 kg (165 lb)   BMI 29.23 kg/m²     Physical Exam   Constitutional: She is oriented to person, place, and time. She appears well-developed and well-nourished.   HENT:   Head: Normocephalic and atraumatic.   Cardiovascular: Normal rate and regular rhythm.  Exam reveals no gallop and no friction rub.    No murmur heard.  Pulmonary/Chest: Effort normal and breath sounds normal. No respiratory distress. She has no wheezes. She has no rales. Right breast exhibits no inverted nipple, no mass, no nipple discharge, no skin change and no tenderness. Left breast exhibits no inverted nipple, no mass, no nipple discharge, no skin change and no tenderness.       Lymphadenopathy:     She has no cervical adenopathy.     She has no axillary adenopathy.        Right: No supraclavicular adenopathy present.        Left: No supraclavicular adenopathy present.   Neurological: She is alert and oriented to person, place, and time.   Skin: Skin is warm and dry. No rash noted. No erythema. No pallor.     Psychiatric: She has a normal mood and affect. Her behavior is normal. Judgment and thought content normal.       Radiology review: Images personally reviewed by me in the clinic and shown to the patient during the consultation.     Assessment:       No diagnosis found.    Plan:   left breast, Clinical Stage IA ([id number]), invasive ductal carcinoma with lobular features of the Upper-outer quadrant of breast, estrogen receptor Positive, progesterone receptor Positive, HER2 Negative    Multidisciplinary nature of breast cancer care was discussed in detail at today's visit.  She is presenting as part of the multidisciplinary clinic.    We discussed that surgical options would  include a lumpectomy versus a mastectomy.  We discussed there is no survival benefit to undergoing a mastectomy compared to lumpectomy.  Based on clinical exam and imaging, she would be a good candidate for breast conservation.  Surgical technique and rationale was discussed with the patient.  We discussed that this would be done as a reflector localized excision of the primary tumor along with a surrounding margin of normal breast tissue.  The concept of local control was explained to the patient.  She understands that we would aim to achieve negative margins at the time of initial surgery.  There is however an approximately 15% risk of a positive margin that would require take back for reexcision.  We discussed that due to the lobular features of her breast cancer she may benefit from preoperative MRI to further assess the extent of disease.  Risks of surgery include but are not limited to infection, bleeding, poor cosmesis, positive margins requiring reexcision, and local and distant recurrence.  We also discussed option for mastectomy with or without reconstruction.  She is most interested in breast conservation.    We also discussed axillary staging using sentinel node biopsy.  [place] lymph node biopsies performed utilizing the injection of blue and radioactive dye.  This dye travels to the 1st few lymph nodes that drain the breast.  Lymph nodes that uptake the blue or radioactive dye or are palpable are surgically removed and sent to pathology.  Typically 1-5 lymph nodes are removed during this procedure although exact numbers vary depending on the patient.  This procedure allows sampling of the lymph nodes most at risk for metastasis.   Risks include but are not limited to lymphedema, bleeding, infection, poor cosmesis, numbness of the incision site, seroma, failure of dye to map, nerve injury leading to permanent arm numbness and or muscle weakness, lymphedema, possibility of a false negative finding, and  need for additional surgery.  If metastatic disease is identified on pathology of the lymph nodes been axillary dissection (a second surgery) may be recommended.      The role of adjuvant radiation therapy following breast conservation surgery was also discussed with the patient. We discussed that when looking at all patient populations risk of local recurrence with lumpectomy alone is high and radiation therapy is recommended in most cases. We discussed that final recommendations on radiation would be based on final surgical pathology.  She is meeting with Radiation Oncology today.    We also discussed the role of systemic therapy in the treatment of early stage breast cancer.  She has an early stage hormone receptor positive tumor. I do not anticipate she will need chemotherapy although final recommendations are pending surgical pathology. She will be recommended for anti-estrogen therapy.  She will discuss this with Medical Oncology today.    She does not meet NCCN guidelines for genetic testing based on her family history.  We discussed that genetic testing can be offered all breast cancer patients.  We discussed genetic testing at length and she will like to think about it.    Following her discussion today, Erma Linda is motivated to undergo breast conservation.  She would like time to consider her options however and will let us know when she would like to schedule surgery.    Surgery will be scheduled. Follow-up in clinic roughly 14 days after surgery.      Patient [facility].  All her questions were answered.    Total time spent with the patient: 60 minutes.  40 minutes of face to face consultation and 20 minutes of chart review and coordination of care.

## 2022-03-21 NOTE — TELEPHONE ENCOUNTER
Called KseniaMaddi regarding her apt with  on 3/21/22 for 1 pm . Patient was asked if she was going to keep her 1:00 pm apt . Patient stated she was fifteen min away.

## 2022-03-21 NOTE — PROGRESS NOTES
PATIENT IDENTIFICATION:  Patient Name: Erma Linda  MRN: [id number]  : 1960    DIAGNOSIS: Cancer Staging  No matching staging information was found for the patient.    HISTORY OF PRESENT ILLNESS:   The patient is a 62 year old woman with an early stage invasive carcinoma of the left breast.  She presents to multidisciplinary breast clinic for management.    Screening mammogram performed on 2022 revealed an asymmetry in the upper-outer quadrant of the left breast.  Further imaging was recommended.  Diagnostic imaging performed on 2022 revealed a 12 mm irregular mass with spiculated margins in the upper-outer left breast at posterior depth.  There was a 6 mm mass with spiculated margins in the upper-outer left breast approximately 2 cm superior to the larger mass.  There was also a 6 mm mass with circumscribed margins and suggestion of a high fatty hilum in the upper-outer left breast at posterior depth.    US Breast Left Limited  There is a 7 x 7 x 6 mm irregular hypoechoic mass with spiculated margins in the left breast at the 2:00 position, 12 cm from the nipple which corresponds to the larger mass in the upper outer left breast at posterior depth on mammogram.      There is a 6 x 4 x 4 mm irregular hypoechoic mass with spiculated margins in the left breast at the 2:00 position, 11 cm from the nipple which corresponds to the superior smaller mass in the upper outer left breast at posterior depth on mammogram.      There is a 9 x 7 x 6 mm morphologically normal lymph node in the left breast at the 2:00 position, 13 cm from the nipple which corresponds to the oval mass in the upper outer left breast at posterior depth on mammogram.     Biopsies of the breast and axillary masses were performed on 03/10/2022.  The mass at the 2 o'clock position approximately 12 cm from the nipple was biopsied and consistent with invasive ductal carcinoma with lobular features, grade 1. The lesion at the 2  o'clock position 11 cm from the nipple was also consistent with invasive ductal carcinoma with lobular features, grade 1. The left axillary lymph node was biopsied and negative.  On immunohistochemistry, the tumor cells were ER NE positive and HER2/yas negative.  The Ki-67 proliferative index was 5%.    REVIEW OF SYSTEMS:   Review of Systems   Constitutional: Negative for fever, malaise/fatigue and weight loss.   HENT: Negative for ear pain, hearing loss, sinus pain and sore throat.    Eyes: Negative for blurred vision, double vision and pain.   Respiratory: Negative for cough, hemoptysis, shortness of breath and wheezing.    Cardiovascular: Negative for chest pain, palpitations and leg swelling.   Gastrointestinal: Negative for abdominal pain, blood in stool, constipation, diarrhea, heartburn, nausea and vomiting.   Genitourinary: Negative for dysuria, frequency, hematuria and urgency.   Musculoskeletal: Negative for back pain and joint pain.   Skin: Negative for itching and rash.   Neurological: Negative for tingling, focal weakness, seizures and headaches.   Psychiatric/Behavioral: Negative for depression. The patient is not nervous/anxious.          PAST MEDICAL HISTORY:  Past Medical History:   Diagnosis Date    Arthritis     History of hepatitis C; S/p RX with SVR 12 documented 2018 3/22/2018    Hypertension     Nuclear sclerosis of both eyes 2021       PAST SURGICAL HISTORY:  Past Surgical History:   Procedure Laterality Date     SECTION, CLASSIC      KNEE ARTHROSCOPY W/ LASER      R    TUBAL LIGATION         ALLERGIES:   Review of patient's allergies indicates:   Allergen Reactions    Hydralazine analogues Palpitations       MEDICATIONS:  Current Outpatient Medications   Medication Sig    amLODIPine (NORVASC) 10 MG tablet Take 1 tablet by mouth once daily    cyanocobalamin (VITAMIN B-12) 100 MCG tablet Take 500 mcg by mouth once daily.    hydroCHLOROthiazide (HYDRODIURIL) 12.5  MG Tab Take 1 tablet (12.5 mg total) by mouth once daily.    lisinopriL (PRINIVIL,ZESTRIL) 40 MG tablet Take 1 tablet (40 mg total) by mouth once daily.    meloxicam (MOBIC) 15 MG tablet Take 15 mg by mouth once daily.    multivitamin (THERAGRAN) per tablet Take 1 tablet by mouth once daily.    vitamin D (VITAMIN D3) 1000 units Tab Take 1,000 Units by mouth once daily.     No current facility-administered medications for this visit.       SOCIAL HISTORY:  Social History     Socioeconomic History    Marital status:    Tobacco Use    Smoking status: Never Smoker    Smokeless tobacco: Never Used   Substance and Sexual Activity    Alcohol use: No    Drug use: No    Sexual activity: Not Currently     Partners: Male     Birth control/protection: Post-menopausal     Comment: 16  with same partner 35 years        FAMILY HISTORY:  Family History   Problem Relation Age of Onset    Heart disease Mother     No Known Problems Father     No Known Problems Sister     No Known Problems Brother     No Known Problems Maternal Aunt     No Known Problems Maternal Uncle     No Known Problems Paternal Aunt     No Known Problems Paternal Uncle     No Known Problems Maternal Grandmother     No Known Problems Maternal Grandfather     No Known Problems Paternal Grandmother     No Known Problems Paternal Grandfather     Glaucoma Neg Hx          PHYSICAL EXAMINATION:  Vitals:    22 1333   BP: 139/77   Pulse: 95     Body mass index is 29.21 kg/m².    ECO  Physical Exam   Constitutional: She is oriented to person, place, and time. She appears well-developed and well-nourished.   HENT:   Head: Normocephalic and atraumatic.   Eyes: Conjunctivae, EOM and lids are normal.   Neck: Trachea normal.   Cardiovascular: Normal rate and intact distal pulses.    Pulmonary/Chest: Effort normal.   Biopsy site appreciated at the upper outer left breast. No palpable lad.   Abdominal: Soft. Normal appearance.    Musculoskeletal: Normal range of motion.   Neurological: She is alert and oriented to person, place, and time.   Skin: Skin is warm and dry.     Psychiatric: She has a normal mood and affect. Her behavior is normal. Judgment and thought content normal.         ASSESSMENT/PLAN:  Erma was seen today for breast cancer.    Diagnoses and all orders for this visit:    Malignant neoplasm of upper-outer quadrant of left breast in female, estrogen receptor positive      The patient will proceed with upfront surgery under the care of Dr. Delong.  If she proceed with breast conservation, the patient will require adjuvant radiation to the breast.  She will receive 4-6 weeks of daily EBRT.  The dose and fractionation will depend of the extent of disease and margin status.  She should return for follow up post-operatively to review surgical pathology and finalize radiation treatment recommendations.    The risks and benefits of treatment have been discussed with the patient and she expressed full understanding.    I spent approximately 60 minutes reviewing the available records and evaluating the patient, out of which over 50% of the time was spent face to face with the patient in counseling and coordinating this patient's care.

## 2022-03-21 NOTE — PROGRESS NOTES
Subjective:       Patient ID: Erma Linda is a 62 y.o. female.    Chief Complaint: No chief complaint on file.    HPI     Oncology History:  - 2/17/2022 Screening Mammogram:  Findings:  This procedure was performed using tomosynthesis. Computer-aided detection was utilized in the interpretation of this examination.  The breasts have scattered areas of fibroglandular density.   Left  There is a focal asymmetry seen in the upper outer quadrant of the left breast in the posterior depth.   Right  There is no evidence of suspicious masses, calcifications, or other abnormal findings in the right breast.  Impression:  Left  Focal Asymmetry: Left breast focal asymmetry at the upper outer posterior position. Assessment: 0 - Incomplete. Diagnostic Mammogram and/or Ultrasound is recommended.   Right  There is no mammographic evidence of malignancy in the right breast.  BI-RADS Category:   Overall: 0 - Incomplete: Needs Additional Imaging Evaluation  Recommendation:  Diagnostic mammogram with possible ultrasound (if indicated) is recommended.  Your estimated lifetime risk of breast cancer (to age 85) based on Tyrer-Cuzick risk assessment model is Tyrer-Cuzick: 2.63 %. According to the [employer], patients with a lifetime breast cancer risk of 20% or higher might benefit from supplemental screening tests.    - 2/28/2022 Diagnostic Mammogram and Ultrasound:  Findings:  Mammo Digital Diagnostic Left with Marcell  The left breast has scattered areas of fibroglandular density.   There is a 12 mm irregular equal density mass with spiculated margins in the upper outer left breast at posterior depth.  There is 6 mm irregular equal density mass with spiculated margins in the upper outer left breast at posterior depth which is 20 mm superior to the larger mass.  There is also a 6 mm oval equal density mass with circumscribed margins and suggestion of a fatty hilum in the upper outer left breast at posterior depth which  is not definitely seen on priors. These findings correspond to the area of concern on recent screening mammogram.  There is a prominent lymph node in the left axilla.   US Breast Left Limited  There is a 7 x 7 x 6 mm irregular hypoechoic mass with spiculated margins in the left breast at the 2:00 position, 12 cm from the nipple which corresponds to the larger mass in the upper outer left breast at posterior depth on mammogram.   There is a 6 x 4 x 4 mm irregular hypoechoic mass with spiculated margins in the left breast at the 2:00 position, 11 cm from the nipple which corresponds to the superior smaller mass in the upper outer left breast at posterior depth on mammogram.   There is a 9 x 7 x 6 mm morphologically normal lymph node in the left breast at the 2:00 position, 13 cm from the nipple which corresponds to the oval mass in the upper outer left breast at posterior depth on mammogram.   Targeted ultrasound of the left axilla demonstrates a lymph node with increased cortical thickening measuring 7 mm which corresponds to the prominent lymph node in the left axilla on mammogram.  Impression:  1.  Two adjacent irregular hypoechoic masses in the LEFT breast at 2:00 which are high suspicion for malignancy.  Recommend ultrasound-guided biopsy of both masses for further evaluation.  2.  Morphologically abnormal LEFT axillary lymph node.  Recommend ultrasound-guided biopsy for further evaluation.   BI-RADS Category:   Overall: 4C - High Suspicion for Malignancy  Recommendation:  Biopsy is recommended.    - 3/10/2022 Biopsies:  1. LEFT BREAST, MASS AT 2 O'CLOCK 12 CM FROM NIPPLE, BIOPSY:   Invasive mammary carcinoma with lobular features, [place] histologic grade   1 (tubule formation:  3, nuclear pleomorphism:  1, mitotic activity:  1).   Comment:  Largest single continuous focus of invasive carcinoma present   measures 8 mm.  Infiltrating tumor cells are highlighted by an AE1/AE3   cytokeratin stain.  Breast  biomarkers and E-cadherin staining for histologic   typing are in progress and the results will be issued in a supplemental   report.   2. LEFT BREAST, MASS AT 2 O'CLOCK 11 CM FROM NIPPLE, BIOPSY:   Invasive mammary carcinoma with lobular features, [place] histologic grade   1 (tubule formation:  3, nuclear pleomorphism:  1, mitotic activity:  1).   Comment:  Largest single continuous focus of invasive carcinoma present   measures 5 mm.  Infiltrating tumor cells are highlighted by an AE1/AE3   cytokeratin stain.  Breast biomarkers and E-cadherin staining for histologic   typing are in progress and the results will be issued in a supplemental   report.   3. LEFT AXILLARY LYMPH NODE, BIOPSY:   Benign lymph node tissue and blood.   Negative for malignancy.   Comment:  The findings are supported by negative AE1/AE3 cytokeratin staining   on both tissue blocks.   Note:  Dr. Shannon Muse also reviewed this case and agrees with the above   interpretations.   1-2. The final histologic diagnosis for both sites is invasive ductal   carcinoma with lobular features.  E-cadherin staining is positive in   infiltrating tumor cells, supporting ductal origin.  It should be noted such   tumors can behave like invasive lobular carcinoma.   1. BREAST BIOMARKER RESULTS   Estrogen receptor (ER):  Positive (100%, strong)   Progesterone receptor (NM):  Positive (75%, strong)   HER2 IHC:  Negative (0)   Ki-67 proliferation index:  5%   2. BREAST BIOMARKER RESULTS   Estrogen receptor (ER):  Positive (100%, strong)   Progesterone receptor (NM):  Positive (80%, strong)   HER2 IHC:  Negative (0)   Ki-67 proliferation index:  5%     PMH:  HTN- managed with medications  C- section x 2  Rt knee arthroscope    FH:  Mom-  at age 37 yo, MI  Maternal aunts-  in late 30s/early 40s heart disease (4 sisters total)  Sister  at age 64 yo, CHF    Dad-  at age 66 yo, empysema (smoker)    No cancers    SH:  , 36 years  5  children  Works,  at [employer]  No smoking, + second hand smoke  No EtOH    GynHx:  Menarche- 15   (age at 1st pregnancy, 18)  No breast feeding  Prior ocps  Menopause - late 30s (asymptomatic other than vaginal dryness)  No HRT    Review of Systems   Constitutional: Negative for activity change, appetite change, chills, fatigue, fever and unexpected weight change.   HENT: Negative for nasal congestion, dental problem, rhinorrhea, sinus pressure/congestion and trouble swallowing.    Eyes: Negative for visual disturbance (wears prescription glasses).   Respiratory: Negative for cough, chest tightness, shortness of breath and wheezing.    Cardiovascular: Negative for chest pain, palpitations and leg swelling.   Gastrointestinal: Negative for abdominal distention, abdominal pain, blood in stool, constipation, diarrhea, nausea and vomiting.   Genitourinary: Negative for decreased urine volume, difficulty urinating, dysuria, frequency and hematuria.   Musculoskeletal: Negative for arthralgias, back pain, gait problem, joint swelling and neck pain.   Integumentary:  Negative for pallor and rash.   Neurological: Negative for dizziness, weakness, light-headedness, numbness and headaches.   Hematological: Negative for adenopathy. Does not bruise/bleed easily.   Psychiatric/Behavioral: Negative for confusion, dysphoric mood and sleep disturbance.         Objective:      Physical Exam  Vitals and nursing note reviewed.   Constitutional:       General: She is not in acute distress.     Appearance: Normal appearance. She is well-developed and normal weight. She is not ill-appearing.      Comments: Presents with her  and daughter  ECOG= 0  Very pleasant   HENT:      Head: Normocephalic and atraumatic.   Eyes:      General: No scleral icterus.     Extraocular Movements: Extraocular movements intact.      Conjunctiva/sclera: Conjunctivae normal.      Pupils: Pupils are equal, round, and reactive to  light.      Right eye: Pupil is round and reactive.      Left eye: Pupil is round and reactive.   Neck:      Thyroid: No thyromegaly.      Vascular: No JVD.      Trachea: No tracheal deviation.   Cardiovascular:      Rate and Rhythm: Normal rate and regular rhythm.      Heart sounds: Normal heart sounds. No murmur heard.    No friction rub. No gallop.   Pulmonary:      Effort: Pulmonary effort is normal. No respiratory distress.      Breath sounds: Normal breath sounds. No wheezing, rhonchi or rales.      Comments: Breasts- no palpable masses or LAD  Chest:   Breasts:      Right: No supraclavicular adenopathy.      Left: No supraclavicular adenopathy.       Abdominal:      General: Abdomen is flat. Bowel sounds are normal. There is no distension.      Palpations: Abdomen is soft. There is no mass.      Tenderness: There is no abdominal tenderness. There is no guarding or rebound.      Comments: No organomegaly   Musculoskeletal:         General: No swelling or tenderness. Normal range of motion.      Cervical back: Normal range of motion and neck supple.      Right lower leg: No edema.      Left lower leg: No edema.   Lymphadenopathy:      Head:      Right side of head: No submandibular adenopathy.      Left side of head: No submandibular adenopathy.      Cervical: No cervical adenopathy.      Right cervical: No superficial, deep or posterior cervical adenopathy.     Left cervical: No superficial, deep or posterior cervical adenopathy.      Upper Body:      Right upper body: No supraclavicular adenopathy.      Left upper body: No supraclavicular adenopathy.   Skin:     General: Skin is warm and dry.      Coloration: Skin is not jaundiced or pale.      Findings: No erythema, lesion, petechiae or rash.   Neurological:      General: No focal deficit present.      Mental Status: She is alert and oriented to person, place, and time.      Cranial Nerves: No cranial nerve deficit.      Sensory: No sensory deficit.       Motor: No weakness.      Coordination: Coordination normal.      Gait: Gait normal.      Deep Tendon Reflexes: Reflexes are normal and symmetric.   Psychiatric:         Mood and Affect: Mood normal. Mood is not anxious or depressed.         Behavior: Behavior normal.         Thought Content: Thought content normal.         Judgment: Judgment normal.       Labs- reviewed  Imaging- reviewed  Assessment:       Problem List Items Addressed This Visit     Malignant neoplasm of upper-outer quadrant of left breast in female, estrogen receptor positive          Plan:       Reviewed her breast cancer diagnosis in depth  Surgery up front recommended followed by XRT if opts for a lumpectomy (or if SLN biopsy positive)  Discussed endocrine therapy to follow    HM:  Colonoscopy- has never had    Route Chart for Scheduling    Med Onc Chart Routing      Follow up with physician . 6 weeks after surgery complete   Follow up with [facility]    Labs    Imaging    Pharmacy appointment    Other referrals

## 2022-03-22 ENCOUNTER — DOCUMENTATION ONLY (OUTPATIENT)
Dept: HEMATOLOGY/ONCOLOGY | Facility: CLINIC | Age: 62
End: 2022-03-22
Payer: COMMERCIAL

## 2022-03-22 NOTE — PROGRESS NOTES
Nurse Navigator Note:     Met with patient during her consult with Dr. Delong.  Patient and I reviewed the information she discussed with Dr. Delong, including treatment options, diagnosis, and future plans for workup. Patient and I went through the new patient binder, explained some of the information and why it is provided.     Also offered patient consults with our other specialty clinics: Dr. Rick for gynecological health during treatment, our breast physical therapy department for pre-op and post-operative assessments, Dr. Michelle for psychological support, and Bety Ruiz for nutritional counseling. Explained to patient that all of these support services are completely optional. Discussed that physical therapy may call patient to offer pre-op appt, and what that appt would entail.     Patient was given a copy of her appointments, Dr. Delong's card, and my card. Encouraged her to call me if she has any questions or concerns or would like to schedule any additional appointments. Verbalized understanding of all information.

## 2022-04-14 ENCOUNTER — OFFICE VISIT (OUTPATIENT)
Dept: FAMILY MEDICINE | Facility: CLINIC | Age: 62
End: 2022-04-14
Payer: COMMERCIAL

## 2022-04-14 VITALS
DIASTOLIC BLOOD PRESSURE: 82 MMHG | OXYGEN SATURATION: 97 % | RESPIRATION RATE: 18 BRPM | HEART RATE: 100 BPM | SYSTOLIC BLOOD PRESSURE: 134 MMHG | WEIGHT: 165 LBS | HEIGHT: 63 IN | TEMPERATURE: 98 F | BODY MASS INDEX: 29.23 KG/M2

## 2022-04-14 DIAGNOSIS — Z00.00 ANNUAL PHYSICAL EXAM: Primary | ICD-10-CM

## 2022-04-14 DIAGNOSIS — Z12.11 ENCOUNTER FOR SCREENING COLONOSCOPY: ICD-10-CM

## 2022-04-14 DIAGNOSIS — I10 ESSENTIAL HYPERTENSION, BENIGN: ICD-10-CM

## 2022-04-14 PROCEDURE — 3079F PR MOST RECENT DIASTOLIC BLOOD PRESSURE 80-89 MM HG: ICD-10-PCS | Mod: CPTII,S$GLB,, | Performed by: INTERNAL MEDICINE

## 2022-04-14 PROCEDURE — 4010F ACE/ARB THERAPY RXD/TAKEN: CPT | Mod: CPTII,S$GLB,, | Performed by: INTERNAL MEDICINE

## 2022-04-14 PROCEDURE — 99396 PR PREVENTIVE VISIT,EST,40-64: ICD-10-PCS | Mod: S$GLB,,, | Performed by: INTERNAL MEDICINE

## 2022-04-14 PROCEDURE — 99999 PR PBB SHADOW E&M-EST. PATIENT-LVL IV: CPT | Mod: PBBFAC,,, | Performed by: INTERNAL MEDICINE

## 2022-04-14 PROCEDURE — 1160F PR REVIEW ALL MEDS BY PRESCRIBER/CLIN PHARMACIST DOCUMENTED: ICD-10-PCS | Mod: CPTII,S$GLB,, | Performed by: INTERNAL MEDICINE

## 2022-04-14 PROCEDURE — 99396 PREV VISIT EST AGE 40-64: CPT | Mod: S$GLB,,, | Performed by: INTERNAL MEDICINE

## 2022-04-14 PROCEDURE — 3008F PR BODY MASS INDEX (BMI) DOCUMENTED: ICD-10-PCS | Mod: CPTII,S$GLB,, | Performed by: INTERNAL MEDICINE

## 2022-04-14 PROCEDURE — 1160F RVW MEDS BY RX/DR IN RCRD: CPT | Mod: CPTII,S$GLB,, | Performed by: INTERNAL MEDICINE

## 2022-04-14 PROCEDURE — 3079F DIAST BP 80-89 MM HG: CPT | Mod: CPTII,S$GLB,, | Performed by: INTERNAL MEDICINE

## 2022-04-14 PROCEDURE — 3008F BODY MASS INDEX DOCD: CPT | Mod: CPTII,S$GLB,, | Performed by: INTERNAL MEDICINE

## 2022-04-14 PROCEDURE — 3075F PR MOST RECENT SYSTOLIC BLOOD PRESS GE 130-139MM HG: ICD-10-PCS | Mod: CPTII,S$GLB,, | Performed by: INTERNAL MEDICINE

## 2022-04-14 PROCEDURE — 3075F SYST BP GE 130 - 139MM HG: CPT | Mod: CPTII,S$GLB,, | Performed by: INTERNAL MEDICINE

## 2022-04-14 PROCEDURE — 4010F PR ACE/ARB THEARPY RXD/TAKEN: ICD-10-PCS | Mod: CPTII,S$GLB,, | Performed by: INTERNAL MEDICINE

## 2022-04-14 PROCEDURE — 1159F MED LIST DOCD IN RCRD: CPT | Mod: CPTII,S$GLB,, | Performed by: INTERNAL MEDICINE

## 2022-04-14 PROCEDURE — 99999 PR PBB SHADOW E&M-EST. PATIENT-LVL IV: ICD-10-PCS | Mod: PBBFAC,,, | Performed by: INTERNAL MEDICINE

## 2022-04-14 PROCEDURE — 1159F PR MEDICATION LIST DOCUMENTED IN MEDICAL RECORD: ICD-10-PCS | Mod: CPTII,S$GLB,, | Performed by: INTERNAL MEDICINE

## 2022-04-14 RX ORDER — AMLODIPINE BESYLATE 10 MG/1
10 TABLET ORAL DAILY
Qty: 90 TABLET | Refills: 1 | Status: SHIPPED | OUTPATIENT
Start: 2022-04-14 | End: 2022-12-28 | Stop reason: SDUPTHER

## 2022-04-14 NOTE — PROGRESS NOTES
SUBJECTIVE     Chief Complaint   Patient presents with    Annual Exam       HPI  Erma Linda is a 62 y.o. female with multiple medical diagnoses as listed in the medical history and problem list that presents for annual exam. Pt has been doing well since her last visit. She has a good appetite and eats well. She does exercise daily by stretching and walking 45 minutes at a time. She sleeps for ~6.5-7 hours nightly. Pt does take OTC supplements, which is a MVI, fish oil, Vit D, Ca, Mg, Vit B12, Vit B6, Vit K, veggie gummy, and Zinc. She does have any current stressors, but meditates to de-stress. Pt is not UTD on age appropriate CA screening.    PAST MEDICAL HISTORY:  Past Medical History:   Diagnosis Date    Arthritis     History of hepatitis C; S/p RX with SVR 12 documented 2018 3/22/2018    Hypertension     Nuclear sclerosis of both eyes 2021       PAST SURGICAL HISTORY:  Past Surgical History:   Procedure Laterality Date     SECTION, CLASSIC      KNEE ARTHROSCOPY W/ LASER      R    TUBAL LIGATION         SOCIAL HISTORY:  Social History     Socioeconomic History    Marital status:    Tobacco Use    Smoking status: Never Smoker    Smokeless tobacco: Never Used   Substance and Sexual Activity    Alcohol use: No    Drug use: No    Sexual activity: Not Currently     Partners: Male     Birth control/protection: Post-menopausal     Comment: 16  with same partner 35 years        FAMILY HISTORY:  Family History   Problem Relation Age of Onset    Heart disease Mother     No Known Problems Father     No Known Problems Sister     No Known Problems Brother     No Known Problems Maternal Aunt     No Known Problems Maternal Uncle     No Known Problems Paternal Aunt     No Known Problems Paternal Uncle     No Known Problems Maternal Grandmother     No Known Problems Maternal Grandfather     No Known Problems Paternal Grandmother     No Known Problems  "Paternal Grandfather     Glaucoma Neg Hx        ALLERGIES AND MEDICATIONS: updated and reviewed.  Review of patient's allergies indicates:   Allergen Reactions    Hydralazine analogues Palpitations     Current Outpatient Medications   Medication Sig Dispense Refill    cyanocobalamin (VITAMIN B-12) 100 MCG tablet Take 500 mcg by mouth once daily.      hydroCHLOROthiazide (HYDRODIURIL) 12.5 MG Tab Take 1 tablet (12.5 mg total) by mouth once daily. 90 tablet 0    lisinopriL (PRINIVIL,ZESTRIL) 40 MG tablet Take 1 tablet (40 mg total) by mouth once daily. 30 tablet 11    multivitamin (THERAGRAN) per tablet Take 1 tablet by mouth once daily.      vitamin D (VITAMIN D3) 1000 units Tab Take 1,000 Units by mouth once daily.      amLODIPine (NORVASC) 10 MG tablet Take 1 tablet (10 mg total) by mouth once daily. 90 tablet 1     No current facility-administered medications for this visit.       ROS  Review of Systems   Constitutional: Negative for chills and fever.   HENT: Negative for hearing loss and sore throat.    Eyes: Negative for visual disturbance.   Respiratory: Negative for cough and shortness of breath.    Cardiovascular: Negative for chest pain, palpitations and leg swelling.   Gastrointestinal: Negative for abdominal pain, constipation, diarrhea, nausea and vomiting.   Genitourinary: Negative for dysuria, frequency and urgency.   Musculoskeletal: Negative for arthralgias, joint swelling and myalgias.        L axillary pain   Skin: Negative for rash and wound.   Neurological: Negative for headaches.   Psychiatric/Behavioral: Negative for agitation and confusion. The patient is not nervous/anxious.          OBJECTIVE     Physical Exam  Vitals:    04/14/22 1612   BP: 134/82   Pulse: 100   Resp: 18   Temp: 98.3 °F (36.8 °C)    Body mass index is 29.23 kg/m².  Weight: 74.8 kg (165 lb)   Height: 5' 3" (160 cm)     Physical Exam  Constitutional:       General: She is not in acute distress.     Appearance: She is " well-developed.   HENT:      Head: Normocephalic and atraumatic.      Right Ear: Tympanic membrane, ear canal and external ear normal.      Left Ear: Tympanic membrane, ear canal and external ear normal.      Nose: Nose normal.   Eyes:      General: No scleral icterus.        Right eye: No discharge.         Left eye: No discharge.      Conjunctiva/sclera: Conjunctivae normal.      Pupils: Pupils are equal, round, and reactive to light.   Neck:      Vascular: No JVD.      Trachea: No tracheal deviation.   Cardiovascular:      Rate and Rhythm: Normal rate and regular rhythm.      Heart sounds: No murmur heard.    No friction rub. No gallop.   Pulmonary:      Effort: Pulmonary effort is normal. No respiratory distress.      Breath sounds: Normal breath sounds. No wheezing.   Abdominal:      General: Bowel sounds are normal. There is no distension.      Palpations: Abdomen is soft. There is no mass.      Tenderness: There is no abdominal tenderness. There is no guarding or rebound.   Musculoskeletal:         General: No tenderness or deformity. Normal range of motion.      Cervical back: Normal range of motion and neck supple.   Skin:     General: Skin is warm and dry.      Findings: No erythema or rash.   Neurological:      Mental Status: She is alert and oriented to person, place, and time.      Motor: No abnormal muscle tone.      Coordination: Coordination normal.   Psychiatric:         Behavior: Behavior normal.         Thought Content: Thought content normal.         Judgment: Judgment normal.           Health Maintenance       Date Due Completion Date    Pneumococcal Vaccines (Age 0-64) (1 - PCV) Never done ---    TETANUS VACCINE Never done ---    Shingles Vaccine (1 of 2) Never done ---    Colorectal Cancer Screening Never done ---    Cervical Cancer Screening 09/30/2019 9/30/2016    Override on 10/3/2013: Declined    COVID-19 Vaccine (3 - Booster for Pfizer series) 10/14/2021 5/14/2021    Influenza Vaccine (1)  06/30/2022 (Originally 9/1/2021) ---    Mammogram 03/10/2023 3/10/2022    Lipid Panel 11/14/2024 11/14/2019            ASSESSMENT     62 y.o. female with     1. Annual physical exam    2. Encounter for screening colonoscopy    3. Essential hypertension, benign        PLAN:     1. Annual physical exam  - Counseled on age appropriate medical preventative services, including age appropriate cancer screenings, over all nutritional health, need for a consistent exercise regimen and an over all push towards maintaining a vigorous and active lifestyle.  Counseled on age appropriate vaccines and discussed upcoming health care needs based on age/gender.  Spent time with patient counseling on need for a good patient/doctor relationship moving forward.  Discussed use of common OTC medications and supplements.  Discussed common dietary aids and use of caffeine and the need for good sleep hygiene and stress management.  - CBC Auto Differential; Future  - Comprehensive Metabolic Panel; Future  - Hemoglobin A1C; Future  - Lipid Panel; Future  - TSH; Future    2. Encounter for screening colonoscopy  - Case Request Endoscopy: COLONOSCOPY    3. Essential hypertension, benign  - BP well controlled; at goal of <140/90  - The current medical regimen is effective;  continue present plan and medications.  - amLODIPine (NORVASC) 10 MG tablet; Take 1 tablet (10 mg total) by mouth once daily.  Dispense: 90 tablet; Refill: 1        RTC in 6 months     Marry Howard MD  04/14/2022 4:24 PM        Follow up in about 6 months (around 10/14/2022).

## 2022-04-21 ENCOUNTER — LAB VISIT (OUTPATIENT)
Dept: LAB | Facility: HOSPITAL | Age: 62
End: 2022-04-21
Attending: INTERNAL MEDICINE
Payer: COMMERCIAL

## 2022-04-21 DIAGNOSIS — Z00.00 ANNUAL PHYSICAL EXAM: ICD-10-CM

## 2022-04-21 LAB
ALBUMIN SERPL BCP-MCNC: 3.6 G/DL (ref 3.5–5.2)
ALP SERPL-CCNC: 94 U/L (ref 55–135)
ALT SERPL W/O P-5'-P-CCNC: 16 U/L (ref 10–44)
ANION GAP SERPL CALC-SCNC: 9 MMOL/L (ref 8–16)
AST SERPL-CCNC: 17 U/L (ref 10–40)
BASOPHILS # BLD AUTO: 0.07 K/UL (ref 0–0.2)
BASOPHILS NFR BLD: 1.1 % (ref 0–1.9)
BILIRUB SERPL-MCNC: 0.4 MG/DL (ref 0.1–1)
BUN SERPL-MCNC: 9 MG/DL (ref 8–23)
CALCIUM SERPL-MCNC: 9.7 MG/DL (ref 8.7–10.5)
CHLORIDE SERPL-SCNC: 107 MMOL/L (ref 95–110)
CHOLEST SERPL-MCNC: 178 MG/DL (ref 120–199)
CHOLEST/HDLC SERPL: 3.3 {RATIO} (ref 2–5)
CO2 SERPL-SCNC: 25 MMOL/L (ref 23–29)
CREAT SERPL-MCNC: 0.8 MG/DL (ref 0.5–1.4)
DIFFERENTIAL METHOD: ABNORMAL
EOSINOPHIL # BLD AUTO: 0.2 K/UL (ref 0–0.5)
EOSINOPHIL NFR BLD: 3.1 % (ref 0–8)
ERYTHROCYTE [DISTWIDTH] IN BLOOD BY AUTOMATED COUNT: 15 % (ref 11.5–14.5)
EST. GFR  (AFRICAN AMERICAN): >60 ML/MIN/1.73 M^2
EST. GFR  (NON AFRICAN AMERICAN): >60 ML/MIN/1.73 M^2
ESTIMATED AVG GLUCOSE: 97 MG/DL (ref 68–131)
GLUCOSE SERPL-MCNC: 89 MG/DL (ref 70–110)
HBA1C MFR BLD: 5 % (ref 4–5.6)
HCT VFR BLD AUTO: 38.9 % (ref 37–48.5)
HDLC SERPL-MCNC: 54 MG/DL (ref 40–75)
HDLC SERPL: 30.3 % (ref 20–50)
HGB BLD-MCNC: 11.6 G/DL (ref 12–16)
IMM GRANULOCYTES # BLD AUTO: 0.02 K/UL (ref 0–0.04)
IMM GRANULOCYTES NFR BLD AUTO: 0.3 % (ref 0–0.5)
LDLC SERPL CALC-MCNC: 113.8 MG/DL (ref 63–159)
LYMPHOCYTES # BLD AUTO: 1.9 K/UL (ref 1–4.8)
LYMPHOCYTES NFR BLD: 30.5 % (ref 18–48)
MCH RBC QN AUTO: 28.7 PG (ref 27–31)
MCHC RBC AUTO-ENTMCNC: 29.8 G/DL (ref 32–36)
MCV RBC AUTO: 96 FL (ref 82–98)
MONOCYTES # BLD AUTO: 0.6 K/UL (ref 0.3–1)
MONOCYTES NFR BLD: 9.9 % (ref 4–15)
NEUTROPHILS # BLD AUTO: 3.4 K/UL (ref 1.8–7.7)
NEUTROPHILS NFR BLD: 55.1 % (ref 38–73)
NONHDLC SERPL-MCNC: 124 MG/DL
NRBC BLD-RTO: 0 /100 WBC
PLATELET # BLD AUTO: 249 K/UL (ref 150–450)
PMV BLD AUTO: 10.8 FL (ref 9.2–12.9)
POTASSIUM SERPL-SCNC: 4.1 MMOL/L (ref 3.5–5.1)
PROT SERPL-MCNC: 9 G/DL (ref 6–8.4)
RBC # BLD AUTO: 4.04 M/UL (ref 4–5.4)
SODIUM SERPL-SCNC: 141 MMOL/L (ref 136–145)
TRIGL SERPL-MCNC: 51 MG/DL (ref 30–150)
TSH SERPL DL<=0.005 MIU/L-ACNC: 1.02 UIU/ML (ref 0.4–4)
WBC # BLD AUTO: 6.19 K/UL (ref 3.9–12.7)

## 2022-04-21 PROCEDURE — 85025 COMPLETE CBC W/AUTO DIFF WBC: CPT | Performed by: INTERNAL MEDICINE

## 2022-04-21 PROCEDURE — 84443 ASSAY THYROID STIM HORMONE: CPT | Performed by: INTERNAL MEDICINE

## 2022-04-21 PROCEDURE — 80061 LIPID PANEL: CPT | Performed by: INTERNAL MEDICINE

## 2022-04-21 PROCEDURE — 36415 COLL VENOUS BLD VENIPUNCTURE: CPT | Mod: PO | Performed by: INTERNAL MEDICINE

## 2022-04-21 PROCEDURE — 80053 COMPREHEN METABOLIC PANEL: CPT | Performed by: INTERNAL MEDICINE

## 2022-04-21 PROCEDURE — 83036 HEMOGLOBIN GLYCOSYLATED A1C: CPT | Performed by: INTERNAL MEDICINE

## 2022-05-04 ENCOUNTER — TELEPHONE (OUTPATIENT)
Dept: SURGERY | Facility: CLINIC | Age: 62
End: 2022-05-04
Payer: COMMERCIAL

## 2022-05-04 ENCOUNTER — OFFICE VISIT (OUTPATIENT)
Dept: SURGERY | Facility: CLINIC | Age: 62
End: 2022-05-04
Payer: COMMERCIAL

## 2022-05-04 VITALS
SYSTOLIC BLOOD PRESSURE: 152 MMHG | BODY MASS INDEX: 29.59 KG/M2 | DIASTOLIC BLOOD PRESSURE: 93 MMHG | HEIGHT: 63 IN | HEART RATE: 102 BPM | WEIGHT: 167 LBS

## 2022-05-04 DIAGNOSIS — Z17.0 CARCINOMA OF UPPER-OUTER QUADRANT OF LEFT BREAST IN FEMALE, ESTROGEN RECEPTOR POSITIVE: ICD-10-CM

## 2022-05-04 DIAGNOSIS — Z09 FOLLOW UP: Primary | ICD-10-CM

## 2022-05-04 DIAGNOSIS — C50.412 CARCINOMA OF UPPER-OUTER QUADRANT OF LEFT BREAST IN FEMALE, ESTROGEN RECEPTOR POSITIVE: ICD-10-CM

## 2022-05-04 DIAGNOSIS — M54.9 UPPER BACK PAIN ON LEFT SIDE: Primary | ICD-10-CM

## 2022-05-04 PROCEDURE — 1159F PR MEDICATION LIST DOCUMENTED IN MEDICAL RECORD: ICD-10-PCS | Mod: CPTII,S$GLB,, | Performed by: NURSE PRACTITIONER

## 2022-05-04 PROCEDURE — 4010F ACE/ARB THERAPY RXD/TAKEN: CPT | Mod: CPTII,S$GLB,, | Performed by: NURSE PRACTITIONER

## 2022-05-04 PROCEDURE — 99213 PR OFFICE/OUTPT VISIT, EST, LEVL III, 20-29 MIN: ICD-10-PCS | Mod: S$GLB,,, | Performed by: NURSE PRACTITIONER

## 2022-05-04 PROCEDURE — 3044F HG A1C LEVEL LT 7.0%: CPT | Mod: CPTII,S$GLB,, | Performed by: NURSE PRACTITIONER

## 2022-05-04 PROCEDURE — 99999 PR PBB SHADOW E&M-EST. PATIENT-LVL III: ICD-10-PCS | Mod: PBBFAC,,, | Performed by: NURSE PRACTITIONER

## 2022-05-04 PROCEDURE — 3077F SYST BP >= 140 MM HG: CPT | Mod: CPTII,S$GLB,, | Performed by: NURSE PRACTITIONER

## 2022-05-04 PROCEDURE — 3044F PR MOST RECENT HEMOGLOBIN A1C LEVEL <7.0%: ICD-10-PCS | Mod: CPTII,S$GLB,, | Performed by: NURSE PRACTITIONER

## 2022-05-04 PROCEDURE — 1159F MED LIST DOCD IN RCRD: CPT | Mod: CPTII,S$GLB,, | Performed by: NURSE PRACTITIONER

## 2022-05-04 PROCEDURE — 4010F PR ACE/ARB THEARPY RXD/TAKEN: ICD-10-PCS | Mod: CPTII,S$GLB,, | Performed by: NURSE PRACTITIONER

## 2022-05-04 PROCEDURE — 99213 OFFICE O/P EST LOW 20 MIN: CPT | Mod: S$GLB,,, | Performed by: NURSE PRACTITIONER

## 2022-05-04 PROCEDURE — 3080F DIAST BP >= 90 MM HG: CPT | Mod: CPTII,S$GLB,, | Performed by: NURSE PRACTITIONER

## 2022-05-04 PROCEDURE — 3008F PR BODY MASS INDEX (BMI) DOCUMENTED: ICD-10-PCS | Mod: CPTII,S$GLB,, | Performed by: NURSE PRACTITIONER

## 2022-05-04 PROCEDURE — 3008F BODY MASS INDEX DOCD: CPT | Mod: CPTII,S$GLB,, | Performed by: NURSE PRACTITIONER

## 2022-05-04 PROCEDURE — 3080F PR MOST RECENT DIASTOLIC BLOOD PRESSURE >= 90 MM HG: ICD-10-PCS | Mod: CPTII,S$GLB,, | Performed by: NURSE PRACTITIONER

## 2022-05-04 PROCEDURE — 3077F PR MOST RECENT SYSTOLIC BLOOD PRESSURE >= 140 MM HG: ICD-10-PCS | Mod: CPTII,S$GLB,, | Performed by: NURSE PRACTITIONER

## 2022-05-04 PROCEDURE — 99999 PR PBB SHADOW E&M-EST. PATIENT-LVL III: CPT | Mod: PBBFAC,,, | Performed by: NURSE PRACTITIONER

## 2022-05-04 RX ORDER — IBUPROFEN 800 MG/1
800 TABLET ORAL EVERY 6 HOURS PRN
Qty: 36 TABLET | Refills: 0 | Status: SHIPPED | OUTPATIENT
Start: 2022-05-04 | End: 2023-05-16 | Stop reason: SDUPTHER

## 2022-05-04 NOTE — PROGRESS NOTES
Breast Surgery  [facility]  Department of Surgery      REFERRING PROVIDER: No referring provider defined for this encounter.    Chief Complaint: Follow-up      Subjective:      Patient ID: Erma Linda is a 62 y.o. female who presents with left breast Invasive Ductal Carcinoma with lobular features    She presented for screening mammogram on 2022 for yearly scheduled screening.. This identified a focal asymmetry in the left breast. Follow-up mammogram and ultrasound on  2022 showed 2 masses in the upper outer quadrant of the breast.  One at 02:00 o'clock, 12 cm from nipple measuring 7 x 7 x 6 mm and 1 at the 2 o'clock position, 11 cm from the nipple measuring 6 x 4 x 4 mm. A ultrasound guided biopsies  were performed of the 2 breast lesions on 03/10/2022 with pathology revealing invasive ductal carcinoma of the breast with lobular features.  During the ultrasound there was also identified a lymph node with cortical thickening measuring 7 mm.  This was also biopsied by ultrasound with pathology consistent with benign findings.    Findings at that time were the following:   Lesion 1:    Location:  Left, 02:00 o'clock, 12 cm from the nipple   Clip:  Twirl, in expected position  Tumor size:  0.7 cm   Tumor ndgndrndanddndend:nd nd2nd Estrogen Receptor:  Positive   Progesterone Receptor:  Positive   Her-2 yas:  Negative     Lesion 2:    Location:  Left, 02:00 o'clock, 11 cm from the nipple   Clip:  Ribbon, in expected position  Tumor size:  0.6 cm   Tumor ndgndrndanddndend:nd nd2nd Estrogen Receptor:  Positive   Progesterone Receptor:  Positive   Her-2 yas:  Negative     Patient has not noted a change on breast exam.  Patient denies nipple discharge. Patient denies previous breast biopsy. Patient has a personal history of breast cancer. Family history includes no family history of cancer.    GYN History:  Age of menarche was 15.   Age of menopause 39.   Patient denies hormonal therapy.   Patient is .   Age of first  live birth was 18.   Patient did not breast feed.    Interval History:   Patient presents with left breast pain that started about 1 week ago. It is radiating to her back. She also complains of left hand tightness. Rates the pain 5/10. Denies taking anything for relief. She also has not made a decision regarding surgery.       Past Medical History:   Diagnosis Date    Arthritis     History of hepatitis C; S/p RX with SVR 12 documented 2018 3/22/2018    Hypertension     Nuclear sclerosis of both eyes 2021     Past Surgical History:   Procedure Laterality Date     SECTION, CLASSIC      KNEE ARTHROSCOPY W/ LASER      R    TUBAL LIGATION       Current Outpatient Medications on File Prior to Visit   Medication Sig Dispense Refill    amLODIPine (NORVASC) 10 MG tablet Take 1 tablet (10 mg total) by mouth once daily. 90 tablet 1    cyanocobalamin (VITAMIN B-12) 100 MCG tablet Take 500 mcg by mouth once daily.      hydroCHLOROthiazide (HYDRODIURIL) 12.5 MG Tab Take 1 tablet (12.5 mg total) by mouth once daily. 90 tablet 0    multivitamin (THERAGRAN) per tablet Take 1 tablet by mouth once daily.      vitamin D (VITAMIN D3) 1000 units Tab Take 1,000 Units by mouth once daily.      lisinopriL (PRINIVIL,ZESTRIL) 40 MG tablet Take 1 tablet (40 mg total) by mouth once daily. 30 tablet 11     No current facility-administered medications on file prior to visit.     Social History     Socioeconomic History    Marital status:    Tobacco Use    Smoking status: Never Smoker    Smokeless tobacco: Never Used   Substance and Sexual Activity    Alcohol use: No    Drug use: No    Sexual activity: Not Currently     Partners: Male     Birth control/protection: Post-menopausal     Comment: 16  with same partner 35 years      Family History   Problem Relation Age of Onset    Heart disease Mother     No Known Problems Father     No Known Problems Sister     No Known Problems Brother   "   No Known Problems Maternal Aunt     No Known Problems Maternal Uncle     No Known Problems Paternal Aunt     No Known Problems Paternal Uncle     No Known Problems Maternal Grandmother     No Known Problems Maternal Grandfather     No Known Problems Paternal Grandmother     No Known Problems Paternal Grandfather     Glaucoma Neg Hx         Review of Systems   Constitutional: Negative for fatigue, fever and unexpected weight change.   Respiratory: Negative for cough, shortness of breath and wheezing.    Cardiovascular: Negative for chest pain and palpitations.   Musculoskeletal:        Left hand stiffness   Skin: Negative for color change, pallor and rash.   Neurological: Negative for dizziness and headaches.     Objective:   BP (!) 152/93 (BP Location: Right arm, Patient Position: Sitting, BP Method: Medium (Automatic))   Pulse 102   Ht 5' 3" (1.6 m)   Wt 75.8 kg (167 lb)   BMI 29.58 kg/m²     Physical Exam   Constitutional: She is oriented to person, place, and time. She appears well-developed and well-nourished.   HENT:   Head: Normocephalic and atraumatic.   Cardiovascular: Normal rate and regular rhythm.  Exam reveals no gallop and no friction rub.    No murmur heard.  Pulmonary/Chest: Effort normal and breath sounds normal. No respiratory distress. She has no wheezes. She has no rales. Right breast exhibits no inverted nipple, no mass, no nipple discharge, no skin change and no tenderness. Left breast exhibits no inverted nipple, no mass, no nipple discharge, no skin change and no tenderness.   Musculoskeletal:      Thoracic back: She exhibits tenderness.      Comments: Left back tenderness with no swelling or overlying skin changes.    Lymphadenopathy:     She has no cervical adenopathy.     She has no axillary adenopathy.        Right: No supraclavicular adenopathy present.        Left: No supraclavicular adenopathy present.   Neurological: She is alert and oriented to person, place, and time. "   Skin: Skin is warm and dry. No rash noted. No erythema. No pallor.     Psychiatric: She has a normal mood and affect. Her behavior is normal. Judgment and thought content normal.       Radiology review: Images personally reviewed by me in the clinic and shown to the patient during the consultation.     Assessment:       1. Follow up    2. Carcinoma of upper-outer quadrant of left breast in female, estrogen receptor positive        Plan:   left breast, Clinical Stage IA ([id number]), invasive ductal carcinoma with lobular features of the Upper-outer quadrant of breast, estrogen receptor Positive, progesterone receptor Positive, HER2 Negative    Multidisciplinary nature of breast cancer care was discussed in detail at today's visit.  She is presenting as part of the multidisciplinary clinic.    We discussed that surgical options would include a lumpectomy versus a mastectomy.  We discussed there is no survival benefit to undergoing a mastectomy compared to lumpectomy.  Based on clinical exam and imaging, she would be a good candidate for breast conservation.  Surgical technique and rationale was discussed with the patient.  We discussed that this would be done as a reflector localized excision of the primary tumor along with a surrounding margin of normal breast tissue.  The concept of local control was explained to the patient.  She understands that we would aim to achieve negative margins at the time of initial surgery.  There is however an approximately 15% risk of a positive margin that would require take back for reexcision.  We discussed that due to the lobular features of her breast cancer she may benefit from preoperative MRI to further assess the extent of disease. Since last visit with Dr. Delong, this was not completed so will need to schedule. Patient has elected to proceed with radar reflector left partial mastectomy and sentinel lymph node biopsy. Risks of surgery include but are not limited to infection,  bleeding, poor cosmesis, positive margins requiring reexcision, and local and distant recurrence.  We also discussed option for mastectomy with or without reconstruction.  She is most interested in breast conservation.    We also discussed axillary staging using sentinel node biopsy.  [place] lymph node biopsies performed utilizing the injection of blue and radioactive dye.  This dye travels to the 1st few lymph nodes that drain the breast.  Lymph nodes that uptake the blue or radioactive dye or are palpable are surgically removed and sent to pathology.  Typically 1-5 lymph nodes are removed during this procedure although exact numbers vary depending on the patient.  This procedure allows sampling of the lymph nodes most at risk for metastasis.   Risks include but are not limited to lymphedema, bleeding, infection, poor cosmesis, numbness of the incision site, seroma, failure of dye to map, nerve injury leading to permanent arm numbness and or muscle weakness, lymphedema, possibility of a false negative finding, and need for additional surgery.  If metastatic disease is identified on pathology of the lymph nodes been axillary dissection (a second surgery) may be recommended.      The role of adjuvant radiation therapy following breast conservation surgery was also discussed with the patient. We discussed that when looking at all patient populations risk of local recurrence with lumpectomy alone is high and radiation therapy is recommended in most cases. We discussed that final recommendations on radiation would be based on final surgical pathology.  She has met with Dr. Morton but patient wishes to meet with Dr. Mathis following surgery.     We also discussed the role of systemic therapy in the treatment of early stage breast cancer.  She has an early stage hormone receptor positive tumor. I do not anticipate she will need chemotherapy although final recommendations are pending surgical pathology. She will be  recommended for anti-estrogen therapy.  She has met with Dr. Camarillo and will meet her again following surgery.     She does not meet NCCN guidelines for genetic testing based on her family history.  We discussed that genetic testing can be offered all breast cancer patients.  We discussed genetic testing at length and she will like to think about it.    We discussed, the role of physical therapy after breast surgery. Due to her complaints of back pain and left hand stiffness it is reasonable to meet with PT prior for evaluation.     Surgery will be scheduled. Follow-up in clinic roughly 14 days after surgery.      Patient was given patient information binder including [employer] breast cancer treatment brochure.  All her questions were answered.

## 2022-05-04 NOTE — TELEPHONE ENCOUNTER
Pt called to report that she is having breast & arm/hand swelling & discomfort. She has been thinking things over since her visits at the end of March & was contemplating a second opinion when this started. She is requesting to be evaluated. Scheduled with TAYLOR Fuller today while  is in clinic. Will await evaluation & follow up.

## 2022-05-06 ENCOUNTER — HOSPITAL ENCOUNTER (OUTPATIENT)
Dept: RADIOLOGY | Facility: HOSPITAL | Age: 62
Discharge: HOME | End: 2022-05-06
Attending: SURGERY
Payer: COMMERCIAL

## 2022-05-06 DIAGNOSIS — C50.412 CARCINOMA OF UPPER-OUTER QUADRANT OF LEFT BREAST IN FEMALE, ESTROGEN RECEPTOR POSITIVE: ICD-10-CM

## 2022-05-06 DIAGNOSIS — Z17.0 CARCINOMA OF UPPER-OUTER QUADRANT OF LEFT BREAST IN FEMALE, ESTROGEN RECEPTOR POSITIVE: ICD-10-CM

## 2022-05-06 PROCEDURE — 25500020 PHARM REV CODE 255: Performed by: SURGERY

## 2022-05-06 PROCEDURE — 77049 MRI BREAST C-+ W/CAD BI: CPT | Mod: 26,,, | Performed by: RADIOLOGY

## 2022-05-06 PROCEDURE — A9577 INJ MULTIHANCE: HCPCS | Performed by: SURGERY

## 2022-05-06 PROCEDURE — 77049 MRI BREAST C-+ W/CAD BI: CPT | Mod: TC

## 2022-05-06 PROCEDURE — 77049 MRI BREAST W/WO CONTRAST, W/CAD, BILATERAL: ICD-10-PCS | Mod: 26,,, | Performed by: RADIOLOGY

## 2022-05-06 RX ADMIN — GADOBENATE DIMEGLUMINE 17 ML: 529 INJECTION, SOLUTION INTRAVENOUS at 07:05

## 2022-05-10 ENCOUNTER — TELEPHONE (OUTPATIENT)
Dept: RADIOLOGY | Facility: HOSPITAL | Age: 62
End: 2022-05-10
Payer: COMMERCIAL

## 2022-05-10 NOTE — TELEPHONE ENCOUNTER
Spoke with patient. Reviewed breast biopsy procedure and reviewed instructions for breast biopsy. Patient expressed understanding and all questions were answered. Provided patient with my phone number to call for any further concerns or questions.   Patient scheduled breast biopsy at the [facility] on 5/16/2022.

## 2022-05-11 ENCOUNTER — ANESTHESIA EVENT (OUTPATIENT)
Dept: SURGERY | Facility: OTHER | Age: 62
End: 2022-05-11
Payer: COMMERCIAL

## 2022-05-11 ENCOUNTER — TELEPHONE (OUTPATIENT)
Dept: SURGERY | Facility: CLINIC | Age: 62
End: 2022-05-11
Payer: COMMERCIAL

## 2022-05-11 ENCOUNTER — HOSPITAL ENCOUNTER (OUTPATIENT)
Dept: PREADMISSION TESTING | Facility: OTHER | Age: 62
Discharge: HOME | End: 2022-05-11
Attending: SURGERY
Payer: COMMERCIAL

## 2022-05-11 VITALS
HEART RATE: 84 BPM | SYSTOLIC BLOOD PRESSURE: 187 MMHG | DIASTOLIC BLOOD PRESSURE: 88 MMHG | WEIGHT: 167 LBS | OXYGEN SATURATION: 99 % | HEIGHT: 63 IN | BODY MASS INDEX: 29.59 KG/M2 | TEMPERATURE: 98 F

## 2022-05-11 DIAGNOSIS — Z01.818 PRE-OP TESTING: ICD-10-CM

## 2022-05-11 PROCEDURE — 93005 ELECTROCARDIOGRAM TRACING: CPT

## 2022-05-11 PROCEDURE — 93010 ELECTROCARDIOGRAM REPORT: CPT | Mod: ,,, | Performed by: INTERNAL MEDICINE

## 2022-05-11 PROCEDURE — 93010 EKG 12-LEAD: ICD-10-PCS | Mod: ,,, | Performed by: INTERNAL MEDICINE

## 2022-05-11 RX ORDER — SODIUM CHLORIDE, SODIUM LACTATE, POTASSIUM CHLORIDE, CALCIUM CHLORIDE 600; 310; 30; 20 MG/100ML; MG/100ML; MG/100ML; MG/100ML
INJECTION, SOLUTION INTRAVENOUS CONTINUOUS
Status: CANCELLED | OUTPATIENT
Start: 2022-05-11

## 2022-05-11 RX ORDER — LIDOCAINE HYDROCHLORIDE 10 MG/ML
0.5 INJECTION, SOLUTION EPIDURAL; INFILTRATION; INTRACAUDAL; PERINEURAL ONCE
Status: CANCELLED | OUTPATIENT
Start: 2022-05-11 | End: 2022-05-11

## 2022-05-11 NOTE — DISCHARGE INSTRUCTIONS
Information to Prepare you for your Surgery    PRE-ADMIT TESTING -  [phone]    [address] [facility]          Your surgery has been scheduled at [facility]. We are pleased to have the opportunity to serve you. For Further Information please call [phone].    On the day of surgery please report to the Information Desk on the 1st floor.    CONTACT YOUR PHYSICIAN'S OFFICE THE DAY PRIOR TO YOUR SURGERY TO OBTAIN YOUR ARRIVAL TIME.     The evening before surgery do not eat anything after 9 p.m. ( this includes hard candy, chewing gum and mints).  You may only have GATORADE, POWERADE AND WATER  from 9 p.m. until you leave your home.   DO NOT DRINK ANY LIQUIDS ON THE WAY TO THE HOSPITAL.      Why does your anesthesiologist allow you to drink Gatorade/Powerade before surgery?  Gatorade/Powerade helps to increase your comfort before surgery and to decrease your nausea after surgery. The carbohydrates in Gatorade/Powerade help reduce your body's stress response to surgery.  If you are a diabetic-drink only water prior to surgery.      Current Visitor policy(12/27/2021) - Patients may have 2 visitors pre and post procedure. Only 2 visitors will be allowed in the Surgical building with the patient.     SPECIAL MEDICATION INSTRUCTIONS: TAKE medications checked off by the Anesthesiologist on your Medication List.    Angiogram Patients: Take medications as instructed by your physician, including aspirin.     Surgery Patients:    If you take ASPIRIN - Your PHYSICIAN/SURGEON will need to inform you IF/OR when you need to stop taking aspirin prior to your surgery.     Do Not take any medications containing IBUPROFEN.    Do Not Wear any make-up (especially eye make-up) to surgery. Please remove any false eyelashes or eyelash extensions. If you arrive the day of surgery with makeup/eyelashes on you will be required to remove prior to surgery. (There is a risk of corneal  abrasions if eye makeup/eyelash extensions are not removed)      Leave all valuables at home.   Do Not wear any jewelry or watches, including any metal in body piercings. Jewelry must be removed prior to coming to the hospital.  There is a possibility that rings that are unable to be removed may be cut off if they are on the surgical extremity.    Please remove all hair extensions, wigs, clips and any other metal accessories/ ornaments from your hair.  These items may pose a flammable/fire risk in Surgery and must be removed.    Do not shave your surgical area at least 5 days prior to your surgery. The surgical prep will be performed at the hospital according to Infection Control regulations.    Contact Lens must be removed before surgery. Either do not wear the contact lens or bring a case and solution for storage.  Please bring a container for eyeglasses or dentures as required.  Bring any paperwork your physician has provided, such as consent forms,  history and physicals, doctor's orders, etc.   Bring comfortable clothes that are loose fitting to wear upon discharge. Take into consideration the type of surgery being performed.  Maintain your diet as advised per your physician the day prior to surgery.      Adequate rest the night before surgery is advised.   Park in the Parking lot behind the hospital or in the [employer] [facility] across the street from the parking lot. Parking is complimentary.  If you will be discharged the same day as your procedure, please arrange for a responsible adult to drive you home or to accompany you if traveling by taxi.   YOU WILL NOT BE PERMITTED TO DRIVE OR TO LEAVE THE HOSPITAL ALONE AFTER SURGERY.   If you are being discharged the same day, it is strongly recommended that you arrange for someone to remain with you for the first 24 hrs following your surgery.    The Surgeon will speak to your family/visitor after your surgery regarding the outcome of your surgery and post op  care.  The Surgeon may speak to you after your surgery, but there is a possibility you may not remember the details.  Please check with your family members regarding the conversation with the Surgeon.    We strongly recommend whoever is bringing you home be present for discharge instructions.  This will ensure a thorough understanding for your post op home care.    ALL CHILDREN MUST ALWAYS BE ACCOMPANIED BY AN ADULT.    Visitors-Refer to current Visitor policy handouts.    Thank you for your cooperation.  The Staff of [facility].            Bathing Instructions with Hibiclens    Shower the evening before and morning of your procedure with Hibiclens:  Wash your face with water and your regular face wash/soap  Apply Hibiclens directly on your skin or on a wet washcloth and wash gently. When showering: Move away from the shower stream when applying Hibiclens to avoid rinsing off too soon.  Rinse thoroughly with warm water  Do not dilute Hibiclens        Dry off as usual, do not use any deodorant, powder, body lotions, perfume, after shave or cologne.

## 2022-05-11 NOTE — ANESTHESIA PREPROCEDURE EVALUATION
05/11/2022  Erma Linda is a 62 y.o., female.      Pre-op Assessment    I have reviewed the Patient Summary Reports.     I have reviewed the Nursing Notes.    I have reviewed the Medications.     Review of Systems  Anesthesia Hx:  History of prior surgery of interest to airway management or planning: Denies Family Hx of Anesthesia complications.   Denies Personal Hx of Anesthesia complications.   Social:  Non-Smoker    Hematology/Oncology:  Hematology Normal      Current/Recent Cancer. Breast left surgery   EENT/Dental:EENT/Dental Normal   Cardiovascular:   Hypertension    Pulmonary:  Pulmonary Normal    Renal/:  Renal/ Normal     Hepatic/GI:   Liver Disease, Hepatitis, C Treated Hep C   Musculoskeletal:   Arthritis   Spine Disorders: lumbar Degenerative disease    Neurological:  Neurology Normal    Endocrine:  Endocrine Normal    Dermatological:  Skin Normal    Psych:  Psychiatric Normal           Physical Exam  General: Cooperative, Alert and Oriented    Airway:  Mallampati: II       Dental:  Partial Dentures        Anesthesia Plan  Type of Anesthesia, risks & benefits discussed:    Anesthesia Type: Gen ETT  Intra-op Monitoring Plan: Standard ASA Monitors  Post Op Pain Control Plan: multimodal analgesia  Induction:  IV  Airway Plan: Video  Informed Consent: Informed consent signed with the Patient and all parties understand the risks and agree with anesthesia plan.  All questions answered.   ASA Score: 2  Anesthesia Plan Notes: EKG per surgeon    Right arm devices    Recent labs in epic ok    Ready For Surgery From Anesthesia Perspective.     .

## 2022-05-11 NOTE — TELEPHONE ENCOUNTER
Spoke with patient regarding surgical plan. Pt proceeding with breast biopsy on 5/16. Patient rescheduled to 6/2 for surgery with Dr. Delong to ensure biopsy results finalized prior to surgery. Pt verbalized understanding. Upcoming appt details confirmed with patient. No concerns at this time.

## 2022-05-12 ENCOUNTER — TELEPHONE (OUTPATIENT)
Dept: RADIOLOGY | Facility: HOSPITAL | Age: 62
End: 2022-05-12
Payer: COMMERCIAL

## 2022-05-12 ENCOUNTER — TELEPHONE (OUTPATIENT)
Dept: SURGERY | Facility: CLINIC | Age: 62
End: 2022-05-12
Payer: COMMERCIAL

## 2022-05-12 ENCOUNTER — TELEPHONE (OUTPATIENT)
Dept: FAMILY MEDICINE | Facility: CLINIC | Age: 62
End: 2022-05-12

## 2022-05-12 NOTE — TELEPHONE ENCOUNTER
Spoke to patient who requested to move her surgery back from 6/2 to 6/16 as 6/16 was another date offered to her.  Notified pt we would have to reschedule her marker placement.  Patient verbalized understanding.

## 2022-05-12 NOTE — TELEPHONE ENCOUNTER
----- Message from Angela Dover sent at 5/12/2022 12:41 PM CDT -----  Regarding: pt  Pt is calling to speak with the nurse to reschedule her up coming surgery on 6/2 can you please call pt at [phone]. Pt would like to reschedule if June 16th is still available.     JONAS

## 2022-05-12 NOTE — TELEPHONE ENCOUNTER
----- Message from Angela Dover sent at 5/12/2022 12:40 PM CDT -----  Regarding: pt  Pt is calling to speak with the nurse to reschedule her Ultrasound and  biopsy appt that she has on Monday next week can you please call pt at  [phone].    JONAS

## 2022-05-12 NOTE — TELEPHONE ENCOUNTER
Returned patients call and rescheduled breast ultrasound and breast biopsy to 6/9/2022 at the breast center, per patient request.

## 2022-05-17 ENCOUNTER — PATIENT OUTREACH (OUTPATIENT)
Dept: ADMINISTRATIVE | Facility: OTHER | Age: 62
End: 2022-05-17
Payer: COMMERCIAL

## 2022-05-17 NOTE — PROGRESS NOTES
Health Maintenance Due   Topic Date Due    Pneumococcal Vaccines (Age 0-64) (1 - PCV) Never done    TETANUS VACCINE  Never done    Shingles Vaccine (1 of 2) Never done    Colorectal Cancer Screening  Never done    Cervical Cancer Screening  09/30/2019    COVID-19 Vaccine (3 - Booster for Pfizer series) 10/14/2021     Updates were requested from care everywhere.  Chart was reviewed for overdue Proactive [facility] Encounters ([facility]) topics (CRS, Breast Cancer Screening, Eye exam)  Health Maintenance has been updated.  LINKS immunization registry triggered.  Immunizations were reconciled.

## 2022-05-30 ENCOUNTER — PATIENT MESSAGE (OUTPATIENT)
Dept: ADMINISTRATIVE | Facility: HOSPITAL | Age: 62
End: 2022-05-30
Payer: COMMERCIAL

## 2022-06-09 ENCOUNTER — HOSPITAL ENCOUNTER (OUTPATIENT)
Dept: RADIOLOGY | Facility: HOSPITAL | Age: 62
Discharge: HOME | End: 2022-06-09
Attending: SURGERY
Payer: COMMERCIAL

## 2022-06-09 DIAGNOSIS — R92.8 ABNORMAL FINDING ON BREAST IMAGING: ICD-10-CM

## 2022-06-09 PROCEDURE — 25000003 PHARM REV CODE 250: Performed by: SURGERY

## 2022-06-09 PROCEDURE — 76642 ULTRASOUND BREAST LIMITED: CPT | Mod: TC,LT

## 2022-06-09 PROCEDURE — A4648 IMPLANTABLE TISSUE MARKER: HCPCS

## 2022-06-09 PROCEDURE — 88341 PR IHC OR ICC EACH ADD'L SINGLE ANTIBODY  STAINPR: ICD-10-PCS | Mod: 26,,, | Performed by: PATHOLOGY

## 2022-06-09 PROCEDURE — 19083 BX BREAST 1ST LESION US IMAG: CPT | Mod: LT,,, | Performed by: RADIOLOGY

## 2022-06-09 PROCEDURE — 88342 CHG IMMUNOCYTOCHEMISTRY: ICD-10-PCS | Mod: 26,,, | Performed by: PATHOLOGY

## 2022-06-09 PROCEDURE — 88305 TISSUE EXAM BY PATHOLOGIST: CPT | Performed by: PATHOLOGY

## 2022-06-09 PROCEDURE — 76642 US BREAST LEFT LIMITED: ICD-10-PCS | Mod: 26,LT,, | Performed by: RADIOLOGY

## 2022-06-09 PROCEDURE — 88305 TISSUE EXAM BY PATHOLOGIST: ICD-10-PCS | Mod: 26,,, | Performed by: PATHOLOGY

## 2022-06-09 PROCEDURE — 27201068 MAMMO DIGITAL DIAGNOSTIC LEFT

## 2022-06-09 PROCEDURE — 76642 ULTRASOUND BREAST LIMITED: CPT | Mod: 26,LT,, | Performed by: RADIOLOGY

## 2022-06-09 PROCEDURE — 19083 US BREAST BIOPSY WITH IMAGING 1ST SITE LEFT: ICD-10-PCS | Mod: LT,,, | Performed by: RADIOLOGY

## 2022-06-09 PROCEDURE — 88341 IMHCHEM/IMCYTCHM EA ADD ANTB: CPT | Mod: 26,,, | Performed by: PATHOLOGY

## 2022-06-09 PROCEDURE — 77065 MAMMO DIGITAL DIAGNOSTIC LEFT: ICD-10-PCS | Mod: 26,LT,, | Performed by: RADIOLOGY

## 2022-06-09 PROCEDURE — 88341 IMHCHEM/IMCYTCHM EA ADD ANTB: CPT | Performed by: PATHOLOGY

## 2022-06-09 PROCEDURE — 88342 IMHCHEM/IMCYTCHM 1ST ANTB: CPT | Performed by: PATHOLOGY

## 2022-06-09 PROCEDURE — 88305 TISSUE EXAM BY PATHOLOGIST: CPT | Mod: 26,,, | Performed by: PATHOLOGY

## 2022-06-09 PROCEDURE — 88342 IMHCHEM/IMCYTCHM 1ST ANTB: CPT | Mod: 26,,, | Performed by: PATHOLOGY

## 2022-06-09 PROCEDURE — 77065 DX MAMMO INCL CAD UNI: CPT | Mod: 26,LT,, | Performed by: RADIOLOGY

## 2022-06-09 PROCEDURE — 19083 BX BREAST 1ST LESION US IMAG: CPT | Mod: LT

## 2022-06-09 RX ORDER — LIDOCAINE HYDROCHLORIDE 10 MG/ML
3 INJECTION, SOLUTION EPIDURAL; INFILTRATION; INTRACAUDAL; PERINEURAL ONCE
Status: DISCONTINUED | OUTPATIENT
Start: 2022-06-09 | End: 2022-06-10 | Stop reason: HOSPADM

## 2022-06-09 RX ORDER — LIDOCAINE HCL/EPINEPHRINE/PF 2%-1:200K
10 VIAL (ML) INJECTION ONCE
Status: DISCONTINUED | OUTPATIENT
Start: 2022-06-09 | End: 2022-06-10 | Stop reason: HOSPADM

## 2022-06-09 RX ORDER — LIDOCAINE HCL/EPINEPHRINE/PF 2%-1:200K
10 VIAL (ML) INJECTION ONCE
Status: COMPLETED | OUTPATIENT
Start: 2022-06-09 | End: 2022-06-09

## 2022-06-09 RX ORDER — LIDOCAINE HYDROCHLORIDE 10 MG/ML
3 INJECTION, SOLUTION EPIDURAL; INFILTRATION; INTRACAUDAL; PERINEURAL ONCE
Status: COMPLETED | OUTPATIENT
Start: 2022-06-09 | End: 2022-06-09

## 2022-06-09 RX ADMIN — LIDOCAINE HYDROCHLORIDE,EPINEPHRINE BITARTRATE 10 ML: 20; .005 INJECTION, SOLUTION EPIDURAL; INFILTRATION; INTRACAUDAL; PERINEURAL at 02:06

## 2022-06-09 RX ADMIN — LIDOCAINE HYDROCHLORIDE 3 ML: 10 INJECTION, SOLUTION EPIDURAL; INFILTRATION; INTRACAUDAL; PERINEURAL at 02:06

## 2022-06-15 ENCOUNTER — HOSPITAL ENCOUNTER (OUTPATIENT)
Dept: RADIOLOGY | Facility: HOSPITAL | Age: 62
Discharge: HOME | End: 2022-06-15
Attending: SURGERY
Payer: COMMERCIAL

## 2022-06-15 ENCOUNTER — NURSE TRIAGE (OUTPATIENT)
Dept: ADMINISTRATIVE | Facility: CLINIC | Age: 62
End: 2022-06-15
Payer: COMMERCIAL

## 2022-06-15 ENCOUNTER — DOCUMENTATION ONLY (OUTPATIENT)
Dept: SURGERY | Facility: CLINIC | Age: 62
End: 2022-06-15
Payer: COMMERCIAL

## 2022-06-15 ENCOUNTER — TELEPHONE (OUTPATIENT)
Dept: SURGERY | Facility: CLINIC | Age: 62
End: 2022-06-15
Payer: COMMERCIAL

## 2022-06-15 DIAGNOSIS — C50.412 CARCINOMA OF UPPER-OUTER QUADRANT OF LEFT BREAST IN FEMALE, ESTROGEN RECEPTOR POSITIVE: ICD-10-CM

## 2022-06-15 DIAGNOSIS — Z17.0 CARCINOMA OF UPPER-OUTER QUADRANT OF LEFT BREAST IN FEMALE, ESTROGEN RECEPTOR POSITIVE: ICD-10-CM

## 2022-06-15 LAB
FINAL PATHOLOGIC DIAGNOSIS: NORMAL
GROSS: NORMAL
Lab: NORMAL
MICROSCOPIC EXAM: NORMAL

## 2022-06-15 PROCEDURE — 19281 MAMMO BREAST RADAR REFLECTOR LOC W/MAMMO GUIDANCE, EA ADDL LESION, LEFT: ICD-10-PCS | Mod: LT,,, | Performed by: RADIOLOGY

## 2022-06-15 PROCEDURE — 77065 PR MAMMO, CAD, DIAGNOSTIC, UNILAT: ICD-10-PCS | Mod: 26,LT,, | Performed by: RADIOLOGY

## 2022-06-15 PROCEDURE — 19281 PERQ DEVICE BREAST 1ST IMAG: CPT | Mod: LT,,, | Performed by: RADIOLOGY

## 2022-06-15 PROCEDURE — 25000003 PHARM REV CODE 250: Performed by: SURGERY

## 2022-06-15 PROCEDURE — 19282 PR PERQ PLCMNT DEV, EA ADDL LES W/MAMMOGRPH GUIDE: ICD-10-PCS | Mod: LT,,, | Performed by: RADIOLOGY

## 2022-06-15 PROCEDURE — A4648 IMPLANTABLE TISSUE MARKER: HCPCS

## 2022-06-15 PROCEDURE — 77065 DX MAMMO INCL CAD UNI: CPT | Mod: 26,LT,, | Performed by: RADIOLOGY

## 2022-06-15 PROCEDURE — 19282 PERQ DEVICE BREAST EA IMAG: CPT | Mod: LT,,, | Performed by: RADIOLOGY

## 2022-06-15 PROCEDURE — 77065 DX MAMMO INCL CAD UNI: CPT | Mod: TC,LT

## 2022-06-15 RX ORDER — LIDOCAINE HYDROCHLORIDE 20 MG/ML
19 INJECTION, SOLUTION INFILTRATION; PERINEURAL ONCE
Status: COMPLETED | OUTPATIENT
Start: 2022-06-15 | End: 2022-06-15

## 2022-06-15 RX ORDER — LIDOCAINE HYDROCHLORIDE 20 MG/ML
10 INJECTION, SOLUTION INFILTRATION; PERINEURAL ONCE
Status: COMPLETED | OUTPATIENT
Start: 2022-06-15 | End: 2022-06-15

## 2022-06-15 RX ORDER — LIDOCAINE HYDROCHLORIDE 20 MG/ML
20 INJECTION, SOLUTION INFILTRATION; PERINEURAL ONCE
Status: COMPLETED | OUTPATIENT
Start: 2022-06-15 | End: 2022-06-15

## 2022-06-15 RX ADMIN — LIDOCAINE HYDROCHLORIDE 10 ML: 20 INJECTION, SOLUTION INFILTRATION; PERINEURAL at 12:06

## 2022-06-15 RX ADMIN — LIDOCAINE HYDROCHLORIDE 20 ML: 20 INJECTION, SOLUTION INFILTRATION; PERINEURAL at 01:06

## 2022-06-15 NOTE — TELEPHONE ENCOUNTER
Spoke with  this morning at the [facility] regarding her arrival time for surgery 6/16/22 at the  [facility] with . Arrival time is for 10 am surgery is scheduled for 12 noon . Nothing to eat after 9 pm this evening 6/15/22. Clear liquids up until 4 hour's prior to surgery. Informed patient she may have 2 people with her on the morning of surgery. Please leave all jewelry home . Also wear a button down shirt the morning of surgery. Patient voiced understanding to our conversation this morning regarding arrival time for surgery on 6/16/22 .

## 2022-06-15 NOTE — PROGRESS NOTES
Dr. Delong spoke with patient regarding results of breast biopsy from 6/9/2022.      MD CHRISTI Kinney MD  Cc: P [facility] Breast Navigation  LEFT breast 3 o'clock: chaparro HUFF. Patient established with breast surgery

## 2022-06-16 ENCOUNTER — ANESTHESIA (OUTPATIENT)
Dept: SURGERY | Facility: OTHER | Age: 62
End: 2022-06-16
Payer: COMMERCIAL

## 2022-06-16 ENCOUNTER — HOSPITAL ENCOUNTER (OUTPATIENT)
Dept: RADIOLOGY | Facility: OTHER | Age: 62
Discharge: HOME | End: 2022-06-16
Attending: SURGERY | Admitting: SURGERY
Payer: COMMERCIAL

## 2022-06-16 ENCOUNTER — HOSPITAL ENCOUNTER (OUTPATIENT)
Facility: OTHER | Age: 62
Discharge: HOME | End: 2022-06-16
Attending: SURGERY | Admitting: SURGERY
Payer: COMMERCIAL

## 2022-06-16 ENCOUNTER — HOSPITAL ENCOUNTER (OUTPATIENT)
Dept: RADIOLOGY | Facility: OTHER | Age: 62
Discharge: HOME | End: 2022-06-16
Attending: SURGERY
Payer: COMMERCIAL

## 2022-06-16 VITALS
WEIGHT: 167 LBS | HEART RATE: 90 BPM | DIASTOLIC BLOOD PRESSURE: 68 MMHG | OXYGEN SATURATION: 98 % | SYSTOLIC BLOOD PRESSURE: 120 MMHG | TEMPERATURE: 99 F | HEIGHT: 63 IN | BODY MASS INDEX: 29.59 KG/M2 | RESPIRATION RATE: 18 BRPM

## 2022-06-16 DIAGNOSIS — C50.412 CARCINOMA OF UPPER-OUTER QUADRANT OF LEFT BREAST IN FEMALE, ESTROGEN RECEPTOR POSITIVE: ICD-10-CM

## 2022-06-16 DIAGNOSIS — C50.919 BREAST CANCER: ICD-10-CM

## 2022-06-16 DIAGNOSIS — C50.412 MALIGNANT NEOPLASM OF UPPER-OUTER QUADRANT OF LEFT BREAST IN FEMALE, ESTROGEN RECEPTOR POSITIVE: Primary | ICD-10-CM

## 2022-06-16 DIAGNOSIS — Z17.0 CARCINOMA OF UPPER-OUTER QUADRANT OF LEFT BREAST IN FEMALE, ESTROGEN RECEPTOR POSITIVE: ICD-10-CM

## 2022-06-16 DIAGNOSIS — Z17.0 MALIGNANT NEOPLASM OF UPPER-OUTER QUADRANT OF LEFT BREAST IN FEMALE, ESTROGEN RECEPTOR POSITIVE: Primary | ICD-10-CM

## 2022-06-16 PROCEDURE — 88341 PR IHC OR ICC EACH ADD'L SINGLE ANTIBODY  STAINPR: ICD-10-PCS | Mod: 26,59,, | Performed by: PATHOLOGY

## 2022-06-16 PROCEDURE — A9520 TC99 TILMANOCEPT DIAG 0.5MCI: HCPCS

## 2022-06-16 PROCEDURE — 88360 TUMOR IMMUNOHISTOCHEM/MANUAL: CPT | Mod: 26,,, | Performed by: PATHOLOGY

## 2022-06-16 PROCEDURE — 88307 TISSUE EXAM BY PATHOLOGIST: CPT | Mod: 59 | Performed by: PATHOLOGY

## 2022-06-16 PROCEDURE — 38525 BIOPSY/REMOVAL LYMPH NODES: CPT | Mod: 51,LT,, | Performed by: SURGERY

## 2022-06-16 PROCEDURE — 25000003 PHARM REV CODE 250: Performed by: STUDENT IN AN ORGANIZED HEALTH CARE EDUCATION/TRAINING PROGRAM

## 2022-06-16 PROCEDURE — 76098 X-RAY EXAM SURGICAL SPECIMEN: CPT | Mod: 26,,, | Performed by: RADIOLOGY

## 2022-06-16 PROCEDURE — 37000008 HC ANESTHESIA 1ST 15 MINUTES: Performed by: SURGERY

## 2022-06-16 PROCEDURE — 38900 PR INTRAOPERATIVE SENTINEL LYMPH NODE ID W DYE INJECTION: ICD-10-PCS | Mod: LT,,, | Performed by: SURGERY

## 2022-06-16 PROCEDURE — 38792 PR IDENTIFY SENTINEL 2DE: ICD-10-PCS | Mod: 59,51,LT, | Performed by: SURGERY

## 2022-06-16 PROCEDURE — 88307 PR  SURG PATH,LEVEL V: ICD-10-PCS | Mod: 26,,, | Performed by: PATHOLOGY

## 2022-06-16 PROCEDURE — 88341 IMHCHEM/IMCYTCHM EA ADD ANTB: CPT | Performed by: PATHOLOGY

## 2022-06-16 PROCEDURE — 19301 PR MASTECTOMY, PARTIAL: ICD-10-PCS | Mod: LT,,, | Performed by: SURGERY

## 2022-06-16 PROCEDURE — 71000039 HC RECOVERY, EACH ADD'L HOUR: Performed by: SURGERY

## 2022-06-16 PROCEDURE — 38900 IO MAP OF SENT LYMPH NODE: CPT | Mod: LT,,, | Performed by: SURGERY

## 2022-06-16 PROCEDURE — 88342 IMHCHEM/IMCYTCHM 1ST ANTB: CPT | Mod: 26,59,, | Performed by: PATHOLOGY

## 2022-06-16 PROCEDURE — 88360 TUMOR IMMUNOHISTOCHEM/MANUAL: CPT | Performed by: PATHOLOGY

## 2022-06-16 PROCEDURE — 63600175 PHARM REV CODE 636 W HCPCS: Performed by: ANESTHESIOLOGY

## 2022-06-16 PROCEDURE — 63600175 PHARM REV CODE 636 W HCPCS: Performed by: STUDENT IN AN ORGANIZED HEALTH CARE EDUCATION/TRAINING PROGRAM

## 2022-06-16 PROCEDURE — 88341 IMHCHEM/IMCYTCHM EA ADD ANTB: CPT | Mod: 26,59,, | Performed by: PATHOLOGY

## 2022-06-16 PROCEDURE — 71000016 HC POSTOP RECOV ADDL HR: Performed by: SURGERY

## 2022-06-16 PROCEDURE — 63600175 PHARM REV CODE 636 W HCPCS: Mod: JG | Performed by: SURGERY

## 2022-06-16 PROCEDURE — 27201423 OPTIME MED/SURG SUP & DEVICES STERILE SUPPLY: Performed by: SURGERY

## 2022-06-16 PROCEDURE — P9045 ALBUMIN (HUMAN), 5%, 250 ML: HCPCS | Mod: JG | Performed by: STUDENT IN AN ORGANIZED HEALTH CARE EDUCATION/TRAINING PROGRAM

## 2022-06-16 PROCEDURE — 36000707: Performed by: SURGERY

## 2022-06-16 PROCEDURE — 38525 PR BIOPSY/REM LYMPH NODES, AXILLARY: ICD-10-PCS | Mod: 51,LT,, | Performed by: SURGERY

## 2022-06-16 PROCEDURE — 88307 TISSUE EXAM BY PATHOLOGIST: CPT | Mod: 26,,, | Performed by: PATHOLOGY

## 2022-06-16 PROCEDURE — 37000009 HC ANESTHESIA EA ADD 15 MINS: Performed by: SURGERY

## 2022-06-16 PROCEDURE — 38792 RA TRACER ID OF SENTINL NODE: CPT | Mod: 59,51,LT, | Performed by: SURGERY

## 2022-06-16 PROCEDURE — C1894 INTRO/SHEATH, NON-LASER: HCPCS | Performed by: SURGERY

## 2022-06-16 PROCEDURE — 76098 X-RAY EXAM SURGICAL SPECIMEN: CPT | Mod: TC

## 2022-06-16 PROCEDURE — 71000015 HC POSTOP RECOV 1ST HR: Performed by: SURGERY

## 2022-06-16 PROCEDURE — 19301 PARTIAL MASTECTOMY: CPT | Mod: LT,,, | Performed by: SURGERY

## 2022-06-16 PROCEDURE — 88342 CHG IMMUNOCYTOCHEMISTRY: ICD-10-PCS | Mod: 26,59,, | Performed by: PATHOLOGY

## 2022-06-16 PROCEDURE — 25000003 PHARM REV CODE 250: Performed by: ANESTHESIOLOGY

## 2022-06-16 PROCEDURE — 71000033 HC RECOVERY, INTIAL HOUR: Performed by: SURGERY

## 2022-06-16 PROCEDURE — 76098 MAMMO BREAST SPECIMEN: ICD-10-PCS | Mod: 26,,, | Performed by: RADIOLOGY

## 2022-06-16 PROCEDURE — 36000706: Performed by: SURGERY

## 2022-06-16 PROCEDURE — 88360 PR  TUMOR IMMUNOHISTOCHEM/MANUAL: ICD-10-PCS | Mod: 26,,, | Performed by: PATHOLOGY

## 2022-06-16 PROCEDURE — 25000003 PHARM REV CODE 250: Performed by: SURGERY

## 2022-06-16 PROCEDURE — 63600175 PHARM REV CODE 636 W HCPCS: Performed by: SURGERY

## 2022-06-16 PROCEDURE — 88342 IMHCHEM/IMCYTCHM 1ST ANTB: CPT | Mod: 59 | Performed by: PATHOLOGY

## 2022-06-16 RX ORDER — ROCURONIUM BROMIDE 10 MG/ML
INJECTION, SOLUTION INTRAVENOUS
Status: DISCONTINUED | OUTPATIENT
Start: 2022-06-16 | End: 2022-06-16

## 2022-06-16 RX ORDER — OXYCODONE HYDROCHLORIDE 5 MG/1
5 TABLET ORAL EVERY 4 HOURS PRN
Qty: 12 TABLET | Refills: 0 | Status: SHIPPED | OUTPATIENT
Start: 2022-06-16 | End: 2022-07-01 | Stop reason: CLARIF

## 2022-06-16 RX ORDER — SODIUM CHLORIDE 9 MG/ML
INJECTION, SOLUTION INTRAVENOUS CONTINUOUS
Status: DISCONTINUED | OUTPATIENT
Start: 2022-06-16 | End: 2022-06-16 | Stop reason: HOSPADM

## 2022-06-16 RX ORDER — SODIUM CHLORIDE, SODIUM LACTATE, POTASSIUM CHLORIDE, CALCIUM CHLORIDE 600; 310; 30; 20 MG/100ML; MG/100ML; MG/100ML; MG/100ML
INJECTION, SOLUTION INTRAVENOUS CONTINUOUS
Status: DISCONTINUED | OUTPATIENT
Start: 2022-06-16 | End: 2022-06-16 | Stop reason: HOSPADM

## 2022-06-16 RX ORDER — CEFAZOLIN SODIUM 2 G/50ML
2 SOLUTION INTRAVENOUS
Status: COMPLETED | OUTPATIENT
Start: 2022-06-16 | End: 2022-06-16

## 2022-06-16 RX ORDER — ISOSULFAN BLUE 50 MG/5ML
INJECTION, SOLUTION SUBCUTANEOUS
Status: DISCONTINUED | OUTPATIENT
Start: 2022-06-16 | End: 2022-06-16 | Stop reason: HOSPADM

## 2022-06-16 RX ORDER — ONDANSETRON 2 MG/ML
INJECTION INTRAMUSCULAR; INTRAVENOUS
Status: DISCONTINUED | OUTPATIENT
Start: 2022-06-16 | End: 2022-06-16

## 2022-06-16 RX ORDER — PROCHLORPERAZINE EDISYLATE 5 MG/ML
5 INJECTION INTRAMUSCULAR; INTRAVENOUS EVERY 30 MIN PRN
Status: DISCONTINUED | OUTPATIENT
Start: 2022-06-16 | End: 2022-06-16 | Stop reason: HOSPADM

## 2022-06-16 RX ORDER — BUPIVACAINE HYDROCHLORIDE 2.5 MG/ML
INJECTION, SOLUTION EPIDURAL; INFILTRATION; INTRACAUDAL
Status: DISCONTINUED | OUTPATIENT
Start: 2022-06-16 | End: 2022-06-16 | Stop reason: HOSPADM

## 2022-06-16 RX ORDER — LIDOCAINE HYDROCHLORIDE 10 MG/ML
0.5 INJECTION, SOLUTION EPIDURAL; INFILTRATION; INTRACAUDAL; PERINEURAL ONCE
Status: DISCONTINUED | OUTPATIENT
Start: 2022-06-16 | End: 2022-06-16 | Stop reason: HOSPADM

## 2022-06-16 RX ORDER — PROPOFOL 10 MG/ML
VIAL (ML) INTRAVENOUS
Status: DISCONTINUED | OUTPATIENT
Start: 2022-06-16 | End: 2022-06-16

## 2022-06-16 RX ORDER — DEXMEDETOMIDINE HYDROCHLORIDE 100 UG/ML
INJECTION, SOLUTION INTRAVENOUS
Status: DISCONTINUED | OUTPATIENT
Start: 2022-06-16 | End: 2022-06-16

## 2022-06-16 RX ORDER — SUCCINYLCHOLINE CHLORIDE 20 MG/ML
INJECTION INTRAMUSCULAR; INTRAVENOUS
Status: DISCONTINUED | OUTPATIENT
Start: 2022-06-16 | End: 2022-06-16

## 2022-06-16 RX ORDER — KETOROLAC TROMETHAMINE 30 MG/ML
30 INJECTION, SOLUTION INTRAMUSCULAR; INTRAVENOUS ONCE
Status: COMPLETED | OUTPATIENT
Start: 2022-06-16 | End: 2022-06-16

## 2022-06-16 RX ORDER — LIDOCAINE HYDROCHLORIDE 20 MG/ML
INJECTION INTRAVENOUS
Status: DISCONTINUED | OUTPATIENT
Start: 2022-06-16 | End: 2022-06-16

## 2022-06-16 RX ORDER — SODIUM CHLORIDE 0.9 % (FLUSH) 0.9 %
3 SYRINGE (ML) INJECTION
Status: DISCONTINUED | OUTPATIENT
Start: 2022-06-16 | End: 2022-06-16 | Stop reason: HOSPADM

## 2022-06-16 RX ORDER — OXYCODONE HYDROCHLORIDE 5 MG/1
5 TABLET ORAL
Status: DISCONTINUED | OUTPATIENT
Start: 2022-06-16 | End: 2022-06-16 | Stop reason: HOSPADM

## 2022-06-16 RX ORDER — DEXAMETHASONE SODIUM PHOSPHATE 4 MG/ML
INJECTION, SOLUTION INTRA-ARTICULAR; INTRALESIONAL; INTRAMUSCULAR; INTRAVENOUS; SOFT TISSUE
Status: DISCONTINUED | OUTPATIENT
Start: 2022-06-16 | End: 2022-06-16

## 2022-06-16 RX ORDER — ALBUMIN HUMAN 50 G/1000ML
SOLUTION INTRAVENOUS CONTINUOUS PRN
Status: DISCONTINUED | OUTPATIENT
Start: 2022-06-16 | End: 2022-06-16

## 2022-06-16 RX ORDER — HYDROMORPHONE HYDROCHLORIDE 2 MG/ML
0.4 INJECTION, SOLUTION INTRAMUSCULAR; INTRAVENOUS; SUBCUTANEOUS EVERY 5 MIN PRN
Status: DISCONTINUED | OUTPATIENT
Start: 2022-06-16 | End: 2022-06-16 | Stop reason: HOSPADM

## 2022-06-16 RX ORDER — FENTANYL CITRATE 50 UG/ML
INJECTION, SOLUTION INTRAMUSCULAR; INTRAVENOUS
Status: DISCONTINUED | OUTPATIENT
Start: 2022-06-16 | End: 2022-06-16

## 2022-06-16 RX ORDER — MEPERIDINE HYDROCHLORIDE 25 MG/ML
12.5 INJECTION INTRAMUSCULAR; INTRAVENOUS; SUBCUTANEOUS ONCE AS NEEDED
Status: DISCONTINUED | OUTPATIENT
Start: 2022-06-16 | End: 2022-06-16 | Stop reason: HOSPADM

## 2022-06-16 RX ORDER — MIDAZOLAM HYDROCHLORIDE 1 MG/ML
INJECTION INTRAMUSCULAR; INTRAVENOUS
Status: DISCONTINUED | OUTPATIENT
Start: 2022-06-16 | End: 2022-06-16

## 2022-06-16 RX ORDER — PHENYLEPHRINE HYDROCHLORIDE 10 MG/ML
INJECTION INTRAVENOUS
Status: DISCONTINUED | OUTPATIENT
Start: 2022-06-16 | End: 2022-06-16

## 2022-06-16 RX ADMIN — ALBUMIN (HUMAN): 12.5 SOLUTION INTRAVENOUS at 01:06

## 2022-06-16 RX ADMIN — GLYCOPYRROLATE 0.2 MG: 0.2 INJECTION, SOLUTION INTRAMUSCULAR; INTRAVITREAL at 01:06

## 2022-06-16 RX ADMIN — FENTANYL CITRATE 100 MCG: 50 INJECTION, SOLUTION INTRAMUSCULAR; INTRAVENOUS at 01:06

## 2022-06-16 RX ADMIN — DEXMEDETOMIDINE HYDROCHLORIDE 15 MCG: 100 INJECTION, SOLUTION, CONCENTRATE INTRAVENOUS at 01:06

## 2022-06-16 RX ADMIN — LIDOCAINE HYDROCHLORIDE 75 MG: 20 INJECTION, SOLUTION INTRAVENOUS at 01:06

## 2022-06-16 RX ADMIN — SODIUM CHLORIDE, SODIUM LACTATE, POTASSIUM CHLORIDE, AND CALCIUM CHLORIDE: 600; 310; 30; 20 INJECTION, SOLUTION INTRAVENOUS at 01:06

## 2022-06-16 RX ADMIN — PROPOFOL 200 MG: 10 INJECTION, EMULSION INTRAVENOUS at 01:06

## 2022-06-16 RX ADMIN — DEXAMETHASONE SODIUM PHOSPHATE 8 MG: 4 INJECTION, SOLUTION INTRAMUSCULAR; INTRAVENOUS at 01:06

## 2022-06-16 RX ADMIN — ONDANSETRON HYDROCHLORIDE 4 MG: 2 INJECTION INTRAMUSCULAR; INTRAVENOUS at 01:06

## 2022-06-16 RX ADMIN — MIDAZOLAM HYDROCHLORIDE 2 MG: 1 INJECTION, SOLUTION INTRAMUSCULAR; INTRAVENOUS at 01:06

## 2022-06-16 RX ADMIN — PHENYLEPHRINE HYDROCHLORIDE 200 MCG: 10 INJECTION INTRAVENOUS at 01:06

## 2022-06-16 RX ADMIN — CEFAZOLIN SODIUM 2 G: 2 SOLUTION INTRAVENOUS at 01:06

## 2022-06-16 RX ADMIN — ROCURONIUM BROMIDE 10 MG: 10 INJECTION, SOLUTION INTRAVENOUS at 01:06

## 2022-06-16 RX ADMIN — FENTANYL CITRATE 50 MCG: 50 INJECTION, SOLUTION INTRAMUSCULAR; INTRAVENOUS at 02:06

## 2022-06-16 RX ADMIN — OXYCODONE 5 MG: 5 TABLET ORAL at 04:06

## 2022-06-16 RX ADMIN — PROPOFOL 50 MG: 10 INJECTION, EMULSION INTRAVENOUS at 01:06

## 2022-06-16 RX ADMIN — HYDROMORPHONE HYDROCHLORIDE 0.4 MG: 2 INJECTION INTRAMUSCULAR; INTRAVENOUS; SUBCUTANEOUS at 04:06

## 2022-06-16 RX ADMIN — KETOROLAC TROMETHAMINE 30 MG: 30 INJECTION, SOLUTION INTRAMUSCULAR; INTRAVENOUS at 05:06

## 2022-06-16 RX ADMIN — SUCCINYLCHOLINE CHLORIDE 120 MG: 20 INJECTION, SOLUTION INTRAMUSCULAR; INTRAVENOUS at 01:06

## 2022-06-16 RX ADMIN — PHENYLEPHRINE HYDROCHLORIDE 0.4 MCG/KG/MIN: 10 INJECTION INTRAVENOUS at 02:06

## 2022-06-16 NOTE — TELEPHONE ENCOUNTER
Pt reports she is to have surgery tomorrow but currently has a temp of 99.6 orally, wanting to know if she should take tylenol and if this will effect her being able to have surgery. Pt advised fever is 100.4 or higher, and not to take tylenol so not to mask if she was getting a fever. Pt should plan to go to surgery as scheduled and encouraged to call back with any worsening symptoms or questions. Pt verbalized understanding.    Reason for Disposition   Health Information question, no triage required and triager able to answer question    Protocols used: INFORMATION ONLY CALL - NO TRIAGE-A-

## 2022-06-16 NOTE — ANESTHESIA PROCEDURE NOTES
Intubation    Date/Time: 6/16/2022 1:42 PM  Performed by: Yamil Pascual CRNA  Authorized by: Primitivo Titus MD     Intubation:     Induction:  Intravenous    Intubated:  Postinduction    Mask Ventilation:  Easy with oral airway    Attempts:  1    Attempted By:  CRNA    Method of Intubation:  Video laryngoscopy    Blade:  [place] 3    Laryngeal View Grade: Grade I - full view of cords      Difficult Airway Encountered?: No      Complications:  None    Airway Device:  Oral endotracheal tube    Airway Device Size:  7.5    Style/Cuff Inflation:  Cuffed (inflated to minimal occlusive pressure)    Tube secured:  21    Secured at:  The teeth    Placement Verified By:  Capnometry and Revisualization with laryngoscopy    Complicating Factors:  None    Findings Post-Intubation:  BS equal bilateral and atraumatic/condition of teeth unchanged

## 2022-06-16 NOTE — DISCHARGE INSTRUCTIONS

## 2022-06-16 NOTE — OP NOTE
DATE OF PROCEDURE: 6/16/2022    SURGEON: Jessica Delong MD    ASSISTANT:  Gus Nieves MD    PREOPERATIVE DIAGNOSIS: Invasive breast carcinoma of the left breast upper outer quadrant 2:00, ALH of the left upper outer quadrant 3:00    POSTOPERATIVE DIAGNOSIS: same    ANESTHESIA: general    PROCEDURES PERFORMED:   1. left breast Martinez  radar localization braketed partial mastectomy (lumpectomy) with excision for clear margins   2. left axillary deep sentinel lymph node biopsy   3. injection of left breast with isosulfan Lymphazurin blue dye and technetium-labeled radiocolloid for sentinel lymph node identification  4. Identification of sentinel lymph node   5. Left breast Martinez  radar localization excisional biopsy      PROCEDURE IN DETAIL:   The patient underwent informed consent.  The martinez  reflectors were placed in the upper outer quadrant of the left breast.  Two MARTINEZ scouts were used to bracket the 2 known left breast cancer masses at the 2 o'clock position.  A 3rd MARTINEZ  was placed near the biopsy proven ALH lesion at the 3 o'clock position  The localization films were reviewed.    She was then brought to the Operating Room and placed in the supine position. Anesthesia was administered.  The left breast was injected in the subareolar region with the technetium-labeled radiocolloid. The left breast was further injected with the isosulfan Lymphazurin blue dye in the central subareolar region. The left breast, anterior chest, arm and axilla were then prepped and draped in a sterile fashion.     We turned our attention to the left breast itself. An incision was made in the upper outer quadrant of the left breast using the reflector as a guide.  We 1st turned our attention to the area of the biopsy-proven cancers at the 02:00 o'clock location which were marked by a bracketed use of MARTINEZ scouts. The specimen was dissected circumferentially around the cancer.  We did dissect all the way down to, but  not including the underlying pectoralis fascia.  The anterior margin was skin.  The localization lumpectomy specimen was inked on the back table using blue superior, green inferior, yellow anterior, black posterior, orange lateral, and red medial.  It was then fixed with acetic acid and submitted for specimen radiograph, which confirmed the clip and area of interest within the specimen. Given the appearance and location of the lesion, additional margins were taken including inferior and medial.  There were firm palpable nodules in the 1st inferior margin the us a 2nd final margin was taken of the inferior aspect.      We then turned our attention to the 2nd area of concern in the upper outer quadrant at 03:00 o'clock where there was the biopsy-proven atypical lobular hyperplasia.  The specimen was dissected circumferentially around the lesion using the reflector and probe as a guide.  There was a large area of hematoma surrounding the area of concern.  The localization excisional biopsy specimen was inked on the back table using blue superior, green inferior, yellow anterior, black posterior, orange lateral, and red medial.  The superior lateral aspect of this excision specimen was the inferior medial margin of the area of excision.  It was then submitted for specimen radiograph, which confirmed the reflector, clip and area of interest within the specimen.     Using the gamma probe, activity was noted and localized in the left axilla just superior to lumpectomy resection cavity.  The first sentinel lymph node was identified using  the gamma probe and identification of blue lymphatics.  It was dissected circumferentially with electrocautery. The lymph node was labeled as specimen #1 for permanent sectioning, hot and blue with an ex vivo count of 7537. A total of 3 axillary sentinel lymph nodes were removed.  The 2nd sentinel lymph node that was removed was just at what would be considered the lateral aspect of the  superior margin of lumpectomy specimen.  The probe was placed in the cavity and there was no significant residual background radioactivity or blue dye noted in the axilla indicating adequate and appropriate sentinel lymph node biopsy. The cavity was then palpated and 0 further palpable nodes were noted. We then achieved hemostasis and irrigation was performed.  The deep layers of incision were closed with 2-0 Vicryl suture to reapproximate the edges of the deep tissue to decrease seroma formation.  The incision was then closed with an interuppted 3-0 vicryl deep dermal followed by a running 4-0 monocryl subcuticular.    Dermabond was applied. Sterile fluff gauze was placed and a post-procedure bra was placed. She tolerated the procedure well without complication and was turned over to Anesthesia for transport to the recovery area in a satisfactory condition. All specimens were sent to Pathology for permanent sectioning.    ESTIMATED BLOOD LOSS: 5 ml    COMPLICATIONS: none    DISPOSITION:  PACU--hemodynamically stable    ATTESTATION:  I was present and scrubbed for the entire procedure.    Ari Synoptic Reporting:  Operation performed with curative intent: yes    Tracer used to identify sentinel nodes in the upfront surgery (non-neoadjuvant) setting: dye,  and radioactive tracer,     Tracer used to identify sentinel nodes in the neoadjuvant setting: N/A    All nodes (colored or non-colored) present at the end of a dye-filled lymphatic channel were removed: Yes    All significantly radioactive nodes were removed: Yes    All palpably suspicious nodes were removed: N/A, no palpable nodes    Biopsy-proven positive nodes marked with clips prior to chemotherapy were identified and removed: N/A

## 2022-06-16 NOTE — BRIEF OP NOTE
[facility] ([place])  Brief Operative Note    Surgery Date: 6/16/2022     Surgeon(s) and Role:     * CHRISTI Delong MD - Primary    Assisting Surgeon: Gus Nieves MD - Resident    Pre-op Diagnosis:  Carcinoma of upper-outer quadrant of left breast in female, estrogen receptor positive [C50.412, Z17.0]    Post-op Diagnosis:  Post-Op Diagnosis Codes:     * Carcinoma of upper-outer quadrant of left breast in female, estrogen receptor positive [C50.412, Z17.0]    Procedure(s) (LRB):  MASTECTOMY, PARTIAL LEFT with radiological marker (Left)  BIOPSY, LYMPH NODE, SENTINEL (Left)  INJECTION, FOR SENTINEL NODE IDENTIFICATION (Left)  BIOPSY, BREAST / excisional biopsy left (Left)    Anesthesia: General    Operative Findings: First partial mastectomy specimen with both radiotracers and clip in place identified with mozart imaging.  Additional margins obtained of medial and inferior.  Second specimen with radiotracer and clip identified with mozart imaging.  Both sent off to pathology.  Three axillary sentinel lymph nodes obtained.  Previously clipped node identified and clip imaged with mozart.  See op note for details.    Estimated Blood Loss: * No values recorded between 6/16/2022  2:04 PM and 6/16/2022  4:20 PM *         Specimens:   Specimen (24h ago, onward)             Start     Ordered    06/16/22 1501  Specimen to Pathology, Surgery Breast  Once        Comments: Pre-op Diagnosis: Carcinoma of upper-outer quadrant of left breast in female, estrogen receptor positive [C50.412, Z17.0]Procedure(s):MASTECTOMY, PARTIAL LEFT with radiological markerBIOPSY, LYMPH NODE, SENTINELINJECTION, FOR SENTINEL NODE IDENTIFICATIONBIOPSY, BREAST / excisional biopsy left Number of specimens: 8Name of specimens: 1. LEFT PARTIAL MASTECTOMY, 2 O'CLOCK, BLUE SUPERIOR, GREEN INFERIOR, ORANGE LATERAL, YELLOW ANTERIOR, BLACK POSTERIOR, RED MEDIAL2. LEFT PARTIAL MASTECTOMY, 2 O'CLOC OCK, NEW MEDIAL MARGIN, RED3. LEFT PARTIAL  MASTECTOMY, 2 O'CLOCK, NEW INFERIOR MARGIN, GREEN4. LEFT PARTIAL MASTECTOMY, 2 O'CLOCK, FINAL INFERIOR MARGIN, GREEN5. LEFT BREAST EXCISIONAL BIOPSY, 3 O'CLOCK, BLUE SUPERIOR, GREEN INFERIOR, ORANGE LATERAL, YELLOW ANTERIOR, BLACK POSTERIOR, RED MEDIAL6. LEFT AXILLARY SENTINEL LYMPH NODE #1, HOT AND BLUE, 7537. LEFT AXILLARY SENTINEL LYMPH NODE # 2, 3818. LEFT AXILLARY SENTINEL LYMPH NODE # 3, HOT 95     References:    Click here for ordering Quick Tip   Question Answer Comment   Procedure Type: Breast    Specimen Class: Known or suspected malignancy    Which provider would you like to cc? ELDER, E. VIANEY    Release to patient Immediate        06/16/22 6837                  Discharge Note    OUTCOME: Patient tolerated treatment/procedure well without complication and is now ready for discharge.    DISPOSITION: Home or Self Care    FINAL DIAGNOSIS:  Malignant neoplasm of upper-outer quadrant of left breast in female, estrogen receptor positive    FOLLOWUP: In clinic    Follow-up Information     Jessica Delong MD Follow up in 2 week(s).    Specialties: Surgery, Breast Surgery  Why: For wound re-check, post op follow up.  Contact information:  [address] [place] [id number] [phone]                          Medication List      START taking these medications    oxyCODONE 5 MG immediate release tablet  Commonly known as: ROXICODONE  Take 1 tablet (5 mg total) by mouth every 4 (four) hours as needed for Pain.        CONTINUE taking these medications    amLODIPine 10 MG tablet  Commonly known as: NORVASC  Take 1 tablet (10 mg total) by mouth once daily.     cyanocobalamin 100 MCG tablet  Commonly known as: VITAMIN B-12     hydroCHLOROthiazide 12.5 MG Tab  Commonly known as: HYDRODIURIL  Take 1 tablet (12.5 mg total) by mouth once daily.     ibuprofen 800 MG tablet  Commonly known as: ADVIL,MOTRIN  Take 1 tablet (800 mg total) by mouth every 6 (six) hours as needed for Pain.     lisinopriL 40 MG  tablet  Commonly known as: PRINIVIL,ZESTRIL  Take 1 tablet (40 mg total) by mouth once daily.     multivitamin per tablet  Commonly known as: THERAGRAN     vitamin D 1000 units Tab  Commonly known as: VITAMIN D3           Where to Get Your Medications      These medications were sent to [facility]  [address] [place] [address]    Hours: Mon-Fri, 8a-5:30p Phone: [phone]   · oxyCODONE 5 MG immediate release tablet       DISCHARGE INSTRUCTIONS:    Discharge Procedure Orders   Diet Adult Regular     Lifting restrictions   Order Comments: Don't lift more than 10lbs for at least 2 weeks.     No driving until:   Order Comments: No driving while using narcotics.     Remove dressing in 24 hours   Order Comments: Please keep surgical bra on at all times during day and night.  May remove to shower however, please replace once done.  Can replace surgical bra for a tight fitting sports bra as needed for comfort.     Notify your health care provider if you experience any of the following:  temperature >100.4     Notify your health care provider if you experience any of the following:  persistent nausea and vomiting or diarrhea     Notify your health care provider if you experience any of the following:  severe uncontrolled pain     Notify your health care provider if you experience any of the following:  redness, tenderness, or signs of infection (pain, swelling, redness, odor or green/yellow discharge around incision site)     Notify your health care provider if you experience any of the following:  difficulty breathing or increased cough     Notify your health care provider if you experience any of the following:  severe persistent headache     Activity as tolerated     Shower on day dressing removed (No bath)   Order Comments: May shower tomorrow.  Don't soak in a bath, pool, lake, etc for at least 2 weeks.

## 2022-06-16 NOTE — H&P
Interval History: No significant change since H&P below was written.  Consent obtained.  Risks of the procedure discussed in detail.  No further questions or concerns.     Gus Nieves MD - PGY 3  General Surgery    Breast Surgery  [facility]  Department of Surgery        REFERRING PROVIDER: Aaareferral Self  No address on file     Chief Complaint: Breast Cancer (New Patient Left Breast Invasive Mammary Carcinoma 3./10/22 .)        Subjective:      Patient ID: Erma Linda is a 62 y.o. female who presents with left breast Invasive Ductal Carcinoma with lobular features     She presented for screening mammogram on 02/17/2022 for yearly scheduled screening.. This identified a focal asymmetry in the left breast. Follow-up mammogram and ultrasound on  02/28/2022 showed 2 masses in the upper outer quadrant of the breast.  One at 02:00 o'clock, 12 cm from nipple measuring 7 x 7 x 6 mm and 1 at the 2 o'clock position, 11 cm from the nipple measuring 6 x 4 x 4 mm. A ultrasound guided biopsies  were performed of the 2 breast lesions on 03/10/2022 with pathology revealing invasive ductal carcinoma of the breast with lobular features.  During the ultrasound there was also identified a lymph node with cortical thickening measuring 7 mm.  This was also biopsied by ultrasound with pathology consistent with benign findings.     Findings at that time were the following:   Lesion 1:               Location:  Left, 02:00 o'clock, 12 cm from the nipple              Clip:  Twirl, in expected position  Tumor size:  0.7 cm   Tumor ndgndrndanddndend:nd nd2nd Estrogen Receptor:  Positive   Progesterone Receptor:  Positive   Her-2 yas:  Negative      Lesion 2:               Location:  Left, 02:00 o'clock, 11 cm from the nipple              Clip:  Ribbon, in expected position  Tumor size:  0.6 cm   Tumor ndgndrndanddndend:nd nd2nd Estrogen Receptor:  Positive   Progesterone Receptor:  Positive   Her-2 yas:  Negative      Patient has not noted a change  on breast exam.  Patient denies nipple discharge. Patient denies previous breast biopsy. Patient has a personal history of breast cancer. Family history includes no family history of cancer.     GYN History:  Age of menarche was 15. Age of menopause 39. Patient denies hormonal therapy. Patient is . Age of first live birth was 18. Patient did not breast feed.             Past Medical History:   Diagnosis Date    Arthritis      History of hepatitis C; S/p RX with SVR 12 documented 2018 3/22/2018    Hypertension      Nuclear sclerosis of both eyes 2021            Past Surgical History:   Procedure Laterality Date     SECTION, CLASSIC        KNEE ARTHROSCOPY W/ LASER        R    TUBAL LIGATION                 Current Outpatient Medications on File Prior to Visit   Medication Sig Dispense Refill    amLODIPine (NORVASC) 10 MG tablet Take 1 tablet by mouth once daily 90 tablet 1    hydroCHLOROthiazide (HYDRODIURIL) 12.5 MG Tab Take 1 tablet (12.5 mg total) by mouth once daily. 90 tablet 0    vitamin D (VITAMIN D3) 1000 units Tab Take 1,000 Units by mouth once daily.        cyanocobalamin (VITAMIN B-12) 100 MCG tablet Take 500 mcg by mouth once daily.        lisinopriL (PRINIVIL,ZESTRIL) 40 MG tablet Take 1 tablet (40 mg total) by mouth once daily. 30 tablet 11    meloxicam (MOBIC) 15 MG tablet Take 15 mg by mouth once daily.        multivitamin (THERAGRAN) per tablet Take 1 tablet by mouth once daily.          No current facility-administered medications on file prior to visit.      Social History               Socioeconomic History    Marital status:    Tobacco Use    Smoking status: Never Smoker    Smokeless tobacco: Never Used   Substance and Sexual Activity    Alcohol use: No    Drug use: No    Sexual activity: Not Currently       Partners: Male       Birth control/protection: Post-menopausal       Comment: 16  with same partner 35 years                 Family History   Problem Relation Age of Onset    Heart disease Mother      No Known Problems Father      No Known Problems Sister      No Known Problems Brother      No Known Problems Maternal Aunt      No Known Problems Maternal Uncle      No Known Problems Paternal Aunt      No Known Problems Paternal Uncle      No Known Problems Maternal Grandmother      No Known Problems Maternal Grandfather      No Known Problems Paternal Grandmother      No Known Problems Paternal Grandfather      Glaucoma Neg Hx           Review of Systems   All other systems reviewed and are negative.     Objective:   Wt 74.8 kg (165 lb)   BMI 29.23 kg/m²      Physical Exam   Constitutional: She is oriented to person, place, and time. She appears well-developed and well-nourished.   HENT:   Head: Normocephalic and atraumatic.   Cardiovascular: Normal rate and regular rhythm.  Exam reveals no gallop and no friction rub.    No murmur heard.  Pulmonary/Chest: Effort normal and breath sounds normal. No respiratory distress. She has no wheezes. She has no rales. Right breast exhibits no inverted nipple, no mass, no nipple discharge, no skin change and no tenderness. Left breast exhibits no inverted nipple, no mass, no nipple discharge, no skin change and no tenderness.       Lymphadenopathy:     She has no cervical adenopathy.     She has no axillary adenopathy.        Right: No supraclavicular adenopathy present.        Left: No supraclavicular adenopathy present.   Neurological: She is alert and oriented to person, place, and time.   Skin: Skin is warm and dry. No rash noted. No erythema. No pallor.     Psychiatric: She has a normal mood and affect. Her behavior is normal. Judgment and thought content normal.         Radiology review: Images personally reviewed by me in the clinic and shown to the patient during the consultation.      Assessment:       No diagnosis found.    Plan:   left breast, Clinical Stage IA ([id number]),  invasive ductal carcinoma with lobular features of the Upper-outer quadrant of breast, estrogen receptor Positive, progesterone receptor Positive, HER2 Negative     Multidisciplinary nature of breast cancer care was discussed in detail at today's visit.  She is presenting as part of the multidisciplinary clinic.     We discussed that surgical options would include a lumpectomy versus a mastectomy.  We discussed there is no survival benefit to undergoing a mastectomy compared to lumpectomy.  Based on clinical exam and imaging, she would be a good candidate for breast conservation.  Surgical technique and rationale was discussed with the patient.  We discussed that this would be done as a reflector localized excision of the primary tumor along with a surrounding margin of normal breast tissue.  The concept of local control was explained to the patient.  She understands that we would aim to achieve negative margins at the time of initial surgery.  There is however an approximately 15% risk of a positive margin that would require take back for reexcision.  We discussed that due to the lobular features of her breast cancer she may benefit from preoperative MRI to further assess the extent of disease.  Risks of surgery include but are not limited to infection, bleeding, poor cosmesis, positive margins requiring reexcision, and local and distant recurrence.  We also discussed option for mastectomy with or without reconstruction.  She is most interested in breast conservation.     We also discussed axillary staging using sentinel node biopsy.  [place] lymph node biopsies performed utilizing the injection of blue and radioactive dye.  This dye travels to the 1st few lymph nodes that drain the breast.  Lymph nodes that uptake the blue or radioactive dye or are palpable are surgically removed and sent to pathology.  Typically 1-5 lymph nodes are removed during this procedure although exact numbers vary depending on the  patient.  This procedure allows sampling of the lymph nodes most at risk for metastasis.   Risks include but are not limited to lymphedema, bleeding, infection, poor cosmesis, numbness of the incision site, seroma, failure of dye to map, nerve injury leading to permanent arm numbness and or muscle weakness, lymphedema, possibility of a false negative finding, and need for additional surgery.  If metastatic disease is identified on pathology of the lymph nodes been axillary dissection (a second surgery) may be recommended.       The role of adjuvant radiation therapy following breast conservation surgery was also discussed with the patient. We discussed that when looking at all patient populations risk of local recurrence with lumpectomy alone is high and radiation therapy is recommended in most cases. We discussed that final recommendations on radiation would be based on final surgical pathology.  She is meeting with Radiation Oncology today.     We also discussed the role of systemic therapy in the treatment of early stage breast cancer.  She has an early stage hormone receptor positive tumor. I do not anticipate she will need chemotherapy although final recommendations are pending surgical pathology. She will be recommended for anti-estrogen therapy.  She will discuss this with Medical Oncology today.     She does not meet NCCN guidelines for genetic testing based on her family history.  We discussed that genetic testing can be offered all breast cancer patients.  We discussed genetic testing at length and she will like to think about it.     Following her discussion today, Erma Linda is motivated to undergo breast conservation.  She would like time to consider her options however and will let us know when she would like to schedule surgery.     Surgery will be scheduled. Follow-up in clinic roughly 14 days after surgery.      Patient [facility].  All her questions were answered.     Total time spent with the patient: 60 minutes.  40 minutes of face to face consultation and 20 minutes of chart review and coordination of care.

## 2022-06-16 NOTE — ANESTHESIA POSTPROCEDURE EVALUATION
Anesthesia Post Evaluation    Patient: Erma Linda    Procedure(s) Performed: Procedure(s) (LRB):  MASTECTOMY, PARTIAL LEFT with radiological marker (Left)  BIOPSY, LYMPH NODE, SENTINEL (Left)  INJECTION, FOR SENTINEL NODE IDENTIFICATION (Left)  BIOPSY, BREAST / excisional biopsy left (Left)    Final Anesthesia Type: general      Patient location during evaluation: PACU  Patient participation: Yes- Able to Participate  Level of consciousness: awake and alert  Post-procedure vital signs: reviewed and stable  Pain management: adequate  Airway patency: patent    PONV status at discharge: No PONV  Anesthetic complications: no      Cardiovascular status: blood pressure returned to baseline  Respiratory status: unassisted and room air  Hydration status: euvolemic  Follow-up not needed.          Vitals Value Taken Time   /62 06/16/22 1658   Temp 36.7 °C (98.1 °F) 06/16/22 1626   Pulse 81 06/16/22 1704   Resp 16 06/16/22 1654   SpO2 97 % 06/16/22 1704   Vitals shown include unvalidated device data.      No case tracking events are documented in the log.      Pain/Carroll Score: Pain Rating Prior to Med Admin: 10 (6/16/2022  4:54 PM)  Pain Rating Post Med Admin: 7 (6/16/2022  5:00 PM)  Carroll Score: 8 (6/16/2022  4:55 PM)

## 2022-06-16 NOTE — PATIENT INSTRUCTIONS
POSTOPERATIVE INSTRUCTIONS FOLLOWING BIOPSY OR LUMPECTOMY    The following are post-operative instructions that will help you to recover from your surgery.  Please read over these instructions carefully and contact us if we can answer any of your questions or concerns.    Wound Care  A surgical bra may be placed around your chest after your surgery.  If you are given the bra, please wear it as close to 24 hours a day as possible until your post-operative clinic appointment (2 weeks after surgery).  If the elastic around the bra irritates your skin, you may wear a soft t-shirt underneath the bra.  You may go without wearing the bra long enough to shower, to launder and dry the bra.  If the bra is extremely uncomfortable, you may wear a supportive sports bra instead after 2 days.  You may shower the day after surgery.  Do not take a tub bath and do not soak the surgical site for 2 weeks.  If you had lymph node surgery a blue dye was utilized. This may turn your skin, urine or stool temporarily blue. This is expected and will improve in a few days.     Activity   You should be able to return to your regular activities 2 days after your surgery.  However, do not engage in strenuous activities in which you use your upper body such as:  golf, tennis, aerobics, washing windows, raking the yard, mopping, vacuuming, heavy lifting (>15 lbs,e.g children) until you are seen for your follow-up appointment in clinic (about 2 weeks).    Medication for pain  You may find that over the counter pain medications may be sufficient for your pain.  You will be given a prescription for pain medication for more severe pain.  You should not drive or operate machinery while taking these.  Please take narcotics with food.  Narcotics can cause, or worsen constipation.  If taking narcotics you will need to increase your fluid intake, eat high fiber foods (such as fruits and bran) and make sure that you are up and walking. You may need to take  an over the counter stool softener for constipation.      Please report the following:  Temperature greater than 101 degrees  Discharge or bad odor from the wound  Excessive bleeding, such as bloody dressing or extreme bruising  Redness at incision and/or drain sites  Swelling or buildup of fluid around incision    Additional information  I will see you approximately 2 weeks following your surgery.  If this follow-up appointment has not been made, please call the office.    If you have any questions or problems, please call my office or my nurse.  Dr. Jessica Delong MD  If you have questions, please call my nurse: Xochitl at [phone] or Poly at [phone]    After hours and on weekends, you may call the main [facility] line at [phone] and ask to have the general surgery resident paged or have me paged.    If directed to the emergency room it would be best to proceed to the [facility] ER. However if very ill or unable to travel safely then please present to your nearest ER.

## 2022-06-17 NOTE — PLAN OF CARE
Erma Pinckard Maddi has met all discharge criteria from Phase II. Vital Signs are stable, ambulating  without difficulty. Discharge instructions given, patient verbalized understanding. Discharged from facility via wheelchair in stable condition.

## 2022-06-29 ENCOUNTER — OFFICE VISIT (OUTPATIENT)
Dept: SURGERY | Facility: CLINIC | Age: 62
End: 2022-06-29
Payer: COMMERCIAL

## 2022-06-29 ENCOUNTER — PATIENT MESSAGE (OUTPATIENT)
Dept: FAMILY MEDICINE | Facility: CLINIC | Age: 62
End: 2022-06-29
Payer: COMMERCIAL

## 2022-06-29 ENCOUNTER — PATIENT MESSAGE (OUTPATIENT)
Dept: SURGERY | Facility: OTHER | Age: 62
End: 2022-06-29
Payer: COMMERCIAL

## 2022-06-29 ENCOUNTER — DOCUMENTATION ONLY (OUTPATIENT)
Dept: SURGERY | Facility: CLINIC | Age: 62
End: 2022-06-29
Payer: COMMERCIAL

## 2022-06-29 VITALS
WEIGHT: 165 LBS | HEART RATE: 84 BPM | DIASTOLIC BLOOD PRESSURE: 87 MMHG | SYSTOLIC BLOOD PRESSURE: 191 MMHG | BODY MASS INDEX: 29.23 KG/M2 | HEIGHT: 63 IN

## 2022-06-29 DIAGNOSIS — I10 ESSENTIAL HYPERTENSION: ICD-10-CM

## 2022-06-29 DIAGNOSIS — Z17.0 MALIGNANT NEOPLASM OF UPPER-OUTER QUADRANT OF LEFT BREAST IN FEMALE, ESTROGEN RECEPTOR POSITIVE: Primary | ICD-10-CM

## 2022-06-29 DIAGNOSIS — Z17.0 CARCINOMA OF UPPER-OUTER QUADRANT OF LEFT BREAST IN FEMALE, ESTROGEN RECEPTOR POSITIVE: Primary | ICD-10-CM

## 2022-06-29 DIAGNOSIS — C50.412 CARCINOMA OF UPPER-OUTER QUADRANT OF LEFT BREAST IN FEMALE, ESTROGEN RECEPTOR POSITIVE: Primary | ICD-10-CM

## 2022-06-29 DIAGNOSIS — C50.412 MALIGNANT NEOPLASM OF UPPER-OUTER QUADRANT OF LEFT BREAST IN FEMALE, ESTROGEN RECEPTOR POSITIVE: Primary | ICD-10-CM

## 2022-06-29 PROCEDURE — 4010F PR ACE/ARB THEARPY RXD/TAKEN: ICD-10-PCS | Mod: CPTII,S$GLB,, | Performed by: SURGERY

## 2022-06-29 PROCEDURE — 3044F PR MOST RECENT HEMOGLOBIN A1C LEVEL <7.0%: ICD-10-PCS | Mod: CPTII,S$GLB,, | Performed by: SURGERY

## 2022-06-29 PROCEDURE — 3008F BODY MASS INDEX DOCD: CPT | Mod: CPTII,S$GLB,, | Performed by: SURGERY

## 2022-06-29 PROCEDURE — 1159F MED LIST DOCD IN RCRD: CPT | Mod: CPTII,S$GLB,, | Performed by: SURGERY

## 2022-06-29 PROCEDURE — 99999 PR PBB SHADOW E&M-EST. PATIENT-LVL IV: ICD-10-PCS | Mod: PBBFAC,,, | Performed by: SURGERY

## 2022-06-29 PROCEDURE — 3079F PR MOST RECENT DIASTOLIC BLOOD PRESSURE 80-89 MM HG: ICD-10-PCS | Mod: CPTII,S$GLB,, | Performed by: SURGERY

## 2022-06-29 PROCEDURE — 99024 POSTOP FOLLOW-UP VISIT: CPT | Mod: S$GLB,,, | Performed by: SURGERY

## 2022-06-29 PROCEDURE — 1160F RVW MEDS BY RX/DR IN RCRD: CPT | Mod: CPTII,S$GLB,, | Performed by: SURGERY

## 2022-06-29 PROCEDURE — 1159F PR MEDICATION LIST DOCUMENTED IN MEDICAL RECORD: ICD-10-PCS | Mod: CPTII,S$GLB,, | Performed by: SURGERY

## 2022-06-29 PROCEDURE — 3044F HG A1C LEVEL LT 7.0%: CPT | Mod: CPTII,S$GLB,, | Performed by: SURGERY

## 2022-06-29 PROCEDURE — 3077F PR MOST RECENT SYSTOLIC BLOOD PRESSURE >= 140 MM HG: ICD-10-PCS | Mod: CPTII,S$GLB,, | Performed by: SURGERY

## 2022-06-29 PROCEDURE — 3008F PR BODY MASS INDEX (BMI) DOCUMENTED: ICD-10-PCS | Mod: CPTII,S$GLB,, | Performed by: SURGERY

## 2022-06-29 PROCEDURE — 1160F PR REVIEW ALL MEDS BY PRESCRIBER/CLIN PHARMACIST DOCUMENTED: ICD-10-PCS | Mod: CPTII,S$GLB,, | Performed by: SURGERY

## 2022-06-29 PROCEDURE — 4010F ACE/ARB THERAPY RXD/TAKEN: CPT | Mod: CPTII,S$GLB,, | Performed by: SURGERY

## 2022-06-29 PROCEDURE — 99024 PR POST-OP FOLLOW-UP VISIT: ICD-10-PCS | Mod: S$GLB,,, | Performed by: SURGERY

## 2022-06-29 PROCEDURE — 3079F DIAST BP 80-89 MM HG: CPT | Mod: CPTII,S$GLB,, | Performed by: SURGERY

## 2022-06-29 PROCEDURE — 3077F SYST BP >= 140 MM HG: CPT | Mod: CPTII,S$GLB,, | Performed by: SURGERY

## 2022-06-29 PROCEDURE — 99999 PR PBB SHADOW E&M-EST. PATIENT-LVL IV: CPT | Mod: PBBFAC,,, | Performed by: SURGERY

## 2022-06-29 RX ORDER — LISINOPRIL 40 MG/1
40 TABLET ORAL DAILY
Qty: 30 TABLET | Refills: 11 | OUTPATIENT
Start: 2022-06-29 | End: 2023-06-29

## 2022-06-29 NOTE — NURSING
Nurse Navigator Note:     Met with patient during her consult with Dr. Delong.  Patient and I reviewed the information she discussed with Dr. Delong, including treatment options, diagnosis, and future plans for workup. Patient and I went through the new patient binder, explained some of the information and why it is provided.     Also offered patient consults with our other specialty clinics: Dr. Rick for gynecological health during treatment, our breast physical therapy department for pre-op and post-operative assessments, Dr. Michelle for psychological support, and Bety Ruiz for nutritional counseling. Explained to patient that all of these support services are completely optional. Discussed that physical therapy may call patient to offer pre-op appt, and what that appt would entail.     Patient was given a copy of her appointments, Dr. Delong's card, and my card. Encouraged her to call me if she has any questions or concerns or would like to schedule any additional appointments. Verbalized understanding of all information.   Oncology Navigation   Intake  Date of Diagnosis: 6/16/2022  Cancer Type: Breast  Internal / External Referral: Internal  Referral Source: [facility]  Date of Referral: 2/28/2022  Initial Nurse Navigator Contact: 6/29/2022  Referral to Initial Contact Timeline (days): 121  Date Worked: 6/29/2022  First Appointment Available: 6/29/2022  Appointment Date: 6/29/2022  First Available Date vs. Scheduled Date (days): 0     Treatment  Current Status: Staging work-up  Date Presented to Tumor Board: 7/5/2022    Surgery: Planned  Surgical Oncologist: Baljeet  Type of Surgery: L margin re-excision (may choose mastectomy, will need plastics if )  Consult Date: 3/21/2022    Medical Oncologist: Dr.Zoe Camarillo  Consult Date: 3/21/2022    Radiation Oncologist: Dr.Angela Aranda    Procedures: Biopsy; Ultrasound; Mammogram  Biopsy Schedule Date: 6/16/2022  Mammo Schedule Date: 2/28/2022  Ultrasound Schedule Date:  2/28/2022    General Referrals: Integrative medicine    ER: Positive  MO: Positive  Her2: Negative    Radiation Oncologist: Dr.Angela Aranda    Support Systems: Family members     Acuity  Treatment Tolerability: Has not started treatment yet/treatment fully completed and side effects resolved  Hospitalization Within the Past Month: 0   Needed: 0  Support: 0  Verbalizes Financial Concerns: 0  Transportation: 0  History of noncompliance/frequent no shows and cancellations: 0  Verbalizes the need for more education: 0  Navigation Acuity: 0     Follow Up  No follow-ups on file.

## 2022-06-29 NOTE — TELEPHONE ENCOUNTER
No new care gaps identified.  [facility] Embedded Care Gaps. Reference number: [id number]. 6/29/2022   3:39:23 PM CDT

## 2022-06-30 ENCOUNTER — PATIENT MESSAGE (OUTPATIENT)
Dept: HEMATOLOGY/ONCOLOGY | Facility: CLINIC | Age: 62
End: 2022-06-30
Payer: COMMERCIAL

## 2022-06-30 NOTE — PROGRESS NOTES
[facility]       Post-Op        REFERRING PHYSICIAN:  No referring provider defined for this encounter.       Marry Howard MD    MEDICAL ONCOLOGIST:    Pending  RADIATION ONCOLOGIST:   Pending    DIAGNOSIS:    This is a 62 y.o. female with a stage pT2 N0 M0 grade 1 ER positive CA positive HER2 negative invasive mammary carcinoma with mixed ductal and lobular features of the left breast.    TREATMENT SUMMARY:  The patient is status post left partial mastectomy and sentinel node biopsy on 6/16/2022.  Final pathology showed     1. LEFT BREAST, 2:00, PARTIAL MASTECTOMY:   - Multifocal invasive mammary carcinoma with mixed ductal and lobular   features (2 foci), grade 1, at least     2.7 cm and 2.0 cm.   - Lobular carcinoma in situ (LCIS), classic type.   - Biopsy site, two biopsy clips and two reflector tags are identified.   - See CAP synoptic report.   2. LEFT BREAST, 2:00 NEW MEDIAL MARGIN, RED, PARTIAL MASTECTOMY:   - Negative for atypia or malignancy.   - Benign fibroadipose tissue.   - The specimen is entirely submitted.   - See CAP synoptic report.   3. LEFT BREAST, 2:00 NEW INFERIOR MARGIN, GREEN, PARTIAL MASTECTOMY:   - Residual invasive mammary carcinoma with mixed ductal and lobular features,   grade 1, 1.2 cm.   - The invasive carcinoma is present at the inked margin (extent involved is   0.6 cm).   - The specimen is entirely submitted.   - See CAP synoptic report.   4. LEFT BREAST, 2:00 FINAL INFERIOR MARGIN, GREEN, PARTIAL MASTECTOMY:   - Residual invasive mammary carcinoma with mixed ductal and lobular features,   grade 1, 0.4 cm.   - The carcinoma is <0.1 cm from the final inferior margin.   - The specimen is entirely submitted.   - See CAP synoptic report.   5. LEFT BREAST, 3:00, EXCISIONAL BIOPSY:   - Multifocal invasive mammary carcinoma with mixed ductal and lobular   features (2 foci), grade 1, at least     1.0 cm and 0.3 cm.   - Lobular carcinoma in situ (LCIS), classic type.   -  Biopsy site, biopsy clip and reflector tag are identified.   - Additional findings: Columnar cell change and hemorrhage.   - The specimen is submitted entirely.   6. LEFT AXILLARY SENTINEL LYMPH NODE #1, HOT AND BLUE 753, DISSECTION:   - One lymph node, negative for carcinoma (0/1).   - See CAP synoptic report.   7. LEFT AXILLARY SENTINEL LYMPH NODE #2, 381, DISSECTION:   - One lymph node, negative for carcinoma (0/1).   - Biopsy clip identified.   - See CAP synoptic report.   8. LEFT AXILLARY SENTINEL LYMPH NODE #3, HOT 95, DISSECTION:   - One lymph node, negative for carcinoma (0/1).   - See CAP synoptic report.   SURGICAL PATHOLOGY CANCER CASE SUMMARY- Breast   Procedure: Excisions (less than mastectomy).   Specimen laterality: Left.   Tumor sites: 2:00 and 3:00.   Tumor sizes: At least 2.7 cm, 2.0 cm, 1.0 cm and 0.3 cm (greatest dimension).   Histologic type: Invasive mammary carcinoma with mixed ductal and lobular   features.   Histologic grade: Grade 1 (glandular/tubular differentiation- 3, nuclear   pleomorphism- 1, mitotic rate- 1)   Tumor focality: Multifocal (4 foci total for all submitted parts).   Ductal carcinoma in situ: Not identified.   Lobular carcinoma in situ: Present.   Margins-   2:00 SITE (including additional margins):   Invasive carcinoma is present at the anterior (extent of involvement is 0.1   cm) and posterior (extent of involvement is 0.3 cm) margins. It is <0.1 cm   from the inferior margin. All other margins are >0.5 cm away.   3:00 SITE:   Invasive carcinoma is present at the superior (extent of involvement is 0.4   cm) and medial (extent of involvement is 0.4 cm) margins. It is 0.3 cm from   the posterior and lateral margins, 0.5 cm from the inferior margin and >0.5   cm from the anterior margin.   Treatment effect: No known presurgical therapy.   Lymphovascular invasion: Not identified.   Perineural invasion: Present, extensive.   Lymph nodes:   Total number of sentinel lymph nodes  examined: 3.   Total number of lymph nodes examined (sentinel and non-sentinel): 3.   Number of lymph nodes with Macrometastases (greater than 2 mm): 0.   Number of lymph nodes with Micrometastases (greater than 0.2 mm to 2 mm   and/or greater than 200 cells): 0.   Number of lymph nodes with isolated tumor cells (0.2 mm or less or 200 cells   or less): 0.   Distant sites: Not applicable.   Pathologic staging (pTNM, AJCC 8th edition): mpT2 pN0.   Ancillary studies- (Performed on biopsy specimen UBS-, Mass 1,   interpreted by Dr. JUAN FRANCISCO Mcknight)   ER: Positive (strong intensity, 100%).   ME: Positive (strong intensity, 75%).   HER2: Negative.   Ki-67: 5%.   Ancillary studies- (Performed on biopsy specimen UBS-, Mass 2,   interpreted by Dr. JUAN FRANCISCO Mcknight)   ER: Positive (strong intensity, 100%).   ME: Positive (strong intensity, 80%).   HER2: Negative.   Ki-67: 5%.   Ancillary studies- (3:00 specimen, Mass 3, at least 1.0 cm)   ER: Positive (weak to strong intensity, 60%).   ME: Positive (weak to strong intensity, 10%).   HER2: Negative, 1+.   Ki-67: 31-40%.   Blocks for potential molecular or ancillary studies:   Tumor block: 1W (2:00 specimen, Mass 1, 2.7 cm), 1AF (2:00 specimen, Mass 2,   2.0 cm), 5T (3:00 specimen, Mass 3, at least 1.0 cm), 5H (3:00 specimen, Mass   4, 0.3 cm).     INTERVAL HISTORY:   Erma Linda comes in for a post-op check. Her pain is well controlled.      MEDICATIONS:  Current Outpatient Medications   Medication Sig Dispense Refill    amLODIPine (NORVASC) 10 MG tablet Take 1 tablet (10 mg total) by mouth once daily. 90 tablet 1    cyanocobalamin (VITAMIN B-12) 100 MCG tablet Take 500 mcg by mouth once daily.      hydroCHLOROthiazide (HYDRODIURIL) 12.5 MG Tab Take 1 tablet (12.5 mg total) by mouth once daily. 90 tablet 0    ibuprofen (ADVIL,MOTRIN) 800 MG tablet Take 1 tablet (800 mg total) by mouth every 6 (six) hours as needed for Pain. 36 tablet 0    multivitamin  (THERAGRAN) per tablet Take 1 tablet by mouth once daily.      vitamin D (VITAMIN D3) 1000 units Tab Take 1,000 Units by mouth once daily.      lisinopriL (PRINIVIL,ZESTRIL) 40 MG tablet Take 1 tablet (40 mg total) by mouth once daily. 30 tablet 11    oxyCODONE (ROXICODONE) 5 MG immediate release tablet Take 1 tablet (5 mg total) by mouth every 4 (four) hours as needed for Pain. (Patient not taking: Reported on 6/29/2022) 12 tablet 0     No current facility-administered medications for this visit.       ALLERGIES:   Review of patient's allergies indicates:   Allergen Reactions    Hydralazine analogues Palpitations       PHYSICAL EXAMINATION:   General:  This is a well appearing female with appropriate speech, affect and gait.     Breast:  Incision clean, dry, and intact    IMPRESSION:   Multifocal disease with multiple positive margins    PLAN:   Discussed that the pathology shows multifocal disease with multiple close margins.  We discussed the area of prior ALH actually showed additional site of cancer.  Given the multiple close margins we discussed the options for additional surgery.  We discussed option would be a multiple site margin re-excision or mastectomy.  She does not require any additional lymph node surgery.  We discussed that with multiple site margin re-excision the goal would be to re-excise areas under concern.  We discussed that the risk of the surgery would be that she would require additional surgery if margins were still positive after the 2nd surgery.  We discussed that with more tissue being removed she may have an increase cosmetic defect from the lateral breast.  We discussed the option for mastectomy in detail including the options for reconstruction.  She is most comfortable proceeding with margin re-excision.  We will plan this at the next available date.  We will plan to send her to Medical Oncology and Radiation Oncology for consultation after final pathology from her 2nd  surgery.

## 2022-07-01 ENCOUNTER — HOSPITAL ENCOUNTER (OUTPATIENT)
Dept: PREADMISSION TESTING | Facility: OTHER | Age: 62
Discharge: HOME | End: 2022-07-01
Attending: SURGERY
Payer: COMMERCIAL

## 2022-07-01 ENCOUNTER — ANESTHESIA EVENT (OUTPATIENT)
Dept: SURGERY | Facility: OTHER | Age: 62
End: 2022-07-01
Payer: COMMERCIAL

## 2022-07-01 VITALS
OXYGEN SATURATION: 98 % | HEART RATE: 93 BPM | BODY MASS INDEX: 29.23 KG/M2 | WEIGHT: 165 LBS | DIASTOLIC BLOOD PRESSURE: 82 MMHG | TEMPERATURE: 99 F | SYSTOLIC BLOOD PRESSURE: 174 MMHG

## 2022-07-01 RX ORDER — LIDOCAINE HYDROCHLORIDE 10 MG/ML
0.5 INJECTION, SOLUTION EPIDURAL; INFILTRATION; INTRACAUDAL; PERINEURAL ONCE
Status: CANCELLED | OUTPATIENT
Start: 2022-07-01 | End: 2022-07-01

## 2022-07-01 RX ORDER — SODIUM CHLORIDE, SODIUM LACTATE, POTASSIUM CHLORIDE, CALCIUM CHLORIDE 600; 310; 30; 20 MG/100ML; MG/100ML; MG/100ML; MG/100ML
INJECTION, SOLUTION INTRAVENOUS CONTINUOUS
Status: CANCELLED | OUTPATIENT
Start: 2022-07-01

## 2022-07-01 NOTE — DISCHARGE INSTRUCTIONS
Information to Prepare you for your Surgery    PRE-ADMIT TESTING -  [phone]    [address] [facility]          Your surgery has been scheduled at [facility]. We are pleased to have the opportunity to serve you. For Further Information please call [phone].    On the day of surgery please report to the Information Desk on the 1st floor.    CONTACT YOUR PHYSICIAN'S OFFICE THE DAY PRIOR TO YOUR SURGERY TO OBTAIN YOUR ARRIVAL TIME.     The evening before surgery do not eat anything after 9 p.m. ( this includes hard candy, chewing gum and mints).  You may only have GATORADE, POWERADE AND WATER  from 9 p.m. until you leave your home.   DO NOT DRINK ANY LIQUIDS ON THE WAY TO THE HOSPITAL.      Why does your anesthesiologist allow you to drink Gatorade/Powerade before surgery?  Gatorade/Powerade helps to increase your comfort before surgery and to decrease your nausea after surgery. The carbohydrates in Gatorade/Powerade help reduce your body's stress response to surgery.  If you are a diabetic-drink only water prior to surgery.      Current Visitor policy(12/27/2021) - Patients may have 2 visitors pre and post procedure. Only 2 visitors will be allowed in the Surgical building with the patient.     SPECIAL MEDICATION INSTRUCTIONS: TAKE medications checked off by the Anesthesiologist on your Medication List.    Angiogram Patients: Take medications as instructed by your physician, including aspirin.     Surgery Patients:    If you take ASPIRIN - Your PHYSICIAN/SURGEON will need to inform you IF/OR when you need to stop taking aspirin prior to your surgery.     Do Not take any medications containing IBUPROFEN.    Do Not Wear any make-up (especially eye make-up) to surgery. Please remove any false eyelashes or eyelash extensions. If you arrive the day of surgery with makeup/eyelashes on you will be required to remove prior to surgery. (There is a risk of corneal  abrasions if eye makeup/eyelash extensions are not removed)      Leave all valuables at home.   Do Not wear any jewelry or watches, including any metal in body piercings. Jewelry must be removed prior to coming to the hospital.  There is a possibility that rings that are unable to be removed may be cut off if they are on the surgical extremity.    Please remove all hair extensions, wigs, clips and any other metal accessories/ ornaments from your hair.  These items may pose a flammable/fire risk in Surgery and must be removed.    Do not shave your surgical area at least 5 days prior to your surgery. The surgical prep will be performed at the hospital according to Infection Control regulations.    Contact Lens must be removed before surgery. Either do not wear the contact lens or bring a case and solution for storage.  Please bring a container for eyeglasses or dentures as required.  Bring any paperwork your physician has provided, such as consent forms,  history and physicals, doctor's orders, etc.   Bring comfortable clothes that are loose fitting to wear upon discharge. Take into consideration the type of surgery being performed.  Maintain your diet as advised per your physician the day prior to surgery.      Adequate rest the night before surgery is advised.   Park in the Parking lot behind the hospital or in the [employer] [facility] across the street from the parking lot. Parking is complimentary.  If you will be discharged the same day as your procedure, please arrange for a responsible adult to drive you home or to accompany you if traveling by taxi.   YOU WILL NOT BE PERMITTED TO DRIVE OR TO LEAVE THE HOSPITAL ALONE AFTER SURGERY.   If you are being discharged the same day, it is strongly recommended that you arrange for someone to remain with you for the first 24 hrs following your surgery.    The Surgeon will speak to your family/visitor after your surgery regarding the outcome of your surgery and post op  care.  The Surgeon may speak to you after your surgery, but there is a possibility you may not remember the details.  Please check with your family members regarding the conversation with the Surgeon.    We strongly recommend whoever is bringing you home be present for discharge instructions.  This will ensure a thorough understanding for your post op home care.    ALL CHILDREN MUST ALWAYS BE ACCOMPANIED BY AN ADULT.    Visitors-Refer to current Visitor policy handouts.    Thank you for your cooperation.  The Staff of [facility].            Bathing Instructions with Hibiclens    Shower the evening before and morning of your procedure with Hibiclens:  Wash your face with water and your regular face wash/soap  Apply Hibiclens directly on your skin or on a wet washcloth and wash gently. When showering: Move away from the shower stream when applying Hibiclens to avoid rinsing off too soon.  Rinse thoroughly with warm water  Do not dilute Hibiclens        Dry off as usual, do not use any deodorant, powder, body lotions, perfume, after shave or cologne.

## 2022-07-01 NOTE — ANESTHESIA PREPROCEDURE EVALUATION
07/01/2022  Erma Linda is a 62 y.o., female.      Pre-op Assessment    I have reviewed the Patient Summary Reports.     I have reviewed the Nursing Notes.    I have reviewed the Medications.     Review of Systems  Anesthesia Hx:  No problems with previous Anesthesia  History of prior surgery of interest to airway management or planning: Previous anesthesia: General GA KATEY [facility] 3 weeks ago [facility] with general anesthesia.  Procedure performed at an [facility] Facility. Denies Family Hx of Anesthesia complications.   Denies Personal Hx of Anesthesia complications.   Social:  Non-Smoker    Hematology/Oncology:  Hematology Normal      Current/Recent Cancer. Breast left surgery   EENT/Dental:EENT/Dental Normal   Cardiovascular:   Hypertension    Pulmonary:  Pulmonary Normal    Renal/:  Renal/ Normal     Hepatic/GI:   Liver Disease, Hepatitis, C Treated Hep C   Musculoskeletal:   Arthritis   Spine Disorders: lumbar Degenerative disease    Neurological:  Neurology Normal    Endocrine:  Endocrine Normal    Dermatological:  Skin Normal    Psych:  Psychiatric Normal           Physical Exam  General: Well nourished, Cooperative, Alert and Oriented    Airway:  Mallampati: I   Mouth Opening: Normal  Neck ROM: Normal ROM    Dental:  Dentures        Anesthesia Plan  Type of Anesthesia, risks & benefits discussed:    Anesthesia Type: MAC  Intra-op Monitoring Plan: Standard ASA Monitors  Informed Consent: Informed consent signed with the Patient and all parties understand the risks and agree with anesthesia plan.  All questions answered.   ASA Score: 2  Anesthesia Plan Notes: Had GA KATEY 3 weeks ago, now for re excision under MAC  Devices on rt side    Ready For Surgery From Anesthesia Perspective.     .

## 2022-07-02 RX ORDER — LISINOPRIL 40 MG/1
40 TABLET ORAL DAILY
Qty: 30 TABLET | Refills: 11 | OUTPATIENT
Start: 2022-07-02 | End: 2023-07-02

## 2022-07-05 ENCOUNTER — TELEPHONE (OUTPATIENT)
Dept: SURGERY | Facility: CLINIC | Age: 62
End: 2022-07-05
Payer: COMMERCIAL

## 2022-07-05 NOTE — TELEPHONE ENCOUNTER
Called to discus tumor board recommendations.   Discussed that margin excision would have a cosmetic deficit and she could consider an oncoplastic reduction. Discussed referral to plastic surgery. She declined.   Proceed as scheduled with margin re-excision.

## 2022-07-06 ENCOUNTER — TELEPHONE (OUTPATIENT)
Dept: SURGERY | Facility: CLINIC | Age: 62
End: 2022-07-06
Payer: COMMERCIAL

## 2022-07-06 NOTE — TELEPHONE ENCOUNTER
Spoke with patient regarding concern for worsening seroma. Patient will come in tomorrow (7/7) for evaluation. Patient verbalized understanding. All questions asked and answered.

## 2022-07-07 ENCOUNTER — OFFICE VISIT (OUTPATIENT)
Dept: SURGERY | Facility: CLINIC | Age: 62
End: 2022-07-07
Payer: COMMERCIAL

## 2022-07-07 VITALS
SYSTOLIC BLOOD PRESSURE: 156 MMHG | HEIGHT: 63 IN | BODY MASS INDEX: 29.23 KG/M2 | WEIGHT: 165 LBS | DIASTOLIC BLOOD PRESSURE: 81 MMHG | HEART RATE: 94 BPM

## 2022-07-07 DIAGNOSIS — Z98.890 S/P LUMPECTOMY, LEFT BREAST: ICD-10-CM

## 2022-07-07 DIAGNOSIS — Z09 POSTOP CHECK: ICD-10-CM

## 2022-07-07 DIAGNOSIS — Z85.3 PERSONAL HISTORY OF BREAST CANCER: ICD-10-CM

## 2022-07-07 DIAGNOSIS — N64.89 SEROMA OF BREAST: Primary | ICD-10-CM

## 2022-07-07 PROCEDURE — 3008F PR BODY MASS INDEX (BMI) DOCUMENTED: ICD-10-PCS | Mod: CPTII,S$GLB,, | Performed by: PHYSICIAN ASSISTANT

## 2022-07-07 PROCEDURE — 3079F PR MOST RECENT DIASTOLIC BLOOD PRESSURE 80-89 MM HG: ICD-10-PCS | Mod: CPTII,S$GLB,, | Performed by: PHYSICIAN ASSISTANT

## 2022-07-07 PROCEDURE — 3044F PR MOST RECENT HEMOGLOBIN A1C LEVEL <7.0%: ICD-10-PCS | Mod: CPTII,S$GLB,, | Performed by: PHYSICIAN ASSISTANT

## 2022-07-07 PROCEDURE — 99999 PR PBB SHADOW E&M-EST. PATIENT-LVL III: ICD-10-PCS | Mod: PBBFAC,,, | Performed by: PHYSICIAN ASSISTANT

## 2022-07-07 PROCEDURE — 99024 PR POST-OP FOLLOW-UP VISIT: ICD-10-PCS | Mod: S$GLB,,, | Performed by: PHYSICIAN ASSISTANT

## 2022-07-07 PROCEDURE — 99024 POSTOP FOLLOW-UP VISIT: CPT | Mod: S$GLB,,, | Performed by: PHYSICIAN ASSISTANT

## 2022-07-07 PROCEDURE — 4010F ACE/ARB THERAPY RXD/TAKEN: CPT | Mod: CPTII,S$GLB,, | Performed by: PHYSICIAN ASSISTANT

## 2022-07-07 PROCEDURE — 99999 PR PBB SHADOW E&M-EST. PATIENT-LVL III: CPT | Mod: PBBFAC,,, | Performed by: PHYSICIAN ASSISTANT

## 2022-07-07 PROCEDURE — 3044F HG A1C LEVEL LT 7.0%: CPT | Mod: CPTII,S$GLB,, | Performed by: PHYSICIAN ASSISTANT

## 2022-07-07 PROCEDURE — 3077F PR MOST RECENT SYSTOLIC BLOOD PRESSURE >= 140 MM HG: ICD-10-PCS | Mod: CPTII,S$GLB,, | Performed by: PHYSICIAN ASSISTANT

## 2022-07-07 PROCEDURE — 3079F DIAST BP 80-89 MM HG: CPT | Mod: CPTII,S$GLB,, | Performed by: PHYSICIAN ASSISTANT

## 2022-07-07 PROCEDURE — 3077F SYST BP >= 140 MM HG: CPT | Mod: CPTII,S$GLB,, | Performed by: PHYSICIAN ASSISTANT

## 2022-07-07 PROCEDURE — 3008F BODY MASS INDEX DOCD: CPT | Mod: CPTII,S$GLB,, | Performed by: PHYSICIAN ASSISTANT

## 2022-07-07 PROCEDURE — 4010F PR ACE/ARB THEARPY RXD/TAKEN: ICD-10-PCS | Mod: CPTII,S$GLB,, | Performed by: PHYSICIAN ASSISTANT

## 2022-07-07 NOTE — PROGRESS NOTES
[facility]      Post-Op        REFERRING PHYSICIAN:  No referring provider defined for this encounter.       Marry Howard MD    MEDICAL ONCOLOGIST:    Pending  RADIATION ONCOLOGIST:   Pending    DIAGNOSIS:    This is a 62 y.o. female with a stage pT2 N0 M0 grade 1 ER positive [place] positive HER2 negative invasive mammary carcinoma with mixed ductal and lobular features of the left breast.    TREATMENT SUMMARY:  The patient is status post left partial mastectomy and sentinel node biopsy on 6/16/2022.  Final pathology showed     1. LEFT BREAST, 2:00, PARTIAL MASTECTOMY:   - Multifocal invasive mammary carcinoma with mixed ductal and lobular   features (2 foci), grade 1, at least     2.7 cm and 2.0 cm.   - Lobular carcinoma in situ (LCIS), classic type.   - Biopsy site, two biopsy clips and two reflector tags are identified.   - See CAP synoptic report.   2. LEFT BREAST, 2:00 NEW MEDIAL MARGIN, RED, PARTIAL MASTECTOMY:   - Negative for atypia or malignancy.   - Benign fibroadipose tissue.   - The specimen is entirely submitted.   - See CAP synoptic report.   3. LEFT BREAST, 2:00 NEW INFERIOR MARGIN, GREEN, PARTIAL MASTECTOMY:   - Residual invasive mammary carcinoma with mixed ductal and lobular features,   grade 1, 1.2 cm.   - The invasive carcinoma is present at the inked margin (extent involved is   0.6 cm).   - The specimen is entirely submitted.   - See CAP synoptic report.   4. LEFT BREAST, 2:00 FINAL INFERIOR MARGIN, GREEN, PARTIAL MASTECTOMY:   - Residual invasive mammary carcinoma with mixed ductal and lobular features,   grade 1, 0.4 cm.   - The carcinoma is <0.1 cm from the final inferior margin.   - The specimen is entirely submitted.   - See CAP synoptic report.   5. LEFT BREAST, 3:00, EXCISIONAL BIOPSY:   - Multifocal invasive mammary carcinoma with mixed ductal and lobular   features (2 foci), grade 1, at least     1.0 cm and 0.3 cm.   - Lobular carcinoma in situ (LCIS), classic type.   -  Biopsy site, biopsy clip and reflector tag are identified.   - Additional findings: Columnar cell change and hemorrhage.   - The specimen is submitted entirely.   6. LEFT AXILLARY SENTINEL LYMPH NODE #1, HOT AND BLUE 753, DISSECTION:   - One lymph node, negative for carcinoma (0/1).   - See CAP synoptic report.   7. LEFT AXILLARY SENTINEL LYMPH NODE #2, 381, DISSECTION:   - One lymph node, negative for carcinoma (0/1).   - Biopsy clip identified.   - See CAP synoptic report.   8. LEFT AXILLARY SENTINEL LYMPH NODE #3, HOT 95, DISSECTION:   - One lymph node, negative for carcinoma (0/1).   - See CAP synoptic report.   SURGICAL PATHOLOGY CANCER CASE SUMMARY- Breast   Procedure: Excisions (less than mastectomy).   Specimen laterality: Left.   Tumor sites: 2:00 and 3:00.   Tumor sizes: At least 2.7 cm, 2.0 cm, 1.0 cm and 0.3 cm (greatest dimension).   Histologic type: Invasive mammary carcinoma with mixed ductal and lobular   features.   Histologic grade: Grade 1 (glandular/tubular differentiation- 3, nuclear   pleomorphism- 1, mitotic rate- 1)   Tumor focality: Multifocal (4 foci total for all submitted parts).   Ductal carcinoma in situ: Not identified.   Lobular carcinoma in situ: Present.   Margins-   2:00 SITE (including additional margins):   Invasive carcinoma is present at the anterior (extent of involvement is 0.1   cm) and posterior (extent of involvement is 0.3 cm) margins. It is <0.1 cm   from the inferior margin. All other margins are >0.5 cm away.   3:00 SITE:   Invasive carcinoma is present at the superior (extent of involvement is 0.4   cm) and medial (extent of involvement is 0.4 cm) margins. It is 0.3 cm from   the posterior and lateral margins, 0.5 cm from the inferior margin and >0.5   cm from the anterior margin.   Treatment effect: No known presurgical therapy.   Lymphovascular invasion: Not identified.   Perineural invasion: Present, extensive.   Lymph nodes:   Total number of sentinel lymph nodes  examined: 3.   Total number of lymph nodes examined (sentinel and non-sentinel): 3.   Number of lymph nodes with Macrometastases (greater than 2 mm): 0.   Number of lymph nodes with Micrometastases (greater than 0.2 mm to 2 mm   and/or greater than 200 cells): 0.   Number of lymph nodes with isolated tumor cells (0.2 mm or less or 200 cells   or less): 0.   Distant sites: Not applicable.   Pathologic staging (pTNM, AJCC 8th edition): mpT2 pN0.   Ancillary studies- (Performed on biopsy specimen UBS-, Mass 1,   interpreted by Dr. JUAN FRANCISCO Mcknight)   ER: Positive (strong intensity, 100%).   NH: Positive (strong intensity, 75%).   HER2: Negative.   Ki-67: 5%.   Ancillary studies- (Performed on biopsy specimen UBS-, Mass 2,   interpreted by Dr. JUAN FRANCISCO Mcknight)   ER: Positive (strong intensity, 100%).   NH: Positive (strong intensity, 80%).   HER2: Negative.   Ki-67: 5%.   Ancillary studies- (3:00 specimen, Mass 3, at least 1.0 cm)   ER: Positive (weak to strong intensity, 60%).   NH: Positive (weak to strong intensity, 10%).   HER2: Negative, 1+.   Ki-67: 31-40%.   Blocks for potential molecular or ancillary studies:   Tumor block: 1W (2:00 specimen, Mass 1, 2.7 cm), 1AF (2:00 specimen, Mass 2,   2.0 cm), 5T (3:00 specimen, Mass 3, at least 1.0 cm), 5H (3:00 specimen, Mass   4, 0.3 cm).     INTERVAL HISTORY:   Erma Linda comes in for evaluation of her post operative seroma. Patient states her pain is well controlled, but that the swelling of her left breast deep to her SLNB incision. Patient is scheduled for a re-excision of her positive margin with Dr. Delong on 7/14/22. Patient is anxious about this upcoming procedure.     MEDICATIONS:  Current Outpatient Medications   Medication Sig Dispense Refill    amLODIPine (NORVASC) 10 MG tablet Take 1 tablet (10 mg total) by mouth once daily. 90 tablet 1    cyanocobalamin (VITAMIN B-12) 100 MCG tablet Take 500 mcg by mouth once daily.      hydroCHLOROthiazide  (HYDRODIURIL) 12.5 MG Tab Take 1 tablet (12.5 mg total) by mouth once daily. 90 tablet 0    ibuprofen (ADVIL,MOTRIN) 800 MG tablet Take 1 tablet (800 mg total) by mouth every 6 (six) hours as needed for Pain. 36 tablet 0    multivitamin (THERAGRAN) per tablet Take 1 tablet by mouth once daily.      vitamin D (VITAMIN D3) 1000 units Tab Take 1,000 Units by mouth once daily.      lisinopriL (PRINIVIL,ZESTRIL) 40 MG tablet Take 1 tablet (40 mg total) by mouth once daily. 30 tablet 11     No current facility-administered medications for this visit.       ALLERGIES:   Review of patient's allergies indicates:   Allergen Reactions    Hydralazine analogues Palpitations       PHYSICAL EXAMINATION:   General:  This is a well appearing female with appropriate speech, affect and gait.     Breast:  Incision clean, dry, and intact. Seroma of lateral left breast seen on in office US without overlying skin changes. See attached image.     7/7/22 Portable US of Left US:         Fine Needle Aspiration Procedure Note    Pre-operative Diagnosis: post op seroma    Post-operative Diagnosis: same    Location: left breast    Anesthesia: 1% plain lidocaine    Procedure Details   The Procedure, risks and complications have been discussed in detail (including, but not limited to pain, infection, bleeding) with the patient, and the patient has signed consent to have the surgery completed.    The skin was sterilely prepped and draped over the affected area in the usual fashion.  Fine Needle Aspiration was performed with a 18 gauge needle using ultrasound guidance.  Contents of Aspiration: 60 ml of serosanguinous fluid.  Size of mass after cyst aspiration: diminished  EBL: none  Sterile dressing placed.  May place ice pack over affected area during the first 24 hours.    Condition:  Stable    Complications:  none.      IMPRESSION:   Multifocal disease with multiple positive margins    PLAN:   1. FNA of post operative seroma of left breast  performed today with good tolerance by the patient. 60 CC's drained.   2. Re-excision planned for 7/14/22 with Dr. Delong.   3. Patient verbalized understanding of plan. All questions asked and answered. Advised to call our office with any new or worsening sxs.     Amanda Lu PA-C  Breast Surgery

## 2022-07-08 ENCOUNTER — OFFICE VISIT (OUTPATIENT)
Dept: HEMATOLOGY/ONCOLOGY | Facility: CLINIC | Age: 62
End: 2022-07-08
Attending: SURGERY
Payer: COMMERCIAL

## 2022-07-08 VITALS
SYSTOLIC BLOOD PRESSURE: 144 MMHG | WEIGHT: 167.31 LBS | HEIGHT: 63 IN | DIASTOLIC BLOOD PRESSURE: 73 MMHG | HEART RATE: 85 BPM | BODY MASS INDEX: 29.64 KG/M2

## 2022-07-08 DIAGNOSIS — C50.412 MALIGNANT NEOPLASM OF UPPER-OUTER QUADRANT OF LEFT BREAST IN FEMALE, ESTROGEN RECEPTOR POSITIVE: ICD-10-CM

## 2022-07-08 DIAGNOSIS — Z17.0 MALIGNANT NEOPLASM OF UPPER-OUTER QUADRANT OF LEFT BREAST IN FEMALE, ESTROGEN RECEPTOR POSITIVE: ICD-10-CM

## 2022-07-08 DIAGNOSIS — N64.4 BREAST PAIN: Primary | ICD-10-CM

## 2022-07-08 PROCEDURE — 1159F PR MEDICATION LIST DOCUMENTED IN MEDICAL RECORD: ICD-10-PCS | Mod: CPTII,S$GLB,, | Performed by: OBSTETRICS & GYNECOLOGY

## 2022-07-08 PROCEDURE — 3008F PR BODY MASS INDEX (BMI) DOCUMENTED: ICD-10-PCS | Mod: CPTII,S$GLB,, | Performed by: OBSTETRICS & GYNECOLOGY

## 2022-07-08 PROCEDURE — 4010F ACE/ARB THERAPY RXD/TAKEN: CPT | Mod: CPTII,S$GLB,, | Performed by: OBSTETRICS & GYNECOLOGY

## 2022-07-08 PROCEDURE — 99999 PR PBB SHADOW E&M-EST. PATIENT-LVL III: CPT | Mod: PBBFAC,,, | Performed by: OBSTETRICS & GYNECOLOGY

## 2022-07-08 PROCEDURE — 99999 PR PBB SHADOW E&M-EST. PATIENT-LVL III: ICD-10-PCS | Mod: PBBFAC,,, | Performed by: OBSTETRICS & GYNECOLOGY

## 2022-07-08 PROCEDURE — 3077F PR MOST RECENT SYSTOLIC BLOOD PRESSURE >= 140 MM HG: ICD-10-PCS | Mod: CPTII,S$GLB,, | Performed by: OBSTETRICS & GYNECOLOGY

## 2022-07-08 PROCEDURE — 99214 OFFICE O/P EST MOD 30 MIN: CPT | Mod: S$GLB,,, | Performed by: OBSTETRICS & GYNECOLOGY

## 2022-07-08 PROCEDURE — 3078F DIAST BP <80 MM HG: CPT | Mod: CPTII,S$GLB,, | Performed by: OBSTETRICS & GYNECOLOGY

## 2022-07-08 PROCEDURE — 4010F PR ACE/ARB THEARPY RXD/TAKEN: ICD-10-PCS | Mod: CPTII,S$GLB,, | Performed by: OBSTETRICS & GYNECOLOGY

## 2022-07-08 PROCEDURE — 1159F MED LIST DOCD IN RCRD: CPT | Mod: CPTII,S$GLB,, | Performed by: OBSTETRICS & GYNECOLOGY

## 2022-07-08 PROCEDURE — 3078F PR MOST RECENT DIASTOLIC BLOOD PRESSURE < 80 MM HG: ICD-10-PCS | Mod: CPTII,S$GLB,, | Performed by: OBSTETRICS & GYNECOLOGY

## 2022-07-08 PROCEDURE — 3077F SYST BP >= 140 MM HG: CPT | Mod: CPTII,S$GLB,, | Performed by: OBSTETRICS & GYNECOLOGY

## 2022-07-08 PROCEDURE — 99214 PR OFFICE/OUTPT VISIT, EST, LEVL IV, 30-39 MIN: ICD-10-PCS | Mod: S$GLB,,, | Performed by: OBSTETRICS & GYNECOLOGY

## 2022-07-08 PROCEDURE — 3044F PR MOST RECENT HEMOGLOBIN A1C LEVEL <7.0%: ICD-10-PCS | Mod: CPTII,S$GLB,, | Performed by: OBSTETRICS & GYNECOLOGY

## 2022-07-08 PROCEDURE — 3044F HG A1C LEVEL LT 7.0%: CPT | Mod: CPTII,S$GLB,, | Performed by: OBSTETRICS & GYNECOLOGY

## 2022-07-08 PROCEDURE — 3008F BODY MASS INDEX DOCD: CPT | Mod: CPTII,S$GLB,, | Performed by: OBSTETRICS & GYNECOLOGY

## 2022-07-08 NOTE — PROGRESS NOTES
"  Integrative Health and Medicine Initial Visit    This 62 y.o.  Female presents today  to address side effects related to her breast cancer treatment. She was referred by her breast surgeon. .    HPI  She has a history of  stage pT2 N0 M0 grade 1 ER positive [place] positive HER2 negative invasive mammary carcinoma with mixed ductal and lobular features of the left breast.  She underwent partial mastectomy on 6/16/2022. She is scheduled for re excision on the left breast on 7/14/2022. She is not certain that she wants to undergo this surgery and has questions about radiation treatment. She is upset about Left breast pain. She reports that fluid was drained off but has "built back up." She reports tingling and burning. She also reports pain in left axilla.   Sleep is disrupted because she cannot get comofrtable. "     Sleep  How many hours of sleep per night? 5 hours- broken sleep  Do you have trouble falling asleep, staying asleep or waking up earlier than you need to? yes- she reports that is it hard to get comfortable - because of left breast pain  Do you have daytime fatigue? yes  Do you need medication for sleep? no  Do you use any supplements or other interventions for sleep? no    Resilience  Rate your current level of stress- high-"I am  Aggravated"  How do you manage stress? Read scriptures, talk to her  and her kids      Spirituality- yes    Nutrition   Food allergies or sensitivities: no  Do you adhere to a particular type of diet? no  What type of diet do you follow? None- drink more smoothies- gets vegetables that way  Do you have any concerns with your eating habits? yes  Are you concerned with your level of alcohol intake? no    Exercise  How would you describe your physical activity level? minimal  Do you work at a sedentary job? yes  What do you do for physical activity? Walk in neighborhood- did go to Fitness classes    Past Medical History  Past Medical History:   Diagnosis Date    Arthritis  "    Cancer     breast    History of hepatitis C; S/p RX with SVR 12 documented 2018 3/22/2018    Hypertension     Nuclear sclerosis of both eyes 2021        Past Surgical History   Past Surgical History:   Procedure Laterality Date    BREAST BIOPSY Left 2022    Procedure: BIOPSY, BREAST / excisional biopsy left;  Surgeon: CHRISTI Delong MD;  Location: [facility];  Service: General;  Laterality: Left;    BREAST SURGERY  2022    lumpectomy     SECTION, CLASSIC      INJECTION FOR SENTINEL NODE IDENTIFICATION Left 2022    Procedure: INJECTION, FOR SENTINEL NODE IDENTIFICATION;  Surgeon: CHRISTI Delong MD;  Location: [facility];  Service: General;  Laterality: Left;    KNEE ARTHROSCOPY W/ LASER  2000    R    MASTECTOMY, PARTIAL Left 2022    Procedure: MASTECTOMY, PARTIAL LEFT with radiological marker;  Surgeon: CHRISTI Delong MD;  Location: [facility];  Service: General;  Laterality: Left;  EMAIL SENT  @ 10:20 PER LK    SENTINEL LYMPH NODE BIOPSY Left 2022    Procedure: BIOPSY, LYMPH NODE, SENTINEL;  Surgeon: CHRISTI Delong MD;  Location: [facility];  Service: General;  Laterality: Left;    TUBAL LIGATION          Family History   Family History   Problem Relation Age of Onset    Heart disease Mother     No Known Problems Father     No Known Problems Sister     No Known Problems Brother     No Known Problems Maternal Aunt     No Known Problems Maternal Uncle     No Known Problems Paternal Aunt     No Known Problems Paternal Uncle     No Known Problems Maternal Grandmother     No Known Problems Maternal Grandfather     No Known Problems Paternal Grandmother     No Known Problems Paternal Grandfather     Glaucoma Neg Hx         Social History  Social History     Socioeconomic History    Marital status:    Tobacco Use    Smoking status: Never Smoker    Smokeless tobacco: Never Used   Substance and Sexual Activity    Alcohol use: No    Drug use: No     Sexual activity: Not Currently     Partners: Male     Birth control/protection: Post-menopausal     Comment: 4/7/17  with same partner 35 years         Allergies  Review of patient's allergies indicates:   Allergen Reactions    Hydralazine analogues Palpitations        Current Medications:    Current Outpatient Medications:     amLODIPine (NORVASC) 10 MG tablet, Take 1 tablet (10 mg total) by mouth once daily., Disp: 90 tablet, Rfl: 1    cyanocobalamin (VITAMIN B-12) 100 MCG tablet, Take 500 mcg by mouth once daily., Disp: , Rfl:     hydroCHLOROthiazide (HYDRODIURIL) 12.5 MG Tab, Take 1 tablet (12.5 mg total) by mouth once daily., Disp: 90 tablet, Rfl: 0    ibuprofen (ADVIL,MOTRIN) 800 MG tablet, Take 1 tablet (800 mg total) by mouth every 6 (six) hours as needed for Pain., Disp: 36 tablet, Rfl: 0    multivitamin (THERAGRAN) per tablet, Take 1 tablet by mouth once daily., Disp: , Rfl:     vitamin D (VITAMIN D3) 1000 units Tab, Take 1,000 Units by mouth once daily., Disp: , Rfl:     lisinopriL (PRINIVIL,ZESTRIL) 40 MG tablet, Take 1 tablet (40 mg total) by mouth once daily., Disp: 30 tablet, Rfl: 11     Review of Systems  Constitutional: no weight loss, weight gain, fever, fatigue    Cardiovascular: No inability to lie flat, chest pain, exercise intolerance, swelling, heart palpitations  Respiratory: No wheezing, coughing blood, shortness of breath, or cough  Gastrointestinal: No diarrhea, bloody stool, nausea/vomiting, constipation, gas, hemorrhoids  Genitourinary: No blood in urine, painful urination, urgency of urination, frequency of urination, incomplete emptying, incontinence, abnormal bleeding, painful periods, heavy periods, vaginal discharge, vaginal odor, painful intercourse, sexual problems, bleeding after intercourse.  Musculoskeletal: No muscle weakness  Skin/Breast: + painful left breast , nipple discharge, masses, rash, ulcers, +breast cancer  Neurological: No passing out, seizures,  "numbness, headache  Endocrine: No diabetes, hypothyroid, hyperthyroid, hot flashes, hair loss, abnormal hair growth, acne  Psychiatric: No depression, crying  Hematologic: No bruises, bleeding, swollen lymph nodes, anemia.    Physical Exam   BP: 144/73 Ht:5'3"  Wt: 167 pounds  BMI Body mass index is 29.64 kg/m².    Physical Exam  deferred  ASSESSMENT  1. Malignant neoplasm of upper-outer quadrant of left breast in female, estrogen receptor positive  Ambulatory referral/consult to Integrative Oncology     2. Breast pain    PLAN Total time 40 minutes- majority spent face to face with patient, also review of medical record and arranging follow up  Counseled her on possible use of acupuncture to help with pain - would not recommend until after her follow up procedure  Message sent to Dr. Delong regarding patient's concerns  "

## 2022-07-11 ENCOUNTER — TELEPHONE (OUTPATIENT)
Dept: SURGERY | Facility: CLINIC | Age: 62
End: 2022-07-11
Payer: COMMERCIAL

## 2022-07-11 ENCOUNTER — TUMOR BOARD CONFERENCE (OUTPATIENT)
Dept: SURGERY | Facility: CLINIC | Age: 62
End: 2022-07-11
Payer: COMMERCIAL

## 2022-07-11 NOTE — TELEPHONE ENCOUNTER
Called to see if patient had any concerns or questions about surgery this week given difficulties expressed at her appointment friday. No answer. Left voicemail.

## 2022-07-11 NOTE — PROGRESS NOTES
Oncology History   Malignant neoplasm of upper-outer quadrant of left breast in female, estrogen receptor positive   3/10/2022 Initial Diagnosis    Malignant neoplasm of upper-outer quadrant of left breast in female, estrogen receptor positive     3/10/2022 Biopsy    1. LEFT BREAST, MASS AT 2 O'CLOCK 12 CM FROM NIPPLE, BIOPSY:   Invasive mammary carcinoma with lobular features, [place] histologic grade   1 (tubule formation:  3, nuclear pleomorphism:  1, mitotic activity:  1).   Comment:  Largest single continuous focus of invasive carcinoma present   measures 8 mm.  Infiltrating tumor cells are highlighted by an AE1/AE3   cytokeratin stain.  Breast biomarkers and E-cadherin staining for histologic   typing are in progress and the results will be issued in a supplemental   report.   2. LEFT BREAST, MASS AT 2 O'CLOCK 11 CM FROM NIPPLE, BIOPSY:   Invasive mammary carcinoma with lobular features, [place] histologic grade   1 (tubule formation:  3, nuclear pleomorphism:  1, mitotic activity:  1).   Comment:  Largest single continuous focus of invasive carcinoma present   measures 5 mm.  Infiltrating tumor cells are highlighted by an AE1/AE3   cytokeratin stain.  Breast biomarkers and E-cadherin staining for histologic   typing are in progress and the results will be issued in a supplemental   report.   3. LEFT AXILLARY LYMPH NODE, BIOPSY:   Benign lymph node tissue and blood.   Negative for malignancy.      3/10/2022 Breast Tumor Markers    1. BREAST BIOMARKER RESULTS   Estrogen receptor (ER):  Positive (100%, strong)   Progesterone receptor (PA):  Positive (75%, strong)   HER2 IHC:  Negative (0)   Ki-67 proliferation index:  5%   2. BREAST BIOMARKER RESULTS   Estrogen receptor (ER):  Positive (100%, strong)   Progesterone receptor (PA):  Positive (80%, strong)   HER2 IHC:  Negative (0)   Ki-67 proliferation index:  5%      6/16/2022 Breast Surgery    Left partial mastectomy with L SLNB.      7/5/2022 Tumor  Conference    Team recommends re-excision with oncoplastic reduction vs mastectomy. Dr. Delong will discuss with the patient surgical options.

## 2022-07-13 ENCOUNTER — TELEPHONE (OUTPATIENT)
Dept: SURGERY | Facility: CLINIC | Age: 62
End: 2022-07-13
Payer: COMMERCIAL

## 2022-07-13 NOTE — TELEPHONE ENCOUNTER
Confirmed arrival time for surgery on 07/14/22 at the [facility] with . Nothing to eat after 9 pm this evening 7/13/22 . Explained to  she may have clear liquids up until 4 hours prior to surgery . Patient may have 2 people with her on the morning of surgery. Also please leave all jewelry home . Patient asked to wear a button down shirt on the morning of surgery.  voiced understanding to this call.

## 2022-07-14 ENCOUNTER — ANESTHESIA (OUTPATIENT)
Dept: SURGERY | Facility: OTHER | Age: 62
End: 2022-07-14
Payer: COMMERCIAL

## 2022-07-14 ENCOUNTER — HOSPITAL ENCOUNTER (OUTPATIENT)
Facility: OTHER | Age: 62
Discharge: HOME | End: 2022-07-14
Attending: SURGERY | Admitting: SURGERY
Payer: COMMERCIAL

## 2022-07-14 ENCOUNTER — DOCUMENTATION ONLY (OUTPATIENT)
Dept: HEMATOLOGY/ONCOLOGY | Facility: CLINIC | Age: 62
End: 2022-07-14
Payer: COMMERCIAL

## 2022-07-14 DIAGNOSIS — C50.412 MALIGNANT NEOPLASM OF UPPER-OUTER QUADRANT OF LEFT BREAST IN FEMALE, ESTROGEN RECEPTOR POSITIVE: Primary | ICD-10-CM

## 2022-07-14 DIAGNOSIS — Z17.0 MALIGNANT NEOPLASM OF UPPER-OUTER QUADRANT OF LEFT BREAST IN FEMALE, ESTROGEN RECEPTOR POSITIVE: Primary | ICD-10-CM

## 2022-07-14 DIAGNOSIS — C50.919 INVASIVE CARCINOMA OF BREAST: Primary | ICD-10-CM

## 2022-07-14 PROCEDURE — 37000009 HC ANESTHESIA EA ADD 15 MINS: Performed by: SURGERY

## 2022-07-14 PROCEDURE — 25000003 PHARM REV CODE 250: Performed by: SURGERY

## 2022-07-14 PROCEDURE — 19301 PARTIAL MASTECTOMY: CPT | Mod: LT,,, | Performed by: SURGERY

## 2022-07-14 PROCEDURE — 36000707: Performed by: SURGERY

## 2022-07-14 PROCEDURE — 25000003 PHARM REV CODE 250: Performed by: ANESTHESIOLOGY

## 2022-07-14 PROCEDURE — 88341 IMHCHEM/IMCYTCHM EA ADD ANTB: CPT | Performed by: STUDENT IN AN ORGANIZED HEALTH CARE EDUCATION/TRAINING PROGRAM

## 2022-07-14 PROCEDURE — 63600175 PHARM REV CODE 636 W HCPCS: Performed by: STUDENT IN AN ORGANIZED HEALTH CARE EDUCATION/TRAINING PROGRAM

## 2022-07-14 PROCEDURE — 63600175 PHARM REV CODE 636 W HCPCS: Performed by: NURSE ANESTHETIST, CERTIFIED REGISTERED

## 2022-07-14 PROCEDURE — 63600175 PHARM REV CODE 636 W HCPCS

## 2022-07-14 PROCEDURE — 19301 PR MASTECTOMY, PARTIAL: ICD-10-PCS | Mod: LT,,, | Performed by: SURGERY

## 2022-07-14 PROCEDURE — 71000016 HC POSTOP RECOV ADDL HR: Performed by: SURGERY

## 2022-07-14 PROCEDURE — 36000706: Performed by: SURGERY

## 2022-07-14 PROCEDURE — 71000033 HC RECOVERY, INTIAL HOUR: Performed by: SURGERY

## 2022-07-14 PROCEDURE — 88307 TISSUE EXAM BY PATHOLOGIST: CPT | Performed by: STUDENT IN AN ORGANIZED HEALTH CARE EDUCATION/TRAINING PROGRAM

## 2022-07-14 PROCEDURE — 88307 TISSUE EXAM BY PATHOLOGIST: CPT | Mod: 26,,, | Performed by: STUDENT IN AN ORGANIZED HEALTH CARE EDUCATION/TRAINING PROGRAM

## 2022-07-14 PROCEDURE — 88341 IMHCHEM/IMCYTCHM EA ADD ANTB: CPT | Mod: 26,,, | Performed by: STUDENT IN AN ORGANIZED HEALTH CARE EDUCATION/TRAINING PROGRAM

## 2022-07-14 PROCEDURE — 88342 IMHCHEM/IMCYTCHM 1ST ANTB: CPT | Mod: 59 | Performed by: STUDENT IN AN ORGANIZED HEALTH CARE EDUCATION/TRAINING PROGRAM

## 2022-07-14 PROCEDURE — 88342 IMHCHEM/IMCYTCHM 1ST ANTB: CPT | Mod: 26,,, | Performed by: STUDENT IN AN ORGANIZED HEALTH CARE EDUCATION/TRAINING PROGRAM

## 2022-07-14 PROCEDURE — 88342 CHG IMMUNOCYTOCHEMISTRY: ICD-10-PCS | Mod: 26,,, | Performed by: STUDENT IN AN ORGANIZED HEALTH CARE EDUCATION/TRAINING PROGRAM

## 2022-07-14 PROCEDURE — 63600175 PHARM REV CODE 636 W HCPCS: Performed by: ANESTHESIOLOGY

## 2022-07-14 PROCEDURE — 71000015 HC POSTOP RECOV 1ST HR: Performed by: SURGERY

## 2022-07-14 PROCEDURE — 27201423 OPTIME MED/SURG SUP & DEVICES STERILE SUPPLY: Performed by: SURGERY

## 2022-07-14 PROCEDURE — 88341 PR IHC OR ICC EACH ADD'L SINGLE ANTIBODY  STAINPR: ICD-10-PCS | Mod: 26,,, | Performed by: STUDENT IN AN ORGANIZED HEALTH CARE EDUCATION/TRAINING PROGRAM

## 2022-07-14 PROCEDURE — 37000008 HC ANESTHESIA 1ST 15 MINUTES: Performed by: SURGERY

## 2022-07-14 PROCEDURE — 88307 PR  SURG PATH,LEVEL V: ICD-10-PCS | Mod: 26,,, | Performed by: STUDENT IN AN ORGANIZED HEALTH CARE EDUCATION/TRAINING PROGRAM

## 2022-07-14 RX ORDER — ONDANSETRON 2 MG/ML
INJECTION INTRAMUSCULAR; INTRAVENOUS
Status: DISCONTINUED | OUTPATIENT
Start: 2022-07-14 | End: 2022-07-14

## 2022-07-14 RX ORDER — PHENYLEPHRINE HYDROCHLORIDE 10 MG/ML
INJECTION INTRAVENOUS
Status: DISCONTINUED | OUTPATIENT
Start: 2022-07-14 | End: 2022-07-14

## 2022-07-14 RX ORDER — LIDOCAINE HYDROCHLORIDE 10 MG/ML
0.5 INJECTION, SOLUTION EPIDURAL; INFILTRATION; INTRACAUDAL; PERINEURAL ONCE
Status: DISCONTINUED | OUTPATIENT
Start: 2022-07-14 | End: 2022-07-14 | Stop reason: HOSPADM

## 2022-07-14 RX ORDER — HYDROMORPHONE HYDROCHLORIDE 2 MG/ML
INJECTION, SOLUTION INTRAMUSCULAR; INTRAVENOUS; SUBCUTANEOUS
Status: DISCONTINUED | OUTPATIENT
Start: 2022-07-14 | End: 2022-07-14

## 2022-07-14 RX ORDER — KETOROLAC TROMETHAMINE 30 MG/ML
INJECTION, SOLUTION INTRAMUSCULAR; INTRAVENOUS
Status: DISCONTINUED | OUTPATIENT
Start: 2022-07-14 | End: 2022-07-14

## 2022-07-14 RX ORDER — OXYCODONE HYDROCHLORIDE 5 MG/1
5 TABLET ORAL
Status: DISCONTINUED | OUTPATIENT
Start: 2022-07-14 | End: 2022-07-14 | Stop reason: HOSPADM

## 2022-07-14 RX ORDER — DIPHENHYDRAMINE HYDROCHLORIDE 50 MG/ML
25 INJECTION INTRAMUSCULAR; INTRAVENOUS EVERY 6 HOURS PRN
Status: DISCONTINUED | OUTPATIENT
Start: 2022-07-14 | End: 2022-07-14 | Stop reason: HOSPADM

## 2022-07-14 RX ORDER — SODIUM CHLORIDE, SODIUM LACTATE, POTASSIUM CHLORIDE, CALCIUM CHLORIDE 600; 310; 30; 20 MG/100ML; MG/100ML; MG/100ML; MG/100ML
INJECTION, SOLUTION INTRAVENOUS CONTINUOUS
Status: DISCONTINUED | OUTPATIENT
Start: 2022-07-14 | End: 2022-07-14 | Stop reason: HOSPADM

## 2022-07-14 RX ORDER — DEXAMETHASONE SODIUM PHOSPHATE 4 MG/ML
INJECTION, SOLUTION INTRA-ARTICULAR; INTRALESIONAL; INTRAMUSCULAR; INTRAVENOUS; SOFT TISSUE
Status: DISCONTINUED | OUTPATIENT
Start: 2022-07-14 | End: 2022-07-14

## 2022-07-14 RX ORDER — CEFAZOLIN SODIUM 2 G/50ML
2 SOLUTION INTRAVENOUS
Status: COMPLETED | OUTPATIENT
Start: 2022-07-14 | End: 2022-07-14

## 2022-07-14 RX ORDER — MEPERIDINE HYDROCHLORIDE 25 MG/ML
12.5 INJECTION INTRAMUSCULAR; INTRAVENOUS; SUBCUTANEOUS ONCE AS NEEDED
Status: DISCONTINUED | OUTPATIENT
Start: 2022-07-14 | End: 2022-07-14 | Stop reason: HOSPADM

## 2022-07-14 RX ORDER — HYDROMORPHONE HYDROCHLORIDE 2 MG/ML
0.4 INJECTION, SOLUTION INTRAMUSCULAR; INTRAVENOUS; SUBCUTANEOUS EVERY 5 MIN PRN
Status: DISCONTINUED | OUTPATIENT
Start: 2022-07-14 | End: 2022-07-14 | Stop reason: HOSPADM

## 2022-07-14 RX ORDER — SODIUM CHLORIDE 0.9 % (FLUSH) 0.9 %
3 SYRINGE (ML) INJECTION
Status: DISCONTINUED | OUTPATIENT
Start: 2022-07-14 | End: 2022-07-14 | Stop reason: HOSPADM

## 2022-07-14 RX ORDER — PROPOFOL 10 MG/ML
VIAL (ML) INTRAVENOUS
Status: DISCONTINUED | OUTPATIENT
Start: 2022-07-14 | End: 2022-07-14

## 2022-07-14 RX ORDER — SODIUM CHLORIDE 9 MG/ML
INJECTION, SOLUTION INTRAVENOUS CONTINUOUS
Status: DISCONTINUED | OUTPATIENT
Start: 2022-07-14 | End: 2024-04-02

## 2022-07-14 RX ORDER — PROCHLORPERAZINE EDISYLATE 5 MG/ML
5 INJECTION INTRAMUSCULAR; INTRAVENOUS EVERY 30 MIN PRN
Status: DISCONTINUED | OUTPATIENT
Start: 2022-07-14 | End: 2022-07-14 | Stop reason: HOSPADM

## 2022-07-14 RX ORDER — HYDROCODONE BITARTRATE AND ACETAMINOPHEN 5; 325 MG/1; MG/1
1 TABLET ORAL EVERY 6 HOURS PRN
Qty: 10 TABLET | Refills: 0 | Status: SHIPPED | OUTPATIENT
Start: 2022-07-14 | End: 2023-03-02

## 2022-07-14 RX ORDER — MIDAZOLAM HYDROCHLORIDE 1 MG/ML
INJECTION INTRAMUSCULAR; INTRAVENOUS
Status: DISCONTINUED | OUTPATIENT
Start: 2022-07-14 | End: 2022-07-14

## 2022-07-14 RX ORDER — BUPIVACAINE HYDROCHLORIDE 2.5 MG/ML
INJECTION, SOLUTION EPIDURAL; INFILTRATION; INTRACAUDAL
Status: DISCONTINUED | OUTPATIENT
Start: 2022-07-14 | End: 2022-07-14 | Stop reason: HOSPADM

## 2022-07-14 RX ORDER — FENTANYL CITRATE 50 UG/ML
INJECTION, SOLUTION INTRAMUSCULAR; INTRAVENOUS
Status: DISCONTINUED | OUTPATIENT
Start: 2022-07-14 | End: 2022-07-14

## 2022-07-14 RX ADMIN — DEXAMETHASONE SODIUM PHOSPHATE 6 MG: 4 INJECTION, SOLUTION INTRAMUSCULAR; INTRAVENOUS at 02:07

## 2022-07-14 RX ADMIN — HYDROMORPHONE HYDROCHLORIDE 0.5 MG: 2 INJECTION INTRAMUSCULAR; INTRAVENOUS; SUBCUTANEOUS at 02:07

## 2022-07-14 RX ADMIN — PROCHLORPERAZINE EDISYLATE 5 MG: 5 INJECTION INTRAMUSCULAR; INTRAVENOUS at 04:07

## 2022-07-14 RX ADMIN — PHENYLEPHRINE HYDROCHLORIDE 100 MCG: 10 INJECTION INTRAVENOUS at 02:07

## 2022-07-14 RX ADMIN — MIDAZOLAM HYDROCHLORIDE 1 MG: 1 INJECTION, SOLUTION INTRAMUSCULAR; INTRAVENOUS at 01:07

## 2022-07-14 RX ADMIN — PROPOFOL 150 MG: 10 INJECTION, EMULSION INTRAVENOUS at 01:07

## 2022-07-14 RX ADMIN — PROPOFOL 30 MG: 10 INJECTION, EMULSION INTRAVENOUS at 01:07

## 2022-07-14 RX ADMIN — FENTANYL CITRATE 100 MCG: 50 INJECTION, SOLUTION INTRAMUSCULAR; INTRAVENOUS at 01:07

## 2022-07-14 RX ADMIN — SODIUM CHLORIDE, SODIUM LACTATE, POTASSIUM CHLORIDE, AND CALCIUM CHLORIDE: .6; .31; .03; .02 INJECTION, SOLUTION INTRAVENOUS at 03:07

## 2022-07-14 RX ADMIN — CEFAZOLIN SODIUM 2 G: 2 SOLUTION INTRAVENOUS at 02:07

## 2022-07-14 RX ADMIN — SODIUM CHLORIDE, SODIUM LACTATE, POTASSIUM CHLORIDE, AND CALCIUM CHLORIDE: .6; .31; .03; .02 INJECTION, SOLUTION INTRAVENOUS at 01:07

## 2022-07-14 RX ADMIN — ONDANSETRON HYDROCHLORIDE 4 MG: 2 INJECTION INTRAMUSCULAR; INTRAVENOUS at 02:07

## 2022-07-14 RX ADMIN — KETOROLAC TROMETHAMINE 30 MG: 30 INJECTION, SOLUTION INTRAMUSCULAR; INTRAVENOUS at 03:07

## 2022-07-14 RX ADMIN — OXYCODONE 5 MG: 5 TABLET ORAL at 04:07

## 2022-07-14 NOTE — PROGRESS NOTES
Per , Oncotype ordered on original surgical specimen, #[id number].  Oncology Navigation   Intake  Date of Diagnosis: 6/16/2022  Cancer Type: Breast  Internal / External Referral: Internal  Referral Source: [place]  Date of Referral: 2/28/2022  Initial Nurse Navigator Contact: 6/29/2022  Referral to Initial Contact Timeline (days): 121  Date Worked: 6/29/2022  First Appointment Available: 6/29/2022  Appointment Date: 6/29/2022  First Available Date vs. Scheduled Date (days): 0     Treatment  Current Status: Active  Date Presented to Tumor Board: 7/5/2022    Surgery: Completed  Surgical Oncologist: Baljeet  Type of Surgery: L margin re-excision (may choose mastectomy, will need plastics if )  Consult Date: 3/21/2022  Surgery Schedule Date: 7/14/2022    Medical Oncologist: Dr.Zoe Camarillo  Consult Date: 3/21/2022    Radiation Oncologist: Dr.Angela Aranda    Procedures: Genomic testing  Biopsy Schedule Date: 6/16/2022  Mammo Schedule Date: 2/28/2022  Genomic Testing Date Sent: 7/14/2022  Ultrasound Schedule Date: 2/28/2022    General Referrals: Integrative medicine    ER: Positive  GA: Positive  Her2: Negative    Radiation Oncologist: Dr.Angela Aranda    Support Systems: Family members     Acuity  Treatment Tolerability: Has not started treatment yet/treatment fully completed and side effects resolved  Hospitalization Within the Past Month: 0   Needed: 0  Support: 0  Verbalizes Financial Concerns: 0  Transportation: 0  History of noncompliance/frequent no shows and cancellations: 0  Verbalizes the need for more education: 0  Navigation Acuity: 0     Follow Up  No follow-ups on file.

## 2022-07-14 NOTE — OR NURSING
Patient states she does not like the way compazine makes her feel.  Patient stated she does not want to receive no more compazine.

## 2022-07-14 NOTE — DISCHARGE INSTRUCTIONS

## 2022-07-14 NOTE — PATIENT INSTRUCTIONS
POSTOPERATIVE INSTRUCTIONS FOLLOWING BIOPSY OR LUMPECTOMY    The following are post-operative instructions that will help you to recover from your surgery.  Please read over these instructions carefully and contact us if we can answer any of your questions or concerns.    Wound Care  A surgical bra may be placed around your chest after your surgery.  If you are given the bra, please wear it as close to 24 hours a day as possible until your post-operative clinic appointment (2 weeks after surgery).  If the elastic around the bra irritates your skin, you may wear a soft t-shirt underneath the bra.  You may go without wearing the bra long enough to shower, to launder and dry the bra.  If the bra is extremely uncomfortable, you may wear a supportive sports bra instead after 2 days.  You may shower the day after surgery.  Do not take a tub bath and do not soak the surgical site for 2 weeks.  If you had lymph node surgery a blue dye was utilized. This may turn your skin, urine or stool temporarily blue. This is expected and will improve in a few days.     Activity   You should be able to return to your regular activities 2 days after your surgery.  However, do not engage in strenuous activities in which you use your upper body such as:  golf, tennis, aerobics, washing windows, raking the yard, mopping, vacuuming, heavy lifting (>15 lbs,e.g children) until you are seen for your follow-up appointment in clinic (about 2 weeks).    Medication for pain  You may find that over the counter pain medications may be sufficient for your pain.  You will be given a prescription for pain medication for more severe pain.  You should not drive or operate machinery while taking these.  Please take narcotics with food.  Narcotics can cause, or worsen constipation.  If taking narcotics you will need to increase your fluid intake, eat high fiber foods (such as fruits and bran) and make sure that you are up and walking. You may need to take  an over the counter stool softener for constipation.      Please report the following:  Temperature greater than 101 degrees  Discharge or bad odor from the wound  Excessive bleeding, such as bloody dressing or extreme bruising  Redness at incision and/or drain sites  Swelling or buildup of fluid around incision    Additional information  I will see you approximately 2 weeks following your surgery.  If this follow-up appointment has not been made, please call the office.    If you have any questions or problems, please call my office or my nurse.  Dr. Jessica Delong MD  If you have questions, please call my nurse: Xochitl at [phone] or Poly at [phone]    After hours and on weekends, you may call the main [facility] line at [phone] and ask to have the general surgery resident paged or have me paged.    If directed to the emergency room it would be best to proceed to the [facility] ER. However if very ill or unable to travel safely then please present to your nearest ER.

## 2022-07-14 NOTE — OP NOTE
DATE OF PROCEDURE: 7/14/2022    SURGEON: Surgeon(s) and Role:     * CHRISTI Delong MD - Primary    PREOPERATIVE DIAGNOSIS: invasive mammary carcinoma of the left breast upper outer quadrant    POSTOPERATIVE DIAGNOSIS: same    ANESTHESIA: general    PROCEDURES PERFORMED:   left breast reexcision partial mastectomy (lumpectomy) for clear margins     PROCEDURE IN DETAIL:   The patient underwent informed consent.      She was then brought to the Operating Room and placed in the supine position. Anesthesia and preoperative antibiotics were administered.  The left breast, anterior chest, right arm and axilla were then prepped and draped in a sterile fashion.     Next, we turned our attention to the left breast. Local anesthetic was injected into the area.  The prior incision was excised to include a 3cm by 12cm island of skin  in the upper outer quadrant of the left breast. This specimen was the anterior margin of the 2:00 lesion and the new margin was entirely skin. The prior cavity was entered and the seroma was evacuated. We then dissected out the other margins requiring reexcision.  We did dissect all the way down to the pectoralis major and including the underlying pectoralis fascia as well down to the fascia of the serratus which comprised the posterior margin of the 2:00 lesion. The new margin was painted black and past that margin was muscle. A new inferior margin of the 2:00 lesion was taken and that new margin was painted green. We then turned our attention to the area of the prior 3:00 lesion. The superior margin was taken to include the remaining tissue between the 2:00 lesion cavity and the 3:00 lesion cavity so that the new superior margin was both the 3:00 superior margin and the 2:00 medial aspect of the inferior margin. A new medial margin was also taken at the 3:00 position and the new margin was painted red. All margins were fixed with acetic acid and submitted for permanent pathology.     Within the  lumpectomy cavity, hemostasis was achieved with cautery. The wound was irrigated until clear. There was no evidence of bleeding. It was closed in multiple layers with deep dermal and subcutaneous interrupted Vicryl sutures and a running 4-0 Monocryl subcuticular skin closure.    Dermabond tape was applied. Sterile fluff gauze was placed and a post-procedure bra was placed. She tolerated the procedure well without complication and was turned over to Anesthesia for transport to the recovery area in a satisfactory condition. All specimens were sent to Pathology for permanent sectioning.    ESTIMATED BLOOD LOSS: 5 ml    COMPLICATIONS: none    DISPOSITION:  PACU--hemodynamically stable    ATTESTATION:  I was present and scrubbed for the entire procedure.

## 2022-07-14 NOTE — TRANSFER OF CARE
Anesthesia Transfer of Care Note    Patient: Erma Linda    Procedure(s) Performed: Procedure(s) (LRB):  RE-EXCISION LEFT BREAST LEFT (Left)    Patient location: PACU    Anesthesia Type: general    Transport from OR: Transported from OR on 2-3 L/min O2 by NC with adequate spontaneous ventilation    Post pain: adequate analgesia    Post assessment: no apparent anesthetic complications and tolerated procedure well    Post vital signs: stable    Level of consciousness: awake, alert and oriented    Nausea/Vomiting: no nausea/vomiting    Complications: none    Transfer of care protocol was followed      Last vitals:   Visit Vitals  BP (!) 140/88   Pulse 91   Temp 37.1 °C (98.8 °F) (Oral)   Resp 16   SpO2 98%   Breastfeeding No

## 2022-07-14 NOTE — ANESTHESIA PROCEDURE NOTES
Intubation    Date/Time: 7/14/2022 1:58 PM  Performed by: Montse Luciano CRNA  Authorized by: Montse Luciano CRNA     Intubation:     Induction:  Intravenous    Intubated:  Postinduction    Mask Ventilation:  Easy mask    Attempts:  1    Attempted By:  CRNA    Difficult Airway Encountered?: No      Complications:  None    Airway Device:  Supraglottic airway/LMA    Airway Device Size:  4.0 (I GEL)    Style/Cuff Inflation:  Uncuffed    Secured at:  The lips    Placement Verified By:  Capnometry    Complicating Factors:  None    Findings Post-Intubation:  Atraumatic/condition of teeth unchanged and BS equal bilateral

## 2022-07-14 NOTE — H&P
Breast Surgery  [facility]  Department of Surgery        REFERRING PROVIDER: Aaareferral Self  No address on file     Chief Complaint: Breast Cancer (New Patient Left Breast Invasive Mammary Carcinoma 3./10/22 .)        Subjective:      Intitial visit HPI: Erma Linda is a 62 y.o. female who presents with left breast Invasive Ductal Carcinoma with lobular features     She presented for screening mammogram on 02/17/2022 for yearly scheduled screening.. This identified a focal asymmetry in the left breast. Follow-up mammogram and ultrasound on  02/28/2022 showed 2 masses in the upper outer quadrant of the breast.  One at 02:00 o'clock, 12 cm from nipple measuring 7 x 7 x 6 mm and 1 at the 2 o'clock position, 11 cm from the nipple measuring 6 x 4 x 4 mm. A ultrasound guided biopsies  were performed of the 2 breast lesions on 03/10/2022 with pathology revealing invasive ductal carcinoma of the breast with lobular features.  During the ultrasound there was also identified a lymph node with cortical thickening measuring 7 mm.  This was also biopsied by ultrasound with pathology consistent with benign findings.     Findings at that time were the following:   Lesion 1:               Location:  Left, 02:00 o'clock, 12 cm from the nipple              Clip:  Twirl, in expected position  Tumor size:  0.7 cm   Tumor ndgndrndanddndend:nd nd2nd Estrogen Receptor:  Positive   Progesterone Receptor:  Positive   Her-2 yas:  Negative      Lesion 2:               Location:  Left, 02:00 o'clock, 11 cm from the nipple              Clip:  Ribbon, in expected position  Tumor size:  0.6 cm   Tumor ndgndrndanddndend:nd nd2nd Estrogen Receptor:  Positive   Progesterone Receptor:  Positive   Her-2 yas:  Negative      Patient has not noted a change on breast exam.  Patient denies nipple discharge. Patient denies previous breast biopsy. Patient has a personal history of breast cancer. Family history includes no family history of cancer.     GYN  History:  Age of menarche was 15. Age of menopause 39. Patient denies hormonal therapy. Patient is . Age of first live birth was 18. Patient did not breast feed.     Updated HPI:  CC subsequently underwent a left breast CORINE localized bracketed partial mastectomy with sentinel lymph node biopsy on 2022.  At the same time she underwent excisional biopsy of additional lesion in the left breast.    Final pathology from the surgery identified a larger extent of disease then imaging had seen.  Also the final pathology from the excisional biopsy specimen was consistent with invasive breast cancer.  Multiple margins were positive for invasive cancer.  This finding was discussed in full detail with the patient.  Her case was presented at the tumor board.  They recommended 3 options 1. margin re-excision, 2. Margin re-excision with oncoplastic breast reduction to improve cosmetic outcome or 3. Mastectomy with or without reconstruction.  The patient strongly desired margin re-excision without any oncoplastic breast reduction.            Past Medical History:   Diagnosis Date    Arthritis      History of hepatitis C; S/p RX with SVR 12 documented 2018 3/22/2018    Hypertension      Nuclear sclerosis of both eyes 2021            Past Surgical History:   Procedure Laterality Date     SECTION, CLASSIC        KNEE ARTHROSCOPY W/ LASER        R    TUBAL LIGATION                 Current Outpatient Medications on File Prior to Visit   Medication Sig Dispense Refill    amLODIPine (NORVASC) 10 MG tablet Take 1 tablet by mouth once daily 90 tablet 1    hydroCHLOROthiazide (HYDRODIURIL) 12.5 MG Tab Take 1 tablet (12.5 mg total) by mouth once daily. 90 tablet 0    vitamin D (VITAMIN D3) 1000 units Tab Take 1,000 Units by mouth once daily.        cyanocobalamin (VITAMIN B-12) 100 MCG tablet Take 500 mcg by mouth once daily.        lisinopriL (PRINIVIL,ZESTRIL) 40 MG tablet Take 1 tablet (40 mg  total) by mouth once daily. 30 tablet 11    meloxicam (MOBIC) 15 MG tablet Take 15 mg by mouth once daily.        multivitamin (THERAGRAN) per tablet Take 1 tablet by mouth once daily.          No current facility-administered medications on file prior to visit.      Social History               Socioeconomic History    Marital status:    Tobacco Use    Smoking status: Never Smoker    Smokeless tobacco: Never Used   Substance and Sexual Activity    Alcohol use: No    Drug use: No    Sexual activity: Not Currently       Partners: Male       Birth control/protection: Post-menopausal       Comment: 4/7/17  with same partner 35 years                Family History   Problem Relation Age of Onset    Heart disease Mother      No Known Problems Father      No Known Problems Sister      No Known Problems Brother      No Known Problems Maternal Aunt      No Known Problems Maternal Uncle      No Known Problems Paternal Aunt      No Known Problems Paternal Uncle      No Known Problems Maternal Grandmother      No Known Problems Maternal Grandfather      No Known Problems Paternal Grandmother      No Known Problems Paternal Grandfather      Glaucoma Neg Hx           Review of Systems   All other systems reviewed and are negative.     Objective:   Wt 74.8 kg (165 lb)   BMI 29.23 kg/m²      Physical Exam   Constitutional: She is oriented to person, place, and time. She appears well-developed and well-nourished.   HENT:   Head: Normocephalic and atraumatic.   Cardiovascular: Normal rate and regular rhythm.   Pulmonary/Chest: Effort normal and breath sounds normal. No respiratory distress.   Breast:  Left breast with upper-outer quadrant incision well-healed with some swelling.  Neurological: She is alert and oriented to person, place, and time.   Skin: Skin is warm and dry. No rash noted. No erythema. No pallor.   Psychiatric: She has a normal mood and affect. Her behavior is normal. Judgment and  thought content normal.        Assessment:      Multifocal invasive mammary carcinoma with mixed ductal and lobular features  Plan:     Final pathology from the surgery identified a larger extent of disease then imaging had seen.  Also the final pathology from the excisional biopsy specimen was consistent with invasive breast cancer.  Multiple margins were positive for invasive cancer.  This finding was discussed in full detail with the patient.  Her case was presented at the tumor board.  They recommended 3 options 1. margin re-excision, 2. Margin re-excision with oncoplastic breast reduction to improve cosmetic outcome or 3. Mastectomy with or without reconstruction.  The patient strongly desired margin re-excision without any oncoplastic breast reduction.    She will require postoperative radiation therapy. Radiation oncology consult after final pathology.    Oncotype testing to determine systemic therapy. Medical oncology consult after final pathology.

## 2022-07-14 NOTE — ANESTHESIA POSTPROCEDURE EVALUATION
Anesthesia Post Evaluation    Patient: Erma Linda    Procedure(s) Performed: Procedure(s) (LRB):  RE-EXCISION LEFT BREAST LEFT (Left)    Final Anesthesia Type: general      Patient location during evaluation: PACU  Patient participation: Yes- Able to Participate  Level of consciousness: awake and alert  Post-procedure vital signs: reviewed and stable  Pain management: adequate  Airway patency: patent    PONV status at discharge: No PONV  Anesthetic complications: no      Cardiovascular status: blood pressure returned to baseline  Respiratory status: unassisted and spontaneous ventilation  Hydration status: euvolemic  Follow-up not needed.          Vitals Value Taken Time   /74 07/14/22 1619   Temp 36.9 °C (98.4 °F) 07/14/22 1600   Pulse 77 07/14/22 1625   Resp 16 07/14/22 1620   SpO2 93 % 07/14/22 1625   Vitals shown include unvalidated device data.      No case tracking events are documented in the log.      Pain/Carroll Score: Pain Rating Prior to Med Admin: 5 (7/14/2022  4:20 PM)  Carroll Score: 8 (7/14/2022  4:00 PM)

## 2022-07-14 NOTE — PLAN OF CARE
Erma [place] Maddi has met all discharge criteria from Phase II. Vital Signs are stable, ambulating  without difficulty. Discharge instructions given, patient verbalized understanding. Discharged from facility via wheelchair in stable condition.

## 2022-07-14 NOTE — BRIEF OP NOTE
[facility] - Surgery ([place])  Brief Operative Note    Surgery Date: 7/14/2022     Surgeon(s) and Role:     * CHRISTI Delong MD - Primary  Melvin Pelaez MD - Resident Assisting    Assisting Surgeon: None    Pre-op Diagnosis:  Carcinoma of upper-outer quadrant of left breast in female, estrogen receptor positive [C50.412, Z17.0]    Post-op Diagnosis:  Post-Op Diagnosis Codes:     * Carcinoma of upper-outer quadrant of left breast in female, estrogen receptor positive [C50.412, Z17.0]    Procedure(s) (LRB):  RE-EXCISION LEFT BREAST LEFT (Left)    Anesthesia: Local MAC    Operative Findings: Left Breast Re-excision of multiple margins (see specimens below). Encountered post operative seroma cavity, drained and washed out. Re-approximation of lateral breast tissue    Estimated Blood Loss: Minimal         Specimens:   Specimen (24h ago, onward)             Start     Ordered    07/14/22 1401  Specimen to Pathology, Surgery Breast  Once        Comments: 1. Left breast 2:00 new anterior margin skin yellow2. Left breast 2:00 new posterior margin black3. Left breast 2:00 new inferior margin  green4. Left breast 3:00 new superior margin blue5. Left breast 3:00 new medial margin red     References:    Click here for ordering Quick Tip   Question:  Procedure Type:  Answer:  Breast    07/14/22 1404                  Discharge Note    OUTCOME: Patient tolerated treatment/procedure well without complication and is now ready for discharge.    DISPOSITION: Home or Self Care    FINAL DIAGNOSIS:  Carcinoma of upper-outer quadrant of left breast in female, estrogen receptor positive    FOLLOWUP: In clinic    DISCHARGE INSTRUCTIONS:    Discharge Procedure Orders   Notify your health care provider if you experience any of the following:  temperature >100.4     Notify your health care provider if you experience any of the following:  persistent nausea and vomiting or diarrhea     Notify your health care provider if you experience any of  the following:  severe uncontrolled pain     Notify your health care provider if you experience any of the following:  redness, tenderness, or signs of infection (pain, swelling, redness, odor or green/yellow discharge around incision site)     Notify your health care provider if you experience any of the following:  difficulty breathing or increased cough     Notify your health care provider if you experience any of the following:  severe persistent headache     Notify your health care provider if you experience any of the following:  worsening rash     Notify your health care provider if you experience any of the following:  persistent dizziness, light-headedness, or visual disturbances     Notify your health care provider if you experience any of the following:  increased confusion or weakness     Activity as tolerated

## 2022-07-15 VITALS
HEART RATE: 76 BPM | TEMPERATURE: 98 F | SYSTOLIC BLOOD PRESSURE: 149 MMHG | OXYGEN SATURATION: 98 % | DIASTOLIC BLOOD PRESSURE: 76 MMHG | RESPIRATION RATE: 18 BRPM

## 2022-07-25 ENCOUNTER — PATIENT MESSAGE (OUTPATIENT)
Dept: SURGERY | Facility: CLINIC | Age: 62
End: 2022-07-25
Payer: COMMERCIAL

## 2022-07-25 LAB
COMMENT: NORMAL
FINAL PATHOLOGIC DIAGNOSIS: NORMAL
GROSS: NORMAL
Lab: NORMAL

## 2022-07-27 ENCOUNTER — OFFICE VISIT (OUTPATIENT)
Dept: SURGERY | Facility: CLINIC | Age: 62
End: 2022-07-27
Payer: COMMERCIAL

## 2022-07-27 VITALS
DIASTOLIC BLOOD PRESSURE: 82 MMHG | WEIGHT: 167 LBS | SYSTOLIC BLOOD PRESSURE: 174 MMHG | BODY MASS INDEX: 29.59 KG/M2 | HEART RATE: 100 BPM | HEIGHT: 63 IN

## 2022-07-27 DIAGNOSIS — N64.89 SEROMA OF BREAST: ICD-10-CM

## 2022-07-27 DIAGNOSIS — C50.412 MALIGNANT NEOPLASM OF UPPER-OUTER QUADRANT OF LEFT BREAST IN FEMALE, ESTROGEN RECEPTOR POSITIVE: ICD-10-CM

## 2022-07-27 DIAGNOSIS — Z98.890 S/P LUMPECTOMY, LEFT BREAST: ICD-10-CM

## 2022-07-27 DIAGNOSIS — Z17.0 MALIGNANT NEOPLASM OF UPPER-OUTER QUADRANT OF LEFT BREAST IN FEMALE, ESTROGEN RECEPTOR POSITIVE: ICD-10-CM

## 2022-07-27 DIAGNOSIS — Z12.31 SCREENING MAMMOGRAM FOR BREAST CANCER: Primary | ICD-10-CM

## 2022-07-27 PROCEDURE — 99024 PR POST-OP FOLLOW-UP VISIT: ICD-10-PCS | Mod: S$GLB,,, | Performed by: SURGERY

## 2022-07-27 PROCEDURE — 1160F PR REVIEW ALL MEDS BY PRESCRIBER/CLIN PHARMACIST DOCUMENTED: ICD-10-PCS | Mod: CPTII,S$GLB,, | Performed by: SURGERY

## 2022-07-27 PROCEDURE — 3077F SYST BP >= 140 MM HG: CPT | Mod: CPTII,S$GLB,, | Performed by: SURGERY

## 2022-07-27 PROCEDURE — 1159F PR MEDICATION LIST DOCUMENTED IN MEDICAL RECORD: ICD-10-PCS | Mod: CPTII,S$GLB,, | Performed by: SURGERY

## 2022-07-27 PROCEDURE — 3079F DIAST BP 80-89 MM HG: CPT | Mod: CPTII,S$GLB,, | Performed by: SURGERY

## 2022-07-27 PROCEDURE — 1159F MED LIST DOCD IN RCRD: CPT | Mod: CPTII,S$GLB,, | Performed by: SURGERY

## 2022-07-27 PROCEDURE — 3044F HG A1C LEVEL LT 7.0%: CPT | Mod: CPTII,S$GLB,, | Performed by: SURGERY

## 2022-07-27 PROCEDURE — 3077F PR MOST RECENT SYSTOLIC BLOOD PRESSURE >= 140 MM HG: ICD-10-PCS | Mod: CPTII,S$GLB,, | Performed by: SURGERY

## 2022-07-27 PROCEDURE — 3044F PR MOST RECENT HEMOGLOBIN A1C LEVEL <7.0%: ICD-10-PCS | Mod: CPTII,S$GLB,, | Performed by: SURGERY

## 2022-07-27 PROCEDURE — 99999 PR PBB SHADOW E&M-EST. PATIENT-LVL III: CPT | Mod: PBBFAC,,, | Performed by: SURGERY

## 2022-07-27 PROCEDURE — 3008F BODY MASS INDEX DOCD: CPT | Mod: CPTII,S$GLB,, | Performed by: SURGERY

## 2022-07-27 PROCEDURE — 1160F RVW MEDS BY RX/DR IN RCRD: CPT | Mod: CPTII,S$GLB,, | Performed by: SURGERY

## 2022-07-27 PROCEDURE — 99999 PR PBB SHADOW E&M-EST. PATIENT-LVL III: ICD-10-PCS | Mod: PBBFAC,,, | Performed by: SURGERY

## 2022-07-27 PROCEDURE — 4010F ACE/ARB THERAPY RXD/TAKEN: CPT | Mod: CPTII,S$GLB,, | Performed by: SURGERY

## 2022-07-27 PROCEDURE — 4010F PR ACE/ARB THEARPY RXD/TAKEN: ICD-10-PCS | Mod: CPTII,S$GLB,, | Performed by: SURGERY

## 2022-07-27 PROCEDURE — 3079F PR MOST RECENT DIASTOLIC BLOOD PRESSURE 80-89 MM HG: ICD-10-PCS | Mod: CPTII,S$GLB,, | Performed by: SURGERY

## 2022-07-27 PROCEDURE — 3008F PR BODY MASS INDEX (BMI) DOCUMENTED: ICD-10-PCS | Mod: CPTII,S$GLB,, | Performed by: SURGERY

## 2022-07-27 PROCEDURE — 99024 POSTOP FOLLOW-UP VISIT: CPT | Mod: S$GLB,,, | Performed by: SURGERY

## 2022-07-27 NOTE — PROGRESS NOTES
[facility]       Post-Op        REFERRING PHYSICIAN:  No referring provider defined for this encounter.       Marry Howard MD    MEDICAL ONCOLOGIST:    MD Ramiro  RADIATION ONCOLOGIST:   MD Manoj    DIAGNOSIS:    This is a 62 y.o. female with a stage pT2 N0 M0 grade 1 ER positive IL positive HER2 negative invasive mammary carcinoma with mixed ductal and lobular features of the left breast.    TREATMENT SUMMARY:  The patient is status post left partial mastectomy margin re-excision on 7/14/2022.  Final pathology showed     Final Pathologic Diagnosis Part 1   Left breast, 2:00, new anterior margin skin, excision:   Negative for residual carcinoma   Extensive previous surgical changes present   Part 2   Left breast, 2:00, new posterior margin, excision:   Negative for residual carcinoma   Extensive previous surgical changes present   See comment section below   Part 3   Left breast, 2:00, new inferior margin, excision:   Negative for residual carcinoma   Extensive previous surgical changes present   Part 4   Left breast, 3:00, new superior margin, excision:   Negative for residual carcinoma   Focal atypical lobular hyperplasia (ALH), dystrophic and vascular   microcalcifications and extensive previous surgical changes present are   present   See comment section below   Part 5   Left breast, 3:00, new medial margin, excision:   Negative for residual carcinoma   Focal atypical lobular hyperplasia (ALH) and extensive previous surgical   changes present   See comment section below          INTERVAL HISTORY:   Erma Linda comes in for a post-op check. Her pain is well controlled.  She has swelling of the upper outer quadrant consistent with seroma.  This is causing some discomfort.     MEDICATIONS:  Current Outpatient Medications   Medication Sig Dispense Refill    amLODIPine (NORVASC) 10 MG tablet Take 1 tablet (10 mg total) by mouth once daily. 90 tablet 1    cyanocobalamin (VITAMIN B-12)  100 MCG tablet Take 500 mcg by mouth once daily.      hydroCHLOROthiazide (HYDRODIURIL) 12.5 MG Tab Take 1 tablet (12.5 mg total) by mouth once daily. 90 tablet 0    HYDROcodone-acetaminophen (NORCO) 5-325 mg per tablet Take 1 tablet by mouth every 6 (six) hours as needed for Pain. 10 tablet 0    ibuprofen (ADVIL,MOTRIN) 800 MG tablet Take 1 tablet (800 mg total) by mouth every 6 (six) hours as needed for Pain. 36 tablet 0    lisinopriL (PRINIVIL,ZESTRIL) 40 MG tablet Take 1 tablet (40 mg total) by mouth once daily. 30 tablet 11    multivitamin (THERAGRAN) per tablet Take 1 tablet by mouth once daily.      vitamin D (VITAMIN D3) 1000 units Tab Take 1,000 Units by mouth once daily.       No current facility-administered medications for this visit.     Facility-Administered Medications Ordered in Other Visits   Medication Dose Route Frequency Provider Last Rate Last Admin    0.9%  NaCl infusion   Intravenous Continuous Melvin Pelaez MD           ALLERGIES:   Review of patient's allergies indicates:   Allergen Reactions    Hydralazine analogues Palpitations    Compazine [prochlorperazine] Other (See Comments)     The patient stated, she didn't like how Compazine, made her feel.       PHYSICAL EXAMINATION:   General:  This is a well appearing female with appropriate speech, affect and gait.     Breast:  Incision clean, dry, and intact; area of fluctuance of right breast consistent with post operative seroma    Verbal consent for seroma aspiration was obtained.  Allergies were confirmed.  Site was confirmed.  Ultrasound was performed of the area of the seroma identifying an area of fluid underneath the skin.  The area was sterilely prepped with chlorhexidine.  The overlying skin was anesthetized with 3 cc of 1% lidocaine.  A 18 gauge needle was used to aspirate approximately 150 cc of serous fluid from the lumpectomy cavity.  Ultrasound was performed to confirm seroma evacuation.  No signs of bleeding.  No  complications.    IMPRESSION:   This is a 62-year-old female status post margin re-excision now with negative margins and focal areas of atypical lobular hyperplasia    PLAN:   Monitor seroma.  If reaccumulate she will call the clinic for repeat aspiration.    1. Return to clinic in February 2023 for clinical breast exam with Dr. Delong  2. Bilateral mammogram in February 2023  3. Recommended patient continue with self-breast exams and to call clinic with questions or concerns or new findings  4. Recommended patient to follow up with medical oncology and radiation oncology.  This was scheduled by the nurse navigator at today's appointment.  Oncotype DX testing is pending.

## 2022-07-27 NOTE — LETTER
July 27, 2022    Erma Linda  [address]  [place] [address]         [facility]  [address]  [facility] [address]  Phone: [phone]  Fax: [phone] July 27, 2022     Patient: Erma Linda   YOB: 1960   Date of Visit: 7/27/2022       To Whom It May Concern:    It is my medical opinion that Erma Linda may return to work on 8/1/2022.    If you have any questions or concerns, please don't hesitate to call.    Sincerely,        CHRISTI Delong MD

## 2022-07-29 ENCOUNTER — DOCUMENTATION ONLY (OUTPATIENT)
Dept: HEMATOLOGY/ONCOLOGY | Facility: CLINIC | Age: 62
End: 2022-07-29
Payer: COMMERCIAL

## 2022-07-29 NOTE — NURSING
Met with pt during her appt with Dr Delong on 07/27/22.  Pt wished to change rad on to [place]. Daniele Brock RN for rad onc aware.  Need to schedule with Dr Camarillo, working with her staff to schedule pt prior to 09/12/22.  Pt made aware, no other needs at present.  Oncology Navigation   Intake  Date of Diagnosis: 6/16/2022  Cancer Type: Breast  Internal / External Referral: Internal  Referral Source: Howard  Date of Referral: 2/28/2022  Initial Nurse Navigator Contact: 6/29/2022  Referral to Initial Contact Timeline (days): 121  Date Worked: 7/29/2022  First Appointment Available: 6/29/2022  Appointment Date: 6/29/2022  First Available Date vs. Scheduled Date (days): 0     Treatment  Current Status: Active  Date Presented to Tumor Board: 7/5/2022    Surgery: Completed  Surgical Oncologist: Baljeet  Type of Surgery: L margin re-excision (may choose mastectomy, will need plastics if )  Consult Date: 3/21/2022  Surgery Schedule Date: 7/14/2022    Medical Oncologist: Rabia  Consult Date: 3/21/2022    Radiation Oncologist: Del    Procedures: Genomic testing  Biopsy Schedule Date: 6/16/2022  Mammo Schedule Date: 2/28/2022  Genomic Testing Date Sent: 7/14/2022  Ultrasound Schedule Date: 2/28/2022    General Referrals: Integrative medicine    ER: Positive  [place]: Positive  Her2: Negative    Radiation Oncologist: Del    Support Systems: Family members     Acuity  Treatment Tolerability: Has not started treatment yet/treatment fully completed and side effects resolved  Hospitalization Within the Past Month: 0   Needed: 0  Support: 0  Verbalizes Financial Concerns: 0  Transportation: 0  History of noncompliance/frequent no shows and cancellations: 0  Verbalizes the need for more education: 0  Navigation Acuity: 0     Follow Up  No follow-ups on file.

## 2022-07-29 NOTE — PROGRESS NOTES
REFERRING PHYSICIAN:  Remedios Delong MD    DIAGNOSIS:  Multifocal pT2 N0 M0, Stage IIA, invasive ductal carcinoma of the left breast with lobular features    HISTORY OF PRESENT ILLNESS:   Ms. Linda is a 62-year-old female who was recently diagnosed with left breast cancer after an abnormal mammogram in February 2022 which revealed a 12 mm irregular mass in the upper-outer quadrant left breast, posterior depth.  There were multiple masses also see the 2 o'clock position.  An ultrasound revealed at least 3 lesions with abnormal left axillary lymph node with cortical thickening measuring 7 mm.  On March 10, 2022, she underwent biopsy of 2 of these lesions and the left axillary abnormal node.  Both breast masses revealed invasive mammary carcinoma with lobular features, grade 1, there the left axillary lymph node which was benign.  Both lesions were ER/CT positive and HER2 negative, with Ki-67 index of 5%.  MRI of bilateral breasts on May 6, 2022 revealed 3 of the lesions in the left breast as well as a new lesion in the 3 o'clock position the left breast which is also suspicious.  On June 9, 2022, biopsy of the 3 o'clock lesion which revealed atypical lobular hyperplasia.      On June 16, 2022, and a lumpectomy and sentinel node biopsy.  Pathology revealed multifocal, grade 1, invasive ductal carcinoma, 4 foci (2.7 cm, 2.0 cm, 1.0 cm and 0.3 cm).  Invasive carcinoma is present at the superior, medial, and posterior margins.  There is extensive perineural invasion noted.  3/3 sentinel lymph nodes were negative for involvement.  She underwent re-excision of the lumpectomy cavity on July 14, 2022. Pathology was negative for residual carcinoma from the new anterior, posterior, inferior, superior, and medial margins.  Her Oncotype revealed a score of 7.  She is here today for indications regarding further treatment.    At present, patient is healing from the surgery without any unexpected side effects.  She reports soreness  of the left breast secondary to surgery. She denies left breast edema, erythema, or discharge.  She also denies fever, night sweats, or weight loss. She works at [employer].    REVIEW OF SYSTEMS:  As above.  In addition, patient denies headaches, visual problems, dizziness, chest pain, shortness of breath, cough, nausea, vomiting, diarrhea, or any new bony pains. Patient also denies easy bruising, skin rashes, or numbness or tingling.    GYN HISTORY:    Menarche age is 15. .  Menopause at age 39. She denies hormone replacement therapy    ECO    PAST MEDICAL HISTORY:  Past Medical History:   Diagnosis Date    Arthritis     Cancer     breast    History of hepatitis C; S/p RX with SVR 12 documented 2018 3/22/2018    Hypertension     Nuclear sclerosis of both eyes 2021       PAST SURGICAL HISTORY:  Past Surgical History:   Procedure Laterality Date    BREAST BIOPSY Left 2022    Procedure: BIOPSY, BREAST / excisional biopsy left;  Surgeon: CHRISTI Delong MD;  Location: [facility];  Service: General;  Laterality: Left;    BREAST MASS EXCISION Left 2022    Procedure: RE- EXCISION,  BREAST;  Surgeon: CHRISTI Delong MD;  Location: [facility];  Service: General;  Laterality: Left;    BREAST SURGERY  2022    lumpectomy     SECTION, CLASSIC      INJECTION FOR SENTINEL NODE IDENTIFICATION Left 2022    Procedure: INJECTION, FOR SENTINEL NODE IDENTIFICATION;  Surgeon: CHRISTI Delong MD;  Location: [facility];  Service: General;  Laterality: Left;    KNEE ARTHROSCOPY W/ LASER  2000    R    MASTECTOMY, PARTIAL Left 2022    Procedure: MASTECTOMY, PARTIAL LEFT with radiological marker;  Surgeon: CHRISTI Delong MD;  Location: [facility];  Service: General;  Laterality: Left;  EMAIL SENT  @ 10:20 PER LK    SENTINEL LYMPH NODE BIOPSY Left 2022    Procedure: BIOPSY, LYMPH NODE, SENTINEL;  Surgeon: CHRISTI Delong MD;  Location: [facility];  Service: General;  Laterality: Left;     TUBAL LIGATION         ALLERGIES:   Review of patient's allergies indicates:   Allergen Reactions    Hydralazine analogues Palpitations    Compazine [prochlorperazine] Other (See Comments)     The patient stated, she didn't like how Compazine, made her feel.       MEDICATIONS:  Current Outpatient Medications   Medication Sig    amLODIPine (NORVASC) 10 MG tablet Take 1 tablet (10 mg total) by mouth once daily.    cyanocobalamin (VITAMIN B-12) 100 MCG tablet Take 500 mcg by mouth once daily.    hydroCHLOROthiazide (HYDRODIURIL) 12.5 MG Tab Take 1 tablet (12.5 mg total) by mouth once daily.    HYDROcodone-acetaminophen (NORCO) 5-325 mg per tablet Take 1 tablet by mouth every 6 (six) hours as needed for Pain. (Patient not taking: Reported on 7/27/2022)    ibuprofen (ADVIL,MOTRIN) 800 MG tablet Take 1 tablet (800 mg total) by mouth every 6 (six) hours as needed for Pain.    lisinopriL (PRINIVIL,ZESTRIL) 40 MG tablet Take 1 tablet (40 mg total) by mouth once daily.    multivitamin (THERAGRAN) per tablet Take 1 tablet by mouth once daily.    vitamin D (VITAMIN D3) 1000 units Tab Take 1,000 Units by mouth once daily.     No current facility-administered medications for this visit.     Facility-Administered Medications Ordered in Other Visits   Medication    0.9%  NaCl infusion       SOCIAL HISTORY:  Social History     Socioeconomic History    Marital status:    Tobacco Use    Smoking status: Never Smoker    Smokeless tobacco: Never Used   Substance and Sexual Activity    Alcohol use: No    Drug use: No    Sexual activity: Not Currently     Partners: Male     Birth control/protection: Post-menopausal     Comment: 4/7/17  with same partner 35 years        FAMILY HISTORY:  Family History   Problem Relation Age of Onset    Heart disease Mother     No Known Problems Father     No Known Problems Sister     No Known Problems Brother     No Known Problems Maternal Aunt     No Known Problems  "Maternal Uncle     No Known Problems Paternal Aunt     No Known Problems Paternal Uncle     No Known Problems Maternal Grandmother     No Known Problems Maternal Grandfather     No Known Problems Paternal Grandmother     No Known Problems Paternal Grandfather     Glaucoma Neg Hx          PHYSICAL EXAMINATION:  Vitals:    08/04/22 1336   BP: (!) 166/85   BP Location: Right arm   Patient Position: Sitting   BP Method: Medium (Automatic)   Pulse: 99   Temp: 97.8 °F (36.6 °C)   TempSrc: Oral   SpO2: 100%   Weight: 75.4 kg (166 lb 1.9 oz)   Height: 5' 3" (1.6 m)     GENERAL: Patient is alert and oriented, in no acute distress.  HEENT:Extraocular muscles are intact.  Oropharynx is clear without lesions.  There is no cervical or supraclavicular lymphadenopathy palpated.  No thyromegaly noted.  HEART: Regular rate and rhythm.  LUNGS: Clear to auscultation bilaterally.  BREAST EXAM: There is mild edema noted in the left breast. The scar secondary to lumpectomy and sentinel node biopsy is noted in the upper outer quadrant of the left breast. No abnormal masses palpated in the left breast or left axilla. The right breast and right axilla area also without suspicious lesions  ABDOMEN:Soft, nontender, nondistended, without hepatosplenomegaly.  Normoactive bowel sounds.  EXTREMITIES: No clubbing, cyanosis, or edema.  NEUROLOGICAL: Cranial nerve II through XII grossly intact.  Sensation is intact.  Strength is 5 out of 5 in the upper and lower extremities bilaterally.     ASSESSMENT:   This is a 62-year-old female with multifocal p T2 N0 M0, grade 1, invasive ductal carcinoma of the left breast with lobular features, who underwent lumpectomy and sentinel node biopsy on June 19, 2022 with 4 foci which were excised with multiple positive margins, for which she underwent re-excision on July 14, 2022 with no residual carcinoma, ER/[place] positive, HER2 negative, Oncotype score 7.     PLAN:   After review of the pathology and images " of the radiological studies, Ms. Linda is noted to have multifocal disease, requiring re excision of the lumpectomy cavity to obtain clear margins .  To reduce her chance of local recurrence, I recommend postoperative radiation to the left breast for a total dose 4200 cGy +1000 cGy boost.  She will then initiate endocrine therapy as planned.    The risks, benefits, and side effects of radiation were explained in detail to the patient.  All questions were answered and informed consent was signed.  I plan to see the patient back for radiation planning CT in approximately 3-4 weeks.    Psychosocial Distress screening score of Distress Score: 0 - No Distress noted and reviewed. No intervention indicated.    I spent approximately 60 minutes reviewing the available records and evaluating the patient, out of which over 50% of the time was spent face to face with the patient in counseling and coordinating this patient's care.

## 2022-08-04 ENCOUNTER — OFFICE VISIT (OUTPATIENT)
Dept: RADIATION ONCOLOGY | Facility: CLINIC | Age: 62
End: 2022-08-04
Payer: COMMERCIAL

## 2022-08-04 VITALS
OXYGEN SATURATION: 100 % | DIASTOLIC BLOOD PRESSURE: 85 MMHG | HEART RATE: 99 BPM | BODY MASS INDEX: 29.44 KG/M2 | WEIGHT: 166.13 LBS | HEIGHT: 63 IN | SYSTOLIC BLOOD PRESSURE: 166 MMHG | TEMPERATURE: 98 F

## 2022-08-04 DIAGNOSIS — Z17.0 MALIGNANT NEOPLASM OF UPPER-OUTER QUADRANT OF LEFT BREAST IN FEMALE, ESTROGEN RECEPTOR POSITIVE: Primary | ICD-10-CM

## 2022-08-04 DIAGNOSIS — C50.412 MALIGNANT NEOPLASM OF UPPER-OUTER QUADRANT OF LEFT BREAST IN FEMALE, ESTROGEN RECEPTOR POSITIVE: Primary | ICD-10-CM

## 2022-08-04 PROCEDURE — 3079F PR MOST RECENT DIASTOLIC BLOOD PRESSURE 80-89 MM HG: ICD-10-PCS | Mod: CPTII,S$GLB,, | Performed by: RADIOLOGY

## 2022-08-04 PROCEDURE — 3079F DIAST BP 80-89 MM HG: CPT | Mod: CPTII,S$GLB,, | Performed by: RADIOLOGY

## 2022-08-04 PROCEDURE — 1160F RVW MEDS BY RX/DR IN RCRD: CPT | Mod: CPTII,S$GLB,, | Performed by: RADIOLOGY

## 2022-08-04 PROCEDURE — 99999 PR PBB SHADOW E&M-EST. PATIENT-LVL III: ICD-10-PCS | Mod: PBBFAC,,, | Performed by: RADIOLOGY

## 2022-08-04 PROCEDURE — 3044F HG A1C LEVEL LT 7.0%: CPT | Mod: CPTII,S$GLB,, | Performed by: RADIOLOGY

## 2022-08-04 PROCEDURE — 1159F MED LIST DOCD IN RCRD: CPT | Mod: CPTII,S$GLB,, | Performed by: RADIOLOGY

## 2022-08-04 PROCEDURE — 99215 OFFICE O/P EST HI 40 MIN: CPT | Mod: S$GLB,,, | Performed by: RADIOLOGY

## 2022-08-04 PROCEDURE — 3008F BODY MASS INDEX DOCD: CPT | Mod: CPTII,S$GLB,, | Performed by: RADIOLOGY

## 2022-08-04 PROCEDURE — 99999 PR PBB SHADOW E&M-EST. PATIENT-LVL III: CPT | Mod: PBBFAC,,, | Performed by: RADIOLOGY

## 2022-08-04 PROCEDURE — 4010F ACE/ARB THERAPY RXD/TAKEN: CPT | Mod: CPTII,S$GLB,, | Performed by: RADIOLOGY

## 2022-08-04 PROCEDURE — 3044F PR MOST RECENT HEMOGLOBIN A1C LEVEL <7.0%: ICD-10-PCS | Mod: CPTII,S$GLB,, | Performed by: RADIOLOGY

## 2022-08-04 PROCEDURE — 3008F PR BODY MASS INDEX (BMI) DOCUMENTED: ICD-10-PCS | Mod: CPTII,S$GLB,, | Performed by: RADIOLOGY

## 2022-08-04 PROCEDURE — 4010F PR ACE/ARB THEARPY RXD/TAKEN: ICD-10-PCS | Mod: CPTII,S$GLB,, | Performed by: RADIOLOGY

## 2022-08-04 PROCEDURE — 3077F SYST BP >= 140 MM HG: CPT | Mod: CPTII,S$GLB,, | Performed by: RADIOLOGY

## 2022-08-04 PROCEDURE — 1160F PR REVIEW ALL MEDS BY PRESCRIBER/CLIN PHARMACIST DOCUMENTED: ICD-10-PCS | Mod: CPTII,S$GLB,, | Performed by: RADIOLOGY

## 2022-08-04 PROCEDURE — 1159F PR MEDICATION LIST DOCUMENTED IN MEDICAL RECORD: ICD-10-PCS | Mod: CPTII,S$GLB,, | Performed by: RADIOLOGY

## 2022-08-04 PROCEDURE — 99215 PR OFFICE/OUTPT VISIT, EST, LEVL V, 40-54 MIN: ICD-10-PCS | Mod: S$GLB,,, | Performed by: RADIOLOGY

## 2022-08-04 PROCEDURE — 3077F PR MOST RECENT SYSTOLIC BLOOD PRESSURE >= 140 MM HG: ICD-10-PCS | Mod: CPTII,S$GLB,, | Performed by: RADIOLOGY

## 2022-08-04 RX ORDER — MELOXICAM 15 MG/1
15 TABLET ORAL DAILY
COMMUNITY
Start: 2022-05-31 | End: 2023-04-06

## 2022-08-08 ENCOUNTER — OFFICE VISIT (OUTPATIENT)
Dept: HEMATOLOGY/ONCOLOGY | Facility: CLINIC | Age: 62
End: 2022-08-08
Payer: COMMERCIAL

## 2022-08-08 ENCOUNTER — TELEPHONE (OUTPATIENT)
Dept: HEMATOLOGY/ONCOLOGY | Facility: CLINIC | Age: 62
End: 2022-08-08
Payer: COMMERCIAL

## 2022-08-08 VITALS
OXYGEN SATURATION: 98 % | SYSTOLIC BLOOD PRESSURE: 142 MMHG | HEIGHT: 63 IN | WEIGHT: 166 LBS | DIASTOLIC BLOOD PRESSURE: 80 MMHG | TEMPERATURE: 98 F | BODY MASS INDEX: 29.41 KG/M2 | RESPIRATION RATE: 17 BRPM | HEART RATE: 96 BPM

## 2022-08-08 DIAGNOSIS — C50.412 MALIGNANT NEOPLASM OF UPPER-OUTER QUADRANT OF LEFT BREAST IN FEMALE, ESTROGEN RECEPTOR POSITIVE: Primary | ICD-10-CM

## 2022-08-08 DIAGNOSIS — Z17.0 MALIGNANT NEOPLASM OF UPPER-OUTER QUADRANT OF LEFT BREAST IN FEMALE, ESTROGEN RECEPTOR POSITIVE: Primary | ICD-10-CM

## 2022-08-08 PROCEDURE — 1159F MED LIST DOCD IN RCRD: CPT | Mod: CPTII,S$GLB,, | Performed by: INTERNAL MEDICINE

## 2022-08-08 PROCEDURE — 4010F PR ACE/ARB THEARPY RXD/TAKEN: ICD-10-PCS | Mod: CPTII,S$GLB,, | Performed by: INTERNAL MEDICINE

## 2022-08-08 PROCEDURE — 3079F DIAST BP 80-89 MM HG: CPT | Mod: CPTII,S$GLB,, | Performed by: INTERNAL MEDICINE

## 2022-08-08 PROCEDURE — 3077F PR MOST RECENT SYSTOLIC BLOOD PRESSURE >= 140 MM HG: ICD-10-PCS | Mod: CPTII,S$GLB,, | Performed by: INTERNAL MEDICINE

## 2022-08-08 PROCEDURE — 99999 PR PBB SHADOW E&M-EST. PATIENT-LVL IV: ICD-10-PCS | Mod: PBBFAC,,, | Performed by: INTERNAL MEDICINE

## 2022-08-08 PROCEDURE — 3079F PR MOST RECENT DIASTOLIC BLOOD PRESSURE 80-89 MM HG: ICD-10-PCS | Mod: CPTII,S$GLB,, | Performed by: INTERNAL MEDICINE

## 2022-08-08 PROCEDURE — 3008F PR BODY MASS INDEX (BMI) DOCUMENTED: ICD-10-PCS | Mod: CPTII,S$GLB,, | Performed by: INTERNAL MEDICINE

## 2022-08-08 PROCEDURE — 4010F ACE/ARB THERAPY RXD/TAKEN: CPT | Mod: CPTII,S$GLB,, | Performed by: INTERNAL MEDICINE

## 2022-08-08 PROCEDURE — 3044F HG A1C LEVEL LT 7.0%: CPT | Mod: CPTII,S$GLB,, | Performed by: INTERNAL MEDICINE

## 2022-08-08 PROCEDURE — 1160F PR REVIEW ALL MEDS BY PRESCRIBER/CLIN PHARMACIST DOCUMENTED: ICD-10-PCS | Mod: CPTII,S$GLB,, | Performed by: INTERNAL MEDICINE

## 2022-08-08 PROCEDURE — 99214 OFFICE O/P EST MOD 30 MIN: CPT | Mod: S$GLB,,, | Performed by: INTERNAL MEDICINE

## 2022-08-08 PROCEDURE — 1159F PR MEDICATION LIST DOCUMENTED IN MEDICAL RECORD: ICD-10-PCS | Mod: CPTII,S$GLB,, | Performed by: INTERNAL MEDICINE

## 2022-08-08 PROCEDURE — 3008F BODY MASS INDEX DOCD: CPT | Mod: CPTII,S$GLB,, | Performed by: INTERNAL MEDICINE

## 2022-08-08 PROCEDURE — 3044F PR MOST RECENT HEMOGLOBIN A1C LEVEL <7.0%: ICD-10-PCS | Mod: CPTII,S$GLB,, | Performed by: INTERNAL MEDICINE

## 2022-08-08 PROCEDURE — 99214 PR OFFICE/OUTPT VISIT, EST, LEVL IV, 30-39 MIN: ICD-10-PCS | Mod: S$GLB,,, | Performed by: INTERNAL MEDICINE

## 2022-08-08 PROCEDURE — 1160F RVW MEDS BY RX/DR IN RCRD: CPT | Mod: CPTII,S$GLB,, | Performed by: INTERNAL MEDICINE

## 2022-08-08 PROCEDURE — 3077F SYST BP >= 140 MM HG: CPT | Mod: CPTII,S$GLB,, | Performed by: INTERNAL MEDICINE

## 2022-08-08 PROCEDURE — 99999 PR PBB SHADOW E&M-EST. PATIENT-LVL IV: CPT | Mod: PBBFAC,,, | Performed by: INTERNAL MEDICINE

## 2022-08-08 NOTE — PROGRESS NOTES
Subjective:       Patient ID: Erma Linda is a 62 y.o. female.    Chief Complaint: Breast Cancer    HPI     Oncology History:  - 2/17/2022 Screening Mammogram:  Findings:  This procedure was performed using tomosynthesis. Computer-aided detection was utilized in the interpretation of this examination.  The breasts have scattered areas of fibroglandular density.   Left  There is a focal asymmetry seen in the upper outer quadrant of the left breast in the posterior depth.   Right  There is no evidence of suspicious masses, calcifications, or other abnormal findings in the right breast.  Impression:  Left  Focal Asymmetry: Left breast focal asymmetry at the upper outer posterior position. Assessment: 0 - Incomplete. Diagnostic Mammogram and/or Ultrasound is recommended.   Right  There is no mammographic evidence of malignancy in the right breast.  BI-RADS Category:   Overall: 0 - Incomplete: Needs Additional Imaging Evaluation  Recommendation:  Diagnostic mammogram with possible ultrasound (if indicated) is recommended.  Your estimated lifetime risk of breast cancer (to age 85) based on Tyrer-Cuzick risk assessment model is Tyrer-Cuzick: 2.63 %. According to the American Cancer Society, patients with a lifetime breast cancer risk of 20% or higher might benefit from supplemental screening tests.     - 2/28/2022 Diagnostic Mammogram and Ultrasound:  Findings:  Mammo Digital Diagnostic Left with Marcell  The left breast has scattered areas of fibroglandular density.   There is a 12 mm irregular equal density mass with spiculated margins in the upper outer left breast at posterior depth.  There is 6 mm irregular equal density mass with spiculated margins in the upper outer left breast at posterior depth which is 20 mm superior to the larger mass.  There is also a 6 mm oval equal density mass with circumscribed margins and suggestion of a fatty hilum in the upper outer left breast at posterior depth which is not  definitely seen on priors. These findings correspond to the area of concern on recent screening mammogram.  There is a prominent lymph node in the left axilla.   US Breast Left Limited  There is a 7 x 7 x 6 mm irregular hypoechoic mass with spiculated margins in the left breast at the 2:00 position, 12 cm from the nipple which corresponds to the larger mass in the upper outer left breast at posterior depth on mammogram.   There is a 6 x 4 x 4 mm irregular hypoechoic mass with spiculated margins in the left breast at the 2:00 position, 11 cm from the nipple which corresponds to the superior smaller mass in the upper outer left breast at posterior depth on mammogram.   There is a 9 x 7 x 6 mm morphologically normal lymph node in the left breast at the 2:00 position, 13 cm from the nipple which corresponds to the oval mass in the upper outer left breast at posterior depth on mammogram.   Targeted ultrasound of the left axilla demonstrates a lymph node with increased cortical thickening measuring 7 mm which corresponds to the prominent lymph node in the left axilla on mammogram.  Impression:  1.  Two adjacent irregular hypoechoic masses in the LEFT breast at 2:00 which are high suspicion for malignancy.  Recommend ultrasound-guided biopsy of both masses for further evaluation.  2.  Morphologically abnormal LEFT axillary lymph node.  Recommend ultrasound-guided biopsy for further evaluation.   BI-RADS Category:   Overall: 4C - High Suspicion for Malignancy  Recommendation:  Biopsy is recommended.     - 3/10/2022 Biopsies:  1. LEFT BREAST, MASS AT 2 O'CLOCK 12 CM FROM NIPPLE, BIOPSY:   Invasive mammary carcinoma with lobular features, [place] histologic grade   1 (tubule formation:  3, nuclear pleomorphism:  1, mitotic activity:  1).   Comment:  Largest single continuous focus of invasive carcinoma present   measures 8 mm.  Infiltrating tumor cells are highlighted by an AE1/AE3   cytokeratin stain.  Breast biomarkers  and E-cadherin staining for histologic   typing are in progress and the results will be issued in a supplemental   report.   2. LEFT BREAST, MASS AT 2 O'CLOCK 11 CM FROM NIPPLE, BIOPSY:   Invasive mammary carcinoma with lobular features, [place] histologic grade   1 (tubule formation:  3, nuclear pleomorphism:  1, mitotic activity:  1).   Comment:  Largest single continuous focus of invasive carcinoma present   measures 5 mm.  Infiltrating tumor cells are highlighted by an AE1/AE3   cytokeratin stain.  Breast biomarkers and E-cadherin staining for histologic   typing are in progress and the results will be issued in a supplemental   report.   3. LEFT AXILLARY LYMPH NODE, BIOPSY:   Benign lymph node tissue and blood.   Negative for malignancy.   Comment:  The findings are supported by negative AE1/AE3 cytokeratin staining   on both tissue blocks.   Note:  Dr. Shannon Muse also reviewed this case and agrees with the above   interpretations.   1-2. The final histologic diagnosis for both sites is invasive ductal   carcinoma with lobular features.  E-cadherin staining is positive in   infiltrating tumor cells, supporting ductal origin.  It should be noted such   tumors can behave like invasive lobular carcinoma.   1. BREAST BIOMARKER RESULTS   Estrogen receptor (ER):  Positive (100%, strong)   Progesterone receptor ([place]):  Positive (75%, strong)   HER2 IHC:  Negative (0)   Ki-67 proliferation index:  5%   2. BREAST BIOMARKER RESULTS   Estrogen receptor (ER):  Positive (100%, strong)   Progesterone receptor ([place]):  Positive (80%, strong)   HER2 IHC:  Negative (0)   Ki-67 proliferation index:  5%      - Oncotype      PMH:  HTN- managed with medications  C- section x 2  Rt knee arthroscope     FH:  Mom-  at age 39 yo, MI  Maternal aunts-  in late 30s/early 40s heart disease (4 sisters total)  Sister  at age 66 yo, CHF     Dad-  at age 68 yo, empysema (smoker)     No cancers     SH:  , 36 years  5  children  Works,  at [employer]  No smoking, + second hand smoke  No EtOH     GynHx:  Menarche- 15   (age at 1st pregnancy, 18)  No breast feeding  Prior ocps  Menopause - late 30s (asymptomatic other than vaginal dryness)  No HRT      Review of Systems   Constitutional: Negative for activity change, appetite change, chills, fatigue, fever and unexpected weight change.   HENT: Negative for nasal congestion, dental problem, rhinorrhea, sinus pressure/congestion and trouble swallowing.    Eyes: Negative for visual disturbance (wears prescription glasses).   Respiratory: Negative for cough, chest tightness, shortness of breath and wheezing.    Cardiovascular: Negative for chest pain, palpitations and leg swelling.   Gastrointestinal: Negative for abdominal distention, abdominal pain, blood in stool, constipation, diarrhea, nausea and vomiting.   Genitourinary: Negative for decreased urine volume, difficulty urinating, dysuria, frequency and hematuria.   Musculoskeletal: Negative for arthralgias, back pain, gait problem, joint swelling and neck pain.   Integumentary:  Negative for pallor and rash.   Neurological: Negative for dizziness, weakness, light-headedness, numbness and headaches.   Hematological: Negative for adenopathy. Does not bruise/bleed easily.   Psychiatric/Behavioral: Negative for confusion, dysphoric mood and sleep disturbance.         Objective:      Physical Exam  Vitals and nursing note reviewed.   Constitutional:       Appearance: Normal appearance. She is normal weight.      Comments: Presents alone  Very pleasant   Pulmonary:      Comments: Left breast well healed  Decr ROM noted at E  Neurological:      Mental Status: She is alert.       Labs- reviewed  Assessment:       Problem List Items Addressed This Visit     Malignant neoplasm of upper-outer quadrant of left breast in female, estrogen receptor positive - Primary          Plan:       Reviewed final pathology and  recommendation for endocrine therapy  Reviewed options, mechanism or action and possible side effects  RTC post XRT to initiate Tamoxifen    PT referral\    Route Chart for Scheduling    Med Onc Chart Routing  Urgent    Follow up with physician . Needs breast PT referral asap for here   Follow up with [facility] 2 months. To start Tamoxifen   Infusion scheduling note    Injection scheduling note    Labs    Imaging    Pharmacy appointment    Other referrals

## 2022-08-11 LAB
COMMENT: NORMAL
FINAL PATHOLOGIC DIAGNOSIS: NORMAL
GROSS: NORMAL
Lab: NORMAL
SUPPLEMENTAL DIAGNOSIS: NORMAL

## 2022-08-13 PROBLEM — M70.61 TROCHANTERIC BURSITIS OF RIGHT HIP: Status: ACTIVE | Noted: 2022-08-13

## 2022-08-13 PROBLEM — G89.29 CHRONIC RIGHT-SIDED LOW BACK PAIN WITHOUT SCIATICA: Status: ACTIVE | Noted: 2022-08-13

## 2022-08-13 PROBLEM — M75.51 BURSITIS OF RIGHT SHOULDER: Status: ACTIVE | Noted: 2022-08-13

## 2022-08-13 PROBLEM — M70.51 PES ANSERINUS BURSITIS OF RIGHT KNEE: Status: ACTIVE | Noted: 2022-08-13

## 2022-08-13 PROBLEM — M54.50 CHRONIC RIGHT-SIDED LOW BACK PAIN WITHOUT SCIATICA: Status: ACTIVE | Noted: 2022-08-13

## 2022-08-16 ENCOUNTER — CLINICAL SUPPORT (OUTPATIENT)
Dept: REHABILITATION | Facility: HOSPITAL | Age: 62
End: 2022-08-16
Attending: INTERNAL MEDICINE
Payer: COMMERCIAL

## 2022-08-16 DIAGNOSIS — M25.612 DECREASED RANGE OF MOTION OF LEFT SHOULDER: ICD-10-CM

## 2022-08-16 DIAGNOSIS — Z17.0 MALIGNANT NEOPLASM OF UPPER-OUTER QUADRANT OF LEFT BREAST IN FEMALE, ESTROGEN RECEPTOR POSITIVE: ICD-10-CM

## 2022-08-16 DIAGNOSIS — Z91.89 AT RISK FOR LYMPHEDEMA: ICD-10-CM

## 2022-08-16 DIAGNOSIS — C50.412 MALIGNANT NEOPLASM OF UPPER-OUTER QUADRANT OF LEFT BREAST IN FEMALE, ESTROGEN RECEPTOR POSITIVE: ICD-10-CM

## 2022-08-16 PROCEDURE — 97110 THERAPEUTIC EXERCISES: CPT

## 2022-08-16 PROCEDURE — 97162 PT EVAL MOD COMPLEX 30 MIN: CPT

## 2022-08-16 NOTE — PATIENT INSTRUCTIONS
PRE/POST OP PATIENT EDUCATION    Post Operative Instructions       When to call your doctor   - if any part of your affected arm or axilla feels hot, is reddened or has increased swelling   - if you develop a temperature over 101 degrees Fahrenheit      Lymphedema - Identification and Prevention     Lymphedema - is the swelling of a body area or extremity caused by the accumulation of lymphatic fluid.  There is a risk for lymphedema with the removal of lymph nodes, trauma or radiation therapy.  Treatment of breast cancer often involves surgery: mastectomy or lumpectomy. Some of the lymph nodes in the underarm (called axillary lymph nodes) may be removed and checked to see if they contain cancer cells.     During breast surgery when axillary lymph nodes are removed (with sentinel node biopsy or axillary dissection) or are treated with radiation therapy, the lymphatic system may become impaired. This may prevent lymphatic fluid from leaving the area therefore, causing lymphedema.     Lymphatic fluid is a normal part of the circulatory system. Its function is to remove waste products and to produce cells vital to fighting infection. Swelling occurs when the vessels become restricted and the lymphatic fluid is unable to freely flow through them.  If lymphedema is left untreated, the affected limb could progressively become more swollen, which could lead to hardening of the skin, bulkiness in the limb, infection and impaired wound healing.         There are things you can do to decrease the chance of developing lymphedema.                                          [email or web address]                              The information presented is intended for general information and educational purposes. It is not intended to replace the  advice of your health care provider. Contact your health care provider if you believe you have a health  problem.                                                    POST OP EXERCISES - SAFE TO DO THE FIRST 2 WEEKS AFTER SURGERY UNTIL YOUR FOLLOW UP APPOINTMENT WITH PHYSICAL/OCCUPATIONAL THERAPY    Radiation Position    Place left arm, elbow bent, beside head on pillow. Use 1-3 pillows to be comfortable.  REST AND RELAX.  Do 1-2 times per day.        Flexibility: Upper Trapezius Stretch        Gently grasp right side of head while reaching behind back with other hand. Tilt head away until a gentle stretch is felt. Hold 15 seconds.  Repeat 3 times per set. Do 1 sets per session. Do 2 sessions per day.       Scapular Retraction (Standing)              LEVATOR SCAPULAE STRETCH - HOLDING TOP OF HEAD    Tilt your head to the side, then rotate to the side, then tip downward as in looking at your opposite pocket.     Use your hand to pull your head downward and towards the opposite side for a gentle stretch.      Hold for a count of 20 do 3 times per session. Do 2 session per day.

## 2022-08-16 NOTE — PLAN OF CARE
[facility]  Physical Therapy Initial Evaluation    Date: 2022   Name: Erma Linda  Clinic Number: [id number]    Therapy Diagnosis:   Encounter Diagnoses   Name Primary?    Malignant neoplasm of upper-outer quadrant of left breast in female, estrogen receptor positive     Decreased range of motion of left shoulder     At risk for lymphedema      Physician: Louise Camarillo MD    Physician Orders: PT Eval and Treat   Medical Diagnosis: C50.412,Z17.0 (ICD-10-CM) - Malignant neoplasm of upper-outer quadrant of left breast in female, estrogen receptor positive  Evaluation Date: 2022  Authorization Period Expiration: 2022  Plan of Care Certification Period: 10/16/2022  Visit # / Visits authorized:   Insurance: BLUE CROSS BLUE SHIELD  FOTO: 1/3    Time In: 11:00 AM  Time Out: 12:00 PM  Total Billable Time: 60 minutes    Precautions: Standard and cancer      History   History of Present Illness: Erma is a 62 y.o. female that presents to  [facility] at the [facility] s/p left breast surgery.   Diagnosis: stage pT2 N0 M0 grade 1 ER positive AK positive HER2 negative invasive mammary carcinoma with mixed ductal and lobular features of the left breast.  Chief complaint: currently she is having trouble with overhead movements, pulling open a drawer, lifting pots,doing her usual household duties, pulling open a heavy door, driving, reaching out to close a car door, reaching across her body, dressing her lower body, gets tired easily climbing the steps in her son's home as the handrail is on the right side and she carries her work bag and purse on her left shoulder. She has resumed walking for exercise daily (15 minutes) but has not resumed her weight work outs and would like to begin swim lessons.   Surgical History:  Erma Linda  has a past surgical history that includes  section, classic; Knee arthroscopy  w/ laser (2000); Tubal ligation; Mastectomy, partial (Left, 6/16/2022); Long Bottom lymph node biopsy (Left, 6/16/2022); Injection for sentinel node identification (Left, 6/16/2022); Breast biopsy (Left, 6/16/2022); Breast surgery (06/16/2022); and Breast mass excision (Left, 7/14/2022).    Chemotherapy: initiate Tamoxifen post radiation  Radiation: simulation to be scheduled    Past Medical History:   Diagnosis Date    Arthritis     Cancer     breast    History of hepatitis C; S/p RX with SVR 12 documented 03/2018 3/22/2018    Hypertension     Nuclear sclerosis of both eyes 12/14/2021          Medications:  Emra has a current medication list which includes the following prescription(s): amlodipine, cyanocobalamin, hydrochlorothiazide, hydrocodone-acetaminophen, ibuprofen, lisinopril, meloxicam, methylprednisolone, multivitamin, and vitamin d, and the following Facility-Administered Medications: sodium chloride 0.9%.    Allergies:  Review of patient's allergies indicates:   Allergen Reactions    Hydralazine analogues Palpitations    Compazine [prochlorperazine] Other (See Comments)     The patient stated, she didn't like how Compazine, made her feel.        Prior Therapy: PT previously for knee, hip  Social History: single story home, no steps to enter, lives with their family  Occupation:  at [employer]   Prior Level of Function: independent  Current Level of Function: mod I, pain with overhead movements  Exercise routine prior to onset: once a week weights and cardio  Hand dominance: right      Other Past Medical History: pelvic fracture 2000     Patient's Goals: to be able to lift without any pain, to do my normal activities with no limitations        Subjective   Pt states: more soreness and stiff  Pain: 4/10 on VAS currently.   Best: 4/10  Worst: 10/10   Pain location: left shoulder, neck, axilla   Objective   Mental status: alert& oriented x 3    Postural examination/scapula alignment:  "Rounded shoulder and Head forward    Skin Integrity:   Scar Location: left breast  Appearance: healed  Signs of infection: No  Drainage:N/A  Color:N/A    Edema: N/A  Location: N/A    Axillary Web Syndrome/Cording:   Location: N/A  Degree of Cording: N/A (mild, moderate etc...)   Number of cords present: N/A    Sensation: WNL         Range of Motion:      Shoulder Range of Motion:   Active /Passive ROM Right Left   Flexion 170 158   Abduction 180 170   Extension 70 55   IR/90deg 90 90   ER/90deg 90 85          Strength: manual muscle test grades below   Upper Extremity Strength   (R) UE (L) UE   Shoulder flexion: 5/5 5/5   Shoulder Abduction: 5/5 5/5   Shoulder IR 5/5 5/5   Shoulder ER 5/5 4/5   Elbow flexion: 5/5 5/5   Elbow extension: 5/5 5/5   Wrist flexion: 5/5 5/5   Wrist extension: 5/5 5/5    good good       Baseline Measurements of BL UE's for early detection of Lymphedema:     LANDMARK RIGHT UE LEFT UE DIFFERENCE   E + 8" 33 cm 34 cm 1 cm   E + 6" 32 cm 32 cm 0 cm   E + 4" 31 cm 31 cm 0 cm   E + 2" 29 cm 29.5 cm 0.5 cm   Elbow 26 cm 25 cm 1 cm   W+ 8" 23.5 cm 24 cm 0.5 cm   W +  6" 22 cm 22.5 cm 0.5 cm   W + 4" 19 cm 19 cm 0 cm   Wrist 15 cm 15 cm 0 cm   DPC 19 cm 18.5 cm 0.5 cm   IP Thumb 6 cm 6 cm 0 cm     Functional Mobility (Bed mobility, transfers)  Independent    ADL's:  Mod I    Gait Assessment:   - AD used: none  - Assistance: independent  - Distance: community distances     Patient Education Provided   - role of therapy in multi - disciplinary team, goals for therapy  - Pt was educated in lymphedema etiology and management plans.    - Pt was provided with written risk reductions and precautions for managing lymphedema.     Pt has no cultural, educational or language barriers to learning provided.  Treatment and Instruction of Home Exercise Program      Total Time Separate from Evaluation: 8 minutes    Erma received individual therapeutic exercises to improve postural correction and alignment, " stretching and soft tissue mobility, and strengthening for 8 minutes including the following:   Scapula retraction, 1 x 10  Radiation position stretch,  2 x 30 sec  Upper trap stretch, 2 x 15 sec  Levator stretch, 2 x 15 sec      Written Home Exercises Provided: yes.  Exercises were reviewed and Erma was able to demonstrate them prior to the end of the session.  Erma demonstrated good  understanding of the education provided.     See EMR under Patient Instructions for exercises provided 8/16/2022.      Functional Limitations Reporting      CMS Impairment/Limitation/Restriction for FOTO Shoulder Survey    Therapist reviewed FOTO scores for Erma Linda on 8/16/2022.   FOTO documents entered into EPIC - see Media section.    Limitations Score: 50%  Category: Other           Assessment   This is a 62 y.o. female referred to outpatient physical therapy and presents with a medical diagnosis of left breast cancer and was seen today post-operatively to establish PT plan of care for impairments following surgery including: decreased range of motion, at risk for lymphedema, tissue tightness, and decreased functional mobility.     Pt instructed in HEP this session and was able to perform all exercises given independently. Pt instructed to follow up with therapist if any concerns arise with program established. Pt will continue to benefit from skilled physical therapy to address the impairments stated in chart below, provide patient/family education and to maximize pt's level of independence in home and community environment     Pt prognosis is Good.    Anticipated barriers to physical therapy: none anticipated     Pt's spiritual, cultural and educational needs considered and pt agreeable to plan of care and goals as stated below:     Medical necessity is demonstrated by the following IMPAIRMENTS/PROMBLEM LIST:    History  Co-morbidities and personal factors that may impact the plan of care Co-morbidities:   history  of cancer and HTN    Personal Factors:   no deficits     moderate   Examination  Body Structures and Functions, activity limitations and participation restrictions that may impact the plan of care Body Regions:   upper extremities    Body Systems:    ROM  strength  motor control    Participation Restrictions:   None anticipated    Activity limitations:   Learning and applying knowledge  no deficits    General Tasks and Commands  no deficits    Communication  no deficits    Mobility  lifting and carrying objects  driving (bike, car, motorcycle)  Ascending/descending stairs    Self care  washing oneself (bathing, drying, washing hands)  caring for body parts (brushing teeth, shaving, grooming)  dressing    Domestic Life  shopping  cooking  doing house work (cleaning house, washing dishes, laundry)    Interactions/Relationships  no deficits    Life Areas  employment    Community and Social Life  community life  recreation and leisure         moderate   Clinical Presentation evolving clinical presentation with changing clinical characteristics moderate   Decision Making/ Complexity Score: moderate       Goals: Pt agrees with goals set    Short Term goals: 4 weeks  1. Patient will demonstrate 100% understanding of lymphedema risk reduction practices to include self monitoring for lymphedema. (progressing, not met)  2. Patient will demonstrate independence with Home Exercise program established. (progressing, not met)  3. Pt will increase AROM/PROM in shoulder abduction ROM to 180 degrees on left to improve functional reach, carry, push, pull pain free. (progressing, not met)  4. Pt will increase AROM/PROM in shoulder flexion to 165 degrees on left to improve functional reach, carry, push, pull pain free.(progressing, not met)    Long Term Goals: 8 weeks   1.  Pt will increase AROM/PROM in shoulder flexion to 180 degrees bilaterally to improve functional reach, carry, push, pull pain free. (progressing, not met)  2. Pt  will increase strength to 5/5 in gross UE musculature to improve tolerance to all functional activities pain free. (progressing, not met)  3. Pt will demonstrate full/maximized tissue mobility to increase ROM and promote healthy tissue to be pain free at discharge. (progressing, not met)  4. Pt will report decrease in overall worst pain to 2/10 at discharge. (progressing, not met)  5. Patient will report compliance with walking program 5x week for 30 min each day to improve overall cardiovascular function and decrease cancer related fatigue at discharge. (progressing, not met)      Plan   Outpatient physical therapy 2x week for 8 weeks to include the following:   Manual Therapy, Neuromuscular Re-ed, Orthotic Management and Training, Patient Education, Self Care, Therapeutic Activities, Therapeutic Exercise and IASTM. Dry needling with manual therapy techniques to decrease pain, inflammation and swelling, increase circulation and promote healing process.    Plan of care Certification Period: 8/16/2022 to 10/16/2022.    Therapist: Daphne Ramos, PT  8/16/2022

## 2022-08-23 ENCOUNTER — CLINICAL SUPPORT (OUTPATIENT)
Dept: REHABILITATION | Facility: HOSPITAL | Age: 62
End: 2022-08-23
Payer: COMMERCIAL

## 2022-08-23 ENCOUNTER — HOSPITAL ENCOUNTER (OUTPATIENT)
Dept: RADIATION THERAPY | Facility: HOSPITAL | Age: 62
Discharge: HOME | End: 2022-08-23
Attending: RADIOLOGY
Payer: COMMERCIAL

## 2022-08-23 DIAGNOSIS — M25.612 DECREASED RANGE OF MOTION OF LEFT SHOULDER: Primary | ICD-10-CM

## 2022-08-23 DIAGNOSIS — Z91.89 AT RISK FOR LYMPHEDEMA: ICD-10-CM

## 2022-08-23 PROCEDURE — 77333 RADIATION TREATMENT AID(S): CPT | Mod: TC | Performed by: RADIOLOGY

## 2022-08-23 PROCEDURE — 77333 PR  RADN TREATMENT AID(S) INTERM: ICD-10-PCS | Mod: 26,,, | Performed by: RADIOLOGY

## 2022-08-23 PROCEDURE — 77333 RADIATION TREATMENT AID(S): CPT | Mod: 26,,, | Performed by: RADIOLOGY

## 2022-08-23 PROCEDURE — 77014 HC CT GUIDANCE RADIATION THERAPY FLDS PLACEMENT: CPT | Mod: TC | Performed by: RADIOLOGY

## 2022-08-23 PROCEDURE — 77263 PR  RADIATION THERAPY PLAN COMPLEX: ICD-10-PCS | Mod: ,,, | Performed by: RADIOLOGY

## 2022-08-23 PROCEDURE — 77290 THER RAD SIMULAJ FIELD CPLX: CPT | Mod: TC | Performed by: RADIOLOGY

## 2022-08-23 PROCEDURE — 77290 PR  SET RADN THERAPY FIELD COMPLEX: ICD-10-PCS | Mod: 26,,, | Performed by: RADIOLOGY

## 2022-08-23 PROCEDURE — 97110 THERAPEUTIC EXERCISES: CPT

## 2022-08-23 PROCEDURE — 77290 THER RAD SIMULAJ FIELD CPLX: CPT | Mod: 26,,, | Performed by: RADIOLOGY

## 2022-08-23 PROCEDURE — 97140 MANUAL THERAPY 1/> REGIONS: CPT

## 2022-08-23 PROCEDURE — 77263 THER RADIOLOGY TX PLNG CPLX: CPT | Mod: ,,, | Performed by: RADIOLOGY

## 2022-08-23 NOTE — PROGRESS NOTES
Physical Therapy Daily Treatment Note       Date: 8/23/2022   Name: Erma Linda  Clinic Number: [id number]    Therapy Diagnosis:   Encounter Diagnoses   Name Primary?    Decreased range of motion of left shoulder Yes    At risk for lymphedema      Physician: Louise Camarillo MD     Physician Orders: PT Eval and Treat   Medical Diagnosis: C50.412,Z17.0 (ICD-10-CM) - Malignant neoplasm of upper-outer quadrant of left breast in female, estrogen receptor positive  Evaluation Date: 8/16/2022  Authorization Period Expiration: 12/31/2022  Plan of Care Certification Period: 10/16/2022  Visit # / Visits authorized: 1/ 20 (plus evaluation)  Insurance: BLUE CROSS BLUE SHIELD  FOTO: 1/3     Time In: 10:12 AM  Time Out: 11:02 AM  Total Billable Time: 50 minutes     Precautions: Standard and cancer         Subjective     Pt reports: has a slight headache today and a sore throat. Pt denies dizziness, but states it feels tight behind her eyes. Reaching overhead, pulling, and bending down are most limited. Pt has radiation simulation today.  She was partially compliant with home exercise program.  Response to previous treatment: no adverse reaction  Pain Scale: Erma rates pain on a scale of 7/10 on VAS.   Pain location: head, B temples- pt describes tightness.     Fatigue: none  Functional change: none stated  Treatment:   Radiation: simulation to be scheduled  Tamoxifen post radiation  Surgery date: s/p L partial mastectomy and SLNB on 6/16/22      Objective     Objective Measures updated at progress report unless specified.   Vitals- 98%, 91bpm, BP: 152/87mmHg on RUE and 140/83mmHg on LUE  Temperature: 98 degrees F    Shoulder Range of Motion: (8/23/22)  Active /Passive ROM Right Left   Flexion 170 180   Abduction 180 150   Extension 70 55   IR/90deg 90 90   ER/90deg 90 80        Treatment     Erma received individual therapeutic exercises to improve postural correction  and alignment, stretching and soft tissue mobility, and strengthening for 42 minutes including the following:     Pulleys, 2 min each of flexion and scaption  pec stretch over towel roll x 2 min  Butterflies x 10 reps  LTR with hands behind head, 10 reps B  PROM to L shoulder by PT: 5-10 reps each of Flex, ABD, ER at 90  Shoulder flexion with 1# wand, 10 reps  B shoulder ER at 90 degrees ABD, 1# wand, 10 reps  Pt attempts side-lying shoulder ABDx ~5 reps, but endorses uncomfortable pull with it, so it was   L shoulder ABD at wall x 10 reps  Attempt shoulder ext with wand in standing and reports pain, so it was stopped.  Rows with yellow band, 10 reps  Shoulder ext with yellow band, 10 reps    Erma received the following manual therapy techniques were performed to increased myofascial/soft tissue length, mobility and pliability, increase PROM, AROM and function as well as to decrease pain for 8 minutes    Grade I anterior-posterior and inferior joint mobs, LUE  Gentle L STM to upper trap  L posterior capsule stretch    Home Exercise Program and Patient Education   Education provided re:  - role of PT in multi - disciplinary team, goals for PT  - progress towards goals   - exercise technique    Written Home Exercises Provided: yes.  Exercises were reviewed and Erma was able to demonstrate them prior to the end of the session.  Erma demonstrated good  understanding of the education provided.     See EMR under Patient Instructions for exercises provided 8/23/2022 and Eval    Pt has no cultural, educational or language barriers to learning provided.    Assessment     Patient is responding well to physical therapy. She endorses headache today, but she denies increase in symptoms during session. She endorses discomfort with side-lying shoulder ABD and standing shoulder ext with wand, so those were stopped. Otherwise, pt was able to progress exercises without issue.  Pt demo's improved L shoulder flexion AROM, and she  responds well to manual techniques. Will progress as tolerated.    Pt prognosis is Good. Pt will continue to benefit from skilled outpatient physical therapy to address the deficits listed in the problem list chart on initial evaluation, provide pt/family education and to maximize pt's level of independence in the home and community environment.     Anticipated barriers to physical therapy: N/A    Goals as follows:  Short Term goals: 4 weeks  1. Patient will demonstrate 100% understanding of lymphedema risk reduction practices to include self monitoring for lymphedema. (progressing, not met)  2. Patient will demonstrate independence with Home Exercise program established. (progressing, not met)  3. Pt will increase AROM/PROM in shoulder abduction ROM to 180 degrees on left to improve functional reach, carry, push, pull pain free. (progressing, not met)  4. Pt will increase AROM/PROM in shoulder flexion to 165 degrees on left to improve functional reach, carry, push, pull pain free.(progressing, not met)     Long Term Goals: 8 weeks   1.  Pt will increase AROM/PROM in shoulder flexion to 180 degrees bilaterally to improve functional reach, carry, push, pull pain free. (progressing, not met)  2. Pt will increase strength to 5/5 in gross UE musculature to improve tolerance to all functional activities pain free. (progressing, not met)  3. Pt will demonstrate full/maximized tissue mobility to increase ROM and promote healthy tissue to be pain free at discharge. (progressing, not met)  4. Pt will report decrease in overall worst pain to 2/10 at discharge. (progressing, not met)  5. Patient will report compliance with walking program 5x week for 30 min each day to improve overall cardiovascular function and decrease cancer related fatigue at discharge. (progressing, not met)        Plan   Outpatient physical therapy 2x week for 8 weeks to include the following:   Manual Therapy, Neuromuscular Re-ed, Orthotic Management  and Training, Patient Education, Self Care, Therapeutic Activities, Therapeutic Exercise and IASTM. Dry needling with manual therapy techniques to decrease pain, inflammation and swelling, increase circulation and promote healing process.     Plan of care Certification Period: 8/16/2022 to 10/16/2022.      Therapist: Gladys Chpain, PT  8/23/2022

## 2022-08-23 NOTE — PATIENT INSTRUCTIONS
ROM: Flexion - Wand (Supine)        Lie on back holding wand. Raise arms over head.   Repeat __10__ times per set. Do __1__ sets per session. Do _1-2___ sessions per day.  Copyright © [employer]. All rights reserved.       [email or web address].[email or web address]    ROM: External Rotation - Wand (supine)    Lie on back holding wand with elbows bent to 90°. Rotate forearms over head as far as possible.   Repeat __10__ times per set. Do __1__ sets per session. Do __1-2__ sessions per day.           Wall Walk (Shoulder Abduction)  Using your affected arm, slide your hand up  a wall straight out to the side, as high as you  are able. You can use a towel under your hand to decrease friction at wall.  10 reps, 1-2x/day

## 2022-09-01 ENCOUNTER — HOSPITAL ENCOUNTER (OUTPATIENT)
Dept: RADIATION THERAPY | Facility: HOSPITAL | Age: 62
Discharge: HOME | End: 2022-09-01
Attending: RADIOLOGY
Payer: COMMERCIAL

## 2022-09-02 PROCEDURE — 77295 PR 3D RADIOTHERAPY PLAN: ICD-10-PCS | Mod: 26,,, | Performed by: RADIOLOGY

## 2022-09-02 PROCEDURE — 77334 RADIATION TREATMENT AID(S): CPT | Mod: 26,,, | Performed by: RADIOLOGY

## 2022-09-02 PROCEDURE — 77293 RESPIRATOR MOTION MGMT SIMUL: CPT | Mod: TC | Performed by: RADIOLOGY

## 2022-09-02 PROCEDURE — 77300 PR RADIATION THERAPY,DOSIMETRY PLAN: ICD-10-PCS | Mod: 26,,, | Performed by: RADIOLOGY

## 2022-09-02 PROCEDURE — 77295 3-D RADIOTHERAPY PLAN: CPT | Mod: 26,,, | Performed by: RADIOLOGY

## 2022-09-02 PROCEDURE — 77334 RADIATION TREATMENT AID(S): CPT | Mod: TC | Performed by: RADIOLOGY

## 2022-09-02 PROCEDURE — 77295 3-D RADIOTHERAPY PLAN: CPT | Mod: TC | Performed by: RADIOLOGY

## 2022-09-02 PROCEDURE — 77293 RESPIRATOR MOTION MGMT SIMUL: CPT | Mod: 26,,, | Performed by: RADIOLOGY

## 2022-09-02 PROCEDURE — 77300 RADIATION THERAPY DOSE PLAN: CPT | Mod: 26,,, | Performed by: RADIOLOGY

## 2022-09-02 PROCEDURE — 77300 RADIATION THERAPY DOSE PLAN: CPT | Mod: TC | Performed by: RADIOLOGY

## 2022-09-02 PROCEDURE — 77293 PR RESPIRATORY MOTION MGMT SIMULATION: ICD-10-PCS | Mod: 26,,, | Performed by: RADIOLOGY

## 2022-09-02 PROCEDURE — 77334 PR  RADN TREATMENT AID(S) COMPLX: ICD-10-PCS | Mod: 26,,, | Performed by: RADIOLOGY

## 2022-09-08 PROCEDURE — 77280 THER RAD SIMULAJ FIELD SMPL: CPT | Mod: TC | Performed by: RADIOLOGY

## 2022-09-08 PROCEDURE — 77280 PR  SET RADN THERAPY FIELD SIMPLE: ICD-10-PCS | Mod: 26,,, | Performed by: RADIOLOGY

## 2022-09-08 PROCEDURE — 77280 THER RAD SIMULAJ FIELD SMPL: CPT | Mod: 26,,, | Performed by: RADIOLOGY

## 2022-09-12 PROCEDURE — 77387 GUIDANCE FOR RADJ TX DLVR: CPT | Mod: 26,,, | Performed by: RADIOLOGY

## 2022-09-12 PROCEDURE — 77412 RADIATION TX DELIVERY LVL 3: CPT | Performed by: RADIOLOGY

## 2022-09-12 PROCEDURE — 77387 GUIDANCE FOR RADJ TX DLVR: CPT | Mod: TC | Performed by: RADIOLOGY

## 2022-09-12 PROCEDURE — 77387 PR GUIDANCE FOR RADIATION TREATMENT DELIVERY: ICD-10-PCS | Mod: 26,,, | Performed by: RADIOLOGY

## 2022-09-13 ENCOUNTER — DOCUMENTATION ONLY (OUTPATIENT)
Dept: RADIATION ONCOLOGY | Facility: CLINIC | Age: 62
End: 2022-09-13
Payer: COMMERCIAL

## 2022-09-13 PROCEDURE — 77387 PR GUIDANCE FOR RADIATION TREATMENT DELIVERY: ICD-10-PCS | Mod: 26,,, | Performed by: RADIOLOGY

## 2022-09-13 PROCEDURE — 77387 GUIDANCE FOR RADJ TX DLVR: CPT | Mod: TC | Performed by: RADIOLOGY

## 2022-09-13 PROCEDURE — 77412 RADIATION TX DELIVERY LVL 3: CPT | Performed by: RADIOLOGY

## 2022-09-13 PROCEDURE — 77387 GUIDANCE FOR RADJ TX DLVR: CPT | Mod: 26,,, | Performed by: RADIOLOGY

## 2022-09-13 NOTE — PLAN OF CARE
Day 2 of outpatient radiation to the left breast, BHANU. States she had a sore throat and headache after her first treatment. Explained that this was not from her radiation and maybe coincidental of timing and that she may just be coming down with something. Miaderm cream and breast handout given.

## 2022-09-14 PROCEDURE — 77387 GUIDANCE FOR RADJ TX DLVR: CPT | Mod: 26,,, | Performed by: RADIOLOGY

## 2022-09-14 PROCEDURE — 77387 GUIDANCE FOR RADJ TX DLVR: CPT | Mod: TC | Performed by: RADIOLOGY

## 2022-09-14 PROCEDURE — 77412 RADIATION TX DELIVERY LVL 3: CPT | Performed by: RADIOLOGY

## 2022-09-14 PROCEDURE — 77387 PR GUIDANCE FOR RADIATION TREATMENT DELIVERY: ICD-10-PCS | Mod: 26,,, | Performed by: RADIOLOGY

## 2022-09-15 PROCEDURE — 77336 RADIATION PHYSICS CONSULT: CPT | Performed by: RADIOLOGY

## 2022-09-15 PROCEDURE — 77387 PR GUIDANCE FOR RADIATION TREATMENT DELIVERY: ICD-10-PCS | Mod: 26,,, | Performed by: RADIOLOGY

## 2022-09-15 PROCEDURE — 77412 RADIATION TX DELIVERY LVL 3: CPT | Performed by: RADIOLOGY

## 2022-09-15 PROCEDURE — 77387 GUIDANCE FOR RADJ TX DLVR: CPT | Mod: 26,,, | Performed by: RADIOLOGY

## 2022-09-15 PROCEDURE — 77387 GUIDANCE FOR RADJ TX DLVR: CPT | Mod: TC | Performed by: RADIOLOGY

## 2022-09-16 PROCEDURE — 77387 PR GUIDANCE FOR RADIATION TREATMENT DELIVERY: ICD-10-PCS | Mod: 26,,, | Performed by: RADIOLOGY

## 2022-09-16 PROCEDURE — 77387 GUIDANCE FOR RADJ TX DLVR: CPT | Mod: 26,,, | Performed by: RADIOLOGY

## 2022-09-16 PROCEDURE — 77387 GUIDANCE FOR RADJ TX DLVR: CPT | Mod: TC | Performed by: RADIOLOGY

## 2022-09-16 PROCEDURE — 77412 RADIATION TX DELIVERY LVL 3: CPT | Performed by: RADIOLOGY

## 2022-09-19 PROCEDURE — 77412 RADIATION TX DELIVERY LVL 3: CPT | Performed by: RADIOLOGY

## 2022-09-19 PROCEDURE — 77387 GUIDANCE FOR RADJ TX DLVR: CPT | Mod: TC | Performed by: RADIOLOGY

## 2022-09-19 PROCEDURE — 77387 GUIDANCE FOR RADJ TX DLVR: CPT | Mod: 26,,, | Performed by: RADIOLOGY

## 2022-09-19 PROCEDURE — 77417 THER RADIOLOGY PORT IMAGE(S): CPT | Performed by: RADIOLOGY

## 2022-09-19 PROCEDURE — 77387 PR GUIDANCE FOR RADIATION TREATMENT DELIVERY: ICD-10-PCS | Mod: 26,,, | Performed by: RADIOLOGY

## 2022-09-20 ENCOUNTER — DOCUMENTATION ONLY (OUTPATIENT)
Dept: RADIATION ONCOLOGY | Facility: CLINIC | Age: 62
End: 2022-09-20
Payer: COMMERCIAL

## 2022-09-20 PROCEDURE — 77387 GUIDANCE FOR RADJ TX DLVR: CPT | Mod: 26,,, | Performed by: RADIOLOGY

## 2022-09-20 PROCEDURE — 77387 PR GUIDANCE FOR RADIATION TREATMENT DELIVERY: ICD-10-PCS | Mod: 26,,, | Performed by: RADIOLOGY

## 2022-09-20 PROCEDURE — 77387 GUIDANCE FOR RADJ TX DLVR: CPT | Mod: TC | Performed by: RADIOLOGY

## 2022-09-20 PROCEDURE — 77417 THER RADIOLOGY PORT IMAGE(S): CPT | Performed by: RADIOLOGY

## 2022-09-20 PROCEDURE — 77412 RADIATION TX DELIVERY LVL 3: CPT | Performed by: RADIOLOGY

## 2022-09-20 NOTE — PLAN OF CARE
Day 7 of outpatient radiation to the left breast, BHANU. Pt very tearful today. Reports having pain to the lateral left side while on the table for treatment yesterday. Not sleeping well. Using Miaderm cream. Gel sheets given with instruction for use per Dr Mathis.

## 2022-09-21 PROCEDURE — 77387 PR GUIDANCE FOR RADIATION TREATMENT DELIVERY: ICD-10-PCS | Mod: 26,,, | Performed by: RADIOLOGY

## 2022-09-21 PROCEDURE — 77387 GUIDANCE FOR RADJ TX DLVR: CPT | Mod: 26,,, | Performed by: RADIOLOGY

## 2022-09-21 PROCEDURE — 77412 RADIATION TX DELIVERY LVL 3: CPT | Performed by: RADIOLOGY

## 2022-09-21 PROCEDURE — 77387 GUIDANCE FOR RADJ TX DLVR: CPT | Mod: TC | Performed by: RADIOLOGY

## 2022-09-22 PROCEDURE — 77412 RADIATION TX DELIVERY LVL 3: CPT | Performed by: RADIOLOGY

## 2022-09-22 PROCEDURE — 77387 GUIDANCE FOR RADJ TX DLVR: CPT | Mod: TC | Performed by: RADIOLOGY

## 2022-09-22 PROCEDURE — 77387 PR GUIDANCE FOR RADIATION TREATMENT DELIVERY: ICD-10-PCS | Mod: 26,,, | Performed by: RADIOLOGY

## 2022-09-22 PROCEDURE — 77387 GUIDANCE FOR RADJ TX DLVR: CPT | Mod: 26,,, | Performed by: RADIOLOGY

## 2022-09-23 PROCEDURE — 77412 RADIATION TX DELIVERY LVL 3: CPT | Performed by: RADIOLOGY

## 2022-09-23 PROCEDURE — 77387 PR GUIDANCE FOR RADIATION TREATMENT DELIVERY: ICD-10-PCS | Mod: 26,,, | Performed by: RADIOLOGY

## 2022-09-23 PROCEDURE — 77387 GUIDANCE FOR RADJ TX DLVR: CPT | Mod: 26,,, | Performed by: RADIOLOGY

## 2022-09-23 PROCEDURE — 77387 GUIDANCE FOR RADJ TX DLVR: CPT | Mod: TC | Performed by: RADIOLOGY

## 2022-09-26 PROCEDURE — 77387 GUIDANCE FOR RADJ TX DLVR: CPT | Mod: 26,,, | Performed by: RADIOLOGY

## 2022-09-26 PROCEDURE — 77387 GUIDANCE FOR RADJ TX DLVR: CPT | Mod: TC | Performed by: RADIOLOGY

## 2022-09-26 PROCEDURE — 77417 THER RADIOLOGY PORT IMAGE(S): CPT | Performed by: RADIOLOGY

## 2022-09-26 PROCEDURE — 77387 PR GUIDANCE FOR RADIATION TREATMENT DELIVERY: ICD-10-PCS | Mod: 26,,, | Performed by: RADIOLOGY

## 2022-09-26 PROCEDURE — 77336 RADIATION PHYSICS CONSULT: CPT | Performed by: RADIOLOGY

## 2022-09-26 PROCEDURE — 77412 RADIATION TX DELIVERY LVL 3: CPT | Performed by: RADIOLOGY

## 2022-09-27 ENCOUNTER — DOCUMENTATION ONLY (OUTPATIENT)
Dept: RADIATION ONCOLOGY | Facility: CLINIC | Age: 62
End: 2022-09-27
Payer: COMMERCIAL

## 2022-09-27 PROCEDURE — 77412 RADIATION TX DELIVERY LVL 3: CPT | Performed by: RADIOLOGY

## 2022-09-27 PROCEDURE — 77387 PR GUIDANCE FOR RADIATION TREATMENT DELIVERY: ICD-10-PCS | Mod: 26,,, | Performed by: RADIOLOGY

## 2022-09-27 PROCEDURE — 77334 PR  RADN TREATMENT AID(S) COMPLX: ICD-10-PCS | Mod: 26,,, | Performed by: RADIOLOGY

## 2022-09-27 PROCEDURE — 77334 RADIATION TREATMENT AID(S): CPT | Mod: 26,,, | Performed by: RADIOLOGY

## 2022-09-27 PROCEDURE — 77293 RESPIRATOR MOTION MGMT SIMUL: CPT | Mod: 26,,, | Performed by: RADIOLOGY

## 2022-09-27 PROCEDURE — 77334 RADIATION TREATMENT AID(S): CPT | Mod: TC | Performed by: RADIOLOGY

## 2022-09-27 PROCEDURE — 77307 TELETHX ISODOSE PLAN CPLX: CPT | Mod: 26,,, | Performed by: RADIOLOGY

## 2022-09-27 PROCEDURE — 77387 GUIDANCE FOR RADJ TX DLVR: CPT | Mod: 26,,, | Performed by: RADIOLOGY

## 2022-09-27 PROCEDURE — 77387 GUIDANCE FOR RADJ TX DLVR: CPT | Mod: TC | Performed by: RADIOLOGY

## 2022-09-27 PROCEDURE — 77293 RESPIRATOR MOTION MGMT SIMUL: CPT | Mod: TC | Performed by: RADIOLOGY

## 2022-09-27 PROCEDURE — 77307 PR TELETHERAPY ISODOSE PLAN; COMPLEX: ICD-10-PCS | Mod: 26,,, | Performed by: RADIOLOGY

## 2022-09-27 PROCEDURE — 77307 TELETHX ISODOSE PLAN CPLX: CPT | Mod: TC | Performed by: RADIOLOGY

## 2022-09-27 PROCEDURE — 77293 PR RESPIRATORY MOTION MGMT SIMULATION: ICD-10-PCS | Mod: 26,,, | Performed by: RADIOLOGY

## 2022-09-27 NOTE — PLAN OF CARE
Day 12 of outpatient radiation to the left breast, DIBH. Reports burning sensation in the lateral breast and inframammary fold. Skin intact. Using Miaderm cream. Gel sheets given for comfort.

## 2022-09-28 PROCEDURE — 77387 GUIDANCE FOR RADJ TX DLVR: CPT | Mod: TC | Performed by: RADIOLOGY

## 2022-09-28 PROCEDURE — 77412 RADIATION TX DELIVERY LVL 3: CPT | Performed by: RADIOLOGY

## 2022-09-28 PROCEDURE — 77387 PR GUIDANCE FOR RADIATION TREATMENT DELIVERY: ICD-10-PCS | Mod: 26,,, | Performed by: RADIOLOGY

## 2022-09-28 PROCEDURE — 77387 GUIDANCE FOR RADJ TX DLVR: CPT | Mod: 26,,, | Performed by: RADIOLOGY

## 2022-09-29 PROCEDURE — 77387 PR GUIDANCE FOR RADIATION TREATMENT DELIVERY: ICD-10-PCS | Mod: 26,,, | Performed by: RADIOLOGY

## 2022-09-29 PROCEDURE — 77412 RADIATION TX DELIVERY LVL 3: CPT | Performed by: RADIOLOGY

## 2022-09-29 PROCEDURE — 77387 GUIDANCE FOR RADJ TX DLVR: CPT | Mod: 26,,, | Performed by: RADIOLOGY

## 2022-09-29 PROCEDURE — 77387 GUIDANCE FOR RADJ TX DLVR: CPT | Mod: TC | Performed by: RADIOLOGY

## 2022-10-03 ENCOUNTER — HOSPITAL ENCOUNTER (OUTPATIENT)
Dept: RADIATION THERAPY | Facility: HOSPITAL | Age: 62
Discharge: HOME | End: 2022-10-03
Attending: RADIOLOGY
Payer: COMMERCIAL

## 2022-10-03 PROCEDURE — 77387 GUIDANCE FOR RADJ TX DLVR: CPT | Mod: 26,,, | Performed by: RADIOLOGY

## 2022-10-03 PROCEDURE — 77387 PR GUIDANCE FOR RADIATION TREATMENT DELIVERY: ICD-10-PCS | Mod: 26,,, | Performed by: RADIOLOGY

## 2022-10-03 PROCEDURE — 77387 GUIDANCE FOR RADJ TX DLVR: CPT | Mod: TC | Performed by: RADIOLOGY

## 2022-10-03 PROCEDURE — 77412 RADIATION TX DELIVERY LVL 3: CPT | Performed by: RADIOLOGY

## 2022-10-04 ENCOUNTER — DOCUMENTATION ONLY (OUTPATIENT)
Dept: RADIATION ONCOLOGY | Facility: CLINIC | Age: 62
End: 2022-10-04
Payer: COMMERCIAL

## 2022-10-04 PROCEDURE — 77412 RADIATION TX DELIVERY LVL 3: CPT | Performed by: RADIOLOGY

## 2022-10-04 PROCEDURE — 77387 GUIDANCE FOR RADJ TX DLVR: CPT | Mod: 26,,, | Performed by: RADIOLOGY

## 2022-10-04 PROCEDURE — 77387 GUIDANCE FOR RADJ TX DLVR: CPT | Mod: TC | Performed by: RADIOLOGY

## 2022-10-04 PROCEDURE — 77387 PR GUIDANCE FOR RADIATION TREATMENT DELIVERY: ICD-10-PCS | Mod: 26,,, | Performed by: RADIOLOGY

## 2022-10-04 NOTE — PLAN OF CARE
Day 16 of outpatient radiation to the left breast, DIBH. Hyperpigmentation noted to breast. Dry desquamation in the inframammary fold that pt reports burns. Gel sheets given.

## 2022-10-05 PROCEDURE — 77412 RADIATION TX DELIVERY LVL 3: CPT | Performed by: RADIOLOGY

## 2022-10-05 PROCEDURE — 77014 PR  CT GUIDANCE PLACEMENT RAD THERAPY FIELDS: CPT | Mod: 26,,, | Performed by: RADIOLOGY

## 2022-10-05 PROCEDURE — 77014 HC CT GUIDANCE RADIATION THERAPY FLDS PLACEMENT: CPT | Mod: TC | Performed by: RADIOLOGY

## 2022-10-05 PROCEDURE — 77014 PR  CT GUIDANCE PLACEMENT RAD THERAPY FIELDS: ICD-10-PCS | Mod: 26,,, | Performed by: RADIOLOGY

## 2022-10-05 PROCEDURE — 77336 RADIATION PHYSICS CONSULT: CPT | Performed by: RADIOLOGY

## 2022-10-06 PROCEDURE — 77014 HC CT GUIDANCE RADIATION THERAPY FLDS PLACEMENT: CPT | Mod: TC | Performed by: RADIOLOGY

## 2022-10-06 PROCEDURE — 77014 PR  CT GUIDANCE PLACEMENT RAD THERAPY FIELDS: ICD-10-PCS | Mod: 26,,, | Performed by: RADIOLOGY

## 2022-10-06 PROCEDURE — 77014 PR  CT GUIDANCE PLACEMENT RAD THERAPY FIELDS: CPT | Mod: 26,,, | Performed by: RADIOLOGY

## 2022-10-06 PROCEDURE — 77412 RADIATION TX DELIVERY LVL 3: CPT | Performed by: RADIOLOGY

## 2022-10-07 PROCEDURE — 77014 PR  CT GUIDANCE PLACEMENT RAD THERAPY FIELDS: CPT | Mod: 26,,, | Performed by: RADIOLOGY

## 2022-10-07 PROCEDURE — 77014 PR  CT GUIDANCE PLACEMENT RAD THERAPY FIELDS: ICD-10-PCS | Mod: 26,,, | Performed by: RADIOLOGY

## 2022-10-07 PROCEDURE — 77412 RADIATION TX DELIVERY LVL 3: CPT | Performed by: RADIOLOGY

## 2022-10-07 PROCEDURE — 77014 HC CT GUIDANCE RADIATION THERAPY FLDS PLACEMENT: CPT | Mod: TC | Performed by: RADIOLOGY

## 2022-10-10 PROCEDURE — 77014 PR  CT GUIDANCE PLACEMENT RAD THERAPY FIELDS: CPT | Mod: 26,,, | Performed by: RADIOLOGY

## 2022-10-10 PROCEDURE — 77412 RADIATION TX DELIVERY LVL 3: CPT | Performed by: RADIOLOGY

## 2022-10-10 PROCEDURE — 77336 RADIATION PHYSICS CONSULT: CPT | Performed by: RADIOLOGY

## 2022-10-10 PROCEDURE — 77014 HC CT GUIDANCE RADIATION THERAPY FLDS PLACEMENT: CPT | Mod: TC | Performed by: RADIOLOGY

## 2022-10-10 PROCEDURE — 77014 PR  CT GUIDANCE PLACEMENT RAD THERAPY FIELDS: ICD-10-PCS | Mod: 26,,, | Performed by: RADIOLOGY

## 2022-10-11 ENCOUNTER — DOCUMENTATION ONLY (OUTPATIENT)
Dept: RADIATION ONCOLOGY | Facility: CLINIC | Age: 62
End: 2022-10-11
Payer: COMMERCIAL

## 2022-10-11 NOTE — PLAN OF CARE
Outpatient radiation to the left breast completed 10/10/22. Pt had some moist desquamation in the inframammary fold. Mepilex dressing supplied to pt. Pt o f/u with Dr Mathis in 6 weeks.

## 2022-10-21 ENCOUNTER — PATIENT MESSAGE (OUTPATIENT)
Dept: RADIATION ONCOLOGY | Facility: CLINIC | Age: 62
End: 2022-10-21
Payer: COMMERCIAL

## 2022-10-25 ENCOUNTER — TELEPHONE (OUTPATIENT)
Dept: RADIATION ONCOLOGY | Facility: CLINIC | Age: 62
End: 2022-10-25
Payer: COMMERCIAL

## 2022-10-25 NOTE — TELEPHONE ENCOUNTER
Return call to pt regarding a return to work note. States she spoke with Negin yesterday and will come by today for the MD signature.     Pt reports that her breast is healing and confirmed with Negin in radiation. Pt has a dry peel only ans was instructed to moisturize area. Will appt pt to see Dr Mathis in approx 6 wks from the end of treatment.

## 2022-11-01 ENCOUNTER — DOCUMENTATION ONLY (OUTPATIENT)
Dept: REHABILITATION | Facility: HOSPITAL | Age: 62
End: 2022-11-01
Payer: COMMERCIAL

## 2022-11-01 DIAGNOSIS — M25.612 DECREASED RANGE OF MOTION OF LEFT SHOULDER: Primary | ICD-10-CM

## 2022-11-01 DIAGNOSIS — Z91.89 AT RISK FOR LYMPHEDEMA: ICD-10-CM

## 2022-11-01 NOTE — PROGRESS NOTES
PHYSICAL THERAPY  Discharge  Date of Discharge 11/1/2022    Name: Erma Arellano [facility] #: [id number]    Pt was seen in Physical Therapy for initial evaluation on:8/16/2022  Pt's last date of treatment in Physical Therapy was:8/23/2022  Number of treatment sessions completed: 2  Reason for discharge:    self discharge/reason unknown  Status at Discharge:  Goals not met

## 2022-11-17 NOTE — PROGRESS NOTES
REFERRING PHYSICIAN: CHRISTI Delong MD, Louise Camarillo MD    DIAGNOSIS: Multifocal pT2 N0 M0, Stage IIA, invasive ductal carcinoma of the left breast with lobular features    Radiation Treatment Summary  Treatment Site Dose/Fx (Gy) #Fx Total Dose (Gy) Start Date End Date Elapsed Days   Left breast 2.65 16 / 16 42.4 9/12/2022 10/4/2022 22   Boost 2.5 4 / 4 10 10/5/2022 10/10/2022 5     INTERVAL HISTORY:   Ms. Linda is a 62-year-old female who completed post operative radiation to the left breast as above, who returns for routine follow-up.      She was diagnosed with left breast cancer after an abnormal mammogram in February 2022 which revealed a 12 mm irregular mass in the upper-outer quadrant left breast, posterior depth. There were multiple masses also see the 2 o'clock position. An ultrasound revealed at least 3 lesions with abnormal left axillary lymph node with cortical thickening measuring 7 mm. On March 10, 2022, she underwent biopsy of 2 of these lesions and the left axillary abnormal node. Both breast masses revealed invasive mammary carcinoma with lobular features, grade1. The left axillary lymph node which was benign. Both lesions were ER/WA positive and HER2 negative, with Ki-67 index of 5%. MRI of bilateral breasts on May 6, 2022 revealed 3 of the lesions in the left breast as well as a new lesion in the 3 o'clock position the left breast which is also suspicious. On June 9, 2022, biopsy of the 3 o'clock lesion which revealed atypical lobular hyperplasia.     On June 16, 2022, and a lumpectomy and sentinel node biopsy. Pathology revealed multifocal, grade 1, invasive ductal carcinoma, 4 foci (2.7 cm, 2.0 cm, 1.0 cm and 0.3 cm). Invasive carcinoma is present at the superior, medial, and posterior margins. There is extensive perineural invasion noted. 3/3 sentinel lymph nodes were negative for involvement. She underwent re-excision of the lumpectomy cavity on July 14, 2022. Pathology was negative for  residual carcinoma from the new anterior, posterior, inferior, superior, and medial margins. Her Oncotype revealed a score of 7. To reduce the chance of local recurrence, she received postoperative radiation to the left breast as above.  She returns for a routine follow-up.    At present, she reports fatigue which is improving as well as itching of the inframammary fold on the left side.  She has not started tamoxifen but is still considering it.  She denies left breast pain, edema, erythema, or nipple discharge or left arm edema.  She also denies fever, night sweats, or weight loss.  She has not returned for further physical therapy completion of radiation.    PHYSICAL EXAMINATION:  Vitals:    11/22/22 1502   BP: (!) 143/74   BP Location: Left arm   Patient Position: Sitting   BP Method: Medium (Automatic)   Pulse: 105   Temp: 97.4 °F (36.3 °C)   TempSrc: Other (see comments)  Comment: non contact forehead   SpO2: 97%   Weight: 74.4 kg (164 lb 1.6 oz)   Body mass index is 28.17 kg/m².   GENERAL: Patient is alert and oriented, in no acute distress.  HEENT: Extraocular muscles are intact.  Oropharynx is clear without lesions.  There is no cervical or supraclavicular adenopathy palpated.    CHEST: Breath sounds clear bilaterally.  No rales.  No rhonchi.  Unlabored respirations.  CARDIOVASCULAR: Normal S1, S2.  Normal rate.  Regular rhythm.  BREAST EXAM:  The left breast is slightly smaller than the right. The scar secondary to lumpectomy and sentinel node biopsy is noted in the upper outer quadrant of the left breast, with associated fibrosis. No abnormal masses palpated in the left breast or left axilla. The right breast and right axilla area also without suspicious lesions  ABDOMEN: Bowel sounds normal.  No tenderness.  No abdominal distention.  No hepatomegaly.  No splenomegaly.  EXTREMITIES: No clubbing, cyanosis, edema  NEUROLOGIC: Cranial nerves II through XII are grossly intact.  Sensation is intact.  Strength  is 5 out of 5 in the upper and lower extremities bilaterally.    ASSESSMENT:   This is a 62-year-old female with multifocal p T2 N0 M0, grade 1, invasive ductal carcinoma of the left breast with lobular features, who underwent lumpectomy and sentinel node biopsy on June 19, 2022 with 4 foci which were excised with multiple positive margins, for which she underwent re-excision on July 14, 2022 with no residual carcinoma, ER/KS positive, HER2 negative, Oncotype score 7.     PLAN:   Ms. Linda is recovering from radiation without any unexpected side effects.  Skin care to the treated area was again reviewed with the patient.  She is recommended to follow up with Dr. Camarillo as scheduled regarding endocrine therapy.  She will repeat yearly mammogram in February 2023.  She will continue follow-up with Dr. Smith as scheduled.  I plan to see her back in approximately 6 months, unless symptoms warrant otherwise.

## 2022-11-22 ENCOUNTER — OFFICE VISIT (OUTPATIENT)
Dept: RADIATION ONCOLOGY | Facility: CLINIC | Age: 62
End: 2022-11-22
Payer: COMMERCIAL

## 2022-11-22 VITALS
TEMPERATURE: 97 F | WEIGHT: 164.13 LBS | BODY MASS INDEX: 28.17 KG/M2 | OXYGEN SATURATION: 97 % | SYSTOLIC BLOOD PRESSURE: 143 MMHG | DIASTOLIC BLOOD PRESSURE: 74 MMHG | HEART RATE: 105 BPM

## 2022-11-22 DIAGNOSIS — Z17.0 MALIGNANT NEOPLASM OF UPPER-OUTER QUADRANT OF LEFT BREAST IN FEMALE, ESTROGEN RECEPTOR POSITIVE: Primary | ICD-10-CM

## 2022-11-22 DIAGNOSIS — C50.412 MALIGNANT NEOPLASM OF UPPER-OUTER QUADRANT OF LEFT BREAST IN FEMALE, ESTROGEN RECEPTOR POSITIVE: Primary | ICD-10-CM

## 2022-11-22 PROCEDURE — 1160F PR REVIEW ALL MEDS BY PRESCRIBER/CLIN PHARMACIST DOCUMENTED: ICD-10-PCS | Mod: CPTII,S$GLB,, | Performed by: RADIOLOGY

## 2022-11-22 PROCEDURE — 99213 OFFICE O/P EST LOW 20 MIN: CPT | Mod: S$GLB,,, | Performed by: RADIOLOGY

## 2022-11-22 PROCEDURE — 3078F PR MOST RECENT DIASTOLIC BLOOD PRESSURE < 80 MM HG: ICD-10-PCS | Mod: CPTII,S$GLB,, | Performed by: RADIOLOGY

## 2022-11-22 PROCEDURE — 4010F ACE/ARB THERAPY RXD/TAKEN: CPT | Mod: CPTII,S$GLB,, | Performed by: RADIOLOGY

## 2022-11-22 PROCEDURE — 3008F BODY MASS INDEX DOCD: CPT | Mod: CPTII,S$GLB,, | Performed by: RADIOLOGY

## 2022-11-22 PROCEDURE — 4010F PR ACE/ARB THEARPY RXD/TAKEN: ICD-10-PCS | Mod: CPTII,S$GLB,, | Performed by: RADIOLOGY

## 2022-11-22 PROCEDURE — 99213 PR OFFICE/OUTPT VISIT, EST, LEVL III, 20-29 MIN: ICD-10-PCS | Mod: S$GLB,,, | Performed by: RADIOLOGY

## 2022-11-22 PROCEDURE — 99999 PR PBB SHADOW E&M-EST. PATIENT-LVL III: ICD-10-PCS | Mod: PBBFAC,,, | Performed by: RADIOLOGY

## 2022-11-22 PROCEDURE — 3044F PR MOST RECENT HEMOGLOBIN A1C LEVEL <7.0%: ICD-10-PCS | Mod: CPTII,S$GLB,, | Performed by: RADIOLOGY

## 2022-11-22 PROCEDURE — 99999 PR PBB SHADOW E&M-EST. PATIENT-LVL III: CPT | Mod: PBBFAC,,, | Performed by: RADIOLOGY

## 2022-11-22 PROCEDURE — 3078F DIAST BP <80 MM HG: CPT | Mod: CPTII,S$GLB,, | Performed by: RADIOLOGY

## 2022-11-22 PROCEDURE — 3077F PR MOST RECENT SYSTOLIC BLOOD PRESSURE >= 140 MM HG: ICD-10-PCS | Mod: CPTII,S$GLB,, | Performed by: RADIOLOGY

## 2022-11-22 PROCEDURE — 3044F HG A1C LEVEL LT 7.0%: CPT | Mod: CPTII,S$GLB,, | Performed by: RADIOLOGY

## 2022-11-22 PROCEDURE — 1160F RVW MEDS BY RX/DR IN RCRD: CPT | Mod: CPTII,S$GLB,, | Performed by: RADIOLOGY

## 2022-11-22 PROCEDURE — 3077F SYST BP >= 140 MM HG: CPT | Mod: CPTII,S$GLB,, | Performed by: RADIOLOGY

## 2022-11-22 PROCEDURE — 3008F PR BODY MASS INDEX (BMI) DOCUMENTED: ICD-10-PCS | Mod: CPTII,S$GLB,, | Performed by: RADIOLOGY

## 2022-11-22 PROCEDURE — 1159F PR MEDICATION LIST DOCUMENTED IN MEDICAL RECORD: ICD-10-PCS | Mod: CPTII,S$GLB,, | Performed by: RADIOLOGY

## 2022-11-22 PROCEDURE — 1159F MED LIST DOCD IN RCRD: CPT | Mod: CPTII,S$GLB,, | Performed by: RADIOLOGY

## 2022-11-22 NOTE — PATIENT INSTRUCTIONS
Follow-up Dr. Camarillo regarding tamoxifen  Repeat yearly mammogram in February 2023 and follow-up Dr. Delong  Return to see me in approximately months

## 2022-12-28 ENCOUNTER — OFFICE VISIT (OUTPATIENT)
Dept: URGENT CARE | Facility: CLINIC | Age: 62
End: 2022-12-28
Payer: COMMERCIAL

## 2022-12-28 VITALS
HEIGHT: 64 IN | SYSTOLIC BLOOD PRESSURE: 194 MMHG | RESPIRATION RATE: 18 BRPM | DIASTOLIC BLOOD PRESSURE: 108 MMHG | WEIGHT: 164 LBS | BODY MASS INDEX: 28 KG/M2 | HEART RATE: 88 BPM | TEMPERATURE: 98 F | OXYGEN SATURATION: 98 %

## 2022-12-28 DIAGNOSIS — I10 ESSENTIAL HYPERTENSION, BENIGN: ICD-10-CM

## 2022-12-28 DIAGNOSIS — Z76.0 MEDICATION REFILL: Primary | ICD-10-CM

## 2022-12-28 PROCEDURE — 1159F MED LIST DOCD IN RCRD: CPT | Mod: CPTII,S$GLB,,

## 2022-12-28 PROCEDURE — 99203 PR OFFICE/OUTPT VISIT, NEW, LEVL III, 30-44 MIN: ICD-10-PCS | Mod: S$GLB,,,

## 2022-12-28 PROCEDURE — 3044F PR MOST RECENT HEMOGLOBIN A1C LEVEL <7.0%: ICD-10-PCS | Mod: CPTII,S$GLB,,

## 2022-12-28 PROCEDURE — 3008F PR BODY MASS INDEX (BMI) DOCUMENTED: ICD-10-PCS | Mod: CPTII,S$GLB,,

## 2022-12-28 PROCEDURE — 3077F SYST BP >= 140 MM HG: CPT | Mod: CPTII,S$GLB,,

## 2022-12-28 PROCEDURE — 3080F PR MOST RECENT DIASTOLIC BLOOD PRESSURE >= 90 MM HG: ICD-10-PCS | Mod: CPTII,S$GLB,,

## 2022-12-28 PROCEDURE — 1160F RVW MEDS BY RX/DR IN RCRD: CPT | Mod: CPTII,S$GLB,,

## 2022-12-28 PROCEDURE — 99203 OFFICE O/P NEW LOW 30 MIN: CPT | Mod: S$GLB,,,

## 2022-12-28 PROCEDURE — 1160F PR REVIEW ALL MEDS BY PRESCRIBER/CLIN PHARMACIST DOCUMENTED: ICD-10-PCS | Mod: CPTII,S$GLB,,

## 2022-12-28 PROCEDURE — 4010F PR ACE/ARB THEARPY RXD/TAKEN: ICD-10-PCS | Mod: CPTII,S$GLB,,

## 2022-12-28 PROCEDURE — 3077F PR MOST RECENT SYSTOLIC BLOOD PRESSURE >= 140 MM HG: ICD-10-PCS | Mod: CPTII,S$GLB,,

## 2022-12-28 PROCEDURE — 3080F DIAST BP >= 90 MM HG: CPT | Mod: CPTII,S$GLB,,

## 2022-12-28 PROCEDURE — 3044F HG A1C LEVEL LT 7.0%: CPT | Mod: CPTII,S$GLB,,

## 2022-12-28 PROCEDURE — 1159F PR MEDICATION LIST DOCUMENTED IN MEDICAL RECORD: ICD-10-PCS | Mod: CPTII,S$GLB,,

## 2022-12-28 PROCEDURE — 4010F ACE/ARB THERAPY RXD/TAKEN: CPT | Mod: CPTII,S$GLB,,

## 2022-12-28 PROCEDURE — 3008F BODY MASS INDEX DOCD: CPT | Mod: CPTII,S$GLB,,

## 2022-12-28 RX ORDER — AMLODIPINE BESYLATE 10 MG/1
10 TABLET ORAL DAILY
Qty: 30 TABLET | Refills: 1 | Status: SHIPPED | OUTPATIENT
Start: 2022-12-28 | End: 2023-05-16 | Stop reason: SDUPTHER

## 2022-12-28 RX ORDER — LISINOPRIL 40 MG/1
40 TABLET ORAL DAILY
Qty: 30 TABLET | Refills: 3 | Status: SHIPPED | OUTPATIENT
Start: 2022-12-28 | End: 2024-04-02 | Stop reason: SDUPTHER

## 2022-12-28 RX ORDER — HYDROCHLOROTHIAZIDE 12.5 MG/1
12.5 TABLET ORAL DAILY
Qty: 30 TABLET | Refills: 0 | Status: ON HOLD | OUTPATIENT
Start: 2022-12-28 | End: 2023-04-07 | Stop reason: SDUPTHER

## 2022-12-28 NOTE — PATIENT INSTRUCTIONS
Take your medications as directed. Check BP twice per day the next few days and create a log to give to your PCP. Try a DASH diet.    Go to the ER if you experience high BP with severe headache, vision changes, weakness, numbness/tingling, trouble speaking, off balance.

## 2022-12-28 NOTE — PROGRESS NOTES
"Subjective:       Patient ID: Erma Linda is a 62 y.o. female.    Vitals:  height is 5' 4" (1.626 m) and weight is 74.4 kg (164 lb). Her tympanic temperature is 97.8 °F (36.6 °C). Her blood pressure is 194/108 (abnormal) and her pulse is 88. Her respiration is 18 and oxygen saturation is 98%.     Chief Complaint: Hypertension    Pt is coming in with high blood pressure that started about three days ago. Pt says she have been out of her medication for about three days. Pt says she had an appointment this morning but didn't go to it. Pt says she needs her Amlodipine, Hydrochlorothiazide, and her Lisinopril refilled. Pt says she isn't having any other issues.      Past Medical History:  No date: Arthritis  No date: Cancer      Comment:  breast  3/22/2018: History of hepatitis C; S/p RX with SVR 12 documented 03/ 2018  No date: Hypertension  12/14/2021: Nuclear sclerosis of both eyes    Provider's note begins below:  The patient is a 62-year-old female with the above past medical history presenting to the clinic stating that she ran out of her high blood pressure medications a week ago.  She reports blood pressure has been progressively elevating since then.  She tried to get an appointment with her primary but unable to get in today.  She reports PCP typically refills her medications but did not.  She denies headache, vision changes, numbness/tingling, weakness, aphasia, balance difficulty, chest pain, shortness at breath, abdominal pain, nausea, vomiting, diarrhea, lightheadedness.    Hypertension  This is a new problem. The current episode started in the past 7 days. The problem has been gradually worsening since onset. The problem is uncontrolled. There are no associated agents to hypertension.   ROS    Objective:      Physical Exam   Constitutional: She is oriented to person, place, and time. She appears well-developed. She is cooperative.  Non-toxic appearance. She does not appear ill. No distress. "   HENT:   Head: Normocephalic and atraumatic.   Ears:   Right Ear: Hearing and external ear normal.   Left Ear: Hearing and external ear normal.   Nose: No mucosal edema, rhinorrhea or nasal deformity. No epistaxis. Right sinus exhibits no maxillary sinus tenderness and no frontal sinus tenderness. Left sinus exhibits no maxillary sinus tenderness and no frontal sinus tenderness.   Mouth/Throat: Uvula is midline, oropharynx is clear and moist and mucous membranes are normal. No trismus in the jaw. Normal dentition. No uvula swelling. No posterior oropharyngeal erythema.   Eyes: Conjunctivae and lids are normal. Pupils are equal, round, and reactive to light. Right eye exhibits no discharge. Left eye exhibits no discharge. No scleral icterus.   Neck: Trachea normal and phonation normal. Neck supple.   Cardiovascular: Normal rate, regular rhythm, normal heart sounds and normal pulses.   Pulmonary/Chest: Effort normal and breath sounds normal. No stridor. No respiratory distress. She has no wheezes. She has no rhonchi. She has no rales.   Abdominal: Normal appearance and bowel sounds are normal. She exhibits no distension and no mass. Soft. There is no abdominal tenderness.   Musculoskeletal: Normal range of motion.         General: No deformity. Normal range of motion.   Neurological: She is alert and oriented to person, place, and time. She displays no weakness. She exhibits normal muscle tone. Coordination and gait normal.   Skin: Skin is warm, dry, intact, not diaphoretic and not pale. Capillary refill takes less than 2 seconds.   Psychiatric: Her speech is normal and behavior is normal. Judgment and thought content normal.   Nursing note and vitals reviewed.      Assessment:       1. Medication refill    2. Essential hypertension, benign          Plan:         Medication refill  -     hydroCHLOROthiazide (HYDRODIURIL) 12.5 MG Tab; Take 1 tablet (12.5 mg total) by mouth once daily.  Dispense: 30 tablet; Refill:  0  -     lisinopriL (PRINIVIL,ZESTRIL) 40 MG tablet; Take 1 tablet (40 mg total) by mouth once daily.  Dispense: 30 tablet; Refill: 3  -     amLODIPine (NORVASC) 10 MG tablet; Take 1 tablet (10 mg total) by mouth once daily.  Dispense: 30 tablet; Refill: 1    Essential hypertension, benign  -     hydroCHLOROthiazide (HYDRODIURIL) 12.5 MG Tab; Take 1 tablet (12.5 mg total) by mouth once daily.  Dispense: 30 tablet; Refill: 0  -     lisinopriL (PRINIVIL,ZESTRIL) 40 MG tablet; Take 1 tablet (40 mg total) by mouth once daily.  Dispense: 30 tablet; Refill: 3  -     amLODIPine (NORVASC) 10 MG tablet; Take 1 tablet (10 mg total) by mouth once daily.  Dispense: 30 tablet; Refill: 1         Discussed results/diagnosis/plan with patient in clinic. Strict precautions given to patient to monitor for worsening signs and symptoms. Advised to follow up with PCP or specialist.  Explained side effects of medications prescribed with patient and informed him/her to discontinue use if he/she has any side effects and to inform UC or PCP if this occurs. All questions answered. Strict ED verses clinic return precautions stressed and given in depth. Advised if symptoms worsens of fail to improve he/she should go to the Emergency Room. Discharge and follow-up instructions given verbally/printed with the patient who expressed understanding and willingness to comply with my recommendations. Patient voiced understanding and in agreement with current treatment plan. Patient exits the exam room in no acute distress. Conversant and engaged during discharge discussion, verbalized understanding.

## 2023-01-20 ENCOUNTER — PATIENT MESSAGE (OUTPATIENT)
Dept: ADMINISTRATIVE | Facility: HOSPITAL | Age: 63
End: 2023-01-20
Payer: COMMERCIAL

## 2023-01-20 ENCOUNTER — OFFICE VISIT (OUTPATIENT)
Dept: SURGERY | Facility: CLINIC | Age: 63
End: 2023-01-20
Payer: COMMERCIAL

## 2023-01-20 VITALS
HEIGHT: 64 IN | BODY MASS INDEX: 28.24 KG/M2 | SYSTOLIC BLOOD PRESSURE: 171 MMHG | HEART RATE: 88 BPM | WEIGHT: 165.38 LBS | DIASTOLIC BLOOD PRESSURE: 85 MMHG

## 2023-01-20 DIAGNOSIS — N64.89 EDEMA OF BREAST: ICD-10-CM

## 2023-01-20 DIAGNOSIS — Z12.39 SCREENING BREAST EXAMINATION: ICD-10-CM

## 2023-01-20 DIAGNOSIS — Z98.890 S/P LUMPECTOMY, LEFT BREAST: ICD-10-CM

## 2023-01-20 DIAGNOSIS — Z85.3 PERSONAL HISTORY OF BREAST CANCER: ICD-10-CM

## 2023-01-20 DIAGNOSIS — B37.2 YEAST INFECTION OF THE SKIN: Primary | ICD-10-CM

## 2023-01-20 PROCEDURE — 3077F SYST BP >= 140 MM HG: CPT | Mod: CPTII,S$GLB,, | Performed by: PHYSICIAN ASSISTANT

## 2023-01-20 PROCEDURE — 1160F RVW MEDS BY RX/DR IN RCRD: CPT | Mod: CPTII,S$GLB,, | Performed by: PHYSICIAN ASSISTANT

## 2023-01-20 PROCEDURE — 99214 PR OFFICE/OUTPT VISIT, EST, LEVL IV, 30-39 MIN: ICD-10-PCS | Mod: S$GLB,,, | Performed by: PHYSICIAN ASSISTANT

## 2023-01-20 PROCEDURE — 3079F PR MOST RECENT DIASTOLIC BLOOD PRESSURE 80-89 MM HG: ICD-10-PCS | Mod: CPTII,S$GLB,, | Performed by: PHYSICIAN ASSISTANT

## 2023-01-20 PROCEDURE — 99999 PR PBB SHADOW E&M-EST. PATIENT-LVL IV: CPT | Mod: PBBFAC,,, | Performed by: PHYSICIAN ASSISTANT

## 2023-01-20 PROCEDURE — 1160F PR REVIEW ALL MEDS BY PRESCRIBER/CLIN PHARMACIST DOCUMENTED: ICD-10-PCS | Mod: CPTII,S$GLB,, | Performed by: PHYSICIAN ASSISTANT

## 2023-01-20 PROCEDURE — 10021 FNA BX W/O IMG GDN 1ST LES: CPT | Mod: S$GLB,,, | Performed by: PHYSICIAN ASSISTANT

## 2023-01-20 PROCEDURE — 3079F DIAST BP 80-89 MM HG: CPT | Mod: CPTII,S$GLB,, | Performed by: PHYSICIAN ASSISTANT

## 2023-01-20 PROCEDURE — 1159F PR MEDICATION LIST DOCUMENTED IN MEDICAL RECORD: ICD-10-PCS | Mod: CPTII,S$GLB,, | Performed by: PHYSICIAN ASSISTANT

## 2023-01-20 PROCEDURE — 99214 OFFICE O/P EST MOD 30 MIN: CPT | Mod: S$GLB,,, | Performed by: PHYSICIAN ASSISTANT

## 2023-01-20 PROCEDURE — 3008F BODY MASS INDEX DOCD: CPT | Mod: CPTII,S$GLB,, | Performed by: PHYSICIAN ASSISTANT

## 2023-01-20 PROCEDURE — 99999 PR PBB SHADOW E&M-EST. PATIENT-LVL IV: ICD-10-PCS | Mod: PBBFAC,,, | Performed by: PHYSICIAN ASSISTANT

## 2023-01-20 PROCEDURE — 3077F PR MOST RECENT SYSTOLIC BLOOD PRESSURE >= 140 MM HG: ICD-10-PCS | Mod: CPTII,S$GLB,, | Performed by: PHYSICIAN ASSISTANT

## 2023-01-20 PROCEDURE — 3008F PR BODY MASS INDEX (BMI) DOCUMENTED: ICD-10-PCS | Mod: CPTII,S$GLB,, | Performed by: PHYSICIAN ASSISTANT

## 2023-01-20 PROCEDURE — 10021 PR FINE NEEDLE ASP BIOPSY, W/O IMAGING GUIDANCE, 1ST LESION: ICD-10-PCS | Mod: S$GLB,,, | Performed by: PHYSICIAN ASSISTANT

## 2023-01-20 PROCEDURE — 1159F MED LIST DOCD IN RCRD: CPT | Mod: CPTII,S$GLB,, | Performed by: PHYSICIAN ASSISTANT

## 2023-01-20 RX ORDER — NYSTATIN 100000 [USP'U]/G
POWDER TOPICAL 2 TIMES DAILY
Qty: 60 G | Refills: 2 | Status: SHIPPED | OUTPATIENT
Start: 2023-01-20 | End: 2023-03-02

## 2023-01-20 NOTE — PROGRESS NOTES
[facility]  Department of Surgery    PCP:  Marry Howard MD  CHIEF COMPLAINT:   Chief Complaint   Patient presents with    Breast Pain     L breast pain and underarm fluid        DIAGNOSIS:   This is a 62 y.o. female with a history of stage T2N0M0, grade 1, ER +, ND +, HER2 - mammary carcinoma with mixed ductal and lobular features of the left breast.    TREATMENT:   1. left partial mastectomy with sentinel node biopsy on 7/14/2022. Dr. Baljeet M.D. Surgical Oncology  2. Final Path: Grade 1, multifocal (2 foci - largest 2.7 cm) invasive mammary carcinoma with mixed ductal and lobular. Positive inferior margin. Negative nodes.   3. Re-excision performed on 7/14/22 - negative for residual carcinoma.   4. XRT completed 10/10/22 with Dr. Mathis, Radiation Oncology.   5. Oncotype: 7.   4. Adjuvant endocrine therapy started on 8/8/22 with Tamoxifen. Dr. Rabia M.D. Medical Oncology     HISTORY OF PRESENT ILLNESS:   Erma Linda is a 62 y.o. female comes in for evaluation of left breast pain and edema. Patient was seen post operatively for a seroma deep to her sentinel node incision. She is concerned this issue has worsened.  She other breast changes such as new masses, skin or nipple changes, or nipple discharge. Past medical and surgical history is updated without new changes. There have been no changes to family history. The patient denies constitutional symptoms of night sweats, chills, weight loss, new headaches, visual changes, new back or bony pain, chest pain, or shortness of breath.        Review of Systems: See HPI/Interval History for other systems reviewed.     IMAGING:     None     MEDICATIONS/ALLERGIES:     Current Outpatient Medications   Medication Sig    amLODIPine (NORVASC) 10 MG tablet Take 1 tablet (10 mg total) by mouth once daily.    cyanocobalamin (VITAMIN B-12) 100 MCG tablet Take 500 mcg by mouth once daily.    hydroCHLOROthiazide (HYDRODIURIL) 12.5 MG Tab Take 1 tablet (12.5 mg total)  "by mouth once daily.    HYDROcodone-acetaminophen (NORCO) 5-325 mg per tablet Take 1 tablet by mouth every 6 (six) hours as needed for Pain.    lisinopriL (PRINIVIL,ZESTRIL) 40 MG tablet Take 1 tablet (40 mg total) by mouth once daily.    meloxicam (MOBIC) 15 MG tablet Take 15 mg by mouth once daily.    multivitamin (THERAGRAN) per tablet Take 1 tablet by mouth once daily.    vitamin D (VITAMIN D3) 1000 units Tab Take 1,000 Units by mouth once daily.    ibuprofen (ADVIL,MOTRIN) 800 MG tablet Take 1 tablet (800 mg total) by mouth every 6 (six) hours as needed for Pain. (Patient not taking: Reported on 11/22/2022)    nystatin (MYCOSTATIN) powder Apply topically 2 (two) times daily.     No current facility-administered medications for this visit.     Facility-Administered Medications Ordered in Other Visits   Medication    0.9%  NaCl infusion      Review of patient's allergies indicates:   Allergen Reactions    Hydralazine analogues Palpitations    Compazine [prochlorperazine] Other (See Comments)     The patient stated, she didn't like how Compazine, made her feel.       PHYSICAL EXAM:   BP (!) 171/85 (BP Location: Left arm, Patient Position: Sitting, BP Method: Medium (Automatic))   Pulse 88   Ht 5' 4" (1.626 m)   Wt 75 kg (165 lb 5.5 oz)   BMI 28.38 kg/m²     Physical Exam   Vitals reviewed.  Constitutional: She is oriented to person, place, and time.   HENT:   Head: Normocephalic and atraumatic.   Nose: Nose normal.   Eyes: Pupils are equal, round, and reactive to light. Right eye exhibits no discharge. Left eye exhibits no discharge.   Pulmonary/Chest: Effort normal and breath sounds normal. No stridor. No respiratory distress. She exhibits no mass, no tenderness and no edema. Right breast exhibits no inverted nipple, no mass, no nipple discharge, no skin change and no tenderness. Left breast exhibits tenderness. Left breast exhibits no inverted nipple, no mass, no nipple discharge and no skin change. There is " breast swelling. No breast bleeding. Breasts are symmetrical.       Abdominal: Normal appearance.   Genitourinary: There is breast swelling. No breast bleeding.   Neurological: She is alert and oriented to person, place, and time.   Skin: Skin is warm and dry.     Psychiatric: Her behavior is normal. Mood, judgment and thought content normal.     Fine Needle Aspiration Procedure Note    Pre-operative Diagnosis: post operative seroma    Post-operative Diagnosis: same    Location: left breast    Anesthesia: 2% plain lidocaine    Procedure Details   The Procedure, risks and complications have been discussed in detail (including, but not limited to pain, infection, bleeding) with the patient, and the patient has signed consent to have the surgery completed.    The skin was sterilely prepped and draped over the affected area in the usual fashion.  Fine Needle Aspiration was performed with a 18 gauge using ultrasound guidance.  Contents of Aspiration: 12 ml of serous fluid.  Size of mass after cyst aspiration: diminished  Specimens sent to pathology.  EBL: none  Sterile dressing placed.  May place ice pack over affected area during the first 24 hours.    Condition:  Stable    Complications:  none.      ASSESSMENT:   This is a 62 y.o. female without evidence of recurrence by exam, history or imaging.       PLAN:   1. On examination, there was a small palpable area of firmness in the left axilla. US shows a small seroma pocket, but improved since last seen for drainage. FNA perfomed for small seroma.    2. Long discussion, that the source of her breast edema is likely lymphatic disruption. Recommend she follow up with PT for evaluation. Recommend gentle massage, breast elevation, warm compresses and OTC pain management as needed.     3. Mammogram due in March.    4. Return to clinic with Dr. Delong in March.      The patient is in agreement with the plan. Questions were encouraged and answered to patient's satisfaction.  Erma will call our office with any questions or concerns.

## 2023-02-07 ENCOUNTER — CLINICAL SUPPORT (OUTPATIENT)
Dept: REHABILITATION | Facility: HOSPITAL | Age: 63
End: 2023-02-07
Payer: COMMERCIAL

## 2023-02-07 DIAGNOSIS — R29.3 POOR POSTURE: ICD-10-CM

## 2023-02-07 DIAGNOSIS — M25.612 DECREASED ROM OF LEFT SHOULDER: ICD-10-CM

## 2023-02-07 DIAGNOSIS — Z98.890 S/P LUMPECTOMY, LEFT BREAST: ICD-10-CM

## 2023-02-07 DIAGNOSIS — Z85.3 PERSONAL HISTORY OF BREAST CANCER: ICD-10-CM

## 2023-02-07 DIAGNOSIS — Z91.89 AT RISK FOR LYMPHEDEMA: ICD-10-CM

## 2023-02-07 DIAGNOSIS — Z74.09 IMPAIRED FUNCTIONAL MOBILITY AND ACTIVITY TOLERANCE: ICD-10-CM

## 2023-02-07 DIAGNOSIS — N64.89 EDEMA OF BREAST: Primary | ICD-10-CM

## 2023-02-07 PROCEDURE — 97110 THERAPEUTIC EXERCISES: CPT

## 2023-02-07 PROCEDURE — 97535 SELF CARE MNGMENT TRAINING: CPT

## 2023-02-07 PROCEDURE — 97162 PT EVAL MOD COMPLEX 30 MIN: CPT

## 2023-02-07 NOTE — PATIENT INSTRUCTIONS
Schedule your post-op therapy follow-up after your drains have been removed     When to call your doctor   - if any part of your affected arm or axilla feels hot, is reddened or has increased swelling   - if you develop a temperature over 101 degrees Fahrenheit      Lymphedema - Identification and Prevention     Lymphedema - is the swelling of a body area or extremity caused by the accumulation of lymphatic fluid.  There is a risk for lymphedema with the removal of lymph nodes, trauma or radiation therapy.  Treatment of breast cancer often involves surgery: mastectomy or lumpectomy. Some of the lymph nodes in the underarm (called axillary lymph nodes) may be removed and checked to see if they contain cancer cells.     During breast surgery when axillary lymph nodes are removed (with sentinel node biopsy or axillary dissection) or are treated with radiation therapy, the lymphatic system may become impaired. This may prevent lymphatic fluid from leaving the area therefore, causing lymphedema.     Lymphatic fluid is a normal part of the circulatory system. Its function is to remove waste products and to produce cells vital to fighting infection. Swelling occurs when the vessels become restricted and the lymphatic fluid is unable to freely flow through them.  If lymphedema is left untreated, the affected limb could progressively become more swollen, which could lead to hardening of the skin, bulkiness in the limb, infection and impaired wound healing.         There are things you can do to decrease the chance of developing lymphedema.                                          [email or web address]                                                                                                                                                  The information presented is intended for general information and educational purposes. It is not intended to replace the  advice of your health care provider. Contact  your health care provider if you believe you have a health problem.                                                        Scapular Retraction (Standing)    With arms at sides, squeeze shoulder blades together. Do not shrug shoulders and do not hold your breath. Hold 5 seconds. Repeat 10 times, 1 set, 2-3 x day.

## 2023-02-07 NOTE — PLAN OF CARE
[facility]  Physical Therapy Initial Evaluation    Date: 2/7/2023   Name: Erma Linda  Clinic Number: [id number]    Therapy Diagnosis:   Encounter Diagnoses   Name Primary?    Personal history of breast cancer     S/P lumpectomy, left breast     Edema of breast Yes    At risk for lymphedema     Poor posture     Decreased ROM of left shoulder     Impaired functional mobility and activity tolerance      Physician: Amanda Lu PA-C    Physician Orders: PT Eval and Treat   Medical Diagnosis: Personal history of breast cancer [Z85.3], S/P lumpectomy, left breast [Z98.890], Edema of breast  Evaluation Date: 2/7/2023  Authorization Period Expiration: 1/24/23  Plan of Care Certification Period: 4/7/23  Visit # / Visits authorized: 1/ 1  Insurance: Blue [employer] Blue Shield  FOTO: 1/3    Time In: 12:05 PM  Time Out: 1:15 PM  Total Billable Time: 70 minutes    Precautions: Standard and cancer      History   History of Present Illness: Erma is a 62 y.o. female that presents to  [facility] at the [facility] clinic with history of left breast surgery. Pt is s/p left partial mastectomy on 6/16/22 and s/p re-excision on 7/14/22.  Negative for residual carcinoma.  Of note, pt recently had a recurrent seroma drained on 1/20/23. Has had been drained twice prior.    Diagnosis: Personal history of breast cancer [Z85.3], S/P lumpectomy, left breast [Z98.890], Edema of breast  Chief complaint: pt endorses pain, swelling, and tightness in her left breast x 3 weeks. Denies precipitating event. Pt states the fluid never really goes away, it just comes back and gradually increases. Pt endorses pain and limitations with reaching her left arm overhead. Pt reports pain with lying on her left side.  Pt states she has tried heating pad, more compressive bra, and massage with some benefit.  Surgical History:  Erma Linda  has a past surgical history that  includes  section, classic; Knee arthroscopy w/ laser (); Tubal ligation; Mastectomy, partial (Left, 2022); [place] lymph node biopsy (Left, 2022); Injection for sentinel node identification (Left, 2022); Breast biopsy (Left, 2022); Breast surgery (2022); and Breast mass excision (Left, 2022).    Chemotherapy: N/A  Radiation: completed adjuvantly on 10/10/22  Endocrine therapy: none    Past Medical History:   Diagnosis Date    Arthritis     Cancer     breast    History of hepatitis C; S/p RX with SVR 12 documented 2018 3/22/2018    Hypertension     Nuclear sclerosis of both eyes 2021          Medications:  Erma has a current medication list which includes the following prescription(s): amlodipine, cyanocobalamin, hydrochlorothiazide, hydrocodone-acetaminophen, ibuprofen, lisinopril, meloxicam, multivitamin, nystatin, and vitamin d, and the following Facility-Administered Medications: sodium chloride 0.9%.    Allergies:  Review of patient's allergies indicates:   Allergen Reactions    Hydralazine analogues Palpitations    Compazine [prochlorperazine] Other (See Comments)     The patient stated, she didn't like how Compazine, made her feel.        Prior Therapy: PT previously for knee, hip, and for left shoulder following breast surgery  Social History: single story home, no steps to enter, lives with their family  Occupation:  at [employer]   Prior Level of Function: swelling has always been there. Could reach overhead without pain/restriction  Current Level of Function: increased swelling and pain in her left breast. Increased pain with overhead reaching in her left shoulder  Exercise routine prior to onset: pt had been doing some of her PT exercises from last time. Pt states she hasn't been able to do them as well.  Hand dominance: right      Other Past Medical History: pelvic fracture     Patient's Goals: I want the fluid to move and  drain        Subjective   Pt states: denies pain at rest- only with movement; ibuprofen helps  Pain: 0/10 on VAS currently.   Best: 0/10  Worst: 8/10   Pain location: left front of breast, pec, and axillary region   Objective   Mental status :A+O x 3, pleasant, appropriate    Postural examination/scapula alignment: Rounded shoulder and Slouched posture    Skin Integrity:   Scar Location: left breast and axilla  Appearance: healed  Signs of infection: No  Drainage: N/A  Color:N/A    Edema: Mild to moderate  Location: left breast and axilla    Axillary Web Syndrome/Cording:   Location: N/A  Degree of Cording: N/A (mild, moderate etc...)   Number of cords present: N/A    Sensation: numbness noted in left under arm and upper arm        Range of Motion:      Shoulder Range of Motion: formal assessment TBA  Active /Passive ROM Right Left   Flexion WFL ~90   Abduction WFL ~90   Extension WFL TBA   IR/90deg TBA TBA   ER/90deg TBA TBA          Strength: manual muscle test grades below   Upper Extremity Strength   (R) UE (L) UE   Shoulder flexion: 5/5 3-/5   Shoulder Abduction: 5/5 3-/5   Shoulder IR 5/5 5/5   Shoulder ER 5/5 5/5   Elbow flexion: 5/5 5/5   Elbow extension: 5/5 5/5   Wrist flexion: 5/5 5/5   Wrist extension: 5/5 5/5    Good  Good       Pt denies swelling in her left arm- will take measurements in the future as needed    Functional Mobility (Bed mobility, transfers)  Mod I to independent    ADL's:  Mod I to independent    Gait Assessment:   - AD used: none  - Assistance: independent  - Distance: community distances     Patient Education Provided   - role of therapy in multi - disciplinary team, goals for therapy  -scheduling, plan of care      Pt has no cultural, educational or language barriers to learning provided.  Treatment and Instruction of Home Exercise Program      Total Time Separate from Evaluation: 40 minutes    Erma received individual therapeutic exercises to improve postural correction and  alignment, stretching and soft tissue mobility, and strengthening for 8 minutes including the following:     Pt attempts butterflies, but she is unable  LUE ER with cane, 10 reps  Scapular retractions x 10 reps  Chicken wings x 10 reps    Erma received the following self-care/home management techniques  for 32 minutes    - Pt was educated in lymphedema etiology and management plans.    - Pt was provided with written risk reductions and precautions for managing lymphedema.   -pt was educated scar massage technique and frequency  -pt was educated on potential benefit of more supportive sports/compression bra to assist with managing swelling. Encouraged use of tank with compression/support at night  -pt was provided with Komprex II padding in cotton stockinette to provide gentle compression and soften tissue in L breast and axilla region. Pt was told she can wear to tolerance and was advised how to care/wash. If pt finds beneficial, PT explained pt may like/benefit from swell spot in the future    Written Home Exercises Provided: yes.  Exercises were reviewed and Erma was able to demonstrate them prior to the end of the session.  Erma demonstrated good  understanding of the education provided.     See EMR under Patient Instructions for exercises provided 2/7/2023.      Functional Limitations Reporting      CMS Impairment/Limitation/Restriction for FOTO Upper Quadrant Edema Survey    Therapist reviewed FOTO scores for Erma Linda on 2/7/2023.   FOTO documents entered into [employer] - see Media section.    Limitations Score: 47%  Category: Other           Assessment   This is a 62 y.o. female referred to outpatient physical therapy and presents with a medical diagnosis of Personal history of breast cancer [Z85.3], S/P lumpectomy, left breast [Z98.890], Edema of breast. She was seen today to establish PT plan of care for following impairments: left shoulder region pain, left breast edema, decreased strength, poor  posture, decreased ROM, impaired functional mobility, and is at risk for lymphedema. Unsure at this time if pt's breast swelling is edema or lymphedema. Initiated exercises, scar massage, and guidance for compression/support. Will gauge response and determine if pt has lymphedema and if manual lymph drainage will be appropriate.    Pt instructed in HEP this session and was able to perform all exercises given independently. Pt instructed to follow up with therapist if any concerns arise with program established. Pt will continue to benefit from skilled physical therapy to address the impairments stated in chart below, provide patient/family education and to maximize pt's level of independence in home and community environment     Pt prognosis is Good.    Anticipated barriers to physical therapy: none anticipated     Pt's spiritual, cultural and educational needs considered and pt agreeable to plan of care and goals as stated below:     Medical necessity is demonstrated by the following IMPAIRMENTS/PROMBLEM LIST:    History  Co-morbidities and personal factors that may impact the plan of care Co-morbidities:   history of cancer, HTN, and arthritis    Personal Factors:   no deficits     moderate   Examination  Body Structures and Functions, activity limitations and participation restrictions that may impact the plan of care Body Regions:   upper extremities  trunk    Body Systems:    ROM  strength  edema  posture    Participation Restrictions:   None anticipated    Activity limitations:   Learning and applying knowledge  no deficits    General Tasks and Commands  no deficits    Communication  no deficits    Mobility  lifting and carrying objects  fine hand use (grasping/picking up)    Self care  dressing    Domestic Life  shopping  cooking  doing house work (cleaning house, washing dishes, laundry)    Interactions/Relationships  no deficits    Life Areas  no deficits    Community and Social Life  community  life  recreation and leisure         high   Clinical Presentation evolving clinical presentation with changing clinical characteristics moderate   Decision Making/ Complexity Score: moderate       Goals: Pt agrees with goals set    Short Term goals: 4 weeks  1. Patient will demonstrate 100% understanding of lymphedema risk reduction practices to include self monitoring for lymphedema. (progressing, not met)  2. Patient will demonstrate independence with Home Exercise program established. (progressing, not met)  3. Pt will increase AROM/PROM in shoulder abduction ROM to >/=140 degrees on left to improve functional reach, carry, push, pull pain free. (progressing, not met)  4. Pt will increase AROM/PROM in shoulder flexion to >/=140 degrees on left to improve functional reach, carry, push, pull pain free.(progressing, not met)  5. Pt will demo >/= 4-/5 strength in LUE for improved functional mobility, endurance, and posture. (progressing, not met)  6.  Patient will perform self lymph drainage techniques, as indicated/appropriate, to enhance lymphatic drainage and mobility.  (progressing, not met)  7. Pt will be independent with compression and scar massage techniques for improved functional mobility. (Progressing, not met)    Long Term Goals: 8 weeks   1.  Pt will increase AROM/PROM in shoulder flexion to >/=170 degrees on left to improve functional reach, carry, push, pull pain free. (progressing, not met)  2. Pt will increase strength to 5/5 in gross UE musculature to improve tolerance to all functional activities pain free. (progressing, not met)  3. Pt will demonstrate full/maximized tissue mobility to increase ROM and promote healthy tissue to be pain free at discharge. (progressing, not met)  4. Pt will report decrease in overall worst pain to 4/10 at discharge. (progressing, not met)  5. Pt will increase AROM/PROM in shoulder abduction ROM to >/=170 degrees on left to improve functional reach, carry, push, pull  pain free. (progressing, not met)  6. Patient will report compliance with walking program 5x week for 20-30 min each day to improve overall cardiovascular function and decrease cancer related fatigue at discharge. (progressing, not met)      Plan   Outpatient physical therapy 2x week for 8 weeks to include the following:   Manual Therapy, Neuromuscular Re-ed, Orthotic Management and Training, Patient Education, Self Care, Therapeutic Activities, and Therapeutic Exercise.    Plan of care Certification Period: 2/7/2023 to 4/7/23.    Therapist: Gladys Chapin, PT  2/7/2023

## 2023-02-15 ENCOUNTER — CLINICAL SUPPORT (OUTPATIENT)
Dept: REHABILITATION | Facility: HOSPITAL | Age: 63
End: 2023-02-15
Payer: COMMERCIAL

## 2023-02-15 DIAGNOSIS — M25.612 DECREASED ROM OF LEFT SHOULDER: ICD-10-CM

## 2023-02-15 DIAGNOSIS — Z74.09 IMPAIRED FUNCTIONAL MOBILITY AND ACTIVITY TOLERANCE: ICD-10-CM

## 2023-02-15 DIAGNOSIS — N64.89 EDEMA OF BREAST: ICD-10-CM

## 2023-02-15 DIAGNOSIS — Z91.89 AT RISK FOR LYMPHEDEMA: Primary | ICD-10-CM

## 2023-02-15 DIAGNOSIS — R29.3 POOR POSTURE: ICD-10-CM

## 2023-02-15 PROCEDURE — 97530 THERAPEUTIC ACTIVITIES: CPT

## 2023-02-15 NOTE — PROGRESS NOTES
Physical Therapy Daily Treatment Note       Date: 2/15/2023   Name: Erma Linda  Clinic Number: [id number]    Therapy Diagnosis:   Encounter Diagnoses   Name Primary?    At risk for lymphedema Yes    Poor posture     Decreased ROM of left shoulder     Impaired functional mobility and activity tolerance     Edema of breast      Physician: Amanda Lu PA-C    Physician Orders: PT Eval and Treat   Medical Diagnosis: Personal history of breast cancer [Z85.3], S/P lumpectomy, left breast [Z98.890], Edema of breast  Evaluation Date: 2/7/2023  Authorization Period Expiration: 12/31/23  Plan of Care Certification Period: 4/7/23  Visit # / Visits authorized: 1/ 20 (plus evaluation)  Insurance: Blue Cross Blue Shield  FOTO: 1/3     Time In: 4:10 PM  Time Out: 4:40 PM  Total Billable Time: 30 minutes     Precautions: Standard and cancer      Subjective     Pt reports: She feels an area of hardness at her left medial aspect of her breast. It hurts if she raises her left arm above shoulder height. She also notes hardness at lower inner quadrant of left breast- increases with lifting her arm overhead. She is concerned about a lump there. Pt states the Komprex II padding reduces the pain, but she doesn't feel like it reduced swelling. It is more supportive. She forgot to wear it today. Pt states scar massage helped.    She was not compliant with home exercise program because the exercises cause pain at front of left breast  Response to previous treatment: a little sore  Pain Scale: Erma rates pain on a scale of 0/10 at rest and 8/10 with movement on VAS.  Pain location: just left of sternum and in front/medial aspect of left breast     Fatigue: not asked  Functional change: none reported  Treatment: Chemotherapy: N/A  Radiation: completed adjuvantly on 10/10/22  Endocrine therapy: none  Surgery date: Pt is s/p left partial mastectomy on 6/16/22 and s/p re-excision  on 7/14/22.       Objective     Objective Measures updated at progress report unless specified.     Swelling noted t/o pt's left breast. Mild indentation noted from bra at medial aspect of left breast. Appreciate hardness from swelling in medial lower quadrant of left breast.    Treatment     Erma received individual therapeutic activity for 30 minutes including the following:     Increased time discussing pt's pain complaints and symptoms. Pt notes pain with gentle exercises provided last time. PT told pt to defer any exercise that causes pain.  Pt was encouraged to continue using Komprex II padding since it feels like it's supportive, as well as to trial a more supportive tank.  Pt was educated on technique to gently elevate her breast when in supine. She verbalizes understanding.  Pt was educated on what lymphatic drainage includes; method, components, etc. Pt defers at this time.  Due to pt's c/o pain at left medial chest with gentle exercises and raising left arm to shoulder height, as well as her concern about a lump, PT contacted referring provided, Amanda Lu via Teams.  Pt was also provided with provider's number. Plan is for pt to follow up with Amanda before continuing with physical therapy for breast edema.  After pt left therapy, Amanda responded to message and states she will follow up with pt to see her.    Home Exercise Program and Patient Education   Education provided re:  - role of PT in multi - disciplinary team, goals for PT  - progress towards goals   - breast elevation, hold exercises that cause pain    Written Home Exercises Provided: Patient instructed to cont prior HEP, unless they cause pain      See EMR under Patient Instructions for exercises provided  on Eval .    Pt has no cultural, educational or language barriers to learning provided.    Assessment     Patient tolerated session fair. Pt continues with left breast edema/?lymphedema. Pt was unable to complete exercises at home  due to pain at left/medial aspect of chest with exercise and overhead motion.  Pt is also concerned about hardness under her left breast, medial aspect. It appears to be due to swelling, but therapist reached out to CHLOE Dodd, so that pt can follow up with her about concerns of pain and hardness prior to continuing next PT session. Will follow up and continue plan of care as appropriate.    Pt prognosis is Fair. Pt will continue to benefit from skilled outpatient physical therapy to address the deficits listed in the problem list chart on initial evaluation, provide pt/family education and to maximize pt's level of independence in the home and community environment.     Anticipated barriers to physical therapy: none anticipated    Goals as follows:  Pt agrees with goals set     Short Term goals: 4 weeks  1. Patient will demonstrate 100% understanding of lymphedema risk reduction practices to include self monitoring for lymphedema. (progressing, not met)  2. Patient will demonstrate independence with Home Exercise program established. (progressing, not met)  3. Pt will increase AROM/PROM in shoulder abduction ROM to >/=140 degrees on left to improve functional reach, carry, push, pull pain free. (progressing, not met)  4. Pt will increase AROM/PROM in shoulder flexion to >/=140 degrees on left to improve functional reach, carry, push, pull pain free.(progressing, not met)  5. Pt will demo >/= 4-/5 strength in LUE for improved functional mobility, endurance, and posture. (progressing, not met)  6.  Patient will perform self lymph drainage techniques, as indicated/appropriate, to enhance lymphatic drainage and mobility.  (progressing, not met)  7. Pt will be independent with compression and scar massage techniques for improved functional mobility. (Progressing, not met)     Long Term Goals: 8 weeks   1.  Pt will increase AROM/PROM in shoulder flexion to >/=170 degrees on left to improve functional reach, carry,  push, pull pain free. (progressing, not met)  2. Pt will increase strength to 5/5 in gross UE musculature to improve tolerance to all functional activities pain free. (progressing, not met)  3. Pt will demonstrate full/maximized tissue mobility to increase ROM and promote healthy tissue to be pain free at discharge. (progressing, not met)  4. Pt will report decrease in overall worst pain to 4/10 at discharge. (progressing, not met)  5. Pt will increase AROM/PROM in shoulder abduction ROM to >/=170 degrees on left to improve functional reach, carry, push, pull pain free. (progressing, not met)  6. Patient will report compliance with walking program 5x week for 20-30 min each day to improve overall cardiovascular function and decrease cancer related fatigue at discharge. (progressing, not met)        Plan   Outpatient physical therapy 2x week for 8 weeks to include the following:   Manual Therapy, Neuromuscular Re-ed, Orthotic Management and Training, Patient Education, Self Care, Therapeutic Activities, and Therapeutic Exercise.     Plan of care Certification Period: 2/7/2023 to 4/7/23.       Therapist: Gladys Chapin, PT  2/15/2023

## 2023-03-01 ENCOUNTER — HOSPITAL ENCOUNTER (OUTPATIENT)
Dept: RADIOLOGY | Facility: HOSPITAL | Age: 63
Discharge: HOME | End: 2023-03-01
Attending: SURGERY
Payer: COMMERCIAL

## 2023-03-01 ENCOUNTER — CLINICAL SUPPORT (OUTPATIENT)
Dept: REHABILITATION | Facility: HOSPITAL | Age: 63
End: 2023-03-01
Payer: COMMERCIAL

## 2023-03-01 ENCOUNTER — OFFICE VISIT (OUTPATIENT)
Dept: SURGERY | Facility: CLINIC | Age: 63
End: 2023-03-01
Payer: COMMERCIAL

## 2023-03-01 VITALS
HEIGHT: 64 IN | BODY MASS INDEX: 28.17 KG/M2 | WEIGHT: 165 LBS | SYSTOLIC BLOOD PRESSURE: 145 MMHG | TEMPERATURE: 98 F | HEART RATE: 114 BPM | DIASTOLIC BLOOD PRESSURE: 90 MMHG | OXYGEN SATURATION: 98 %

## 2023-03-01 DIAGNOSIS — C50.412 CARCINOMA OF UPPER-OUTER QUADRANT OF LEFT BREAST IN FEMALE, ESTROGEN RECEPTOR POSITIVE: Primary | ICD-10-CM

## 2023-03-01 DIAGNOSIS — R29.3 POOR POSTURE: ICD-10-CM

## 2023-03-01 DIAGNOSIS — N64.89 EDEMA OF BREAST: ICD-10-CM

## 2023-03-01 DIAGNOSIS — M25.612 DECREASED ROM OF LEFT SHOULDER: ICD-10-CM

## 2023-03-01 DIAGNOSIS — Z74.09 IMPAIRED FUNCTIONAL MOBILITY AND ACTIVITY TOLERANCE: ICD-10-CM

## 2023-03-01 DIAGNOSIS — Z12.31 SCREENING MAMMOGRAM FOR BREAST CANCER: ICD-10-CM

## 2023-03-01 DIAGNOSIS — Z91.89 AT RISK FOR LYMPHEDEMA: Primary | ICD-10-CM

## 2023-03-01 DIAGNOSIS — Z17.0 CARCINOMA OF UPPER-OUTER QUADRANT OF LEFT BREAST IN FEMALE, ESTROGEN RECEPTOR POSITIVE: Primary | ICD-10-CM

## 2023-03-01 PROCEDURE — 3080F DIAST BP >= 90 MM HG: CPT | Mod: CPTII,S$GLB,, | Performed by: SURGERY

## 2023-03-01 PROCEDURE — 77063 MAMMO DIGITAL SCREENING BILAT WITH TOMO: ICD-10-PCS | Mod: 26,,, | Performed by: RADIOLOGY

## 2023-03-01 PROCEDURE — 3080F PR MOST RECENT DIASTOLIC BLOOD PRESSURE >= 90 MM HG: ICD-10-PCS | Mod: CPTII,S$GLB,, | Performed by: SURGERY

## 2023-03-01 PROCEDURE — 1159F MED LIST DOCD IN RCRD: CPT | Mod: CPTII,S$GLB,, | Performed by: SURGERY

## 2023-03-01 PROCEDURE — 77067 SCR MAMMO BI INCL CAD: CPT | Mod: TC

## 2023-03-01 PROCEDURE — 3077F SYST BP >= 140 MM HG: CPT | Mod: CPTII,S$GLB,, | Performed by: SURGERY

## 2023-03-01 PROCEDURE — 97140 MANUAL THERAPY 1/> REGIONS: CPT

## 2023-03-01 PROCEDURE — 3077F PR MOST RECENT SYSTOLIC BLOOD PRESSURE >= 140 MM HG: ICD-10-PCS | Mod: CPTII,S$GLB,, | Performed by: SURGERY

## 2023-03-01 PROCEDURE — 77067 SCR MAMMO BI INCL CAD: CPT | Mod: 26,,, | Performed by: RADIOLOGY

## 2023-03-01 PROCEDURE — 4010F PR ACE/ARB THEARPY RXD/TAKEN: ICD-10-PCS | Mod: CPTII,S$GLB,, | Performed by: SURGERY

## 2023-03-01 PROCEDURE — 4010F ACE/ARB THERAPY RXD/TAKEN: CPT | Mod: CPTII,S$GLB,, | Performed by: SURGERY

## 2023-03-01 PROCEDURE — 1160F RVW MEDS BY RX/DR IN RCRD: CPT | Mod: CPTII,S$GLB,, | Performed by: SURGERY

## 2023-03-01 PROCEDURE — 99213 PR OFFICE/OUTPT VISIT, EST, LEVL III, 20-29 MIN: ICD-10-PCS | Mod: S$GLB,,, | Performed by: SURGERY

## 2023-03-01 PROCEDURE — 77067 MAMMO DIGITAL SCREENING BILAT WITH TOMO: ICD-10-PCS | Mod: 26,,, | Performed by: RADIOLOGY

## 2023-03-01 PROCEDURE — 1159F PR MEDICATION LIST DOCUMENTED IN MEDICAL RECORD: ICD-10-PCS | Mod: CPTII,S$GLB,, | Performed by: SURGERY

## 2023-03-01 PROCEDURE — 3008F BODY MASS INDEX DOCD: CPT | Mod: CPTII,S$GLB,, | Performed by: SURGERY

## 2023-03-01 PROCEDURE — 99213 OFFICE O/P EST LOW 20 MIN: CPT | Mod: S$GLB,,, | Performed by: SURGERY

## 2023-03-01 PROCEDURE — 97535 SELF CARE MNGMENT TRAINING: CPT

## 2023-03-01 PROCEDURE — 1160F PR REVIEW ALL MEDS BY PRESCRIBER/CLIN PHARMACIST DOCUMENTED: ICD-10-PCS | Mod: CPTII,S$GLB,, | Performed by: SURGERY

## 2023-03-01 PROCEDURE — 99999 PR PBB SHADOW E&M-EST. PATIENT-LVL IV: ICD-10-PCS | Mod: PBBFAC,,, | Performed by: SURGERY

## 2023-03-01 PROCEDURE — 99999 PR PBB SHADOW E&M-EST. PATIENT-LVL IV: CPT | Mod: PBBFAC,,, | Performed by: SURGERY

## 2023-03-01 PROCEDURE — 3008F PR BODY MASS INDEX (BMI) DOCUMENTED: ICD-10-PCS | Mod: CPTII,S$GLB,, | Performed by: SURGERY

## 2023-03-01 PROCEDURE — 77063 BREAST TOMOSYNTHESIS BI: CPT | Mod: 26,,, | Performed by: RADIOLOGY

## 2023-03-01 NOTE — PROGRESS NOTES
Physical Therapy Daily Treatment Note       Date: 3/1/2023   Name: Erma Linda  Clinic Number: [id number]    Therapy Diagnosis:   Encounter Diagnoses   Name Primary?    At risk for lymphedema Yes    Poor posture     Decreased ROM of left shoulder     Impaired functional mobility and activity tolerance     Edema of breast      Physician: Amanda Lu PA-C    Physician Orders: PT Eval and Treat   Medical Diagnosis: Personal history of breast cancer [Z85.3], S/P lumpectomy, left breast [Z98.890], Edema of breast  Evaluation Date: 2/7/2023  Authorization Period Expiration: 12/31/23  Plan of Care Certification Period: 4/7/23  Visit # / Visits authorized: 2/ 20 (plus evaluation)  Insurance: [employer]  FOTO: 1/3     Time In: 4:10 PM  Time Out: 4:45PM  Total Billable Time: 35 minutes     Precautions: Standard and cancer      Subjective     Pt reports: She saw Dr. Delong prior to this. Pt was able to have mammogram for right breast, but had to stop for left breast due to pain. Pt was told her pain symptoms are from radiation and fluid. Notes the most pain with transfers/repositioning with turning in bed and getting up. Pt uses a pillow for support and this helps.    She was not compliant with home exercise program because the exercises cause pain at front of left breast  Response to previous treatment: a little sore  Pain Scale: Erma rates pain on a scale of 8/10  Pain location: just left of sternum and in front/medial aspect of left breast     Fatigue: not asked  Functional change: none reported  Treatment: Chemotherapy: N/A  Radiation: completed adjuvantly on 10/10/22  Endocrine therapy: none  Surgery date: Pt is s/p left partial mastectomy on 6/16/22 and s/p re-excision on 7/14/22.       Objective     Objective Measures updated at progress report unless specified.         Treatment     Erma received the following manual therapy techniques:  Manual Lymphatic Drainage were applied to the: left breast for 15 minutes.     Diaphragmatic breathing x 5 reps    Short Neck- held due to thyroid issues  Clear Healthy Lymph Nodes:  Right Axilla                                                  Left Groin  Open Anastomoses:  Anterior Axillo-Axillary  (AAA)                                    Axillo-Inguinal     (AI)                                  Sims's wave to left breast  J-stroke at left lateral chest wall  Inferior trunk slide  Repeat J-stroke at left lateral chest wall    Rework Anastomoses  Rework Healthy lymph Nodes    Diaphragmatic breathing x 5 reps        Pt receives self-care home management for 20 min    Reviewed steps of self manual lymph drainage for home. Please refer to media section for steps/details. Pt was advised to stop if anything increases or causes pain.    Reviewed/reinforced compression recommendation with supportive bra or tank    Reviewed log rolling technique and bracing with pillow, as pt endorses pain with repositioning transfers.        Home Exercise Program and Patient Education   Education provided re:  - role of PT in multi - disciplinary team, goals for PT  - progress towards goals   -postural awareness  -self manual lymph drainage.    Written Home Exercises Provided: Patient instructed to cont prior HEP, unless they cause pain      See EMR under Patient Instructions for exercises provided  on Eval .    Pt has no cultural, educational or language barriers to learning provided.    Assessment     Patient tolerated session fair. Pt continues with left breast edema/ questionable lymphedema. Pt saw breast surgeon prior to PT session. Per pt, surgeon states that pt's pain and swelling symptoms are due to radiation. Pt states she was unable to tolerate left breast mammogram due to pain, but they could take enough images of her left breast. Pt endorses increased pain from mammogram. Initiated manual lymph drainage for left breast, which  pt tolerates well in clinic. Pt was taught how to perform at home and demo's good technique, but she may need review. Will gauge response at next session and continue plan of care as appropriate.    Pt prognosis is Fair. Pt will continue to benefit from skilled outpatient physical therapy to address the deficits listed in the problem list chart on initial evaluation, provide pt/family education and to maximize pt's level of independence in the home and community environment.     Anticipated barriers to physical therapy: none anticipated    Goals as follows:  Pt agrees with goals set     Short Term goals: 4 weeks  1. Patient will demonstrate 100% understanding of lymphedema risk reduction practices to include self monitoring for lymphedema. (progressing, not met)  2. Patient will demonstrate independence with Home Exercise program established. (progressing, not met)  3. Pt will increase AROM/PROM in shoulder abduction ROM to >/=140 degrees on left to improve functional reach, carry, push, pull pain free. (progressing, not met)  4. Pt will increase AROM/PROM in shoulder flexion to >/=140 degrees on left to improve functional reach, carry, push, pull pain free.(progressing, not met)  5. Pt will demo >/= 4-/5 strength in LUE for improved functional mobility, endurance, and posture. (progressing, not met)  6.  Patient will perform self lymph drainage techniques, as indicated/appropriate, to enhance lymphatic drainage and mobility.  (progressing, not met)  7. Pt will be independent with compression and scar massage techniques for improved functional mobility. (Progressing, not met)     Long Term Goals: 8 weeks   1.  Pt will increase AROM/PROM in shoulder flexion to >/=170 degrees on left to improve functional reach, carry, push, pull pain free. (progressing, not met)  2. Pt will increase strength to 5/5 in gross UE musculature to improve tolerance to all functional activities pain free. (progressing, not met)  3. Pt will  demonstrate full/maximized tissue mobility to increase ROM and promote healthy tissue to be pain free at discharge. (progressing, not met)  4. Pt will report decrease in overall worst pain to 4/10 at discharge. (progressing, not met)  5. Pt will increase AROM/PROM in shoulder abduction ROM to >/=170 degrees on left to improve functional reach, carry, push, pull pain free. (progressing, not met)  6. Patient will report compliance with walking program 5x week for 20-30 min each day to improve overall cardiovascular function and decrease cancer related fatigue at discharge. (progressing, not met)        Plan   Outpatient physical therapy 2x week for 8 weeks to include the following:   Manual Therapy, Neuromuscular Re-ed, Orthotic Management and Training, Patient Education, Self Care, Therapeutic Activities, and Therapeutic Exercise.     Plan of care Certification Period: 2/7/2023 to 4/7/23.       Therapist: Gladys Chapin, PT  3/1/2023

## 2023-03-01 NOTE — PROGRESS NOTES
[facility]  Department of Surgery    PCP:  Marry Howard MD  CHIEF COMPLAINT:   Chief Complaint   Patient presents with    Follow-up     Follow up [facility] & [facility] Left Breast Pain .       DIAGNOSIS:   This is a 62 y.o. 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    TREATMENT:   1. left partial mastectomy with sentinel node biopsy on 7/14/2022. Dr. Baljeet M.D. Surgical Oncology  2. Final Path: Grade 1, multifocal (2 foci - largest 2.7 cm) invasive mammary carcinoma with mixed ductal and lobular. Positive inferior margin. Negative nodes.   3. Re-excision performed on 7/14/22 - negative for residual carcinoma.   4. XRT completed 10/10/22 with Dr. Mathis, Radiation Oncology.   5. Oncotype: 7.   4. Adjuvant endocrine therapy started on 8/8/22 with Tamoxifen. Dr. Rabia M.D. Medical Oncology     HISTORY OF PRESENT ILLNESS:   Erma Linda is a 62 y.o. female comes in for previously scheduled six-month follow-up.Patient continues to have pain and swelling of the left breast.  She has recently started seeing physical therapy with minimal to no improvement.  The pain and swelling of her left breast is most consistent with  lymphedema of the left breast after radiation.  No other breast related complaints.  Otherwise doing well with no history changes.  She has not difficulty with mammogram today due to her discomfort but it was able to be completed.    Review of Systems: See HPI/Interval History for other systems reviewed.     IMAGING:     MMG 3/1/23:   There is no mammographic evidence of malignancy     MEDICATIONS/ALLERGIES:     Current Outpatient Medications   Medication Sig    amLODIPine (NORVASC) 10 MG tablet Take 1 tablet (10 mg total) by mouth once daily.    cyanocobalamin (VITAMIN B-12) 100 MCG tablet Take 500 mcg by mouth once daily.    hydroCHLOROthiazide (HYDRODIURIL) 12.5 MG Tab Take 1 tablet (12.5 mg total)  "by mouth once daily.    ibuprofen (ADVIL,MOTRIN) 800 MG tablet Take 1 tablet (800 mg total) by mouth every 6 (six) hours as needed for Pain.    multivitamin (THERAGRAN) per tablet Take 1 tablet by mouth once daily.    vitamin D (VITAMIN D3) 1000 units Tab Take 1,000 Units by mouth once daily.    HYDROcodone-acetaminophen (NORCO) 5-325 mg per tablet Take 1 tablet by mouth every 6 (six) hours as needed for Pain.    lisinopriL (PRINIVIL,ZESTRIL) 40 MG tablet Take 1 tablet (40 mg total) by mouth once daily.    meloxicam (MOBIC) 15 MG tablet Take 15 mg by mouth once daily.    nystatin (MYCOSTATIN) powder Apply topically 2 (two) times daily.     No current facility-administered medications for this visit.     Facility-Administered Medications Ordered in Other Visits   Medication    0.9%  NaCl infusion      Review of patient's allergies indicates:   Allergen Reactions    Hydralazine analogues Palpitations    Compazine [prochlorperazine] Other (See Comments)     The patient stated, she didn't like how Compazine, made her feel.       PHYSICAL EXAM:   BP (!) 145/90 (BP Location: Right arm, Patient Position: Sitting, BP Method: Medium (Automatic))   Pulse (!) 114   Temp 97.6 °F (36.4 °C) (Oral)   Ht 5' 4" (1.626 m)   Wt 74.8 kg (165 lb)   SpO2 98%   BMI 28.32 kg/m²     Physical Exam   Vitals reviewed.  Constitutional: She is oriented to person, place, and time.   Pulmonary/Chest: Effort normal. No respiratory distress. She exhibits no mass and no edema. Right breast exhibits no inverted nipple, no mass, no nipple discharge, no skin change and no tenderness. Left breast exhibits skin change and tenderness. Left breast exhibits no inverted nipple, no mass and no nipple discharge. There is breast swelling. No breast bleeding. Breasts are symmetrical.       Abdominal: Normal appearance.   Genitourinary: There is breast swelling. No breast bleeding.   Lymphadenopathy:     She has no cervical adenopathy.        Right: No " supraclavicular adenopathy present.        Left: No supraclavicular adenopathy present.   No evidence of left upper extremity lymphedema.   Neurological: She is alert and oriented to person, place, and time.   Skin: Skin is warm and dry.     Psychiatric: Her behavior is normal. Mood, judgment and thought content normal.       ASSESSMENT:   This is a 62 y.o. female without evidence of recurrence by exam, history or imaging.  Physical exam consistent with lymphedema of the left breast status post radiation therapy.     PLAN:   1.  Continue working with physical therapy for lymphatic drain into the left breast.  2.  Bilateral mammogram due in 1 year.  Return to clinic in 1 year for clinical breast exam.  Continue follow up with Medical Oncology  3. Continue with clinical breast exams and call clinic if she has any problems

## 2023-03-06 ENCOUNTER — TELEPHONE (OUTPATIENT)
Dept: REHABILITATION | Facility: HOSPITAL | Age: 63
End: 2023-03-06
Payer: COMMERCIAL

## 2023-03-06 NOTE — TELEPHONE ENCOUNTER
Called pt to check in on her symptoms since last PT session. Pt states symptoms have not worsened, but her pain level is staying the same.  As lymphedema does not tend to be painful, PT advised that pt reach out to her radiation oncologist about her symptoms.  Pt states she will. Therapist to see pt on Wednesday, 3/8/23 for next PT appointment. All questions answered.

## 2023-04-06 ENCOUNTER — HOSPITAL ENCOUNTER (OUTPATIENT)
Facility: HOSPITAL | Age: 63
Discharge: HOME | End: 2023-04-07
Attending: STUDENT IN AN ORGANIZED HEALTH CARE EDUCATION/TRAINING PROGRAM | Admitting: INTERNAL MEDICINE
Payer: COMMERCIAL

## 2023-04-06 DIAGNOSIS — I10 ESSENTIAL HYPERTENSION, BENIGN: ICD-10-CM

## 2023-04-06 DIAGNOSIS — R07.9 CHEST PAIN: ICD-10-CM

## 2023-04-06 DIAGNOSIS — R07.9 CHEST PAIN, UNSPECIFIED TYPE: Primary | ICD-10-CM

## 2023-04-06 DIAGNOSIS — Z76.0 MEDICATION REFILL: ICD-10-CM

## 2023-04-06 DIAGNOSIS — R07.89 CHEST DISCOMFORT: ICD-10-CM

## 2023-04-06 DIAGNOSIS — R07.9 CHEST PAIN, RULE OUT ACUTE MYOCARDIAL INFARCTION: ICD-10-CM

## 2023-04-06 LAB
ALBUMIN SERPL BCP-MCNC: 3.9 G/DL (ref 3.5–5.2)
ALP SERPL-CCNC: 114 U/L (ref 55–135)
ALT SERPL W/O P-5'-P-CCNC: 11 U/L (ref 10–44)
ANION GAP SERPL CALC-SCNC: 11 MMOL/L (ref 8–16)
AST SERPL-CCNC: 19 U/L (ref 10–40)
BASOPHILS # BLD AUTO: 0.06 K/UL (ref 0–0.2)
BASOPHILS NFR BLD: 0.9 % (ref 0–1.9)
BILIRUB SERPL-MCNC: 0.4 MG/DL (ref 0.1–1)
BNP SERPL-MCNC: 86 PG/ML (ref 0–99)
BUN SERPL-MCNC: 15 MG/DL (ref 8–23)
CALCIUM SERPL-MCNC: 10.3 MG/DL (ref 8.7–10.5)
CHLORIDE SERPL-SCNC: 107 MMOL/L (ref 95–110)
CO2 SERPL-SCNC: 23 MMOL/L (ref 23–29)
CREAT SERPL-MCNC: 0.8 MG/DL (ref 0.5–1.4)
D DIMER PPP IA.FEU-MCNC: 1.37 MG/L FEU
DIFFERENTIAL METHOD: ABNORMAL
EOSINOPHIL # BLD AUTO: 0.2 K/UL (ref 0–0.5)
EOSINOPHIL NFR BLD: 2.5 % (ref 0–8)
ERYTHROCYTE [DISTWIDTH] IN BLOOD BY AUTOMATED COUNT: 15.8 % (ref 11.5–14.5)
EST. GFR  (NO RACE VARIABLE): >60 ML/MIN/1.73 M^2
GLUCOSE SERPL-MCNC: 80 MG/DL (ref 70–110)
HCT VFR BLD AUTO: 39.3 % (ref 37–48.5)
HGB BLD-MCNC: 12.3 G/DL (ref 12–16)
IMM GRANULOCYTES # BLD AUTO: 0.02 K/UL (ref 0–0.04)
IMM GRANULOCYTES NFR BLD AUTO: 0.3 % (ref 0–0.5)
LYMPHOCYTES # BLD AUTO: 1.4 K/UL (ref 1–4.8)
LYMPHOCYTES NFR BLD: 21.9 % (ref 18–48)
MCH RBC QN AUTO: 28.3 PG (ref 27–31)
MCHC RBC AUTO-ENTMCNC: 31.3 G/DL (ref 32–36)
MCV RBC AUTO: 90 FL (ref 82–98)
MONOCYTES # BLD AUTO: 0.4 K/UL (ref 0.3–1)
MONOCYTES NFR BLD: 6.8 % (ref 4–15)
NEUTROPHILS # BLD AUTO: 4.3 K/UL (ref 1.8–7.7)
NEUTROPHILS NFR BLD: 67.6 % (ref 38–73)
NRBC BLD-RTO: 0 /100 WBC
PLATELET # BLD AUTO: 242 K/UL (ref 150–450)
PMV BLD AUTO: 10.5 FL (ref 9.2–12.9)
POC CARDIAC TROPONIN I: 0 NG/ML (ref 0–0.08)
POTASSIUM SERPL-SCNC: 3.7 MMOL/L (ref 3.5–5.1)
PROT SERPL-MCNC: 9.3 G/DL (ref 6–8.4)
RBC # BLD AUTO: 4.35 M/UL (ref 4–5.4)
SAMPLE: NORMAL
SARS-COV-2 RDRP RESP QL NAA+PROBE: NEGATIVE
SODIUM SERPL-SCNC: 141 MMOL/L (ref 136–145)
TROPONIN I SERPL DL<=0.01 NG/ML-MCNC: <0.006 NG/ML (ref 0–0.03)
TROPONIN I SERPL DL<=0.01 NG/ML-MCNC: <0.006 NG/ML (ref 0–0.03)
WBC # BLD AUTO: 6.34 K/UL (ref 3.9–12.7)

## 2023-04-06 PROCEDURE — 85025 COMPLETE CBC W/AUTO DIFF WBC: CPT | Performed by: PHYSICIAN ASSISTANT

## 2023-04-06 PROCEDURE — G0378 HOSPITAL OBSERVATION PER HR: HCPCS

## 2023-04-06 PROCEDURE — 36415 COLL VENOUS BLD VENIPUNCTURE: CPT | Performed by: PHYSICIAN ASSISTANT

## 2023-04-06 PROCEDURE — 93010 EKG 12-LEAD: ICD-10-PCS | Mod: ,,, | Performed by: INTERNAL MEDICINE

## 2023-04-06 PROCEDURE — 63600175 PHARM REV CODE 636 W HCPCS: Performed by: PHYSICIAN ASSISTANT

## 2023-04-06 PROCEDURE — 93005 ELECTROCARDIOGRAM TRACING: CPT

## 2023-04-06 PROCEDURE — 84484 ASSAY OF TROPONIN QUANT: CPT | Mod: 91 | Performed by: PHYSICIAN ASSISTANT

## 2023-04-06 PROCEDURE — 99285 EMERGENCY DEPT VISIT HI MDM: CPT | Mod: 25

## 2023-04-06 PROCEDURE — 99223 1ST HOSP IP/OBS HIGH 75: CPT | Mod: ,,, | Performed by: PHYSICIAN ASSISTANT

## 2023-04-06 PROCEDURE — 99285 EMERGENCY DEPT VISIT HI MDM: CPT | Mod: FS,CS,, | Performed by: STUDENT IN AN ORGANIZED HEALTH CARE EDUCATION/TRAINING PROGRAM

## 2023-04-06 PROCEDURE — 99223 PR INITIAL HOSPITAL CARE,LEVL III: ICD-10-PCS | Mod: ,,, | Performed by: PHYSICIAN ASSISTANT

## 2023-04-06 PROCEDURE — U0002 COVID-19 LAB TEST NON-CDC: HCPCS | Performed by: PHYSICIAN ASSISTANT

## 2023-04-06 PROCEDURE — 25500020 PHARM REV CODE 255: Performed by: STUDENT IN AN ORGANIZED HEALTH CARE EDUCATION/TRAINING PROGRAM

## 2023-04-06 PROCEDURE — 25000003 PHARM REV CODE 250: Performed by: PHYSICIAN ASSISTANT

## 2023-04-06 PROCEDURE — 84484 ASSAY OF TROPONIN QUANT: CPT

## 2023-04-06 PROCEDURE — 80053 COMPREHEN METABOLIC PANEL: CPT | Performed by: PHYSICIAN ASSISTANT

## 2023-04-06 PROCEDURE — 83880 ASSAY OF NATRIURETIC PEPTIDE: CPT | Performed by: PHYSICIAN ASSISTANT

## 2023-04-06 PROCEDURE — 96372 THER/PROPH/DIAG INJ SC/IM: CPT | Mod: 59 | Performed by: PHYSICIAN ASSISTANT

## 2023-04-06 PROCEDURE — 25000242 PHARM REV CODE 250 ALT 637 W/ HCPCS: Performed by: PHYSICIAN ASSISTANT

## 2023-04-06 PROCEDURE — 85379 FIBRIN DEGRADATION QUANT: CPT | Performed by: PHYSICIAN ASSISTANT

## 2023-04-06 PROCEDURE — 99285 PR EMERGENCY DEPT VISIT,LEVEL V: ICD-10-PCS | Mod: FS,CS,, | Performed by: STUDENT IN AN ORGANIZED HEALTH CARE EDUCATION/TRAINING PROGRAM

## 2023-04-06 PROCEDURE — 84484 ASSAY OF TROPONIN QUANT: CPT | Performed by: PHYSICIAN ASSISTANT

## 2023-04-06 PROCEDURE — 93010 ELECTROCARDIOGRAM REPORT: CPT | Mod: ,,, | Performed by: INTERNAL MEDICINE

## 2023-04-06 RX ORDER — NYSTATIN 100000 U/G
OINTMENT TOPICAL
COMMUNITY
Start: 2022-11-21 | End: 2023-04-06

## 2023-04-06 RX ORDER — DEXTROSE 40 %
15 GEL (GRAM) ORAL
Status: DISCONTINUED | OUTPATIENT
Start: 2023-04-06 | End: 2023-04-07 | Stop reason: HOSPADM

## 2023-04-06 RX ORDER — AMOXICILLIN 500 MG
1 CAPSULE ORAL DAILY
COMMUNITY

## 2023-04-06 RX ORDER — BISACODYL 10 MG
10 SUPPOSITORY, RECTAL RECTAL DAILY PRN
Status: DISCONTINUED | OUTPATIENT
Start: 2023-04-06 | End: 2023-04-07 | Stop reason: HOSPADM

## 2023-04-06 RX ORDER — ENOXAPARIN SODIUM 100 MG/ML
40 INJECTION SUBCUTANEOUS EVERY 24 HOURS
Status: DISCONTINUED | OUTPATIENT
Start: 2023-04-06 | End: 2023-04-07 | Stop reason: HOSPADM

## 2023-04-06 RX ORDER — TALC
6 POWDER (GRAM) TOPICAL NIGHTLY PRN
Status: DISCONTINUED | OUTPATIENT
Start: 2023-04-06 | End: 2023-04-07 | Stop reason: HOSPADM

## 2023-04-06 RX ORDER — SODIUM CHLORIDE 0.9 % (FLUSH) 0.9 %
10 SYRINGE (ML) INJECTION
Status: DISCONTINUED | OUTPATIENT
Start: 2023-04-06 | End: 2023-04-07 | Stop reason: HOSPADM

## 2023-04-06 RX ORDER — ACETAMINOPHEN 500 MG
500 TABLET ORAL DAILY PRN
COMMUNITY

## 2023-04-06 RX ORDER — ONDANSETRON 8 MG/1
8 TABLET, ORALLY DISINTEGRATING ORAL EVERY 8 HOURS PRN
Status: DISCONTINUED | OUTPATIENT
Start: 2023-04-06 | End: 2023-04-07 | Stop reason: HOSPADM

## 2023-04-06 RX ORDER — POLYETHYLENE GLYCOL 3350 17 G/17G
17 POWDER, FOR SOLUTION ORAL DAILY PRN
Status: DISCONTINUED | OUTPATIENT
Start: 2023-04-06 | End: 2023-04-07 | Stop reason: HOSPADM

## 2023-04-06 RX ORDER — ACETAMINOPHEN 325 MG/1
650 TABLET ORAL EVERY 4 HOURS PRN
Status: DISCONTINUED | OUTPATIENT
Start: 2023-04-06 | End: 2023-04-07 | Stop reason: HOSPADM

## 2023-04-06 RX ORDER — DEXTROSE 40 %
30 GEL (GRAM) ORAL
Status: DISCONTINUED | OUTPATIENT
Start: 2023-04-06 | End: 2023-04-07 | Stop reason: HOSPADM

## 2023-04-06 RX ORDER — NITROGLYCERIN 0.4 MG/1
0.4 TABLET SUBLINGUAL EVERY 5 MIN PRN
Status: DISCONTINUED | OUTPATIENT
Start: 2023-04-06 | End: 2023-04-06

## 2023-04-06 RX ORDER — ONDANSETRON 2 MG/ML
4 INJECTION INTRAMUSCULAR; INTRAVENOUS EVERY 8 HOURS PRN
Status: DISCONTINUED | OUTPATIENT
Start: 2023-04-06 | End: 2023-04-07 | Stop reason: HOSPADM

## 2023-04-06 RX ORDER — GLUCAGON 1 MG
1 KIT INJECTION
Status: DISCONTINUED | OUTPATIENT
Start: 2023-04-06 | End: 2023-04-07 | Stop reason: HOSPADM

## 2023-04-06 RX ORDER — ASPIRIN 325 MG
325 TABLET ORAL
Status: COMPLETED | OUTPATIENT
Start: 2023-04-06 | End: 2023-04-06

## 2023-04-06 RX ORDER — ACETAMINOPHEN 500 MG
1000 TABLET ORAL
Status: COMPLETED | OUTPATIENT
Start: 2023-04-06 | End: 2023-04-06

## 2023-04-06 RX ADMIN — ACETAMINOPHEN 1000 MG: 500 TABLET ORAL at 02:04

## 2023-04-06 RX ADMIN — ASPIRIN 325 MG ORAL TABLET 325 MG: 325 PILL ORAL at 01:04

## 2023-04-06 RX ADMIN — NITROGLYCERIN 0.4 MG: 0.4 TABLET, ORALLY DISINTEGRATING SUBLINGUAL at 02:04

## 2023-04-06 RX ADMIN — ENOXAPARIN SODIUM 40 MG: 40 INJECTION SUBCUTANEOUS at 09:04

## 2023-04-06 RX ADMIN — IOHEXOL 75 ML: 350 INJECTION, SOLUTION INTRAVENOUS at 05:04

## 2023-04-06 NOTE — ED NOTES
Pt is complaining of left upper chest pain. Pain sharp and reproducible when palpated. It is a constant pain that does not radiate anywhere. Pain rating is 7/10.

## 2023-04-06 NOTE — PHARMACY MED REC
"Admission Medication History     The home medication history was taken by Marlen Bueno.    You may go to "Admission" then "Reconcile Home Medications" tabs to review and/or act upon these items.     The home medication list has been updated by the Pharmacy department.   Please read ALL comments highlighted in yellow.   Please address this information as you see fit.    Feel free to contact us if you have any questions or require assistance.      The medications listed below were removed from the home medication list. Please reorder if appropriate:  Patient reports no longer taking the following medication(s):  MELOXICAM 15 MG  NYSTATIN OINTMENT    Medications listed below were obtained from: Patient  Current Outpatient Medications on File Prior to Encounter   Medication Sig    acetaminophen (TYLENOL) 500 MG tablet   Take 500 mg by mouth daily as needed for Pain.    amLODIPine (NORVASC) 10 MG tablet   Take 1 tablet (10 mg total) by mouth once daily.    cyanocobalamin (VITAMIN B-12) 100 MCG tablet   Take 500 mcg by mouth once daily.    ibuprofen (ADVIL,MOTRIN) 800 MG tablet Take 1 tablet (800 mg total) by mouth every 6 (six) hours as needed for Pain.      lisinopriL (PRINIVIL,ZESTRIL) 40 MG tablet   Take 1 tablet (40 mg total) by mouth once daily.    MAGNESIUM ORAL   Take 1 tablet by mouth once daily.    multivitamin (THERAGRAN) per tablet   Take 1 tablet by mouth once daily.    omega-3 fatty acids/fish oil (FISH OIL-OMEGA-3 FATTY   ACIDS) 300-1,000 mg capsule   Take 1 capsule by mouth once daily.    vitamin D (VITAMIN D3) 1000 units Tab   Take 1,000 Units by mouth once daily.    hydroCHLOROthiazide (HYDRODIURIL) 12.5 MG Tab   Take 1 tablet (12.5 mg total) by mouth once daily.       Potential issues to be addressed PRIOR TO DISCHARGE  Patient requested refills for the following medications: (HYDRODIURIL)              Marlen Bueno  EXT [phone]                  .        "

## 2023-04-06 NOTE — ED TRIAGE NOTES
Chest pain started around 0930 pain was sharp to left side chest, complaints of tightness while breathing heavy, complaint of weakness since waking up this  morning. Left arm pain and complaints of hands bilaterally tingling, ankle swelling noted.

## 2023-04-06 NOTE — HPI
"Erma Linda is a 63 y.o. female with a PMHx of breast cancer s/p partial mastectomy and radiation, HTN who presents to [facility] for evaluation of chest pain. Patient reports acute onset left sided "sharp, stabbing" chest pain began while sitting down at work typing earlier today. Pain worsened with deep inspiration and exertion. She notes feeling generalized weakness upon wakening this morning. She had an episode of shortness of breath while on the phone with her  earlier today. Endorses associated tingling in her left arm and left foot and bilateral lower extremity swelling. Reports 1 similar episode many years ago without clear etiology. She had a stress test done in 2016 which was negative for ischemia. Her mother passed away of an MI at age 38.  No personal cardiac history.  Nonsmoker. No history of DVT/PE, no recent surgeries or immobilization. Currently chest pain free after given NTG x3. Denies fever, chills, cough, LE pain, N/V, abdominal pain, HA, vision changes or syncope. Of note, patient has been out of her HCTZ for a few days.     ED: hypertensive to /90 and tachycardic to . No leukocytosis or electrolyte abnormalities. Trop <0.0065, BNP 86, D-dimer 1.37. EKG with worsening TWIs in anterior leads compared to prior. CTA negative for PE.   "

## 2023-04-06 NOTE — ED PROVIDER NOTES
"Encounter Date: 4/6/2023       History     Chief Complaint   Patient presents with    Chest Pain     Left anterior chest tightness x several weeks that is "sharp" associated with left arm/leg tingling (x this morning around 9) left ankle swelling, head tightness, and generalized weakness. No cardiac hx, stroke     63-year-old female with history of hypertension, breast cancer s/p lumpectomy and radiation in remission, hepatitis-C s/p SVR and arthritis presents for chest pain.  She woke feeling generally weak this morning with some lightheadedness.  While she was at work typing, she developed sharp, stabbing left-sided chest pain which worsens with deep breathing and exertion.  She reports associated chest tightness, tingling in her left arm and left foot, bilateral leg swelling and occasional shortness of breath.  She denies numbness, weakness, nausea/vomiting, abdominal pain, fever or cough.  Reports 1 similar episode many years ago without clear etiology.  She had a stress test report a long time ago".  Her mother passed away of an MI at age 38.  No personal cardiac history.  Nonsmoker.  No history of DVT/PE, no recent surgeries or immobilization.      Review of patient's allergies indicates:   Allergen Reactions    Hydralazine analogues Palpitations    Compazine [prochlorperazine] Other (See Comments)     The patient stated, she didn't like how Compazine, made her feel.     Past Medical History:   Diagnosis Date    Arthritis     Cancer     breast    History of hepatitis C; S/p RX with SVR 12 documented 03/2018 3/22/2018    Hypertension     Nuclear sclerosis of both eyes 12/14/2021     Past Surgical History:   Procedure Laterality Date    BREAST BIOPSY Left 6/16/2022    Procedure: BIOPSY, BREAST / excisional biopsy left;  Surgeon: CHRISTI Delong MD;  Location: [facility];  Service: General;  Laterality: Left;    BREAST MASS EXCISION Left 7/14/2022    Procedure: RE- EXCISION,  BREAST;  Surgeon: CHRISTI Delong MD;  " Location: [facility];  Service: General;  Laterality: Left;    BREAST SURGERY  2022    lumpectomy     SECTION, CLASSIC      INJECTION FOR SENTINEL NODE IDENTIFICATION Left 2022    Procedure: INJECTION, FOR SENTINEL NODE IDENTIFICATION;  Surgeon: CHRISTI Delong MD;  Location: [facility];  Service: General;  Laterality: Left;    KNEE ARTHROSCOPY W/ LASER  2000    R    MASTECTOMY, PARTIAL Left 2022    Procedure: MASTECTOMY, PARTIAL LEFT with radiological marker;  Surgeon: CHRISTI Delong MD;  Location: [facility];  Service: General;  Laterality: Left;  EMAIL SENT  @ 10:20 PER LK    SENTINEL LYMPH NODE BIOPSY Left 2022    Procedure: BIOPSY, LYMPH NODE, SENTINEL;  Surgeon: CHRISTI Delong MD;  Location: [facility];  Service: General;  Laterality: Left;    TUBAL LIGATION       Family History   Problem Relation Age of Onset    Heart disease Mother     No Known Problems Father     No Known Problems Sister     No Known Problems Brother     No Known Problems Maternal Aunt     No Known Problems Maternal Uncle     No Known Problems Paternal Aunt     No Known Problems Paternal Uncle     No Known Problems Maternal Grandmother     No Known Problems Maternal Grandfather     No Known Problems Paternal Grandmother     No Known Problems Paternal Grandfather     Glaucoma Neg Hx      Social History     Tobacco Use    Smoking status: Never    Smokeless tobacco: Never   Substance Use Topics    Alcohol use: No    Drug use: No     Review of Systems   Constitutional:  Positive for fatigue. Negative for fever.   Respiratory:  Positive for chest tightness and shortness of breath.    Cardiovascular:  Positive for chest pain and leg swelling. Negative for palpitations.   Gastrointestinal:  Negative for abdominal pain.   Allergic/Immunologic: Negative for immunocompromised state.   Neurological:  Positive for light-headedness. Negative for numbness.     Physical Exam     Initial Vitals [23 1246]   BP Pulse Resp Temp  SpO2   (!) 186/90 (!) 115 20 98.5 °F (36.9 °C) 99 %      MAP       --         Physical Exam    Nursing note and vitals reviewed.  Constitutional: She appears well-developed and well-nourished. She is not diaphoretic. No distress.   HENT:   Head: Normocephalic and atraumatic.   Eyes: EOM are normal. Pupils are equal, round, and reactive to light.   Neck:   Normal range of motion.  Cardiovascular:  Normal rate, regular rhythm, normal heart sounds and intact distal pulses.     Exam reveals no gallop and no friction rub.       No murmur heard.  1+ pitting edema bilateral lower legs.  There is some calf tenderness bilaterally but no significant swelling or warmth.    Strong 2+ bilateral radial and pedal pulses   Pulmonary/Chest: Breath sounds normal. No respiratory distress. She has no wheezes. She has no rhonchi. She has no rales. She exhibits tenderness.   Abdominal: Abdomen is soft. Bowel sounds are normal. She exhibits no distension and no mass. There is no abdominal tenderness. There is no rebound and no guarding.   Musculoskeletal:         General: Normal range of motion.      Cervical back: Normal range of motion.     Neurological: She is alert and oriented to person, place, and time. She has normal strength. No sensory deficit.   Skin: Skin is warm and dry.   Psychiatric: She has a normal mood and affect.       ED Course   Procedures  Labs Reviewed   CBC W/ AUTO DIFFERENTIAL - Abnormal; Notable for the following components:       Result Value    MCHC 31.3 (*)     RDW 15.8 (*)     All other components within normal limits   COMPREHENSIVE METABOLIC PANEL - Abnormal; Notable for the following components:    Total Protein 9.3 (*)     All other components within normal limits   D DIMER, QUANTITATIVE - Abnormal; Notable for the following components:    D-Dimer 1.37 (*)     All other components within normal limits   TROPONIN I   B-TYPE NATRIURETIC PEPTIDE   SARS-COV-2 RNA AMPLIFICATION, QUAL   TROPONIN ISTAT   TROPONIN  I   TROPONIN I   POCT TROPONIN   POCT TROPONIN     EKG Readings: (Independently Interpreted)   Initial Reading: No STEMI. Previous EKG: Compared with most recent EKG Previous EKG Date: 5/11/2022. Rhythm: Normal Sinus Rhythm. Heart Rate: 100. Ectopy: No Ectopy. Conduction: RBBB. ST Segments: Normal ST Segments. Clinical Impression: with RBBB   ECG Results              EKG 12-lead (Final result)  Result time 04/06/23 13:27:02      Final result by Interface, Lab In [facility] (04/06/23 13:27:02)                   Narrative:    Test Reason : R07.89,    Vent. Rate : 100 BPM     Atrial Rate : 100 BPM     P-R Int : 132 ms          QRS Dur : 114 ms      QT Int : 392 ms       P-R-T Axes : 055 086 029 degrees     QTc Int : 505 ms    Normal sinus rhythm  Possible Left atrial enlargement  Right bundle branch block  Abnormal ECG  When compared with ECG of 11-MAY-2022 09:35,  T wave inversion more evident in Anterior leads  Confirmed by TERRA ARAGON MD ([id number]) on 4/6/2023 1:26:49 PM    Referred By:             Confirmed By:TERRA ARAGON MD                                  Imaging Results              CTA Chest Non-Coronary (PE Studies) (Final result)  Result time 04/06/23 18:06:31      Final result by Randy Mcdonald MD (04/06/23 18:06:31)                   Impression:      No evidence of pulmonary thromboembolism.    Post-radiation changes in the left breast and left upper lobe.    Electronically signed by resident: Chung Raphael  Date:    04/06/2023  Time:    17:50    Electronically signed by: Randy Mcdonald MD  Date:    04/06/2023  Time:    18:06               Narrative:    EXAMINATION:  CTA CHEST NON CORONARY (PE STUDIES)    CLINICAL HISTORY:  Pulmonary embolism (PE) suspected, positive D-dimer;    TECHNIQUE:  During intravenous bolus injection of 75 ml of Omnipaque 350 contrast medium and using low dose technique, the chest was surveyed from above the pulmonary apices through the posterior costophrenic angles.  Data was  reconstructed for multiplanar images in axial, sagittal and coronal planes and for maximal intensity projection images in the axial plane.    COMPARISON:  Screening mammogram 03/01/2023; CTA chest 08/25/2021    FINDINGS:  Base of Neck: No significant abnormality.    Aorta: Left-sided aortic arch with 2 arterial branches noting common origin of the brachiocephalic and left common carotid arteries.  The aorta maintains normal caliber, contour, and course. There is no calcification of the thoracic aorta.  There is  mild multi-vessel coronary artery calcification.    Heart/pericardium: Normal size.  No pericardial effusion or calcification.    Pulmonary arteries: Pulmonary arteries distribute normally.  Pulmonary arteries are brightly opacified and sufficiently opacified for diagnostic assessment to the level of the subsegmental pulmonary arteries.  There is no pulmonary thromboembolism or other filling defect.    Pulmonary veins, left atrium: There are four pulmonary veins that return to the left atrium.    Sharri/Mediastinum: No pathologic darrell enlargement.    Esophagus: Normal.    Upper Abdomen: No abnormality of the partially imaged upper abdomen.    Thoracic soft tissues: Postoperative changes from prior lumpectomy in the left breast.  Diffuse trabecular and skin thickening throughout the left breast, as seen on recent screening mammogram likely related to post radiation change..    Bones: No acute fracture. No suspicious lytic or sclerotic lesion.  Patent sternal foramen in the lower sternal body, a benign anatomical variant.    Airways: Patent.    Lungs/Pleura: Biapical pleuroparenchymal scarring, similar to prior.  Right lower lobe bandlike opacity which is unchanged from 2021 likely representing subsegmental atelectasis or scarring.  There is subpleural bandlike consolidation in the lingula which is favored to represent post-radiation changes.  Right lower lobe 2 mm calcified granuloma.  No pleural fluid.No  pleural calcification.  No pneumothorax.                                       Medications   sodium chloride 0.9% flush 10 mL (has no administration in time range)   melatonin tablet 6 mg (has no administration in time range)   enoxaparin injection 40 mg (has no administration in time range)   ondansetron disintegrating tablet 8 mg (has no administration in time range)   polyethylene glycol packet 17 g (has no administration in time range)   bisacodyL suppository 10 mg (has no administration in time range)   acetaminophen tablet 650 mg (has no administration in time range)   glucagon (human recombinant) injection 1 mg (has no administration in time range)   dextrose 10% bolus 125 mL 125 mL (has no administration in time range)   dextrose 10% bolus 250 mL 250 mL (has no administration in time range)   dextrose 40 % gel 15,000 mg (has no administration in time range)   dextrose 40 % gel 30,000 mg (has no administration in time range)   ondansetron injection 4 mg (has no administration in time range)   aspirin tablet 325 mg (325 mg Oral Given 4/6/23 1351)   acetaminophen tablet 1,000 mg (1,000 mg Oral Given 4/6/23 1423)   iohexoL (OMNIPAQUE 350) injection 75 mL (75 mLs Intravenous Given 4/6/23 1315)     Medical Decision Making:   History:   Old Medical Records: I decided to obtain old medical records.  Old Records Summarized: records from clinic visits.       <> Summary of Records: No recent ED visits or admissions.  Followed in oncology clinic regarding breast lymphedema s/p lumpectomy  Initial Assessment:   63-year-old female presenting for chest pain.  She is hypertensive 186/90 and tachycardic with heart rate of 115.  Otherwise normal vitals.  She appears uncomfortable but nontoxic.  Differential Diagnosis:   Initial concern for ACS versus pulmonary embolism   Chest muscle strain  Doubt infectious cause such as pneumonia  Doubt GERD  Independently Interpreted Test(s):   I have ordered and independently interpreted  EKG Reading(s) - see prior notes  Clinical Tests:   Lab Tests: Ordered and Reviewed  Radiological Study: Ordered and Reviewed  Medical Tests: Ordered and Reviewed  ED Management:  Will check labs, EKG give aspirin, nitro and reassess.    Pain relieved with nitro x3.  D-dimer is elevated.  CTA obtained but negative for PE.  Will admit to hospital medicine for ACS rule out.  Patient comfortable with admission.  I discussed this patient with supervising physician who also evaluated this patient.  Additional MDM:     Well's Criteria Score:  -Clinical symptoms of DVT (leg swelling, pain with palpation) = 0.0  -Other diagnosis less likely than pulmonary embolism =            0.0  -Heart Rate >100 =   1.5  -Immobilization (= or > than 3 days) or surgery in the previous 4 weeks = 0.0  -Previous DVT/PE = 1.5  -Hemoptysis =          0.0  -Malignancy =           1.0  Well's Probability Score =    4      Heart Score:    History:          Moderately suspicious.  ECG:             Nonspecific repolarisation disturbance  Age:               45-65 years  Risk factors: 1-2 risk factors  Troponin:       Less than or equal to normal limit  Final Score: 4          ED Course as of 04/06/23 2014   Thu Apr 06, 2023   1402 Hemoglobin: 12.3 [CC]   1408 D-Dimer(!): 1.37 [CC]   1421 Chest pain improved from 9->7 with initial dose nitro.  Will give additional dose, Tylenol headache.  Anticipate admission [CC]   1425 Troponin I: <0.006 [CC]   1543 Chest pain relieved with therapy [CC]      ED Course User Index  [CC] Mary Alonzo PA-C                 Clinical Impression:   Final diagnoses:  [R07.89] Chest discomfort  [R07.9] Chest pain, unspecified type (Primary)        ED Disposition Condition    Observation Stable                Mary Alonzo PA-C  04/06/23 2014

## 2023-04-07 VITALS
HEIGHT: 64 IN | OXYGEN SATURATION: 97 % | SYSTOLIC BLOOD PRESSURE: 136 MMHG | DIASTOLIC BLOOD PRESSURE: 72 MMHG | TEMPERATURE: 99 F | RESPIRATION RATE: 17 BRPM | WEIGHT: 165 LBS | BODY MASS INDEX: 28.17 KG/M2 | HEART RATE: 67 BPM

## 2023-04-07 LAB
ANION GAP SERPL CALC-SCNC: 11 MMOL/L (ref 8–16)
ASCENDING AORTA: 3.21 CM
AV INDEX (PROSTH): 0.8
AV MEAN GRADIENT: 3 MMHG
AV PEAK GRADIENT: 6 MMHG
AV VALVE AREA: 2.43 CM2
AV VELOCITY RATIO: 0.68
BASOPHILS # BLD AUTO: 0.05 K/UL (ref 0–0.2)
BASOPHILS NFR BLD: 0.9 % (ref 0–1.9)
BSA FOR ECHO PROCEDURE: 1.84 M2
BUN SERPL-MCNC: 16 MG/DL (ref 8–23)
CALCIUM SERPL-MCNC: 10.2 MG/DL (ref 8.7–10.5)
CHLORIDE SERPL-SCNC: 108 MMOL/L (ref 95–110)
CHOLEST SERPL-MCNC: 160 MG/DL (ref 120–199)
CHOLEST/HDLC SERPL: 3 {RATIO} (ref 2–5)
CO2 SERPL-SCNC: 22 MMOL/L (ref 23–29)
CREAT SERPL-MCNC: 0.8 MG/DL (ref 0.5–1.4)
CV ECHO LV RWT: 0.38 CM
DIFFERENTIAL METHOD: ABNORMAL
DOP CALC AO PEAK VEL: 1.2 M/S
DOP CALC AO VTI: 16.49 CM
DOP CALC LVOT AREA: 3 CM2
DOP CALC LVOT DIAMETER: 1.97 CM
DOP CALC LVOT PEAK VEL: 0.81 M/S
DOP CALC LVOT STROKE VOLUME: 40.06 CM3
DOP CALCLVOT PEAK VEL VTI: 13.15 CM
E WAVE DECELERATION TIME: 196.51 MSEC
E/A RATIO: 0.59
E/E' RATIO: 8.53 M/S
ECHO LV POSTERIOR WALL: 0.75 CM (ref 0.6–1.1)
EJECTION FRACTION: 65 %
EOSINOPHIL # BLD AUTO: 0.2 K/UL (ref 0–0.5)
EOSINOPHIL NFR BLD: 3.5 % (ref 0–8)
ERYTHROCYTE [DISTWIDTH] IN BLOOD BY AUTOMATED COUNT: 15.8 % (ref 11.5–14.5)
EST. GFR  (NO RACE VARIABLE): >60 ML/MIN/1.73 M^2
ESTIMATED AVG GLUCOSE: 88 MG/DL (ref 68–131)
FRACTIONAL SHORTENING: 42 % (ref 28–44)
GLUCOSE SERPL-MCNC: 91 MG/DL (ref 70–110)
HBA1C MFR BLD: 4.7 % (ref 4–5.6)
HCT VFR BLD AUTO: 35.3 % (ref 37–48.5)
HDLC SERPL-MCNC: 54 MG/DL (ref 40–75)
HDLC SERPL: 33.8 % (ref 20–50)
HGB BLD-MCNC: 11 G/DL (ref 12–16)
IMM GRANULOCYTES # BLD AUTO: 0.01 K/UL (ref 0–0.04)
IMM GRANULOCYTES NFR BLD AUTO: 0.2 % (ref 0–0.5)
INTERVENTRICULAR SEPTUM: 1.2 CM (ref 0.6–1.1)
IVRT: 102.76 MSEC
LA MAJOR: 5.03 CM
LA MINOR: 4.99 CM
LA WIDTH: 3.96 CM
LDLC SERPL CALC-MCNC: 96.2 MG/DL (ref 63–159)
LEFT ATRIUM SIZE: 3.54 CM
LEFT ATRIUM VOLUME INDEX MOD: 18.1 ML/M2
LEFT ATRIUM VOLUME INDEX: 33.2 ML/M2
LEFT ATRIUM VOLUME MOD: 32.62 CM3
LEFT ATRIUM VOLUME: 59.7 CM3
LEFT INTERNAL DIMENSION IN SYSTOLE: 2.3 CM (ref 2.1–4)
LEFT VENTRICLE DIASTOLIC VOLUME INDEX: 37.53 ML/M2
LEFT VENTRICLE DIASTOLIC VOLUME: 67.55 ML
LEFT VENTRICLE MASS INDEX: 67 G/M2
LEFT VENTRICLE SYSTOLIC VOLUME INDEX: 10.1 ML/M2
LEFT VENTRICLE SYSTOLIC VOLUME: 18.19 ML
LEFT VENTRICULAR INTERNAL DIMENSION IN DIASTOLE: 3.94 CM (ref 3.5–6)
LEFT VENTRICULAR MASS: 119.72 G
LV LATERAL E/E' RATIO: 7.11 M/S
LV SEPTAL E/E' RATIO: 10.67 M/S
LYMPHOCYTES # BLD AUTO: 1.4 K/UL (ref 1–4.8)
LYMPHOCYTES NFR BLD: 26.7 % (ref 18–48)
MAGNESIUM SERPL-MCNC: 2.2 MG/DL (ref 1.6–2.6)
MCH RBC QN AUTO: 28.6 PG (ref 27–31)
MCHC RBC AUTO-ENTMCNC: 31.2 G/DL (ref 32–36)
MCV RBC AUTO: 92 FL (ref 82–98)
MONOCYTES # BLD AUTO: 0.6 K/UL (ref 0.3–1)
MONOCYTES NFR BLD: 11 % (ref 4–15)
MV PEAK A VEL: 1.08 M/S
MV PEAK E VEL: 0.64 M/S
MV STENOSIS PRESSURE HALF TIME: 56.99 MS
MV VALVE AREA P 1/2 METHOD: 3.86 CM2
NEUTROPHILS # BLD AUTO: 3.1 K/UL (ref 1.8–7.7)
NEUTROPHILS NFR BLD: 57.7 % (ref 38–73)
NONHDLC SERPL-MCNC: 106 MG/DL
NRBC BLD-RTO: 0 /100 WBC
PHOSPHATE SERPL-MCNC: 3.8 MG/DL (ref 2.7–4.5)
PLATELET # BLD AUTO: 206 K/UL (ref 150–450)
PMV BLD AUTO: 11 FL (ref 9.2–12.9)
POTASSIUM SERPL-SCNC: 4 MMOL/L (ref 3.5–5.1)
RA MAJOR: 3.84 CM
RA PRESSURE: 3 MMHG
RA WIDTH: 3.2 CM
RBC # BLD AUTO: 3.85 M/UL (ref 4–5.4)
RV TISSUE DOPPLER FREE WALL SYSTOLIC VELOCITY 1 (APICAL 4 CHAMBER VIEW): 10.59 CM/S
SINUS: 2.97 CM
SODIUM SERPL-SCNC: 141 MMOL/L (ref 136–145)
STJ: 2.46 CM
TDI LATERAL: 0.09 M/S
TDI SEPTAL: 0.06 M/S
TDI: 0.08 M/S
TRICUSPID ANNULAR PLANE SYSTOLIC EXCURSION: 1.98 CM
TRIGL SERPL-MCNC: 49 MG/DL (ref 30–150)
TROPONIN I SERPL DL<=0.01 NG/ML-MCNC: <0.006 NG/ML (ref 0–0.03)
TSH SERPL DL<=0.005 MIU/L-ACNC: 1.53 UIU/ML (ref 0.4–4)
WBC # BLD AUTO: 5.36 K/UL (ref 3.9–12.7)

## 2023-04-07 PROCEDURE — 80061 LIPID PANEL: CPT | Performed by: PHYSICIAN ASSISTANT

## 2023-04-07 PROCEDURE — G0378 HOSPITAL OBSERVATION PER HR: HCPCS

## 2023-04-07 PROCEDURE — 85025 COMPLETE CBC W/AUTO DIFF WBC: CPT | Performed by: PHYSICIAN ASSISTANT

## 2023-04-07 PROCEDURE — 84443 ASSAY THYROID STIM HORMONE: CPT | Performed by: PHYSICIAN ASSISTANT

## 2023-04-07 PROCEDURE — 25500020 PHARM REV CODE 255: Performed by: INTERNAL MEDICINE

## 2023-04-07 PROCEDURE — 25000003 PHARM REV CODE 250: Performed by: PHYSICIAN ASSISTANT

## 2023-04-07 PROCEDURE — 84100 ASSAY OF PHOSPHORUS: CPT | Performed by: PHYSICIAN ASSISTANT

## 2023-04-07 PROCEDURE — 83036 HEMOGLOBIN GLYCOSYLATED A1C: CPT | Performed by: PHYSICIAN ASSISTANT

## 2023-04-07 PROCEDURE — 99239 HOSP IP/OBS DSCHRG MGMT >30: CPT | Mod: ,,,

## 2023-04-07 PROCEDURE — 80048 BASIC METABOLIC PNL TOTAL CA: CPT | Performed by: PHYSICIAN ASSISTANT

## 2023-04-07 PROCEDURE — 83735 ASSAY OF MAGNESIUM: CPT | Performed by: PHYSICIAN ASSISTANT

## 2023-04-07 PROCEDURE — 36415 COLL VENOUS BLD VENIPUNCTURE: CPT | Performed by: PHYSICIAN ASSISTANT

## 2023-04-07 PROCEDURE — 99239 PR HOSPITAL DISCHARGE DAY,>30 MIN: ICD-10-PCS | Mod: ,,,

## 2023-04-07 RX ORDER — AMLODIPINE BESYLATE 10 MG/1
10 TABLET ORAL DAILY
Status: DISCONTINUED | OUTPATIENT
Start: 2023-04-07 | End: 2023-04-07 | Stop reason: HOSPADM

## 2023-04-07 RX ORDER — LISINOPRIL 20 MG/1
40 TABLET ORAL DAILY
Status: DISCONTINUED | OUTPATIENT
Start: 2023-04-07 | End: 2023-04-07 | Stop reason: HOSPADM

## 2023-04-07 RX ORDER — HYDROCHLOROTHIAZIDE 12.5 MG/1
12.5 TABLET ORAL DAILY
Status: DISCONTINUED | OUTPATIENT
Start: 2023-04-07 | End: 2023-04-07

## 2023-04-07 RX ORDER — HYDROCHLOROTHIAZIDE 12.5 MG/1
12.5 TABLET ORAL DAILY
Qty: 30 TABLET | Refills: 0 | Status: SHIPPED | OUTPATIENT
Start: 2023-04-07 | End: 2023-05-16

## 2023-04-07 RX ORDER — HYDROCHLOROTHIAZIDE 12.5 MG/1
12.5 TABLET ORAL DAILY
Qty: 30 TABLET | Refills: 0 | Status: SHIPPED | OUTPATIENT
Start: 2023-04-07 | End: 2023-04-07 | Stop reason: SDUPTHER

## 2023-04-07 RX ORDER — HYDROCHLOROTHIAZIDE 12.5 MG/1
12.5 TABLET ORAL DAILY
Status: DISCONTINUED | OUTPATIENT
Start: 2023-04-08 | End: 2023-04-07 | Stop reason: HOSPADM

## 2023-04-07 RX ADMIN — HYDROCHLOROTHIAZIDE 12.5 MG: 12.5 TABLET ORAL at 01:04

## 2023-04-07 RX ADMIN — HUMAN ALBUMIN MICROSPHERES AND PERFLUTREN 0.11 MG: 10; .22 INJECTION, SOLUTION INTRAVENOUS at 01:04

## 2023-04-07 RX ADMIN — AMLODIPINE BESYLATE 10 MG: 10 TABLET ORAL at 08:04

## 2023-04-07 RX ADMIN — LISINOPRIL 40 MG: 20 TABLET ORAL at 08:04

## 2023-04-07 RX ADMIN — ACETAMINOPHEN 650 MG: 325 TABLET ORAL at 01:04

## 2023-04-07 NOTE — H&P
"[facility] - Emergency Dept  [facility] Medicine  History & Physical    Patient Name: Erma Linda  MRN: [id number]  Patient Class: OP- Observation  Admission Date: 4/6/2023  Attending Physician: Coleman Napier MD   Primary Care Provider: Marry Howard MD         Patient information was obtained from ER records.     Subjective:     Principal Problem:Chest pain, rule out acute myocardial infarction    Chief Complaint:   Chief Complaint   Patient presents with    Chest Pain     Left anterior chest tightness x several weeks that is "sharp" associated with left arm/leg tingling (x this morning around 9) left ankle swelling, head tightness, and generalized weakness. No cardiac hx, stroke        HPI: Erma Linda is a 63 y.o. female with a PMHx of breast cancer s/p partial mastectomy and radiation, HTN who presents to [facility] for evaluation of chest pain. Patient reports acute onset left sided "sharp, stabbing" chest pain began while sitting down at work typing earlier today. Pain worsened with deep inspiration and exertion. She notes feeling generalized weakness upon wakening this morning. She had an episode of shortness of breath while on the phone with her  earlier today. Endorses associated tingling in her left arm and left foot and bilateral lower extremity swelling. Reports 1 similar episode many years ago without clear etiology. She had a stress test done in 2016 which was negative for ischemia. Her mother passed away of an MI at age 38.  No personal cardiac history.  Nonsmoker. No history of DVT/PE, no recent surgeries or immobilization. Currently chest pain free after given NTG x3. Denies fever, chills, cough, LE pain, N/V, abdominal pain, HA, vision changes or syncope. Of note, patient has been out of her HCTZ for a few days.     ED: hypertensive to /90 and tachycardic to . No leukocytosis or electrolyte abnormalities. Trop <0.0065, BNP 86, D-dimer 1.37. EKG with worsening TWIs in " anterior leads compared to prior. CTA negative for PE.       Past Medical History:   Diagnosis Date    Arthritis     Cancer     breast    History of hepatitis C; S/p RX with SVR 12 documented 2018 3/22/2018    Hypertension     Nuclear sclerosis of both eyes 2021       Past Surgical History:   Procedure Laterality Date    BREAST BIOPSY Left 2022    Procedure: BIOPSY, BREAST / excisional biopsy left;  Surgeon: CHRISTI Delong MD;  Location: [facility];  Service: General;  Laterality: Left;    BREAST MASS EXCISION Left 2022    Procedure: RE- EXCISION,  BREAST;  Surgeon: CHRISTI Delong MD;  Location: [facility];  Service: General;  Laterality: Left;    BREAST SURGERY  2022    lumpectomy     SECTION, CLASSIC      INJECTION FOR SENTINEL NODE IDENTIFICATION Left 2022    Procedure: INJECTION, FOR SENTINEL NODE IDENTIFICATION;  Surgeon: CHRISTI Delong MD;  Location: [facility];  Service: General;  Laterality: Left;    KNEE ARTHROSCOPY W/ LASER  2000    R    MASTECTOMY, PARTIAL Left 2022    Procedure: MASTECTOMY, PARTIAL LEFT with radiological marker;  Surgeon: CHRISTI Delong MD;  Location: [facility];  Service: General;  Laterality: Left;  EMAIL SENT  @ 10:20 PER LK    SENTINEL LYMPH NODE BIOPSY Left 2022    Procedure: BIOPSY, LYMPH NODE, SENTINEL;  Surgeon: CHRISTI Delong MD;  Location: [facility];  Service: General;  Laterality: Left;    TUBAL LIGATION         Review of patient's allergies indicates:   Allergen Reactions    Hydralazine analogues Palpitations    Compazine [prochlorperazine] Other (See Comments)     The patient stated, she didn't like how Compazine, made her feel.       Current Facility-Administered Medications on File Prior to Encounter   Medication    0.9%  NaCl infusion     Current Outpatient Medications on File Prior to Encounter   Medication Sig    acetaminophen (TYLENOL) 500 MG tablet Take 500 mg by mouth daily as needed for Pain.     amLODIPine (NORVASC) 10 MG tablet Take 1 tablet (10 mg total) by mouth once daily.    cyanocobalamin (VITAMIN B-12) 100 MCG tablet Take 500 mcg by mouth once daily.    ibuprofen (ADVIL,MOTRIN) 800 MG tablet Take 1 tablet (800 mg total) by mouth every 6 (six) hours as needed for Pain.    lisinopriL (PRINIVIL,ZESTRIL) 40 MG tablet Take 1 tablet (40 mg total) by mouth once daily.    MAGNESIUM ORAL Take 1 tablet by mouth once daily.    multivitamin (THERAGRAN) per tablet Take 1 tablet by mouth once daily.    omega-3 fatty acids/fish oil (FISH OIL-OMEGA-3 FATTY ACIDS) 300-1,000 mg capsule Take 1 capsule by mouth once daily.    vitamin D (VITAMIN D3) 1000 units Tab Take 1,000 Units by mouth once daily.    hydroCHLOROthiazide (HYDRODIURIL) 12.5 MG Tab Take 1 tablet (12.5 mg total) by mouth once daily.     Family History       Problem Relation (Age of Onset)    Heart disease Mother    No Known Problems Father, Sister, Brother, Maternal Aunt, Maternal Uncle, Paternal Aunt, Paternal Uncle, Maternal Grandmother, Maternal Grandfather, Paternal Grandmother, Paternal Grandfather          Tobacco Use    Smoking status: Never    Smokeless tobacco: Never   Substance and Sexual Activity    Alcohol use: No    Drug use: No    Sexual activity: Not Currently     Partners: Male     Birth control/protection: Post-menopausal     Comment: 4/7/17  with same partner 35 years      Review of Systems   Constitutional:  Negative for activity change, chills, fatigue and fever.   HENT:  Negative for congestion, trouble swallowing and voice change.    Eyes:  Negative for photophobia and visual disturbance.   Respiratory:  Positive for shortness of breath. Negative for cough, chest tightness and wheezing.    Cardiovascular:  Positive for chest pain. Negative for palpitations and leg swelling.   Gastrointestinal:  Negative for abdominal distention, abdominal pain, constipation, diarrhea, nausea and vomiting.   Endocrine: Negative  for polyphagia and polyuria.   Genitourinary:  Negative for difficulty urinating, dysuria and flank pain.   Musculoskeletal:  Negative for arthralgias, back pain and gait problem.   Skin:  Negative for color change and wound.   Neurological:  Negative for dizziness, speech difficulty, weakness, light-headedness, numbness and headaches.   Psychiatric/Behavioral:  Negative for confusion, decreased concentration and dysphoric mood.    Objective:     Vital Signs (Most Recent):  Temp: 98 °F (36.7 °C) (04/06/23 2358)  Pulse: 77 (04/06/23 2358)  Resp: 18 (04/06/23 2358)  BP: (!) 183/87 (04/06/23 2358)  SpO2: 96 % (04/06/23 2358)   Vital Signs (24h Range):  Temp:  [97.5 °F (36.4 °C)-98.5 °F (36.9 °C)] 98 °F (36.7 °C)  Pulse:  [] 77  Resp:  [18-20] 18  SpO2:  [96 %-99 %] 96 %  BP: (133-186)/(67-90) 183/87        There is no height or weight on file to calculate BMI.    Physical Exam  Vitals and nursing note reviewed.   Constitutional:       General: She is not in acute distress.     Appearance: She is well-developed.   HENT:      Head: Normocephalic and atraumatic.      Mouth/Throat:      Pharynx: No oropharyngeal exudate.   Eyes:      General: No scleral icterus.     Extraocular Movements: Extraocular movements intact.      Conjunctiva/sclera: Conjunctivae normal.   Cardiovascular:      Rate and Rhythm: Normal rate and regular rhythm.      Heart sounds: Normal heart sounds.   Pulmonary:      Effort: Pulmonary effort is normal. No respiratory distress.      Breath sounds: Normal breath sounds. No wheezing.   Abdominal:      General: Bowel sounds are normal. There is no distension.      Palpations: Abdomen is soft.      Tenderness: There is no abdominal tenderness.   Musculoskeletal:         General: No tenderness. Normal range of motion.      Cervical back: Normal range of motion and neck supple.      Right lower leg: Edema present.      Left lower leg: Edema present.   Lymphadenopathy:      Cervical: No cervical  adenopathy.   Skin:     General: Skin is warm and dry.      Capillary Refill: Capillary refill takes less than 2 seconds.      Findings: No rash.   Neurological:      Mental Status: She is alert and oriented to person, place, and time.      Cranial Nerves: No cranial nerve deficit.      Sensory: No sensory deficit.      Coordination: Coordination normal.   Psychiatric:         Behavior: Behavior normal.         Thought Content: Thought content normal.         Judgment: Judgment normal.           Significant Labs: All pertinent labs within the past 24 hours have been reviewed.  CBC:   Recent Labs   Lab 04/06/23  1335   WBC 6.34   HGB 12.3   HCT 39.3        CMP:   Recent Labs   Lab 04/06/23  1335      K 3.7      CO2 23   GLU 80   BUN 15   CREATININE 0.8   CALCIUM 10.3   PROT 9.3*   ALBUMIN 3.9   BILITOT 0.4   ALKPHOS 114   AST 19   ALT 11   ANIONGAP 11       Significant Imaging: I have reviewed all pertinent imaging results/findings within the past 24 hours.  CTA Chest Non-Coronary (PE Studies)  Narrative: EXAMINATION:  CTA CHEST NON CORONARY (PE STUDIES)    CLINICAL HISTORY:  Pulmonary embolism (PE) suspected, positive D-dimer;    TECHNIQUE:  During intravenous bolus injection of 75 ml of Omnipaque 350 contrast medium and using low dose technique, the chest was surveyed from above the pulmonary apices through the posterior costophrenic angles.  Data was reconstructed for multiplanar images in axial, sagittal and coronal planes and for maximal intensity projection images in the axial plane.    COMPARISON:  Screening mammogram 03/01/2023; CTA chest 08/25/2021    FINDINGS:  Base of Neck: No significant abnormality.    Aorta: Left-sided aortic arch with 2 arterial branches noting common origin of the brachiocephalic and left common carotid arteries.  The aorta maintains normal caliber, contour, and course. There is no calcification of the thoracic aorta.  There is  mild multi-vessel coronary artery  calcification.    Heart/pericardium: Normal size.  No pericardial effusion or calcification.    Pulmonary arteries: Pulmonary arteries distribute normally.  Pulmonary arteries are brightly opacified and sufficiently opacified for diagnostic assessment to the level of the subsegmental pulmonary arteries.  There is no pulmonary thromboembolism or other filling defect.    Pulmonary veins, left atrium: There are four pulmonary veins that return to the left atrium.    Sharri/Mediastinum: No pathologic darrell enlargement.    Esophagus: Normal.    Upper Abdomen: No abnormality of the partially imaged upper abdomen.    Thoracic soft tissues: Postoperative changes from prior lumpectomy in the left breast.  Diffuse trabecular and skin thickening throughout the left breast, as seen on recent screening mammogram likely related to post radiation change..    Bones: No acute fracture. No suspicious lytic or sclerotic lesion.  Patent sternal foramen in the lower sternal body, a benign anatomical variant.    Airways: Patent.    Lungs/Pleura: Biapical pleuroparenchymal scarring, similar to prior.  Right lower lobe bandlike opacity which is unchanged from 2021 likely representing subsegmental atelectasis or scarring.  There is subpleural bandlike consolidation in the lingula which is favored to represent post-radiation changes.  Right lower lobe 2 mm calcified granuloma.  No pleural fluid.No pleural calcification.  No pneumothorax.  Impression: No evidence of pulmonary thromboembolism.    Post-radiation changes in the left breast and left upper lobe.    Electronically signed by resident: Chung Raphael  Date:    04/06/2023  Time:    17:50    Electronically signed by: Randy Mcdonald MD  Date:    04/06/2023  Time:    18:06        Assessment/Plan:     * Chest pain, rule out acute myocardial infarction  - suspect from uncontrolled BP as patient has been out of her HCTZ at home, however does have some typical CP features concerning for ACS  -  EKG with increased TWIs in anterior leads  - CTA negative for PE  - trop negative x2, will trend  - TTE ordered  - BP control as below  - lipid panel, A1c, TSH in AM  - PRN nitro  - monitor on tele   - consider IP stress testing pending clinical course    Essential hypertension, benign  - uncontrolled in the setting of missed meds  - continue amlodipine 10mg and lisinopril 40mg daily   - resume home HCTX 12.5mg daily-- needs refill on DC    Malignant neoplasm of upper-outer quadrant of left breast in female, estrogen receptor positive  - s/p partial mastectomy and radiation  - no acute issues    VTE Risk Mitigation (From admission, onward)         Ordered     enoxaparin injection 40 mg  Daily         04/06/23 1904     IP VTE HIGH RISK PATIENT  Once         04/06/23 1904     Place sequential compression device  Until discontinued         04/06/23 1904                     On 04/07/2023, patient should be placed in hospital observation services under my care in collaboration with Dr. Randy Snow.      Karyn Koo PA-C  Department of Hospital Medicine  [facility] - Emergency Dept

## 2023-04-07 NOTE — PLAN OF CARE
[facility] - Observation 11H  Discharge Final Note    Primary Care Provider: Marry Howard MD    Expected Discharge Date: 4/7/2023    Final Discharge Note (most recent)       Final Note - 04/07/23 1436          Final Note    Assessment Type Final Discharge Note (P)      Anticipated Discharge Disposition Home or Self Care (P)      What phone number can be called within the next 1-3 days to see how you are doing after discharge? [phone] (P)      Hospital Resources/Appts/Education Provided Provided patient/caregiver with written discharge plan information (P)         Post-Acute Status    Discharge Delays None known at this time (P)                    Pt discharging home. Pt has an ambulatory referral. Pt discharging on a holiday, no offices are open. Pt has no other post acute needs and is cleared for discharge from a case management standpoint.     REGI Malik, LMSW [facility]  [id number]

## 2023-04-07 NOTE — NURSING
AVS and discharge instructions given. No questions asked. IV and tele box removed. Pt states she will ambulate to her car.

## 2023-04-07 NOTE — ASSESSMENT & PLAN NOTE
- uncontrolled in the setting of missed meds  - continue amlodipine 10mg and lisinopril 40mg daily   - resume home HCTX 12.5mg daily-- needs refill on DC

## 2023-04-07 NOTE — ASSESSMENT & PLAN NOTE
- suspect from uncontrolled BP as patient has been out of her HCTZ at home, however does have some typical CP features concerning for ACS  - EKG with increased TWIs in anterior leads  - CTA negative for PE  - trop negative x2, will trend  - TTE ordered  - BP control as below  - lipid panel, A1c, TSH in AM  - PRN nitro  - monitor on tele   - consider IP stress testing pending clinical course

## 2023-04-07 NOTE — SUBJECTIVE & OBJECTIVE
Past Medical History:   Diagnosis Date    Arthritis     Cancer     breast    History of hepatitis C; S/p RX with SVR 12 documented 2018 3/22/2018    Hypertension     Nuclear sclerosis of both eyes 2021       Past Surgical History:   Procedure Laterality Date    BREAST BIOPSY Left 2022    Procedure: BIOPSY, BREAST / excisional biopsy left;  Surgeon: CHRISTI Delong MD;  Location: [facility];  Service: General;  Laterality: Left;    BREAST MASS EXCISION Left 2022    Procedure: RE- EXCISION,  BREAST;  Surgeon: CHRISTI Delong MD;  Location: [facility];  Service: General;  Laterality: Left;    BREAST SURGERY  2022    lumpectomy     SECTION, CLASSIC      INJECTION FOR SENTINEL NODE IDENTIFICATION Left 2022    Procedure: INJECTION, FOR SENTINEL NODE IDENTIFICATION;  Surgeon: CHRISTI Delong MD;  Location: [facility];  Service: General;  Laterality: Left;    KNEE ARTHROSCOPY W/ LASER  2000    R    MASTECTOMY, PARTIAL Left 2022    Procedure: MASTECTOMY, PARTIAL LEFT with radiological marker;  Surgeon: CHRISTI Delong MD;  Location: [facility];  Service: General;  Laterality: Left;  EMAIL SENT  @ 10:20 PER LK    SENTINEL LYMPH NODE BIOPSY Left 2022    Procedure: BIOPSY, LYMPH NODE, SENTINEL;  Surgeon: CHRISTI Delong MD;  Location: [facility];  Service: General;  Laterality: Left;    TUBAL LIGATION         Review of patient's allergies indicates:   Allergen Reactions    Hydralazine analogues Palpitations    Compazine [prochlorperazine] Other (See Comments)     The patient stated, she didn't like how Compazine, made her feel.       Current Facility-Administered Medications on File Prior to Encounter   Medication    0.9%  NaCl infusion     Current Outpatient Medications on File Prior to Encounter   Medication Sig    acetaminophen (TYLENOL) 500 MG tablet Take 500 mg by mouth daily as needed for Pain.    amLODIPine (NORVASC) 10 MG tablet Take 1 tablet (10 mg total) by mouth once daily.     cyanocobalamin (VITAMIN B-12) 100 MCG tablet Take 500 mcg by mouth once daily.    ibuprofen (ADVIL,MOTRIN) 800 MG tablet Take 1 tablet (800 mg total) by mouth every 6 (six) hours as needed for Pain.    lisinopriL (PRINIVIL,ZESTRIL) 40 MG tablet Take 1 tablet (40 mg total) by mouth once daily.    MAGNESIUM ORAL Take 1 tablet by mouth once daily.    multivitamin (THERAGRAN) per tablet Take 1 tablet by mouth once daily.    omega-3 fatty acids/fish oil (FISH OIL-OMEGA-3 FATTY ACIDS) 300-1,000 mg capsule Take 1 capsule by mouth once daily.    vitamin D (VITAMIN D3) 1000 units Tab Take 1,000 Units by mouth once daily.    hydroCHLOROthiazide (HYDRODIURIL) 12.5 MG Tab Take 1 tablet (12.5 mg total) by mouth once daily.     Family History       Problem Relation (Age of Onset)    Heart disease Mother    No Known Problems Father, Sister, Brother, Maternal Aunt, Maternal Uncle, Paternal Aunt, Paternal Uncle, Maternal Grandmother, Maternal Grandfather, Paternal Grandmother, Paternal Grandfather          Tobacco Use    Smoking status: Never    Smokeless tobacco: Never   Substance and Sexual Activity    Alcohol use: No    Drug use: No    Sexual activity: Not Currently     Partners: Male     Birth control/protection: Post-menopausal     Comment: 4/7/17  with same partner 35 years      Review of Systems   Constitutional:  Negative for activity change, chills, fatigue and fever.   HENT:  Negative for congestion, trouble swallowing and voice change.    Eyes:  Negative for photophobia and visual disturbance.   Respiratory:  Positive for shortness of breath. Negative for cough, chest tightness and wheezing.    Cardiovascular:  Positive for chest pain. Negative for palpitations and leg swelling.   Gastrointestinal:  Negative for abdominal distention, abdominal pain, constipation, diarrhea, nausea and vomiting.   Endocrine: Negative for polyphagia and polyuria.   Genitourinary:  Negative for difficulty urinating, dysuria and flank  pain.   Musculoskeletal:  Negative for arthralgias, back pain and gait problem.   Skin:  Negative for color change and wound.   Neurological:  Negative for dizziness, speech difficulty, weakness, light-headedness, numbness and headaches.   Psychiatric/Behavioral:  Negative for confusion, decreased concentration and dysphoric mood.    Objective:     Vital Signs (Most Recent):  Temp: 98 °F (36.7 °C) (04/06/23 2358)  Pulse: 77 (04/06/23 2358)  Resp: 18 (04/06/23 2358)  BP: (!) 183/87 (04/06/23 2358)  SpO2: 96 % (04/06/23 2358)   Vital Signs (24h Range):  Temp:  [97.5 °F (36.4 °C)-98.5 °F (36.9 °C)] 98 °F (36.7 °C)  Pulse:  [] 77  Resp:  [18-20] 18  SpO2:  [96 %-99 %] 96 %  BP: (133-186)/(67-90) 183/87        There is no height or weight on file to calculate BMI.    Physical Exam  Vitals and nursing note reviewed.   Constitutional:       General: She is not in acute distress.     Appearance: She is well-developed.   HENT:      Head: Normocephalic and atraumatic.      Mouth/Throat:      Pharynx: No oropharyngeal exudate.   Eyes:      General: No scleral icterus.     Extraocular Movements: Extraocular movements intact.      Conjunctiva/sclera: Conjunctivae normal.   Cardiovascular:      Rate and Rhythm: Normal rate and regular rhythm.      Heart sounds: Normal heart sounds.   Pulmonary:      Effort: Pulmonary effort is normal. No respiratory distress.      Breath sounds: Normal breath sounds. No wheezing.   Abdominal:      General: Bowel sounds are normal. There is no distension.      Palpations: Abdomen is soft.      Tenderness: There is no abdominal tenderness.   Musculoskeletal:         General: No tenderness. Normal range of motion.      Cervical back: Normal range of motion and neck supple.      Right lower leg: Edema present.      Left lower leg: Edema present.   Lymphadenopathy:      Cervical: No cervical adenopathy.   Skin:     General: Skin is warm and dry.      Capillary Refill: Capillary refill takes less  than 2 seconds.      Findings: No rash.   Neurological:      Mental Status: She is alert and oriented to person, place, and time.      Cranial Nerves: No cranial nerve deficit.      Sensory: No sensory deficit.      Coordination: Coordination normal.   Psychiatric:         Behavior: Behavior normal.         Thought Content: Thought content normal.         Judgment: Judgment normal.           Significant Labs: All pertinent labs within the past 24 hours have been reviewed.  CBC:   Recent Labs   Lab 04/06/23  1335   WBC 6.34   HGB 12.3   HCT 39.3        CMP:   Recent Labs   Lab 04/06/23  1335      K 3.7      CO2 23   GLU 80   BUN 15   CREATININE 0.8   CALCIUM 10.3   PROT 9.3*   ALBUMIN 3.9   BILITOT 0.4   ALKPHOS 114   AST 19   ALT 11   ANIONGAP 11       Significant Imaging: I have reviewed all pertinent imaging results/findings within the past 24 hours.  CTA Chest Non-Coronary (PE Studies)  Narrative: EXAMINATION:  CTA CHEST NON CORONARY (PE STUDIES)    CLINICAL HISTORY:  Pulmonary embolism (PE) suspected, positive D-dimer;    TECHNIQUE:  During intravenous bolus injection of 75 ml of Omnipaque 350 contrast medium and using low dose technique, the chest was surveyed from above the pulmonary apices through the posterior costophrenic angles.  Data was reconstructed for multiplanar images in axial, sagittal and coronal planes and for maximal intensity projection images in the axial plane.    COMPARISON:  Screening mammogram 03/01/2023; CTA chest 08/25/2021    FINDINGS:  Base of Neck: No significant abnormality.    Aorta: Left-sided aortic arch with 2 arterial branches noting common origin of the brachiocephalic and left common carotid arteries.  The aorta maintains normal caliber, contour, and course. There is no calcification of the thoracic aorta.  There is  mild multi-vessel coronary artery calcification.    Heart/pericardium: Normal size.  No pericardial effusion or calcification.    Pulmonary  arteries: Pulmonary arteries distribute normally.  Pulmonary arteries are brightly opacified and sufficiently opacified for diagnostic assessment to the level of the subsegmental pulmonary arteries.  There is no pulmonary thromboembolism or other filling defect.    Pulmonary veins, left atrium: There are four pulmonary veins that return to the left atrium.    Sharri/Mediastinum: No pathologic darrell enlargement.    Esophagus: Normal.    Upper Abdomen: No abnormality of the partially imaged upper abdomen.    Thoracic soft tissues: Postoperative changes from prior lumpectomy in the left breast.  Diffuse trabecular and skin thickening throughout the left breast, as seen on recent screening mammogram likely related to post radiation change..    Bones: No acute fracture. No suspicious lytic or sclerotic lesion.  Patent sternal foramen in the lower sternal body, a benign anatomical variant.    Airways: Patent.    Lungs/Pleura: Biapical pleuroparenchymal scarring, similar to prior.  Right lower lobe bandlike opacity which is unchanged from 2021 likely representing subsegmental atelectasis or scarring.  There is subpleural bandlike consolidation in the lingula which is favored to represent post-radiation changes.  Right lower lobe 2 mm calcified granuloma.  No pleural fluid.No pleural calcification.  No pneumothorax.  Impression: No evidence of pulmonary thromboembolism.    Post-radiation changes in the left breast and left upper lobe.    Electronically signed by resident: Chung Raphael  Date:    04/06/2023  Time:    17:50    Electronically signed by: Randy Mcdonald MD  Date:    04/06/2023  Time:    18:06

## 2023-04-07 NOTE — NURSING
Pt sitting up in bed. AAOx4. Respirations are even and unlabored with NAD. IV site is CDI. Saline locked. Tele box intact. Bed locked in the lowest position with side rails up x2. Call bell within reach.

## 2023-04-08 NOTE — HOSPITAL COURSE
Erma Linda is a 63 y.o. female admitted to observation for chest pain. Pain resolved. Workup unremarkable. ECHO with an EF 65% and no WMA. All vitals stable. Stress test and Cardiology follow-up scheduled for outpatient. Patient is medically ready for discharge. All questions answered at bedside with verbal understanding. Return precautions given.

## 2023-04-08 NOTE — DISCHARGE SUMMARY
"[facility] [address] [place] [address] Medicine  Discharge Summary      Patient Name: Erma Linda  MRN: [id number]  BELINDA: [id number]  Patient Class: OP- Observation  Admission Date: 4/6/2023  Hospital Length of Stay: 0 days  Discharge Date and Time: 4/7/2023  3:01 PM  Attending Physician: Leta att. providers found   Discharging Provider: Brenda Harley PA-C  Primary Care Provider: Marry Howard MD  Hospital Medicine Team: [facility] Brenda Harley PA-C  Primary Care Team: [facility]    HPI:   Erma Linda is a 63 y.o. female with a PMHx of breast cancer s/p partial mastectomy and radiation, HTN who [place] [facility] [place]. Patient reports acute onset left sided "sharp, stabbing" chest pain began while sitting down at work typing earlier today. Pain worsened with deep inspiration and exertion. She notes feeling generalized weakness upon wakening this morning. She had an episode of shortness of breath while on the phone with her  earlier today. Endorses associated tingling in her left arm and left foot and bilateral lower extremity swelling. Reports 1 similar episode many years ago without clear etiology. She had a stress test done in 2016 which was negative for ischemia. Her mother passed away of an MI at age 38.  No personal cardiac history.  Nonsmoker. No history of DVT/PE, no recent surgeries or immobilization. Currently chest pain free after given NTG x3. Denies fever, chills, cough, LE pain, N/V, abdominal pain, HA, vision changes or syncope. Of note, patient has been out of her HCTZ for a few days.     ED: hypertensive to /90 and tachycardic to . No leukocytosis or electrolyte abnormalities. Trop <0.0065, BNP 86, D-dimer 1.37. EKG with worsening TWIs in anterior leads compared to prior. CTA negative for PE.       * No surgery found *      Hospital Course:   Erma Linda is a 63 y.o. female admitted to observation for chest pain. Pain resolved. " Workup unremarkable. ECHO with an EF 65% and no WMA. All vitals stable. Stress test and Cardiology follow-up scheduled for outpatient. Patient is medically ready for discharge. All questions answered at bedside with verbal understanding. Return precautions given.        Goals of Care Treatment Preferences:  Code Status: Full Code      Final Active Diagnoses:    Diagnosis Date Noted POA    PRINCIPAL PROBLEM:  Chest pain, rule out acute myocardial infarction [R07.9] 04/06/2023 Yes    Malignant neoplasm of upper-outer quadrant of left breast in female, estrogen receptor positive [C50.412, Z17.0] 03/21/2022 Not Applicable    Essential hypertension, benign [I10] 06/27/2018 Yes      Problems Resolved During this Admission:       Discharged Condition: stable    Disposition: Home or Self Care    Follow Up:    Patient Instructions:      Ambulatory referral/consult to Cardiology   Standing Status: Future   Referral Priority: Routine Referral Type: Consultation   Referral Reason: Specialty Services Required   Requested Specialty: Cardiology   Number of Visits Requested: 1     Notify your health care provider if you experience any of the following:  temperature >100.4     Notify your health care provider if you experience any of the following:  severe uncontrolled pain     Notify your health care provider if you experience any of the following:  persistent dizziness, light-headedness, or visual disturbances     Notify your health care provider if you experience any of the following:  increased confusion or weakness     Nuclear Stress - Cardiology Interpreted   Standing Status: Future Standing Exp. Date: 04/07/24     Order Specific Question Answer Comments   Which stress agent will be used? Exercise    Release to patient Immediate      Activity as tolerated         Pending Diagnostic Studies:     None         Medications:  Reconciled Home Medications:      Medication List      CONTINUE taking these medications     acetaminophen 500 MG tablet  Commonly known as: TYLENOL  Take 500 mg by mouth daily as needed for Pain.     amLODIPine 10 MG tablet  Commonly known as: NORVASC  Take 1 tablet (10 mg total) by mouth once daily.     cyanocobalamin 100 MCG tablet  Commonly known as: VITAMIN B-12  Take 500 mcg by mouth once daily.     fish oil-omega-3 fatty acids 300-1,000 mg capsule  Take 1 capsule by mouth once daily.     hydroCHLOROthiazide 12.5 MG Tab  Commonly known as: HYDRODIURIL  Take 1 tablet (12.5 mg total) by mouth once daily.     ibuprofen 800 MG tablet  Commonly known as: ADVIL,MOTRIN  Take 1 tablet (800 mg total) by mouth every 6 (six) hours as needed for Pain.     lisinopriL 40 MG tablet  Commonly known as: PRINIVIL,ZESTRIL  Take 1 tablet (40 mg total) by mouth once daily.     MAGNESIUM ORAL  Take 1 tablet by mouth once daily.     multivitamin per tablet  Commonly known as: THERAGRAN  Take 1 tablet by mouth once daily.     vitamin D 1000 units Tab  Commonly known as: VITAMIN D3  Take 1,000 Units by mouth once daily.            Indwelling Lines/Drains at time of discharge:   Lines/Drains/Airways     None                 Time spent on the discharge of patient: 36 minutes         Brenda Harley PA-C  Department of Hospital Medicine  [facility] 11H

## 2023-04-13 ENCOUNTER — PATIENT MESSAGE (OUTPATIENT)
Dept: ADMINISTRATIVE | Facility: HOSPITAL | Age: 63
End: 2023-04-13
Payer: COMMERCIAL

## 2023-04-25 ENCOUNTER — OFFICE VISIT (OUTPATIENT)
Dept: RADIATION ONCOLOGY | Facility: CLINIC | Age: 63
End: 2023-04-25
Payer: COMMERCIAL

## 2023-04-25 VITALS
HEIGHT: 64 IN | BODY MASS INDEX: 28.1 KG/M2 | TEMPERATURE: 98 F | WEIGHT: 164.63 LBS | DIASTOLIC BLOOD PRESSURE: 85 MMHG | SYSTOLIC BLOOD PRESSURE: 184 MMHG | OXYGEN SATURATION: 98 % | HEART RATE: 118 BPM | RESPIRATION RATE: 17 BRPM

## 2023-04-25 DIAGNOSIS — C50.412 MALIGNANT NEOPLASM OF UPPER-OUTER QUADRANT OF LEFT BREAST IN FEMALE, ESTROGEN RECEPTOR POSITIVE: Primary | ICD-10-CM

## 2023-04-25 DIAGNOSIS — Z17.0 MALIGNANT NEOPLASM OF UPPER-OUTER QUADRANT OF LEFT BREAST IN FEMALE, ESTROGEN RECEPTOR POSITIVE: Primary | ICD-10-CM

## 2023-04-25 PROCEDURE — 99999 PR PBB SHADOW E&M-EST. PATIENT-LVL IV: CPT | Mod: PBBFAC,,, | Performed by: RADIOLOGY

## 2023-04-25 PROCEDURE — 99213 PR OFFICE/OUTPT VISIT, EST, LEVL III, 20-29 MIN: ICD-10-PCS | Mod: S$GLB,,, | Performed by: RADIOLOGY

## 2023-04-25 PROCEDURE — 99999 PR PBB SHADOW E&M-EST. PATIENT-LVL IV: ICD-10-PCS | Mod: PBBFAC,,, | Performed by: RADIOLOGY

## 2023-04-25 PROCEDURE — 3008F BODY MASS INDEX DOCD: CPT | Mod: CPTII,S$GLB,, | Performed by: RADIOLOGY

## 2023-04-25 PROCEDURE — 4010F PR ACE/ARB THEARPY RXD/TAKEN: ICD-10-PCS | Mod: CPTII,S$GLB,, | Performed by: RADIOLOGY

## 2023-04-25 PROCEDURE — 3008F PR BODY MASS INDEX (BMI) DOCUMENTED: ICD-10-PCS | Mod: CPTII,S$GLB,, | Performed by: RADIOLOGY

## 2023-04-25 PROCEDURE — 3077F SYST BP >= 140 MM HG: CPT | Mod: CPTII,S$GLB,, | Performed by: RADIOLOGY

## 2023-04-25 PROCEDURE — 3044F HG A1C LEVEL LT 7.0%: CPT | Mod: CPTII,S$GLB,, | Performed by: RADIOLOGY

## 2023-04-25 PROCEDURE — 99213 OFFICE O/P EST LOW 20 MIN: CPT | Mod: S$GLB,,, | Performed by: RADIOLOGY

## 2023-04-25 PROCEDURE — 1159F PR MEDICATION LIST DOCUMENTED IN MEDICAL RECORD: ICD-10-PCS | Mod: CPTII,S$GLB,, | Performed by: RADIOLOGY

## 2023-04-25 PROCEDURE — 4010F ACE/ARB THERAPY RXD/TAKEN: CPT | Mod: CPTII,S$GLB,, | Performed by: RADIOLOGY

## 2023-04-25 PROCEDURE — 3079F PR MOST RECENT DIASTOLIC BLOOD PRESSURE 80-89 MM HG: ICD-10-PCS | Mod: CPTII,S$GLB,, | Performed by: RADIOLOGY

## 2023-04-25 PROCEDURE — 1159F MED LIST DOCD IN RCRD: CPT | Mod: CPTII,S$GLB,, | Performed by: RADIOLOGY

## 2023-04-25 PROCEDURE — 3077F PR MOST RECENT SYSTOLIC BLOOD PRESSURE >= 140 MM HG: ICD-10-PCS | Mod: CPTII,S$GLB,, | Performed by: RADIOLOGY

## 2023-04-25 PROCEDURE — 3044F PR MOST RECENT HEMOGLOBIN A1C LEVEL <7.0%: ICD-10-PCS | Mod: CPTII,S$GLB,, | Performed by: RADIOLOGY

## 2023-04-25 PROCEDURE — 3079F DIAST BP 80-89 MM HG: CPT | Mod: CPTII,S$GLB,, | Performed by: RADIOLOGY

## 2023-04-25 NOTE — PATIENT INSTRUCTIONS
Continue physical therapy exercises  Repeat mammogram in March 2024  Continue follow-up Dr. Delong  Return to see me needed

## 2023-07-10 ENCOUNTER — OFFICE VISIT (OUTPATIENT)
Dept: INTERNAL MEDICINE | Facility: CLINIC | Age: 63
End: 2023-07-10
Payer: OTHER GOVERNMENT

## 2023-07-10 VITALS
RESPIRATION RATE: 16 BRPM | WEIGHT: 162.94 LBS | BODY MASS INDEX: 27.82 KG/M2 | SYSTOLIC BLOOD PRESSURE: 130 MMHG | OXYGEN SATURATION: 97 % | DIASTOLIC BLOOD PRESSURE: 78 MMHG | HEART RATE: 98 BPM | TEMPERATURE: 99 F | HEIGHT: 64 IN

## 2023-07-10 DIAGNOSIS — R30.0 DYSURIA: Primary | ICD-10-CM

## 2023-07-10 LAB
BACTERIA #/AREA URNS AUTO: ABNORMAL /HPF
BILIRUB UR QL STRIP: NEGATIVE
CLARITY UR REFRACT.AUTO: CLEAR
COLOR UR AUTO: YELLOW
GLUCOSE UR QL STRIP: NEGATIVE
HGB UR QL STRIP: NEGATIVE
HYALINE CASTS UR QL AUTO: 1 /LPF
KETONES UR QL STRIP: NEGATIVE
LEUKOCYTE ESTERASE UR QL STRIP: ABNORMAL
MICROSCOPIC COMMENT: ABNORMAL
NITRITE UR QL STRIP: NEGATIVE
NON-SQ EPI CELLS #/AREA URNS AUTO: 2 /HPF
PH UR STRIP: 6 [PH] (ref 5–8)
PROT UR QL STRIP: NEGATIVE
RBC #/AREA URNS AUTO: 2 /HPF (ref 0–4)
SP GR UR STRIP: 1.01 (ref 1–1.03)
SQUAMOUS #/AREA URNS AUTO: 0 /HPF
URN SPEC COLLECT METH UR: ABNORMAL
WBC #/AREA URNS AUTO: >100 /HPF (ref 0–5)

## 2023-07-10 PROCEDURE — 81001 URINALYSIS AUTO W/SCOPE: CPT | Performed by: STUDENT IN AN ORGANIZED HEALTH CARE EDUCATION/TRAINING PROGRAM

## 2023-07-10 PROCEDURE — 99213 OFFICE O/P EST LOW 20 MIN: CPT | Mod: S$PBB,,, | Performed by: STUDENT IN AN ORGANIZED HEALTH CARE EDUCATION/TRAINING PROGRAM

## 2023-07-10 PROCEDURE — 87186 SC STD MICRODIL/AGAR DIL: CPT | Performed by: STUDENT IN AN ORGANIZED HEALTH CARE EDUCATION/TRAINING PROGRAM

## 2023-07-10 PROCEDURE — 99214 OFFICE O/P EST MOD 30 MIN: CPT | Mod: PBBFAC | Performed by: STUDENT IN AN ORGANIZED HEALTH CARE EDUCATION/TRAINING PROGRAM

## 2023-07-10 PROCEDURE — 99213 PR OFFICE/OUTPT VISIT, EST, LEVL III, 20-29 MIN: ICD-10-PCS | Mod: S$PBB,,, | Performed by: STUDENT IN AN ORGANIZED HEALTH CARE EDUCATION/TRAINING PROGRAM

## 2023-07-10 PROCEDURE — 99999 PR PBB SHADOW E&M-EST. PATIENT-LVL IV: ICD-10-PCS | Mod: PBBFAC,,, | Performed by: STUDENT IN AN ORGANIZED HEALTH CARE EDUCATION/TRAINING PROGRAM

## 2023-07-10 PROCEDURE — 87088 URINE BACTERIA CULTURE: CPT | Performed by: STUDENT IN AN ORGANIZED HEALTH CARE EDUCATION/TRAINING PROGRAM

## 2023-07-10 PROCEDURE — 87086 URINE CULTURE/COLONY COUNT: CPT | Performed by: STUDENT IN AN ORGANIZED HEALTH CARE EDUCATION/TRAINING PROGRAM

## 2023-07-10 PROCEDURE — 99999 PR PBB SHADOW E&M-EST. PATIENT-LVL IV: CPT | Mod: PBBFAC,,, | Performed by: STUDENT IN AN ORGANIZED HEALTH CARE EDUCATION/TRAINING PROGRAM

## 2023-07-10 PROCEDURE — 87077 CULTURE AEROBIC IDENTIFY: CPT | Performed by: STUDENT IN AN ORGANIZED HEALTH CARE EDUCATION/TRAINING PROGRAM

## 2023-07-10 RX ORDER — NITROFURANTOIN 25; 75 MG/1; MG/1
100 CAPSULE ORAL 2 TIMES DAILY
Qty: 10 CAPSULE | Refills: 0 | Status: CANCELLED | OUTPATIENT
Start: 2023-07-10 | End: 2023-07-15

## 2023-07-10 RX ORDER — AMOXICILLIN AND CLAVULANATE POTASSIUM 875; 125 MG/1; MG/1
1 TABLET, FILM COATED ORAL EVERY 12 HOURS
Qty: 20 TABLET | Refills: 0 | Status: SHIPPED | OUTPATIENT
Start: 2023-07-10 | End: 2023-07-20

## 2023-07-10 NOTE — ASSESSMENT & PLAN NOTE
Will treat her for 10 days with augmentin since pt states she had fever. No CVA tenderness. Will send urine for culture. Asked her to reach if symptoms dont improve over the next 2-3 days or if she develops fever

## 2023-07-10 NOTE — PROGRESS NOTES
Body mass index is 31.15 kg/m².     Subjective     Patient ID: Erma Linda is a 63 y.o. female.    Chief Complaint: Urinary Tract Infection, Back Pain, body chills , and Generalized Body Aches    HPI  Patient is a 62 yo female here for dysuria and pain while urinating for a week. Pt admits to chills. She states she checked her temperature and it was 101.7 on Friday. She has been taking tylenol. Denies hx of pyelonephritis.    Review of Systems   Constitutional:  Positive for chills. Negative for fever.   HENT:  Negative for rhinorrhea and sore throat.    Respiratory:  Negative for cough and shortness of breath.    Cardiovascular:  Negative for chest pain.   Gastrointestinal:  Negative for abdominal pain, constipation, diarrhea, nausea and vomiting.   Genitourinary:  Positive for dysuria. Negative for hematuria.   Neurological:  Negative for dizziness, light-headedness and headaches.        Objective     Physical Exam  Vitals and nursing note reviewed.   Constitutional:       Appearance: Normal appearance.   Eyes:      Conjunctiva/sclera: Conjunctivae normal.   Cardiovascular:      Rate and Rhythm: Normal rate and regular rhythm.   Pulmonary:      Effort: Pulmonary effort is normal. No respiratory distress.   Abdominal:      General: Bowel sounds are normal.      Palpations: Abdomen is soft.      Tenderness: There is no right CVA tenderness or left CVA tenderness.   Musculoskeletal:      Right lower leg: No edema.      Left lower leg: No edema.   Skin:     General: Skin is warm.   Neurological:      Mental Status: She is alert and oriented to person, place, and time.          Assessment and Plan     1. Dysuria  Assessment & Plan:  Will treat her for 10 days with augmentin since pt states she had fever. No CVA tenderness. Will send urine for culture. Asked her to reach if symptoms dont improve over the next 2-3 days or if she develops fever    Orders:  -     Cancel: POCT URINE DIPSTICK WITHOUT MICROSCOPE  -     Urinalysis, Reflex to Urine  Culture Urine, Clean Catch    Other orders  -     amoxicillin-clavulanate 875-125mg (AUGMENTIN) 875-125 mg per tablet; Take 1 tablet by mouth every 12 (twelve) hours. for 10 days  Dispense: 20 tablet; Refill: 0

## 2023-07-13 LAB — BACTERIA UR CULT: ABNORMAL

## 2023-08-10 DIAGNOSIS — Z76.0 MEDICATION REFILL: ICD-10-CM

## 2023-08-10 DIAGNOSIS — I10 ESSENTIAL HYPERTENSION, BENIGN: ICD-10-CM

## 2023-10-05 ENCOUNTER — PATIENT MESSAGE (OUTPATIENT)
Dept: ADMINISTRATIVE | Facility: HOSPITAL | Age: 63
End: 2023-10-05
Payer: OTHER GOVERNMENT

## 2023-10-06 ENCOUNTER — PATIENT OUTREACH (OUTPATIENT)
Dept: ADMINISTRATIVE | Facility: HOSPITAL | Age: 63
End: 2023-10-06
Payer: OTHER GOVERNMENT

## 2024-04-02 ENCOUNTER — OFFICE VISIT (OUTPATIENT)
Dept: FAMILY MEDICINE | Facility: CLINIC | Age: 64
End: 2024-04-02
Payer: MEDICARE

## 2024-04-02 VITALS
BODY MASS INDEX: 28.38 KG/M2 | RESPIRATION RATE: 16 BRPM | OXYGEN SATURATION: 99 % | HEART RATE: 97 BPM | DIASTOLIC BLOOD PRESSURE: 88 MMHG | SYSTOLIC BLOOD PRESSURE: 139 MMHG | TEMPERATURE: 97 F | WEIGHT: 166.25 LBS | HEIGHT: 64 IN

## 2024-04-02 DIAGNOSIS — I10 ESSENTIAL HYPERTENSION, BENIGN: Primary | ICD-10-CM

## 2024-04-02 DIAGNOSIS — Z12.31 ENCOUNTER FOR SCREENING MAMMOGRAM FOR MALIGNANT NEOPLASM OF BREAST: ICD-10-CM

## 2024-04-02 DIAGNOSIS — Z12.11 COLON CANCER SCREENING: ICD-10-CM

## 2024-04-02 DIAGNOSIS — E21.0 PRIMARY HYPERPARATHYROIDISM: ICD-10-CM

## 2024-04-02 DIAGNOSIS — D64.9 CHRONIC ANEMIA: ICD-10-CM

## 2024-04-02 DIAGNOSIS — R79.9 ABNORMAL FINDING OF BLOOD CHEMISTRY, UNSPECIFIED: ICD-10-CM

## 2024-04-02 DIAGNOSIS — H35.033 HYPERTENSIVE RETINOPATHY OF BOTH EYES: ICD-10-CM

## 2024-04-02 DIAGNOSIS — D53.9 NUTRITIONAL ANEMIA, UNSPECIFIED: ICD-10-CM

## 2024-04-02 DIAGNOSIS — M81.0 AGE-RELATED OSTEOPOROSIS WITHOUT CURRENT PATHOLOGICAL FRACTURE: ICD-10-CM

## 2024-04-02 DIAGNOSIS — Z17.0 MALIGNANT NEOPLASM OF UPPER-OUTER QUADRANT OF LEFT BREAST IN FEMALE, ESTROGEN RECEPTOR POSITIVE: ICD-10-CM

## 2024-04-02 DIAGNOSIS — C50.412 MALIGNANT NEOPLASM OF UPPER-OUTER QUADRANT OF LEFT BREAST IN FEMALE, ESTROGEN RECEPTOR POSITIVE: ICD-10-CM

## 2024-04-02 PROBLEM — R29.3 POOR POSTURE: Status: RESOLVED | Noted: 2023-02-07 | Resolved: 2024-04-02

## 2024-04-02 PROBLEM — N64.89 EDEMA OF BREAST: Status: RESOLVED | Noted: 2023-02-07 | Resolved: 2024-04-02

## 2024-04-02 PROBLEM — M70.51 PES ANSERINUS BURSITIS OF RIGHT KNEE: Status: RESOLVED | Noted: 2022-08-13 | Resolved: 2024-04-02

## 2024-04-02 PROBLEM — M75.51 BURSITIS OF RIGHT SHOULDER: Status: RESOLVED | Noted: 2022-08-13 | Resolved: 2024-04-02

## 2024-04-02 PROBLEM — R30.0 DYSURIA: Status: RESOLVED | Noted: 2023-07-10 | Resolved: 2024-04-02

## 2024-04-02 PROBLEM — M70.61 TROCHANTERIC BURSITIS OF RIGHT HIP: Status: RESOLVED | Noted: 2022-08-13 | Resolved: 2024-04-02

## 2024-04-02 PROBLEM — R07.9 CHEST PAIN, RULE OUT ACUTE MYOCARDIAL INFARCTION: Status: RESOLVED | Noted: 2023-04-06 | Resolved: 2024-04-02

## 2024-04-02 PROBLEM — M25.612 DECREASED ROM OF LEFT SHOULDER: Status: RESOLVED | Noted: 2023-02-07 | Resolved: 2024-04-02

## 2024-04-02 PROBLEM — Z74.09 IMPAIRED FUNCTIONAL MOBILITY AND ACTIVITY TOLERANCE: Status: RESOLVED | Noted: 2023-02-07 | Resolved: 2024-04-02

## 2024-04-02 PROCEDURE — 99215 OFFICE O/P EST HI 40 MIN: CPT | Mod: PBBFAC,PO | Performed by: NURSE PRACTITIONER

## 2024-04-02 PROCEDURE — 99999 PR PBB SHADOW E&M-EST. PATIENT-LVL V: CPT | Mod: PBBFAC,,, | Performed by: NURSE PRACTITIONER

## 2024-04-02 PROCEDURE — 99214 OFFICE O/P EST MOD 30 MIN: CPT | Mod: S$PBB,,, | Performed by: NURSE PRACTITIONER

## 2024-04-02 RX ORDER — LISINOPRIL 40 MG/1
40 TABLET ORAL
Qty: 30 TABLET | Refills: 0 | OUTPATIENT
Start: 2024-04-02

## 2024-04-02 RX ORDER — LISINOPRIL 40 MG/1
40 TABLET ORAL DAILY
Qty: 90 TABLET | Refills: 1 | Status: SHIPPED | OUTPATIENT
Start: 2024-04-02

## 2024-04-02 RX ORDER — HYDROCHLOROTHIAZIDE 25 MG/1
25 TABLET ORAL DAILY
Qty: 90 TABLET | Refills: 1 | Status: SHIPPED | OUTPATIENT
Start: 2024-04-02 | End: 2024-09-29

## 2024-04-02 RX ORDER — AMLODIPINE BESYLATE 10 MG/1
10 TABLET ORAL DAILY
Qty: 90 TABLET | Refills: 1 | Status: SHIPPED | OUTPATIENT
Start: 2024-04-02

## 2024-04-02 NOTE — PROGRESS NOTES
"Subjective:      Patient ID: Erma Linda is a 64 y.o. female.  Pt presents to clinic for medication refills, takes lisinopril 40mg, amlodipine 10mg and HCTZ 25mg daily. She denies any acute complaints today. She remains very active and continues to monitor her diet.       Review of Systems   Constitutional:  Negative for activity change, appetite change, fatigue, fever and unexpected weight change.   HENT: Negative.     Eyes:  Negative for pain and visual disturbance.   Respiratory:  Negative for chest tightness and shortness of breath.    Cardiovascular:  Negative for chest pain and palpitations.   Gastrointestinal:  Negative for abdominal pain, change in bowel habit, constipation, diarrhea, nausea and vomiting.   Endocrine: Negative.    Genitourinary:  Negative for difficulty urinating and dysuria.   Musculoskeletal: Negative.    Integumentary:  Negative for rash.   Allergic/Immunologic: Negative.    Neurological: Negative.  Negative for weakness and headaches.   Hematological: Negative.    Psychiatric/Behavioral: Negative.     All other systems reviewed and are negative.        Objective:     Vitals:    04/02/24 1310   BP: 139/88   BP Location: Left arm   Patient Position: Sitting   BP Method: Medium (Manual)   Pulse: 97   Resp: 16   Temp: 97 °F (36.1 °C)   TempSrc: Oral   SpO2: 99%   Weight: 75.4 kg (166 lb 3.6 oz)   Height: 5' 4" (1.626 m)     Physical Exam  Vitals and nursing note reviewed.   Constitutional:       General: She is not in acute distress.     Appearance: Normal appearance. She is well-developed and well-groomed. She is not ill-appearing.   HENT:      Head: Normocephalic and atraumatic.      Right Ear: External ear normal.      Left Ear: External ear normal.      Nose: Nose normal.      Mouth/Throat:      Lips: Pink.      Mouth: Mucous membranes are moist.   Eyes:      General: Lids are normal. Vision grossly intact. Gaze aligned appropriately.      Conjunctiva/sclera: Conjunctivae " normal.      Pupils: Pupils are equal, round, and reactive to light.   Neck:      Trachea: Phonation normal.   Cardiovascular:      Rate and Rhythm: Normal rate and regular rhythm.      Heart sounds: Normal heart sounds.   Pulmonary:      Effort: Pulmonary effort is normal. No accessory muscle usage or respiratory distress.      Breath sounds: Normal breath sounds and air entry.   Abdominal:      General: Abdomen is flat. Bowel sounds are normal. There is no distension.      Palpations: Abdomen is soft.      Tenderness: There is no abdominal tenderness.   Musculoskeletal:      Cervical back: Neck supple.      Right lower leg: No edema.      Left lower leg: No edema.   Skin:     General: Skin is warm and dry.      Findings: No rash.   Neurological:      General: No focal deficit present.      Mental Status: She is alert and oriented to person, place, and time. Mental status is at baseline.      Sensory: Sensation is intact.      Motor: Motor function is intact.      Coordination: Coordination is intact.      Gait: Gait is intact.   Psychiatric:         Attention and Perception: Attention and perception normal.         Mood and Affect: Mood and affect normal.         Speech: Speech normal.         Behavior: Behavior normal. Behavior is cooperative.         Thought Content: Thought content normal.         Cognition and Memory: Cognition and memory normal.         Judgment: Judgment normal.       Assessment and Plan:     1. Essential hypertension, benign  Continue current treatment regimen.  Dietary sodium restriction.  Regular aerobic exercise.  Weight loss.  Follow up in 6 months.  Patient Education: Reviewed risks of hypertension and principles of treatment.  - amLODIPine (NORVASC) 10 MG tablet; Take 1 tablet (10 mg total) by mouth once daily.  Dispense: 90 tablet; Refill: 1  - hydroCHLOROthiazide (HYDRODIURIL) 25 MG tablet; Take 1 tablet (25 mg total) by mouth once daily.  Dispense: 90 tablet; Refill: 1  -  lisinopriL (PRINIVIL,ZESTRIL) 40 MG tablet; Take 1 tablet (40 mg total) by mouth once daily.  Dispense: 90 tablet; Refill: 1  - CBC Auto Differential; Future  - Comprehensive Metabolic Panel; Future  - Ferritin; Future  - Folate; Future  - Hemoglobin A1C; Future  - Iron and TIBC; Future  - Lipid Panel; Future  - Vitamin D; Future  - Vitamin B12; Future  - TSH; Future  - Magnesium; Future  - PTH, intact; Future  - T4, Free; Future  - Ambulatory referral/consult to Ophthalmology; Future    2. Primary hyperparathyroidism  Asymptomatic and uncomplicated, previously normal calcium, vit.D and no CKD  - CBC Auto Differential; Future  - Comprehensive Metabolic Panel; Future  - Ferritin; Future  - Folate; Future  - Hemoglobin A1C; Future  - Iron and TIBC; Future  - Lipid Panel; Future  - Vitamin D; Future  - Vitamin B12; Future  - TSH; Future  - Magnesium; Future  - PTH, intact; Future  - T4, Free; Future    3. Malignant neoplasm of upper-outer quadrant of left breast in female, estrogen receptor positive  Completed radiation, due for mammogram   - CBC Auto Differential; Future  - Comprehensive Metabolic Panel; Future  - Ferritin; Future  - Folate; Future  - Hemoglobin A1C; Future  - Iron and TIBC; Future  - Lipid Panel; Future  - Vitamin D; Future  - Vitamin B12; Future  - TSH; Future  - Magnesium; Future  - PTH, intact; Future  - T4, Free; Future  - Mammo Digital Screening Bilat w/ Marcell; Future    4. Chronic anemia  Without acute hemorrhage   - CBC Auto Differential; Future  - Comprehensive Metabolic Panel; Future  - Ferritin; Future  - Folate; Future  - Hemoglobin A1C; Future  - Iron and TIBC; Future  - Lipid Panel; Future  - Vitamin D; Future  - Vitamin B12; Future  - TSH; Future  - Magnesium; Future  - PTH, intact; Future  - T4, Free; Future    5. Hypertensive retinopathy of both eyes  Chronic blurred vision, due for exam   - Ambulatory referral/consult to Ophthalmology; Future    6. Age-related osteoporosis without  current pathological fracture  Does not take calcium or vit.D  - DXA Bone Density Axial Skeleton 1 or more sites; Future    7. Nutritional anemia, unspecified  - Folate; Future  - Vitamin B12; Future    8. Abnormal finding of blood chemistry, unspecified  - Hemoglobin A1C; Future    9. Encounter for screening mammogram for malignant neoplasm of breast  - Mammo Digital Screening Bilat w/ Marcell; Future    10. Colon cancer screening  - Ambulatory referral/consult to Endo Procedure ; Future           JING El, FNP-C  Family/Internal Medicine  [facility]

## 2024-04-02 NOTE — PROGRESS NOTES
Health Maintenance Due   Topic     Pneumococcal Vaccines (Age 65+) (1 of 2 - PCV)     TETANUS VACCINE      Shingles Vaccine (1 of 2) hx chickenpox ; inform pt can get vaccine at pharmacy.    Colorectal Cancer Screening      RSV Vaccine (Age 60+ and Pregnant patients) (1 - 1-dose 60+ series)     COVID-19 Vaccine (3 - Pfizer risk series)     DEXA Scan      Influenza Vaccine (1)     Mammogram

## 2024-04-03 ENCOUNTER — LAB VISIT (OUTPATIENT)
Dept: LAB | Facility: HOSPITAL | Age: 64
End: 2024-04-03
Attending: NURSE PRACTITIONER
Payer: MEDICARE

## 2024-04-03 DIAGNOSIS — R79.9 ABNORMAL FINDING OF BLOOD CHEMISTRY, UNSPECIFIED: ICD-10-CM

## 2024-04-03 DIAGNOSIS — I10 ESSENTIAL HYPERTENSION, BENIGN: ICD-10-CM

## 2024-04-03 DIAGNOSIS — C50.412 MALIGNANT NEOPLASM OF UPPER-OUTER QUADRANT OF LEFT BREAST IN FEMALE, ESTROGEN RECEPTOR POSITIVE: ICD-10-CM

## 2024-04-03 DIAGNOSIS — E21.0 PRIMARY HYPERPARATHYROIDISM: ICD-10-CM

## 2024-04-03 DIAGNOSIS — Z17.0 MALIGNANT NEOPLASM OF UPPER-OUTER QUADRANT OF LEFT BREAST IN FEMALE, ESTROGEN RECEPTOR POSITIVE: ICD-10-CM

## 2024-04-03 DIAGNOSIS — D53.9 NUTRITIONAL ANEMIA, UNSPECIFIED: ICD-10-CM

## 2024-04-03 DIAGNOSIS — D64.9 CHRONIC ANEMIA: ICD-10-CM

## 2024-04-03 LAB
25(OH)D3+25(OH)D2 SERPL-MCNC: 56 NG/ML (ref 30–96)
ALBUMIN SERPL BCP-MCNC: 3.5 G/DL (ref 3.5–5.2)
ALP SERPL-CCNC: 95 U/L (ref 55–135)
ALT SERPL W/O P-5'-P-CCNC: 15 U/L (ref 10–44)
ANION GAP SERPL CALC-SCNC: 12 MMOL/L (ref 8–16)
AST SERPL-CCNC: 18 U/L (ref 10–40)
BASOPHILS # BLD AUTO: 0.05 K/UL (ref 0–0.2)
BASOPHILS NFR BLD: 0.8 % (ref 0–1.9)
BILIRUB SERPL-MCNC: 0.4 MG/DL (ref 0.1–1)
BUN SERPL-MCNC: 11 MG/DL (ref 8–23)
CALCIUM SERPL-MCNC: 9.7 MG/DL (ref 8.7–10.5)
CHLORIDE SERPL-SCNC: 107 MMOL/L (ref 95–110)
CHOLEST SERPL-MCNC: 171 MG/DL (ref 120–199)
CHOLEST/HDLC SERPL: 3 {RATIO} (ref 2–5)
CO2 SERPL-SCNC: 26 MMOL/L (ref 23–29)
CREAT SERPL-MCNC: 0.8 MG/DL (ref 0.5–1.4)
DIFFERENTIAL METHOD BLD: ABNORMAL
EOSINOPHIL # BLD AUTO: 0.2 K/UL (ref 0–0.5)
EOSINOPHIL NFR BLD: 3.4 % (ref 0–8)
ERYTHROCYTE [DISTWIDTH] IN BLOOD BY AUTOMATED COUNT: 15.9 % (ref 11.5–14.5)
EST. GFR  (NO RACE VARIABLE): >60 ML/MIN/1.73 M^2
ESTIMATED AVG GLUCOSE: 97 MG/DL (ref 68–131)
FERRITIN SERPL-MCNC: 49 NG/ML (ref 20–300)
FOLATE SERPL-MCNC: 9.5 NG/ML (ref 4–24)
GLUCOSE SERPL-MCNC: 84 MG/DL (ref 70–110)
HBA1C MFR BLD: 5 % (ref 4–5.6)
HCT VFR BLD AUTO: 38.6 % (ref 37–48.5)
HDLC SERPL-MCNC: 57 MG/DL (ref 40–75)
HDLC SERPL: 33.3 % (ref 20–50)
HGB BLD-MCNC: 11.6 G/DL (ref 12–16)
IMM GRANULOCYTES # BLD AUTO: 0.02 K/UL (ref 0–0.04)
IMM GRANULOCYTES NFR BLD AUTO: 0.3 % (ref 0–0.5)
IRON SERPL-MCNC: 45 UG/DL (ref 30–160)
LDLC SERPL CALC-MCNC: 102.6 MG/DL (ref 63–159)
LYMPHOCYTES # BLD AUTO: 1.7 K/UL (ref 1–4.8)
LYMPHOCYTES NFR BLD: 26.5 % (ref 18–48)
MAGNESIUM SERPL-MCNC: 2.3 MG/DL (ref 1.6–2.6)
MCH RBC QN AUTO: 28.2 PG (ref 27–31)
MCHC RBC AUTO-ENTMCNC: 30.1 G/DL (ref 32–36)
MCV RBC AUTO: 94 FL (ref 82–98)
MONOCYTES # BLD AUTO: 0.7 K/UL (ref 0.3–1)
MONOCYTES NFR BLD: 10.4 % (ref 4–15)
NEUTROPHILS # BLD AUTO: 3.8 K/UL (ref 1.8–7.7)
NEUTROPHILS NFR BLD: 58.6 % (ref 38–73)
NONHDLC SERPL-MCNC: 114 MG/DL
NRBC BLD-RTO: 0 /100 WBC
PLATELET # BLD AUTO: 253 K/UL (ref 150–450)
PMV BLD AUTO: 10.9 FL (ref 9.2–12.9)
POTASSIUM SERPL-SCNC: 4.4 MMOL/L (ref 3.5–5.1)
PROT SERPL-MCNC: 8.2 G/DL (ref 6–8.4)
PTH-INTACT SERPL-MCNC: 88.5 PG/ML (ref 9–77)
RBC # BLD AUTO: 4.11 M/UL (ref 4–5.4)
SATURATED IRON: 12 % (ref 20–50)
SODIUM SERPL-SCNC: 145 MMOL/L (ref 136–145)
T4 FREE SERPL-MCNC: 1.01 NG/DL (ref 0.71–1.51)
TOTAL IRON BINDING CAPACITY: 385 UG/DL (ref 250–450)
TRANSFERRIN SERPL-MCNC: 260 MG/DL (ref 200–375)
TRIGL SERPL-MCNC: 57 MG/DL (ref 30–150)
TSH SERPL DL<=0.005 MIU/L-ACNC: 1.98 UIU/ML (ref 0.4–4)
VIT B12 SERPL-MCNC: 800 PG/ML (ref 210–950)
WBC # BLD AUTO: 6.45 K/UL (ref 3.9–12.7)

## 2024-04-03 PROCEDURE — 85025 COMPLETE CBC W/AUTO DIFF WBC: CPT | Performed by: NURSE PRACTITIONER

## 2024-04-03 PROCEDURE — 82746 ASSAY OF FOLIC ACID SERUM: CPT | Performed by: NURSE PRACTITIONER

## 2024-04-03 PROCEDURE — 82728 ASSAY OF FERRITIN: CPT | Performed by: NURSE PRACTITIONER

## 2024-04-03 PROCEDURE — 83036 HEMOGLOBIN GLYCOSYLATED A1C: CPT | Performed by: NURSE PRACTITIONER

## 2024-04-03 PROCEDURE — 83970 ASSAY OF PARATHORMONE: CPT | Performed by: NURSE PRACTITIONER

## 2024-04-03 PROCEDURE — 36415 COLL VENOUS BLD VENIPUNCTURE: CPT | Mod: PO | Performed by: NURSE PRACTITIONER

## 2024-04-03 PROCEDURE — 84439 ASSAY OF FREE THYROXINE: CPT | Performed by: NURSE PRACTITIONER

## 2024-04-03 PROCEDURE — 80061 LIPID PANEL: CPT | Performed by: NURSE PRACTITIONER

## 2024-04-03 PROCEDURE — 83735 ASSAY OF MAGNESIUM: CPT | Performed by: NURSE PRACTITIONER

## 2024-04-03 PROCEDURE — 82607 VITAMIN B-12: CPT | Performed by: NURSE PRACTITIONER

## 2024-04-03 PROCEDURE — 83540 ASSAY OF IRON: CPT | Performed by: NURSE PRACTITIONER

## 2024-04-03 PROCEDURE — 82306 VITAMIN D 25 HYDROXY: CPT | Performed by: NURSE PRACTITIONER

## 2024-04-03 PROCEDURE — 80053 COMPREHEN METABOLIC PANEL: CPT | Performed by: NURSE PRACTITIONER

## 2024-04-03 PROCEDURE — 84443 ASSAY THYROID STIM HORMONE: CPT | Performed by: NURSE PRACTITIONER

## 2024-04-04 ENCOUNTER — PATIENT MESSAGE (OUTPATIENT)
Dept: FAMILY MEDICINE | Facility: CLINIC | Age: 64
End: 2024-04-04
Payer: MEDICARE

## 2024-04-04 DIAGNOSIS — E61.1 IRON DEFICIENCY: Primary | ICD-10-CM

## 2024-05-03 ENCOUNTER — CLINICAL SUPPORT (OUTPATIENT)
Dept: ENDOSCOPY | Facility: HOSPITAL | Age: 64
End: 2024-05-03
Payer: MEDICARE

## 2024-05-03 DIAGNOSIS — Z12.11 COLON CANCER SCREENING: ICD-10-CM

## 2024-05-06 ENCOUNTER — PATIENT MESSAGE (OUTPATIENT)
Dept: ENDOSCOPY | Facility: HOSPITAL | Age: 64
End: 2024-05-06
Payer: MEDICARE

## 2024-05-06 NOTE — PLAN OF CARE
Attempted to reach you to schedule your colonoscopy. Left voicemail message. Please contact Endoscopy Scheduling at # [phone].

## 2024-05-30 ENCOUNTER — HOSPITAL ENCOUNTER (OUTPATIENT)
Dept: RADIOLOGY | Facility: CLINIC | Age: 64
Discharge: HOME | End: 2024-05-30
Attending: NURSE PRACTITIONER
Payer: MEDICARE

## 2024-05-30 DIAGNOSIS — M81.0 AGE-RELATED OSTEOPOROSIS WITHOUT CURRENT PATHOLOGICAL FRACTURE: ICD-10-CM

## 2024-05-30 PROCEDURE — 77080 DXA BONE DENSITY AXIAL: CPT | Mod: 26,,, | Performed by: INTERNAL MEDICINE

## 2024-05-30 PROCEDURE — 77080 DXA BONE DENSITY AXIAL: CPT | Mod: TC,PO

## 2024-06-10 ENCOUNTER — PATIENT MESSAGE (OUTPATIENT)
Dept: FAMILY MEDICINE | Facility: CLINIC | Age: 64
End: 2024-06-10
Payer: MEDICARE

## 2024-06-10 DIAGNOSIS — M81.0 AGE-RELATED OSTEOPOROSIS WITHOUT CURRENT PATHOLOGICAL FRACTURE: Primary | ICD-10-CM

## 2024-06-10 RX ORDER — ALENDRONATE SODIUM 70 MG/1
70 TABLET ORAL
Qty: 4 TABLET | Refills: 11 | Status: SHIPPED | OUTPATIENT
Start: 2024-06-10 | End: 2025-06-10

## 2024-08-14 ENCOUNTER — PATIENT OUTREACH (OUTPATIENT)
Dept: ADMINISTRATIVE | Facility: HOSPITAL | Age: 64
End: 2024-08-14
Payer: MEDICARE

## 2024-08-14 NOTE — PROGRESS NOTES
Health Maintenance Due   Topic Date Due    Pneumococcal Vaccines (Age 65+) (1 of 2 - PCV) Never done    TETANUS VACCINE  Never done    Shingles Vaccine (1 of 2) Never done    Colorectal Cancer Screening  Never done    RSV Vaccine (Age 60+ and Pregnant patients) (1 - 1-dose 60+ series) Never done    COVID-19 Vaccine (3 - Pfizer risk series) 06/11/2021    Mammogram  03/01/2024     Chart review done. HM updated. Immunizations reviewed & updated. Care Everywhere updated.  OVERDUE HM ADDED TO APPOINTMENT  MAMMOGRAM SCHEDULED

## 2024-08-19 ENCOUNTER — HOSPITAL ENCOUNTER (OUTPATIENT)
Dept: RADIOLOGY | Facility: HOSPITAL | Age: 64
Discharge: HOME | End: 2024-08-19
Attending: NURSE PRACTITIONER
Payer: MEDICARE

## 2024-08-19 DIAGNOSIS — Z17.0 MALIGNANT NEOPLASM OF UPPER-OUTER QUADRANT OF LEFT BREAST IN FEMALE, ESTROGEN RECEPTOR POSITIVE: ICD-10-CM

## 2024-08-19 DIAGNOSIS — Z12.31 ENCOUNTER FOR SCREENING MAMMOGRAM FOR MALIGNANT NEOPLASM OF BREAST: ICD-10-CM

## 2024-08-19 DIAGNOSIS — C50.412 MALIGNANT NEOPLASM OF UPPER-OUTER QUADRANT OF LEFT BREAST IN FEMALE, ESTROGEN RECEPTOR POSITIVE: ICD-10-CM

## 2024-08-19 PROCEDURE — 77067 SCR MAMMO BI INCL CAD: CPT | Mod: TC

## 2024-08-19 PROCEDURE — 77067 SCR MAMMO BI INCL CAD: CPT | Mod: 26,,, | Performed by: RADIOLOGY

## 2024-08-19 PROCEDURE — 77063 BREAST TOMOSYNTHESIS BI: CPT | Mod: 26,,, | Performed by: RADIOLOGY

## 2024-08-21 ENCOUNTER — HOSPITAL ENCOUNTER (OUTPATIENT)
Dept: RADIOLOGY | Facility: HOSPITAL | Age: 64
Discharge: HOME | End: 2024-08-21
Attending: INTERNAL MEDICINE
Payer: MEDICARE

## 2024-08-21 ENCOUNTER — OFFICE VISIT (OUTPATIENT)
Dept: FAMILY MEDICINE | Facility: CLINIC | Age: 64
End: 2024-08-21
Payer: MEDICARE

## 2024-08-21 VITALS
HEART RATE: 78 BPM | OXYGEN SATURATION: 99 % | SYSTOLIC BLOOD PRESSURE: 134 MMHG | WEIGHT: 161.38 LBS | BODY MASS INDEX: 27.55 KG/M2 | TEMPERATURE: 98 F | DIASTOLIC BLOOD PRESSURE: 88 MMHG | HEIGHT: 64 IN

## 2024-08-21 DIAGNOSIS — I10 ESSENTIAL HYPERTENSION, BENIGN: ICD-10-CM

## 2024-08-21 DIAGNOSIS — Z12.11 SCREENING FOR COLON CANCER: ICD-10-CM

## 2024-08-21 DIAGNOSIS — M25.551 CHRONIC RIGHT HIP PAIN: ICD-10-CM

## 2024-08-21 DIAGNOSIS — G44.329 CHRONIC INTERMITTENT POST-TRAUMATIC HEADACHE: ICD-10-CM

## 2024-08-21 DIAGNOSIS — M25.551 CHRONIC RIGHT HIP PAIN: Primary | ICD-10-CM

## 2024-08-21 DIAGNOSIS — G89.29 CHRONIC RIGHT HIP PAIN: ICD-10-CM

## 2024-08-21 DIAGNOSIS — G89.29 CHRONIC RIGHT HIP PAIN: Primary | ICD-10-CM

## 2024-08-21 DIAGNOSIS — H57.89 IRRITATION OF LEFT EYE: ICD-10-CM

## 2024-08-21 DIAGNOSIS — D64.9 CHRONIC ANEMIA: ICD-10-CM

## 2024-08-21 PROCEDURE — 3075F SYST BP GE 130 - 139MM HG: CPT | Mod: CPTII,S$GLB,, | Performed by: INTERNAL MEDICINE

## 2024-08-21 PROCEDURE — 99214 OFFICE O/P EST MOD 30 MIN: CPT | Mod: S$GLB,,, | Performed by: INTERNAL MEDICINE

## 2024-08-21 PROCEDURE — 73560 X-RAY EXAM OF KNEE 1 OR 2: CPT | Mod: TC,RT

## 2024-08-21 PROCEDURE — 73502 X-RAY EXAM HIP UNI 2-3 VIEWS: CPT | Mod: TC,RT

## 2024-08-21 PROCEDURE — 70551 MRI BRAIN STEM W/O DYE: CPT | Mod: TC

## 2024-08-21 PROCEDURE — 1159F MED LIST DOCD IN RCRD: CPT | Mod: CPTII,S$GLB,, | Performed by: INTERNAL MEDICINE

## 2024-08-21 PROCEDURE — 3044F HG A1C LEVEL LT 7.0%: CPT | Mod: CPTII,S$GLB,, | Performed by: INTERNAL MEDICINE

## 2024-08-21 PROCEDURE — 1101F PT FALLS ASSESS-DOCD LE1/YR: CPT | Mod: CPTII,S$GLB,, | Performed by: INTERNAL MEDICINE

## 2024-08-21 PROCEDURE — 3079F DIAST BP 80-89 MM HG: CPT | Mod: CPTII,S$GLB,, | Performed by: INTERNAL MEDICINE

## 2024-08-21 PROCEDURE — 3288F FALL RISK ASSESSMENT DOCD: CPT | Mod: CPTII,S$GLB,, | Performed by: INTERNAL MEDICINE

## 2024-08-21 PROCEDURE — 1160F RVW MEDS BY RX/DR IN RCRD: CPT | Mod: CPTII,S$GLB,, | Performed by: INTERNAL MEDICINE

## 2024-08-21 PROCEDURE — 3008F BODY MASS INDEX DOCD: CPT | Mod: CPTII,S$GLB,, | Performed by: INTERNAL MEDICINE

## 2024-08-21 PROCEDURE — 4010F ACE/ARB THERAPY RXD/TAKEN: CPT | Mod: CPTII,S$GLB,, | Performed by: INTERNAL MEDICINE

## 2024-08-21 PROCEDURE — 70551 MRI BRAIN STEM W/O DYE: CPT | Mod: 26,,, | Performed by: STUDENT IN AN ORGANIZED HEALTH CARE EDUCATION/TRAINING PROGRAM

## 2024-08-21 PROCEDURE — 73502 X-RAY EXAM HIP UNI 2-3 VIEWS: CPT | Mod: 26,RT,, | Performed by: RADIOLOGY

## 2024-08-21 PROCEDURE — 72100 X-RAY EXAM L-S SPINE 2/3 VWS: CPT | Mod: 26,,, | Performed by: RADIOLOGY

## 2024-08-21 PROCEDURE — 99999 PR PBB SHADOW E&M-EST. PATIENT-LVL V: CPT | Mod: PBBFAC,,, | Performed by: INTERNAL MEDICINE

## 2024-08-21 PROCEDURE — 72100 X-RAY EXAM L-S SPINE 2/3 VWS: CPT | Mod: TC

## 2024-08-21 PROCEDURE — 73560 X-RAY EXAM OF KNEE 1 OR 2: CPT | Mod: 26,RT,, | Performed by: RADIOLOGY

## 2024-08-21 PROCEDURE — G2211 COMPLEX E/M VISIT ADD ON: HCPCS | Mod: S$GLB,,, | Performed by: INTERNAL MEDICINE

## 2024-08-21 RX ORDER — AZELASTINE HYDROCHLORIDE 0.5 MG/ML
1 SOLUTION/ DROPS OPHTHALMIC 2 TIMES DAILY
Qty: 6 ML | Refills: 0 | Status: SHIPPED | OUTPATIENT
Start: 2024-08-21 | End: 2024-08-28

## 2024-08-21 NOTE — PROGRESS NOTES
SUBJECTIVE     Chief Complaint   Patient presents with    Headache    Hip Pain    Eye Pain    Referral       HPI  Erma Linda is a 64 y.o. female with multiple medical diagnoses as listed in the medical history and problem list that presents for evaluation of R hip pain x 3 weeks. Pt did have a fall on  when getting into the shower at a hotel. She was sore afterwards, but improved with Tylenol. Pt did hit her head, so now she has headaches coming at random. Pt did not seek medical attention at that time. Her hip pain is sharp at a 7-8/10 intermittently without radiation. Pain improves with Ibuprofen and Tylenol. Pain is aggravated by laying on the R side and walking. Pt also now notes L sided headaches that is stabbing at an 8/10 intermittently with occasional radiation to the right. +lightheadedness and nausea. Denies any photophobia/phonophobia. Otherwise, pt reports feeling like her L eye is closing up or something is in it.     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    Cancer     breast    Chronic anemia 2024    History of hepatitis C; S/p RX with SVR 12 documented 2018    Hypertension     Nuclear sclerosis of both eyes 2021       PAST SURGICAL HISTORY:  Past Surgical History:   Procedure Laterality Date    BREAST BIOPSY Left 2022    Procedure: BIOPSY, BREAST / excisional biopsy left;  Surgeon: CHRISTI Delong MD;  Location: [facility];  Service: General;  Laterality: Left;    BREAST MASS EXCISION Left 2022    Procedure: RE- EXCISION,  BREAST;  Surgeon: CHRISTI Delong MD;  Location: [facility];  Service: General;  Laterality: Left;    BREAST SURGERY  2022    lumpectomy     SECTION, CLASSIC       INJECTION FOR SENTINEL NODE IDENTIFICATION Left 6/16/2022    Procedure: INJECTION, FOR SENTINEL NODE IDENTIFICATION;  Surgeon: CHRISTI Delong MD;  Location: [facility];  Service: General;  Laterality: Left;    KNEE ARTHROSCOPY W/ LASER  2000    R    MASTECTOMY, PARTIAL Left 6/16/2022    Procedure: MASTECTOMY, PARTIAL LEFT with radiological marker;  Surgeon: CHRISTI Delong MD;  Location: [facility];  Service: General;  Laterality: Left;  EMAIL SENT 6-14 @ 10:20 PER LK    SENTINEL LYMPH NODE BIOPSY Left 6/16/2022    Procedure: BIOPSY, LYMPH NODE, SENTINEL;  Surgeon: CHRISTI Delong MD;  Location: [facility];  Service: General;  Laterality: Left;    TUBAL LIGATION         SOCIAL HISTORY:  Social History     Socioeconomic History    Marital status:    Tobacco Use    Smoking status: Never    Smokeless tobacco: Never   Substance and Sexual Activity    Alcohol use: No    Drug use: No    Sexual activity: Not Currently     Partners: Male     Birth control/protection: Post-menopausal     Comment: 4/7/17  with same partner 35 years      Social Determinants of Health     Financial Resource Strain: Low Risk  (8/20/2024)    Overall Financial Resource Strain (CARDIA)     Difficulty of Paying Living Expenses: Not hard at all   Food Insecurity: No Food Insecurity (8/20/2024)    Hunger Vital Sign     Worried About Running Out of Food in the Last Year: Never true     Ran Out of Food in the Last Year: Never true   Physical Activity: Sufficiently Active (8/20/2024)    Exercise Vital Sign     Days of Exercise per Week: 5 days     Minutes of Exercise per Session: 40 min   Stress: No Stress Concern Present (8/20/2024)    [employer] [place] - Occupational Stress Questionnaire     Feeling of Stress : Not at all   Housing Stability: Unknown (8/20/2024)    Housing Stability Vital Sign     Unable to Pay for Housing in the Last Year: No       FAMILY HISTORY:  Family History   Problem Relation Name Age of Onset     Heart disease Mother      No Known Problems Father      No Known Problems Sister      No Known Problems Brother      No Known Problems Maternal Aunt      No Known Problems Maternal Uncle      No Known Problems Paternal Aunt      No Known Problems Paternal Uncle      No Known Problems Maternal Grandmother      No Known Problems Maternal Grandfather      No Known Problems Paternal Grandmother      No Known Problems Paternal Grandfather      Glaucoma Neg Hx         ALLERGIES AND MEDICATIONS: updated and reviewed.  Review of patient's allergies indicates:   Allergen Reactions    Hydralazine analogues Palpitations    Compazine [prochlorperazine] Other (See Comments)     The patient stated, she didn't like how Compazine, made her feel.     Current Outpatient Medications   Medication Sig Dispense Refill    acetaminophen (TYLENOL) 500 MG tablet Take 500 mg by mouth daily as needed for Pain.      alendronate (FOSAMAX) 70 MG tablet Take 1 tablet (70 mg total) by mouth every 7 days. 4 tablet 11    amLODIPine (NORVASC) 10 MG tablet Take 1 tablet (10 mg total) by mouth once daily. 90 tablet 1    cyanocobalamin (VITAMIN B-12) 100 MCG tablet Take 500 mcg by mouth once daily.      ferrous fum-vit C-vit B12--60-0.01-1 mg Cap Take 1 Capful by mouth Daily. 90 each 3    hydroCHLOROthiazide (HYDRODIURIL) 25 MG tablet Take 1 tablet (25 mg total) by mouth once daily. 90 tablet 1    ibuprofen (ADVIL,MOTRIN) 800 MG tablet Take 1 tablet (800 mg total) by mouth 3 (three) times daily as needed for Pain (TAKE WITH MEALS). 30 tablet 2    lisinopriL (PRINIVIL,ZESTRIL) 40 MG tablet Take 1 tablet (40 mg total) by mouth once daily. 90 tablet 1    MAGNESIUM ORAL Take 1 tablet by mouth once daily.      multivitamin (THERAGRAN) per tablet Take 1 tablet by mouth once daily.      omega-3 fatty acids/fish oil (FISH OIL-OMEGA-3 FATTY ACIDS) 300-1,000 mg capsule Take 1 capsule by mouth once daily.      vitamin D (VITAMIN D3) 1000 units Tab Take 1,000  "Units by mouth once daily.      azelastine (OPTIVAR) 0.05 % ophthalmic solution Place 1 drop into the left eye 2 (two) times daily. for 7 days 6 mL 0     No current facility-administered medications for this visit.       ROS  Review of Systems   Constitutional:  Negative for chills and fever.   HENT:  Negative for hearing loss and sore throat.    Eyes:  Positive for pain, discharge, redness and itching. Negative for photophobia and visual disturbance.   Respiratory:  Negative for cough and shortness of breath.    Cardiovascular:  Negative for chest pain, palpitations and leg swelling.   Gastrointestinal:  Negative for abdominal pain, constipation, diarrhea, nausea and vomiting.   Genitourinary:  Negative for dysuria, frequency and urgency.   Musculoskeletal:  Positive for arthralgias. Negative for joint swelling and myalgias.   Skin:  Negative for rash and wound.   Neurological:  Positive for headaches.   Psychiatric/Behavioral:  Negative for agitation and confusion. The patient is not nervous/anxious.          OBJECTIVE     Physical Exam  Vitals:    08/21/24 0832   BP: 134/88   Pulse: 78   Temp: 97.9 °F (36.6 °C)    Body mass index is 27.7 kg/m².  Weight: 73.2 kg (161 lb 6 oz)   Height: 5' 4" (162.6 cm)     Physical Exam  Constitutional:       General: She is not in acute distress.     Appearance: She is well-developed.   HENT:      Head: Normocephalic and atraumatic.      Right Ear: External ear normal.      Left Ear: External ear normal.      Nose: Nose normal.   Eyes:      General: No scleral icterus.        Right eye: No discharge.         Left eye: No discharge.      Conjunctiva/sclera: Conjunctivae normal.   Neck:      Vascular: No JVD.      Trachea: No tracheal deviation.   Cardiovascular:      Rate and Rhythm: Normal rate and regular rhythm.      Heart sounds: No murmur heard.     No friction rub. No gallop.   Pulmonary:      Effort: Pulmonary effort is normal. No respiratory distress.      Breath sounds: " Normal breath sounds. No wheezing.   Abdominal:      General: Bowel sounds are normal. There is no distension.      Palpations: Abdomen is soft. There is no mass.      Tenderness: There is no abdominal tenderness. There is no guarding or rebound.   Musculoskeletal:         General: No tenderness or deformity. Normal range of motion.      Cervical back: Normal range of motion and neck supple.   Skin:     General: Skin is warm and dry.      Findings: No erythema or rash.   Neurological:      Mental Status: She is alert and oriented to person, place, and time.      Motor: No abnormal muscle tone.      Coordination: Coordination normal.   Psychiatric:         Behavior: Behavior normal.         Thought Content: Thought content normal.         Judgment: Judgment normal.           Health Maintenance         Date Due Completion Date    Pneumococcal Vaccines (Age 65+) (1 of 2 - PCV) Never done ---    TETANUS VACCINE Never done ---    Shingles Vaccine (1 of 2) Never done ---    Colorectal Cancer Screening Never done ---    RSV Vaccine (Age 60+ and Pregnant patients) (1 - 1-dose 60+ series) Never done ---    COVID-19 Vaccine (3 - Pfizer risk series) 06/11/2021 5/14/2021    Influenza Vaccine (1) 09/01/2024 ---    Lipid Panel 04/03/2025 4/3/2024    Mammogram 08/19/2025 8/19/2024    DEXA Scan 05/30/2026 5/30/2024    Hemoglobin A1c (Diabetic Prevention Screening) 04/03/2027 4/3/2024              ASSESSMENT     64 y.o. female with     1. Chronic right hip pain    2. Chronic intermittent post-traumatic headache    3. Irritation of left eye    4. Screening for colon cancer    5. Chronic anemia    6. Essential hypertension, benign        PLAN:     1. Chronic right hip pain  - Pt encouraged to apply ice packs 2-3 times daily at 10 minute intervals x 72 hours, then okay to change to heating compress with care not to burn her self; she  voiced understanding   - patient okay to continue Tylenol and ibuprofen p.r.n.  - X-Ray Lumbar Spine  AP And Lateral; Future  - X-Ray Hip 2 or 3 views Right with Pelvis when performed; Future  - X-Ray Knee 1 or 2 View Right; Future    2. Chronic intermittent post-traumatic headache  - needs further eval  - MRI Brain Without Contrast; Future    3. Irritation of left eye  - will start trial Optivar; patient advised to follow up with Optometry if no improvement and voiced understanding  - azelastine (OPTIVAR) 0.05 % ophthalmic solution; Place 1 drop into the left eye 2 (two) times daily. for 7 days  Dispense: 6 mL; Refill: 0    4. Screening for colon cancer  - Ambulatory referral/consult to Endo Procedure ; Future    5. Chronic anemia  - Stable; no acute issues    6. Essential hypertension, benign  - BP well controlled; at goal of <140/90  - The current medical regimen is effective;  continue present plan and medications.        RTC in 4 weeks for repeat assessment of current treatment plan       Marry Howard MD  08/21/2024 8:57 AM        No follow-ups on file.

## 2024-08-22 DIAGNOSIS — M47.812 FACET ARTHRITIS OF CERVICAL REGION: ICD-10-CM

## 2024-08-22 DIAGNOSIS — G89.29 CHRONIC RIGHT HIP PAIN: ICD-10-CM

## 2024-08-22 DIAGNOSIS — M25.551 CHRONIC RIGHT HIP PAIN: ICD-10-CM

## 2024-08-22 DIAGNOSIS — M51.36 DEGENERATIVE LUMBAR DISC: Primary | ICD-10-CM

## 2024-08-22 PROBLEM — M16.11 PRIMARY OSTEOARTHRITIS OF RIGHT HIP: Status: ACTIVE | Noted: 2024-08-22

## 2024-09-25 DIAGNOSIS — Z00.00 ENCOUNTER FOR MEDICARE ANNUAL WELLNESS EXAM: ICD-10-CM

## 2024-10-21 DIAGNOSIS — I10 ESSENTIAL HYPERTENSION, BENIGN: ICD-10-CM

## 2024-10-21 NOTE — TELEPHONE ENCOUNTER
Refill Routing Note   Medication(s) are not appropriate for processing by [facility] for the following reason(s):        No active prescription written by provider    [facility] action(s):  Defer               Appointments  past 12m or future 3m with PCP    Date Provider   Last Visit   8/21/2024 Marry Howard MD   Next Visit   Visit date not found Marry Howard MD   ED visits in past 90 days: 0        Note composed:5:42 PM 10/21/2024

## 2024-10-22 RX ORDER — HYDROCHLOROTHIAZIDE 25 MG/1
25 TABLET ORAL DAILY
Qty: 90 TABLET | Refills: 1 | Status: SHIPPED | OUTPATIENT
Start: 2024-10-22 | End: 2025-04-20

## 2024-10-22 RX ORDER — LISINOPRIL 40 MG/1
40 TABLET ORAL DAILY
Qty: 90 TABLET | Refills: 1 | Status: SHIPPED | OUTPATIENT
Start: 2024-10-22

## 2024-11-08 ENCOUNTER — TELEPHONE (OUTPATIENT)
Dept: ENDOSCOPY | Facility: HOSPITAL | Age: 64
End: 2024-11-08
Payer: MEDICARE

## 2024-11-08 ENCOUNTER — CLINICAL SUPPORT (OUTPATIENT)
Dept: ENDOSCOPY | Facility: HOSPITAL | Age: 64
End: 2024-11-08
Attending: INTERNAL MEDICINE
Payer: MEDICARE

## 2024-11-08 VITALS — HEIGHT: 64 IN | BODY MASS INDEX: 27.48 KG/M2 | WEIGHT: 160.94 LBS

## 2024-11-08 DIAGNOSIS — Z12.11 SCREENING FOR COLON CANCER: ICD-10-CM

## 2024-11-08 RX ORDER — SODIUM, POTASSIUM,MAG SULFATES 17.5-3.13G
1 SOLUTION, RECONSTITUTED, ORAL ORAL DAILY
Qty: 1 KIT | Refills: 0 | Status: SHIPPED | OUTPATIENT
Start: 2024-11-08 | End: 2024-11-10

## 2024-12-05 ENCOUNTER — TELEPHONE (OUTPATIENT)
Dept: ENDOSCOPY | Facility: HOSPITAL | Age: 64
End: 2024-12-05
Payer: MEDICARE

## 2024-12-05 DIAGNOSIS — Z12.11 COLON CANCER SCREENING: Primary | ICD-10-CM

## 2024-12-05 NOTE — TELEPHONE ENCOUNTER
Pt called and requested new prep medication due to cost. PEG Rx sent and updated inst sent to portal.

## 2024-12-12 ENCOUNTER — HOSPITAL ENCOUNTER (OUTPATIENT)
Dept: RADIOLOGY | Facility: HOSPITAL | Age: 64
Discharge: HOME | End: 2024-12-12
Attending: NURSE PRACTITIONER
Payer: MEDICARE

## 2024-12-12 ENCOUNTER — OFFICE VISIT (OUTPATIENT)
Dept: FAMILY MEDICINE | Facility: CLINIC | Age: 64
End: 2024-12-12
Payer: MEDICARE

## 2024-12-12 VITALS
RESPIRATION RATE: 16 BRPM | TEMPERATURE: 99 F | DIASTOLIC BLOOD PRESSURE: 80 MMHG | SYSTOLIC BLOOD PRESSURE: 138 MMHG | HEIGHT: 64 IN | BODY MASS INDEX: 27.13 KG/M2 | OXYGEN SATURATION: 97 % | HEART RATE: 79 BPM | WEIGHT: 158.94 LBS

## 2024-12-12 DIAGNOSIS — M76.32 ILIOTIBIAL BAND SYNDROME OF LEFT SIDE: ICD-10-CM

## 2024-12-12 DIAGNOSIS — M70.62 GREATER TROCHANTERIC BURSITIS OF LEFT HIP: Primary | ICD-10-CM

## 2024-12-12 DIAGNOSIS — M70.62 GREATER TROCHANTERIC BURSITIS OF LEFT HIP: ICD-10-CM

## 2024-12-12 PROCEDURE — 99999 PR PBB SHADOW E&M-EST. PATIENT-LVL V: CPT | Mod: PBBFAC,,, | Performed by: NURSE PRACTITIONER

## 2024-12-12 PROCEDURE — 73502 X-RAY EXAM HIP UNI 2-3 VIEWS: CPT | Mod: TC,FY,LT

## 2024-12-12 PROCEDURE — 1159F MED LIST DOCD IN RCRD: CPT | Mod: CPTII,S$GLB,, | Performed by: NURSE PRACTITIONER

## 2024-12-12 PROCEDURE — 3044F HG A1C LEVEL LT 7.0%: CPT | Mod: CPTII,S$GLB,, | Performed by: NURSE PRACTITIONER

## 2024-12-12 PROCEDURE — 1160F RVW MEDS BY RX/DR IN RCRD: CPT | Mod: CPTII,S$GLB,, | Performed by: NURSE PRACTITIONER

## 2024-12-12 PROCEDURE — 3079F DIAST BP 80-89 MM HG: CPT | Mod: CPTII,S$GLB,, | Performed by: NURSE PRACTITIONER

## 2024-12-12 PROCEDURE — 3075F SYST BP GE 130 - 139MM HG: CPT | Mod: CPTII,S$GLB,, | Performed by: NURSE PRACTITIONER

## 2024-12-12 PROCEDURE — 73564 X-RAY EXAM KNEE 4 OR MORE: CPT | Mod: 26,LT,, | Performed by: RADIOLOGY

## 2024-12-12 PROCEDURE — 1100F PTFALLS ASSESS-DOCD GE2>/YR: CPT | Mod: CPTII,S$GLB,, | Performed by: NURSE PRACTITIONER

## 2024-12-12 PROCEDURE — 3008F BODY MASS INDEX DOCD: CPT | Mod: CPTII,S$GLB,, | Performed by: NURSE PRACTITIONER

## 2024-12-12 PROCEDURE — 99214 OFFICE O/P EST MOD 30 MIN: CPT | Mod: S$GLB,,, | Performed by: NURSE PRACTITIONER

## 2024-12-12 PROCEDURE — 3288F FALL RISK ASSESSMENT DOCD: CPT | Mod: CPTII,S$GLB,, | Performed by: NURSE PRACTITIONER

## 2024-12-12 PROCEDURE — 73502 X-RAY EXAM HIP UNI 2-3 VIEWS: CPT | Mod: 26,LT,, | Performed by: RADIOLOGY

## 2024-12-12 PROCEDURE — 73564 X-RAY EXAM KNEE 4 OR MORE: CPT | Mod: TC,FY,LT

## 2024-12-12 PROCEDURE — 4010F ACE/ARB THERAPY RXD/TAKEN: CPT | Mod: CPTII,S$GLB,, | Performed by: NURSE PRACTITIONER

## 2024-12-12 RX ORDER — METHYLPREDNISOLONE 4 MG/1
TABLET ORAL
Qty: 21 EACH | Refills: 0 | Status: SHIPPED | OUTPATIENT
Start: 2024-12-12 | End: 2025-01-02

## 2024-12-12 RX ORDER — TIZANIDINE 4 MG/1
4 TABLET ORAL
Qty: 45 TABLET | Refills: 0 | Status: SHIPPED | OUTPATIENT
Start: 2024-12-12

## 2024-12-13 ENCOUNTER — PATIENT MESSAGE (OUTPATIENT)
Dept: FAMILY MEDICINE | Facility: CLINIC | Age: 64
End: 2024-12-13
Payer: MEDICARE

## 2024-12-17 ENCOUNTER — PATIENT MESSAGE (OUTPATIENT)
Dept: ENDOSCOPY | Facility: HOSPITAL | Age: 64
End: 2024-12-17
Payer: MEDICARE

## 2024-12-25 NOTE — PROGRESS NOTES
"Subjective:      Patient ID: Erma Linda is a 64 y.o. female.  Pt returns to clinic with persistently worsening left hip pain radiating to left knee and groin. States she is attempting to get healthy. She has been walking at outdoor track and exercising in gym. She also has a  and lifts low weight with assistance. States she is unable to turn or lie on her left side and side stepping makes pain worse. Pain not improving with rest      Review of Systems   Constitutional:  Negative for activity change, appetite change, fatigue, fever, night sweats and unexpected weight change.   Respiratory:  Negative for chest tightness and shortness of breath.    Cardiovascular:  Positive for claudication. Negative for chest pain, palpitations and leg swelling.   Gastrointestinal:  Negative for abdominal pain, change in bowel habit, constipation, diarrhea, nausea and vomiting.   Genitourinary:  Negative for difficulty urinating and dysuria.   Musculoskeletal:  Positive for arthralgias, gait problem, joint swelling, leg pain and myalgias. Negative for back pain, neck pain, neck stiffness and joint deformity.   Integumentary:  Negative for rash.   Neurological:  Negative for weakness and headaches.   Psychiatric/Behavioral: Negative.     All other systems reviewed and are negative.        Objective:     Vitals:    12/12/24 1332   BP: 138/80   Pulse: 79   Resp: 16   Temp: 98.7 °F (37.1 °C)   TempSrc: Oral   SpO2: 97%   Weight: 72.1 kg (158 lb 15.2 oz)   Height: 5' 4" (1.626 m)     Physical Exam  Vitals and nursing note reviewed.   Constitutional:       General: She is not in acute distress.     Appearance: Normal appearance. She is well-developed and well-groomed. She is not ill-appearing.   HENT:      Head: Normocephalic and atraumatic.      Right Ear: External ear normal.      Left Ear: External ear normal.      Nose: Nose normal.      Mouth/Throat:      Lips: Pink.      Mouth: Mucous membranes are moist. "   Eyes:      General: Lids are normal. Vision grossly intact. Gaze aligned appropriately.      Conjunctiva/sclera: Conjunctivae normal.      Pupils: Pupils are equal, round, and reactive to light.   Neck:      Trachea: Phonation normal.   Cardiovascular:      Rate and Rhythm: Normal rate and regular rhythm.      Heart sounds: Normal heart sounds.   Pulmonary:      Effort: Pulmonary effort is normal. No accessory muscle usage or respiratory distress.      Breath sounds: Normal breath sounds and air entry.   Abdominal:      General: Abdomen is flat. Bowel sounds are normal. There is no distension.      Palpations: Abdomen is soft.      Tenderness: There is no abdominal tenderness.   Musculoskeletal:      Cervical back: Neck supple.      Lumbar back: Spasms and tenderness present. No swelling, edema, deformity, signs of trauma, lacerations or bony tenderness. Normal range of motion. Negative right straight leg raise test and negative left straight leg raise test. No scoliosis.      Right hip: Normal.      Left hip: Tenderness, bony tenderness and crepitus present. No deformity or lacerations. Normal range of motion. Normal strength.      Right upper leg: Normal.      Left upper leg: Normal.      Right knee: Normal.      Left knee: Swelling present. No deformity, effusion, erythema, ecchymosis, lacerations, bony tenderness or crepitus. Normal range of motion. Tenderness present over the lateral joint line and patellar tendon.      Right lower leg: Normal. No edema.      Left lower leg: Normal. No edema.        Legs:    Skin:     General: Skin is warm and dry.      Findings: No rash.   Neurological:      General: No focal deficit present.      Mental Status: She is alert and oriented to person, place, and time. Mental status is at baseline.      Sensory: Sensation is intact.      Motor: Motor function is intact.      Coordination: Coordination is intact.      Gait: Gait abnormal (antalgic limp).   Psychiatric:          Attention and Perception: Attention and perception normal.         Mood and Affect: Mood and affect normal.         Speech: Speech normal.         Behavior: Behavior normal. Behavior is cooperative.         Thought Content: Thought content normal.         Cognition and Memory: Cognition and memory normal.         Judgment: Judgment normal.       EXAMINATION: XR HIP WITH PELVIS WHEN PERFORMED 2 OR 3 VIEWS LEFT  FINDINGS:  Left hip joint space is fairly well maintained.  No fracture.  No marrow replacement process.  No evidence of calcific tendinosis.  Osteitis pubis noted.    EXAMINATION: XR KNEE COMP 4 OR MORE VIEWS LEFT  FINDINGS:  The joint spaces are fairly well maintained.  No fracture.  No marrow replacement process.  Alignment anatomic.  Mild enthesopathy at the quadriceps tendon insertion.    Assessment and Plan:     1. Greater trochanteric bursitis of left hip (Primary)  Natural history and expected course discussed. Questions answered.  Proper lifting, bending technique discussed.  Stretching exercises discussed.  Regular aerobic and trunk strengthening exercises discussed.  Ice to affected area as needed for local pain relief.  Heat to affected area as needed for local pain relief.  NSAIDs per medication orders.  OTC analgesics as needed.  Muscle relaxants per medication orders.  Images  now and f/u with ortho for further management   - methylPREDNISolone (MEDROL DOSEPACK) 4 mg tablet; use as directed  Dispense: 21 each; Refill: 0  - tiZANidine (ZANAFLEX) 4 MG tablet; Take 1 tablet (4 mg total) by mouth every 6 to 8 hours as needed.  Dispense: 45 tablet; Refill: 0    2. Iliotibial band syndrome of left side  apply ice packs, referral to Orthopedics for this injury, prescription for muscle relaxant, prescription for NSAID given, referral to Physical Therapy, patient given copy of X-Ray  Home exercises discussed.  - X-Ray Knee Complete 4 or More Views Left; Future  - methylPREDNISolone (MEDROL DOSEPACK) 4 mg  tablet; use as directed  Dispense: 21 each; Refill: 0  - tiZANidine (ZANAFLEX) 4 MG tablet; Take 1 tablet (4 mg total) by mouth every 6 to 8 hours as needed.  Dispense: 45 tablet; Refill: 0           JING El, FNP-C  Family/Internal Medicine  [facility]

## 2025-01-21 ENCOUNTER — TELEPHONE (OUTPATIENT)
Dept: ENDOSCOPY | Facility: HOSPITAL | Age: 65
End: 2025-01-21
Payer: MEDICARE

## 2025-01-21 NOTE — TELEPHONE ENCOUNTER
Endoscopy unit  will be closed on 1/22/25 due to weather circumstances. Pt notified and verbalized understanding. Endoscopy scheduling team to contact Pt to reschedule endoscopy procedure.

## 2025-02-04 ENCOUNTER — TELEPHONE (OUTPATIENT)
Dept: ENDOSCOPY | Facility: HOSPITAL | Age: 65
End: 2025-02-04
Payer: MEDICARE

## 2025-02-04 NOTE — TELEPHONE ENCOUNTER
.Discharge Instructions:    Test performed today: Esophageal Motility Study   (also known as Esophageal Manometry)    Doctor: Dr. VANDA Ramsey [phone]      Activity: You may resume your normal activities.    Diet: No changes    Medication: You may resume all your normal medications.     Call your doctor if you:  · Have symptoms that are not \"normal\" for you  · Chills or fever    Call 911 if you have trouble breathing or chest pain.    Referrals:    Additional Instructions: Throat and nose may feel numb for the next hour. Be mindful of blowing your nose.    Thank you for choosing [facility].   Contacted the patient to reschedule Colonoscopy. The patient did not answer the call. Voice message left requesting a call back and portal message sent.

## 2025-02-14 ENCOUNTER — TELEPHONE (OUTPATIENT)
Dept: ENDOSCOPY | Facility: HOSPITAL | Age: 65
End: 2025-02-14
Payer: MEDICARE

## 2025-02-14 NOTE — TELEPHONE ENCOUNTER
Patient rescheduled due to weather cancellation.  Original referral for procedure from PAT appointment    Spoke to Erma Linda to reschedule Colonoscopy       Physician to perform procedure(s) Dr. JAN Adame  Date of Procedure (s) 2/21/25  Arrival Time 9:40 AM  Time of Procedure(s) 10:40 AM   Location of Procedure(s) [place] 4th Floor  Type of Rx Prep patient already received from pharmacy: PEG  Instructions provided to patient via [facility]  Patient denies use of blood thinners, GLP-1 medications, and weight loss medications.  The following information was discussed with patient, and patient verbalized understanding:  Screening questionnaire reviewed with patient and complete. If procedure requires anesthesia, a responsible adult needs to be present to accompany the patient home. Appointment details are tentative, especially check-in time. Patient will receive a pre-op call 7 days prior to appointment to confirm check-in time for procedure. If applicable the patient should contact their pharmacy to verify Rx for procedure prep is ready for pick-up. Patient was instructed to call the scheduling department at [phone] if pharmacy states no Rx is available. Patient was also advised to call the endoscopy scheduling department if any questions or concerns arise.      [facility] Scheduling Department

## 2025-02-19 ENCOUNTER — PATIENT MESSAGE (OUTPATIENT)
Dept: ENDOSCOPY | Facility: HOSPITAL | Age: 65
End: 2025-02-19
Payer: MEDICARE

## 2025-02-24 DIAGNOSIS — Z00.00 ENCOUNTER FOR MEDICARE ANNUAL WELLNESS EXAM: ICD-10-CM

## 2025-03-18 DIAGNOSIS — Z12.12 SCREENING FOR COLORECTAL CANCER: Primary | ICD-10-CM

## 2025-03-18 DIAGNOSIS — Z12.11 SCREENING FOR COLORECTAL CANCER: Primary | ICD-10-CM

## 2025-03-20 ENCOUNTER — TELEPHONE (OUTPATIENT)
Dept: ENDOSCOPY | Facility: HOSPITAL | Age: 65
End: 2025-03-20

## 2025-03-20 ENCOUNTER — CLINICAL SUPPORT (OUTPATIENT)
Dept: ENDOSCOPY | Facility: HOSPITAL | Age: 65
End: 2025-03-20
Attending: INTERNAL MEDICINE
Payer: MEDICARE

## 2025-03-20 VITALS — BODY MASS INDEX: 26.98 KG/M2 | HEIGHT: 64 IN | WEIGHT: 158 LBS

## 2025-03-20 DIAGNOSIS — Z12.11 SCREENING FOR COLORECTAL CANCER: ICD-10-CM

## 2025-03-20 DIAGNOSIS — Z12.12 SCREENING FOR COLORECTAL CANCER: ICD-10-CM

## 2025-03-20 NOTE — TELEPHONE ENCOUNTER
Referral for procedure from PAT appointment      Spoke to patient to schedule procedure(s) Colonoscopy       Physician to perform procedure(s) Dr. VENKATA Velásquez  Date of Procedure (s) 3/24/25  Arrival Time 12:50 PM  Time of Procedure(s) 1:50 PM   Location of Procedure(s) [place] 4th Floor  Type of Rx Prep sent to patient: PEG  Instructions provided to patient via [facility]    Patient was informed on the following information and verbalized understanding. Screening questionnaire reviewed with patient and complete. If procedure requires anesthesia, a responsible adult needs to be present to accompany the patient home, patient cannot drive after receiving anesthesia. Appointment details are tentative, especially check-in time. Patient will receive a prep-op call 7 days prior to confirm check-in time for procedure. If applicable the patient should contact their pharmacy to verify Rx for procedure prep is ready for pick-up. Patient was advised to call the scheduling department at [phone] if pharmacy states no Rx is available. Patient was advised to call the endoscopy scheduling department if any questions or concerns arise.      [facility]oscopy Scheduling Department

## 2025-03-24 ENCOUNTER — ANESTHESIA (OUTPATIENT)
Dept: ENDOSCOPY | Facility: HOSPITAL | Age: 65
End: 2025-03-24
Payer: MEDICARE

## 2025-03-24 ENCOUNTER — ANESTHESIA EVENT (OUTPATIENT)
Dept: ENDOSCOPY | Facility: HOSPITAL | Age: 65
End: 2025-03-24
Payer: MEDICARE

## 2025-03-24 ENCOUNTER — HOSPITAL ENCOUNTER (OUTPATIENT)
Facility: HOSPITAL | Age: 65
Discharge: HOME | End: 2025-03-24
Attending: INTERNAL MEDICINE | Admitting: INTERNAL MEDICINE
Payer: MEDICARE

## 2025-03-24 VITALS
WEIGHT: 156.31 LBS | OXYGEN SATURATION: 100 % | RESPIRATION RATE: 16 BRPM | BODY MASS INDEX: 26.69 KG/M2 | HEART RATE: 68 BPM | DIASTOLIC BLOOD PRESSURE: 78 MMHG | HEIGHT: 64 IN | TEMPERATURE: 98 F | SYSTOLIC BLOOD PRESSURE: 175 MMHG

## 2025-03-24 DIAGNOSIS — Z12.11 SCREENING FOR COLORECTAL CANCER: ICD-10-CM

## 2025-03-24 DIAGNOSIS — Z12.11 SCREENING FOR COLON CANCER: ICD-10-CM

## 2025-03-24 DIAGNOSIS — Z12.12 SCREENING FOR COLORECTAL CANCER: ICD-10-CM

## 2025-03-24 PROCEDURE — 37000008 HC ANESTHESIA 1ST 15 MINUTES: Performed by: INTERNAL MEDICINE

## 2025-03-24 PROCEDURE — 45380 COLONOSCOPY AND BIOPSY: CPT | Mod: PT,,, | Performed by: INTERNAL MEDICINE

## 2025-03-24 PROCEDURE — 88305 TISSUE EXAM BY PATHOLOGIST: CPT | Mod: 26,,, | Performed by: PATHOLOGY

## 2025-03-24 PROCEDURE — 45380 COLONOSCOPY AND BIOPSY: CPT | Performed by: INTERNAL MEDICINE

## 2025-03-24 PROCEDURE — 25000003 PHARM REV CODE 250

## 2025-03-24 PROCEDURE — 37000009 HC ANESTHESIA EA ADD 15 MINS: Performed by: INTERNAL MEDICINE

## 2025-03-24 PROCEDURE — 27201012 HC FORCEPS, HOT/COLD, DISP: Performed by: INTERNAL MEDICINE

## 2025-03-24 PROCEDURE — 63600175 PHARM REV CODE 636 W HCPCS

## 2025-03-24 PROCEDURE — 88305 TISSUE EXAM BY PATHOLOGIST: CPT | Mod: TC | Performed by: INTERNAL MEDICINE

## 2025-03-24 RX ORDER — LIDOCAINE HYDROCHLORIDE 20 MG/ML
INJECTION INTRAVENOUS
Status: DISCONTINUED | OUTPATIENT
Start: 2025-03-24 | End: 2025-03-24

## 2025-03-24 RX ORDER — SODIUM CHLORIDE 9 MG/ML
INJECTION, SOLUTION INTRAVENOUS CONTINUOUS
Status: DISCONTINUED | OUTPATIENT
Start: 2025-03-24 | End: 2025-03-24 | Stop reason: HOSPADM

## 2025-03-24 RX ORDER — PROPOFOL 10 MG/ML
VIAL (ML) INTRAVENOUS
Status: DISCONTINUED | OUTPATIENT
Start: 2025-03-24 | End: 2025-03-24

## 2025-03-24 RX ADMIN — SODIUM CHLORIDE: 9 INJECTION, SOLUTION INTRAVENOUS at 01:03

## 2025-03-24 RX ADMIN — LIDOCAINE HYDROCHLORIDE 100 MG: 20 INJECTION INTRAVENOUS at 01:03

## 2025-03-24 RX ADMIN — PROPOFOL 80 MG: 10 INJECTION, EMULSION INTRAVENOUS at 01:03

## 2025-03-24 RX ADMIN — PROPOFOL 175 MCG/KG/MIN: 10 INJECTION, EMULSION INTRAVENOUS at 01:03

## 2025-03-24 NOTE — PROVATION PATIENT INSTRUCTIONS
Discharge Summary/Instructions after an Endoscopic Procedure  Patient Name: Erma Linda  Patient MRN: [id number]  Patient YOB: 1960  Monday, March 24, 2025  German Velásquez MD  Dear patient,  As a result of recent federal legislation (The Federal Cures Act), you may   receive lab or pathology results from your procedure in your [facility]   account before your physician is able to contact you. Your physician or   their representative will relay the results to you with their   recommendations at their soonest availability.  Thank you,  RESTRICTIONS:  During your procedure today, you received medications for sedation.  These   medications may affect your judgment, balance and coordination.  Therefore,   for 24 hours, you have the following restrictions:   - DO NOT drive a car, operate machinery, make legal/financial decisions,   sign important papers or drink alcohol.    ACTIVITY:  Today: no heavy lifting, straining or running due to procedural   sedation/anesthesia.  The following day: return to full activity including work.  DIET:  Eat and drink normally unless instructed otherwise.     TREATMENT FOR COMMON SIDE EFFECTS:  - Mild abdominal pain, nausea, belching, bloating or excessive gas:  rest,   eat lightly and use a heating pad.  - Sore Throat: treat with throat lozenges and/or gargle with warm salt   water.  - Because air was used during the procedure, expelling large amounts of air   from your rectum or belching is normal.  - If a bowel prep was taken, you may not have a bowel movement for 1-3 days.    This is normal.  SYMPTOMS TO WATCH FOR AND REPORT TO YOUR PHYSICIAN:  1. Abdominal pain or bloating, other than gas cramps.  2. Chest pain.  3. Back pain.  4. Signs of infection such as: chills or fever occurring within 24 hours   after the procedure.  5. Rectal bleeding, which would show as bright red, maroon, or black stools.   (A tablespoon of blood from the rectum is not serious, especially if    hemorrhoids are present.)  6. Vomiting.  7. Weakness or dizziness.  GO DIRECTLY TO THE NEAREST EMERGENCY ROOM IF YOU HAVE ANY OF THE FOLLOWING:      Difficulty breathing              Chills and/or fever over 101 F   Persistent vomiting and/or vomiting blood   Severe abdominal pain   Severe chest pain   Black, tarry stools   Bleeding- more than one tablespoon   Any other symptom or condition that you feel may need urgent attention  Your doctor recommends these additional instructions:  If any biopsies were taken, your doctors clinic will contact you in 1 to 2   weeks with any results.  - Discharge patient to home.   - Resume previous diet today.   - Continue present medications.   - Await pathology results.   - Repeat colonoscopy in 7-10 years for surveillance based on pathology   results.   - Return to referring physician as previously scheduled.   - Patient has a contact number available for emergencies.  The signs and   symptoms of potential delayed complications were discussed with the   patient.  Return to normal activities tomorrow.  Written discharge   instructions were provided to the patient.  For questions, problems or results please call your physician - German Velásquez MD at Work:  [phone].  [facility], EMERGENCY ROOM PHONE NUMBER: [phone]  IF A COMPLICATION OR EMERGENCY SITUATION ARISES AND YOU ARE UNABLE TO REACH   YOUR PHYSICIAN - GO DIRECTLY TO THE EMERGENCY ROOM.  German Velásquez MD  3/24/2025 2:20:56 PM  This report has been verified and signed electronically.  Dear patient,  As a result of recent federal legislation (The Federal Cures Act), you may   receive lab or pathology results from your procedure in your [facility]   account before your physician is able to contact you. Your physician or   their representative will relay the results to you with their   recommendations at their soonest availability.  Thank you,  [employer]ATION

## 2025-03-24 NOTE — TRANSFER OF CARE
"Anesthesia Transfer of Care Note    Patient: Erma Linda    Procedure(s) Performed: Procedure(s) (LRB):  COLONOSCOPY, SCREENING, LOW RISK PATIENT (N/A)    Patient location: GI    Anesthesia Type: general    Transport from OR: Transported from OR on room air with adequate spontaneous ventilation    Post pain: adequate analgesia    Post assessment: no apparent anesthetic complications and tolerated procedure well    Post vital signs: stable    Level of consciousness: awake    Nausea/Vomiting: no nausea/vomiting    Complications: none    Transfer of care protocol was followed      Last vitals: Visit Vitals  Pulse 93   Ht 5' 4" (1.626 m)   Wt 70.9 kg (156 lb 4.9 oz)   Breastfeeding No   BMI 26.83 kg/m²     "

## 2025-03-24 NOTE — ANESTHESIA PREPROCEDURE EVALUATION
2025  Erma Linda is a 65 y.o., female.    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 OR;  Service: General;  Laterality: Left;    BREAST MASS EXCISION Left 2022    Procedure: RE- EXCISION,  BREAST;  Surgeon: CHRISTI Delong MD;  Location: [facility];  Service: General;  Laterality: Left;    BREAST SURGERY  2022    lumpectomy     SECTION, CLASSIC      INJECTION FOR SENTINEL NODE IDENTIFICATION Left 2022    Procedure: INJECTION, FOR SENTINEL NODE IDENTIFICATION;  Surgeon: CHRISTI Delong MD;  Location: [facility];  Service: General;  Laterality: Left;    KNEE ARTHROSCOPY W/ LASER  2000    R    MASTECTOMY, PARTIAL Left 2022    Procedure: MASTECTOMY, PARTIAL LEFT with radiological marker;  Surgeon: CHRISTI Delong MD;  Location: [facility];  Service: General;  Laterality: Left;  EMAIL SENT  @ 10:20 PER LK    SENTINEL LYMPH NODE BIOPSY Left 2022    Procedure: BIOPSY, LYMPH NODE, SENTINEL;  Surgeon: CHRISTI Delong MD;  Location: [facility];  Service: General;  Laterality: Left;    TUBAL LIGATION       Results for orders placed or performed during the hospital encounter of 23   EKG 12-lead    Collection Time: 23 12:50 PM    Narrative    Test Reason :  R07.89,    Vent. Rate : 100 BPM     Atrial Rate : 100 BPM     P-R Int : 132 ms          QRS Dur : 114 ms      QT Int : 392 ms       P-R-T Axes : 055 086 029 degrees     QTc Int : 505 ms    Normal sinus rhythm  Possible Left atrial enlargement  Right bundle branch block  Abnormal ECG  When compared with ECG of 11-MAY-2022 09:35,  T wave inversion more evident in Anterior leads  Confirmed by TERRA ARAGON MD ([id number]) on 4/6/2023 1:26:49 PM    Referred By:             Confirmed By:TERRA ARAGON MD     No echocardiogram results found for the past 12 months    The left ventricle is normal in size with normal systolic function.  Trivial posterior pericardial effusion.  The estimated ejection fraction is 65%.  Grade I left ventricular diastolic dysfunction.  Normal right ventricular size with normal right ventricular systolic function.  Normal central venous pressure (3 mmHg).  There is no significant tricuspid regurgitation and therefore the pulmonary artery systolic pressure cannot be reported.      Pre-op Assessment    I have reviewed the Patient Summary Reports.    I have reviewed the NPO Status.   I have reviewed the Medications.     Review of Systems  Anesthesia Hx:  No problems with previous Anesthesia                Social:  Non-Smoker       Hematology/Oncology:  Hematology Normal   Oncology Normal                                   EENT/Dental:  EENT/Dental Normal           Cardiovascular:  Exercise tolerance: good   Hypertension                                          Pulmonary:  Pulmonary Normal                       Hepatic/GI:      Liver Disease, Hepatitis              Musculoskeletal:  Arthritis               Neurological:  Neurology Normal                                      Endocrine:  Endocrine Normal            Dermatological:  Skin Normal    Psych:  Psychiatric Normal                    Physical Exam  General: Well nourished, Cooperative, Alert and Oriented    Airway:  Mallampati: II   Mouth  Opening: Normal  TM Distance: Normal  Tongue: Normal  Neck ROM: Normal ROM    Dental:  Intact, Dentures        Anesthesia Plan  Type of Anesthesia, risks & benefits discussed:    Anesthesia Type: Gen Natural Airway  Intra-op Monitoring Plan: Standard ASA Monitors  Induction:  IV  ASA Score: 2  Day of Surgery Review of History & Physical: H&P Update referred to the surgeon/provider.I have interviewed and examined the patient. I have reviewed the patient's H&P dated:     Ready For Surgery From Anesthesia Perspective.     .

## 2025-03-24 NOTE — H&P
Short Stay Endoscopy History and Physical    PCP - Marry Howard MD    Procedure - Colonoscopy  ASA - 2  Mallampati - per anesthesia  History of Anesthesia problems - no  Family history Anesthesia problems -  no     HPI:  This is a 65 y.o. female here for evaluation of :     Average Risk Screening: yes  High risk screening: No  History of polyps: No  Anemia: No  Blood in stools: No  Diarrhea: No  Abdominal Pain: No    Review of Systems:  CONSTITUTIONAL: Denies weight change,  fatigue, fevers, chills, night sweats.  CARDIOVASCULAR: Denies chest pain, shortness of breath, orthopnea and edema.  RESPIRATORY: Denies cough, hemoptysis, dyspnea, and wheezing.  GI: See HPI.    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    Cancer     breast    Chronic anemia 2024    History of hepatitis C; S/p RX with SVR 12 documented 2018    Hypertension     Nuclear sclerosis of both eyes 2021       Surgical History:   Past Surgical History:   Procedure Laterality Date    BREAST BIOPSY Left 2022    Procedure: BIOPSY, BREAST / excisional biopsy left;  Surgeon: CHRISTI Delong MD;  Location: [facility];  Service: General;  Laterality: Left;    BREAST MASS EXCISION Left 2022    Procedure: RE- EXCISION,  BREAST;  Surgeon: CHRISTI Delong MD;  Location: [facility];  Service: General;  Laterality: Left;    BREAST SURGERY  2022    lumpectomy     SECTION, CLASSIC      INJECTION FOR SENTINEL NODE IDENTIFICATION Left 2022    Procedure: INJECTION, FOR SENTINEL NODE IDENTIFICATION;  Surgeon: CHRISTI Delong MD;  Location: [facility];  Service: General;  Laterality: Left;    KNEE ARTHROSCOPY W/ LASER  2000    R    MASTECTOMY, PARTIAL Left 2022     Procedure: MASTECTOMY, PARTIAL LEFT with radiological marker;  Surgeon: CHRISTI Delong MD;  Location: [facility];  Service: General;  Laterality: Left;  EMAIL SENT 6-14 @ 10:20 PER LK    SENTINEL LYMPH NODE BIOPSY Left 6/16/2022    Procedure: BIOPSY, LYMPH NODE, SENTINEL;  Surgeon: CHRISTI Delong MD;  Location: [facility];  Service: General;  Laterality: Left;    TUBAL LIGATION         Family History:   Family History   Problem Relation Name Age of Onset    Heart disease Mother      No Known Problems Father      No Known Problems Sister      No Known Problems Brother      No Known Problems Maternal Aunt      No Known Problems Maternal Uncle      No Known Problems Paternal Aunt      No Known Problems Paternal Uncle      No Known Problems Maternal Grandmother      No Known Problems Maternal Grandfather      No Known Problems Paternal Grandmother      No Known Problems Paternal Grandfather      Glaucoma Neg Hx         Social History:   Social History[1]    Allergies: Reviewed.    Medications:  Medications Ordered Prior to Encounter[2]    Physical Exam:  Vital Signs:   Vitals:    03/24/25 1320   Pulse: 93     General Appearance: Well appearing in no acute distress  ENT: OP clear  Chest: CTA B  CV: RRR, no m/r/g  Abd: s/nt/nd/nabs  Ext: no edema    Labs:  Reviewed    IMPRESSION:  Screening    Plan:  I have explained the risks and benefits of colonoscopy to the patient including but not limited to bleeding, perforation, infection, and death. The patient wishes to proceed with colonoscopy.         [1]   Social History  Tobacco Use    Smoking status: Never    Smokeless tobacco: Never   Substance Use Topics    Alcohol use: No    Drug use: No   [2]   No current facility-administered medications on file prior to encounter.     Current Outpatient Medications on File Prior to Encounter   Medication Sig Dispense Refill    acetaminophen (TYLENOL) 500 MG tablet Take 500 mg by mouth daily as needed for Pain.      amLODIPine (NORVASC)  10 MG tablet Take 1 tablet (10 mg total) by mouth once daily. 90 tablet 1    cyanocobalamin (VITAMIN B-12) 100 MCG tablet Take 500 mcg by mouth once daily.      ferrous fum-vit C-vit B12--60-0.01-1 mg Cap Take 1 Capful by mouth Daily. 90 each 3    hydroCHLOROthiazide (HYDRODIURIL) 25 MG tablet Take 1 tablet (25 mg total) by mouth once daily. 90 tablet 1    ibuprofen (ADVIL,MOTRIN) 800 MG tablet Take 1 tablet (800 mg total) by mouth 3 (three) times daily as needed for Pain (TAKE WITH MEALS). 30 tablet 2    lisinopriL (PRINIVIL,ZESTRIL) 40 MG tablet Take 1 tablet (40 mg total) by mouth once daily. 90 tablet 1    MAGNESIUM ORAL Take 1 tablet by mouth once daily.      multivitamin (THERAGRAN) per tablet Take 1 tablet by mouth once daily.      omega-3 fatty acids/fish oil (FISH OIL-OMEGA-3 FATTY ACIDS) 300-1,000 mg capsule Take 1 capsule by mouth once daily.      tiZANidine (ZANAFLEX) 4 MG tablet Take 1 tablet (4 mg total) by mouth every 6 to 8 hours as needed. 45 tablet 0    vitamin D (VITAMIN D3) 1000 units Tab Take 1,000 Units by mouth once daily.      azelastine (OPTIVAR) 0.05 % ophthalmic solution Place 1 drop into the left eye 2 (two) times daily. for 7 days 6 mL 0

## 2025-03-24 NOTE — ANESTHESIA POSTPROCEDURE EVALUATION
Anesthesia Post Evaluation    Patient: Erma Linda    Procedure(s) Performed: Procedure(s) (LRB):  COLONOSCOPY, SCREENING, LOW RISK PATIENT (N/A)    Final Anesthesia Type: general      Patient location during evaluation: GI PACU  Patient participation: Yes- Able to Participate  Level of consciousness: awake and alert  Post-procedure vital signs: reviewed and stable  Pain management: adequate  Airway patency: patent    PONV status at discharge: No PONV  Anesthetic complications: no      Cardiovascular status: blood pressure returned to baseline  Respiratory status: unassisted  Hydration status: euvolemic  Follow-up not needed.          Vitals Value Taken Time   /78 03/24/25 14:55   Temp 36.5 °C (97.7 °F) 03/24/25 14:25   Pulse 68 03/24/25 14:55   Resp 16 03/24/25 14:55   SpO2 100 % 03/24/25 14:55         Event Time   Out of Recovery 14:58:47         Pain/Carroll Score: Carroll Score: 10 (3/24/2025  2:55 PM)

## 2025-03-25 ENCOUNTER — RESULTS FOLLOW-UP (OUTPATIENT)
Dept: FAMILY MEDICINE | Facility: CLINIC | Age: 65
End: 2025-03-25

## 2025-03-25 ENCOUNTER — PATIENT OUTREACH (OUTPATIENT)
Dept: ADMINISTRATIVE | Facility: HOSPITAL | Age: 65
End: 2025-03-25
Payer: MEDICARE

## 2025-03-25 ENCOUNTER — OFFICE VISIT (OUTPATIENT)
Dept: FAMILY MEDICINE | Facility: CLINIC | Age: 65
End: 2025-03-25
Payer: MEDICARE

## 2025-03-25 VITALS
HEART RATE: 74 BPM | TEMPERATURE: 98 F | WEIGHT: 156.5 LBS | BODY MASS INDEX: 26.72 KG/M2 | HEIGHT: 64 IN | DIASTOLIC BLOOD PRESSURE: 88 MMHG | OXYGEN SATURATION: 98 % | RESPIRATION RATE: 19 BRPM | SYSTOLIC BLOOD PRESSURE: 136 MMHG

## 2025-03-25 DIAGNOSIS — Z00.00 ENCOUNTER FOR MEDICARE ANNUAL WELLNESS EXAM: ICD-10-CM

## 2025-03-25 DIAGNOSIS — D64.9 CHRONIC ANEMIA: ICD-10-CM

## 2025-03-25 DIAGNOSIS — E21.0 PRIMARY HYPERPARATHYROIDISM: Primary | ICD-10-CM

## 2025-03-25 DIAGNOSIS — M81.0 AGE-RELATED OSTEOPOROSIS WITHOUT CURRENT PATHOLOGICAL FRACTURE: ICD-10-CM

## 2025-03-25 DIAGNOSIS — M12.9 ARTHRITIS, MULTIPLE JOINT INVOLVEMENT: ICD-10-CM

## 2025-03-25 DIAGNOSIS — E55.9 VITAMIN D INSUFFICIENCY: ICD-10-CM

## 2025-03-25 DIAGNOSIS — I10 ESSENTIAL HYPERTENSION, BENIGN: ICD-10-CM

## 2025-03-25 DIAGNOSIS — Z86.19 HISTORY OF HEPATITIS C: ICD-10-CM

## 2025-03-25 PROBLEM — C50.412 MALIGNANT NEOPLASM OF UPPER-OUTER QUADRANT OF LEFT BREAST IN FEMALE, ESTROGEN RECEPTOR POSITIVE: Status: RESOLVED | Noted: 2022-03-21 | Resolved: 2025-03-25

## 2025-03-25 PROBLEM — Z17.0 MALIGNANT NEOPLASM OF UPPER-OUTER QUADRANT OF LEFT BREAST IN FEMALE, ESTROGEN RECEPTOR POSITIVE: Status: RESOLVED | Noted: 2022-03-21 | Resolved: 2025-03-25

## 2025-03-25 PROCEDURE — 99999 PR PBB SHADOW E&M-EST. PATIENT-LVL V: CPT | Mod: PBBFAC,,, | Performed by: NURSE PRACTITIONER

## 2025-03-25 NOTE — PROGRESS NOTES
**PLEASE RECHECK BLOOD PRESSURE OR SCHEDULE NURSE VISIT IF NOT IN RANGE DURING VISIT.    HM and immunization's reviewed and updated.  Patient is due for Lipid. Please help schedule or if already done please get information to get records.

## 2025-03-25 NOTE — PROGRESS NOTES
"  Erma Linda presented for a  Medicare AWV and comprehensive Health Risk Assessment today. The following components were reviewed and updated:    Medical history  Family History  Social history  Allergies and Current Medications  Health Risk Assessment  Health Maintenance  Care Team         ** See Completed Assessments for Annual Wellness Visit within the encounter summary.**         The following assessments were completed:  Living Situation  CAGE  Depression Screening  Timed Get Up and Go  Whisper Test  Cognitive Function Screening  Nutrition Screening  ADL Screening  PAQ Screening      Opioid documentation:  Patient does not have a current opioid prescription.        Vitals:    03/25/25 1050   BP: 136/88   Pulse: 74   Resp: 19   Temp: 98.1 °F (36.7 °C)   TempSrc: Oral   SpO2: 98%   Weight: 71 kg (156 lb 8.4 oz)   Height: 5' 4" (1.626 m)     Body mass index is 26.87 kg/m².  Physical Exam  Vitals and nursing note reviewed.   Constitutional:       General: She is not in acute distress.     Appearance: Normal appearance. She is well-developed and well-groomed. She is not ill-appearing.   HENT:      Head: Normocephalic and atraumatic.      Right Ear: External ear normal.      Left Ear: External ear normal.      Nose: Nose normal.      Mouth/Throat:      Lips: Pink.      Mouth: Mucous membranes are moist.   Eyes:      General: Lids are normal. Vision grossly intact. Gaze aligned appropriately.      Conjunctiva/sclera: Conjunctivae normal.   Neck:      Trachea: Phonation normal.   Pulmonary:      Effort: Pulmonary effort is normal. No accessory muscle usage or respiratory distress.   Abdominal:      General: Abdomen is flat. There is no distension.   Musculoskeletal:      Cervical back: Neck supple.   Skin:     General: Skin is warm and dry.      Findings: No rash.   Neurological:      General: No focal deficit present.      Mental Status: She is alert and oriented to person, place, and time. Mental status is at " baseline.      Motor: No abnormal muscle tone.      Gait: Gait normal.   Psychiatric:         Attention and Perception: Attention and perception normal.         Mood and Affect: Mood and affect normal.         Speech: Speech normal.         Behavior: Behavior normal. Behavior is cooperative.         Thought Content: Thought content normal.         Cognition and Memory: Cognition and memory normal.         Judgment: Judgment normal.               Diagnoses and health risks identified today and associated recommendations/orders:    1. Encounter for Medicare annual wellness exam  Health maintenance reviewed and up to date, will f/u with PCP for routine preventative care  Review for Opioid Screening: Pt does not have Rx for Opioids   Review for Substance Use Disorders: Patient does not use substance   - Referral to Enhanced Annual Wellness Visit (eAWV) W+1    2. Primary hyperparathyroidism  Asymptomatic with normal kidney functioning, calcium and vit.D    3. Vitamin D insufficiency  Stable with daily vitamin supplementation     4. Age-related osteoporosis without current pathological fracture  Doing well without falls, precautions discussed   - add more outdoor lighting  - always use handrails on the stairs  - always wear low-heeled or flat shoes or slippers with nonskid soles  - call the doctor if I am feeling too drowsy  - install bathroom grab bars  - join an exercise group in my community  - keep a flashlight by the bed  - keep my cell phone with me always  - learn how to get back up if I fall  - make an emergency alert plan in case I fall  -  clutter from the floors  - use a nonslip pad with throw rugs, or remove them completely  - use a cane or walker  - use a nightlight in the bathroom  - wear my glasses and/or hearing aid  - attend therapy    5. Essential hypertension, benign  Continue current treatment regimen.  Dietary sodium restriction.  Regular aerobic exercise.  Patient Education: Reviewed risks of  hypertension and principles of treatment.    6. Chronic anemia  Without acute hemorrhage, continue daily ferrous supplementation, bleeding precautions discussed     7. Arthritis, multiple joint involvement  Chronic with occasional exacerbations   Natural history and expected course discussed. Questions answered.  Proper lifting, bending technique discussed.  Stretching exercises discussed.  Regular aerobic and trunk strengthening exercises discussed.  Ice to affected area as needed for local pain relief.  Heat to affected area as needed for local pain relief.  Continue symptomatic abortive medications     8. History of hepatitis C; S/p RX with SVR 12 documented 03/2018  Resolved with normal liver functioning       Provided Erma with a 5-10 year written screening schedule and personal prevention plan. Recommendations were developed using the USPSTF age appropriate recommendations. Education, counseling, and referrals were provided as needed. After Visit Summary printed and given to patient which includes a list of additional screenings\tests needed.    No follow-ups on file.    Nila Lewis, TAYLOR  I offered to discuss advanced care planning, including how to pick a person who would make decisions for you if you were unable to make them for yourself, called a health care power of , and what kind of decisions you might make such as use of life sustaining treatments such as ventilators and tube feeding when faced with a life limiting illness recorded on a living will that they will need to know. (How you want to be cared for as you near the end of your natural life)     X Patient is interested in learning more about how to make advanced directives.  I provided them paperwork and offered to discuss this with them.

## 2025-03-25 NOTE — PATIENT INSTRUCTIONS
Counseling and Referral of Other Preventative  (Italic type indicates deductible and co-insurance are waived)    Patient Name: Erma Linda  Today's Date: 3/25/2025    Health Maintenance       Date Due Completion Date    TETANUS VACCINE Never done ---    Shingles Vaccine (1 of 2) Never done ---    Pneumococcal Vaccines (Age 50+) (1 of 2 - PCV) Never done ---    RSV Vaccine (Age 60+ and Pregnant patients) (1 - Risk 60-74 years 1-dose series) Never done ---    COVID-19 Vaccine (3 - Pfizer risk series) 06/11/2021 5/14/2021    Influenza Vaccine (1) Never done ---    Lipid Panel 04/03/2025 4/3/2024    Mammogram 08/19/2025 8/19/2024    DEXA Scan 05/30/2026 5/30/2024    Hemoglobin A1c (Diabetic Prevention Screening) 04/03/2027 4/3/2024    Colorectal Cancer Screening 03/24/2035 3/24/2025        No orders of the defined types were placed in this encounter.    The following information is provided to all patients.  This information is to help you find resources for any of the problems found today that may be affecting your health:                  Living healthy guide: www.[facility].[place].gov      Understanding Diabetes: www.diabetes.org      Eating healthy: www.cdc.gov/healthyweight      CDC home safety checklist: www.cdc.gov/steadi/patient.html      Agency on Aging: www.goea.[place].[facility]      Alcoholics anonymous (AA): www.aa.org      Physical Activity: www.brice.nih.gov/au7qghj      Tobacco use: www.quitwithusla.org

## 2025-03-28 LAB
ESTROGEN SERPL-MCNC: NORMAL PG/ML
INSULIN SERPL-ACNC: NORMAL U[IU]/ML
LAB AP CLINICAL INFORMATION: NORMAL
LAB AP GROSS DESCRIPTION: NORMAL
LAB AP PERFORMING LOCATION(S): NORMAL
LAB AP REPORT FOOTNOTES: NORMAL
T3RU NFR SERPL: NORMAL %

## 2025-04-15 DIAGNOSIS — I10 ESSENTIAL HYPERTENSION, BENIGN: ICD-10-CM

## 2025-04-15 RX ORDER — HYDROCHLOROTHIAZIDE 25 MG/1
25 TABLET ORAL DAILY
Qty: 90 TABLET | Refills: 0 | Status: SHIPPED | OUTPATIENT
Start: 2025-04-15 | End: 2025-10-12

## 2025-04-15 RX ORDER — LISINOPRIL 40 MG/1
40 TABLET ORAL
Qty: 90 TABLET | Refills: 0 | Status: SHIPPED | OUTPATIENT
Start: 2025-04-15

## 2025-04-15 RX ORDER — LISINOPRIL 40 MG/1
40 TABLET ORAL DAILY
Qty: 90 TABLET | Refills: 0 | Status: SHIPPED | OUTPATIENT
Start: 2025-04-15

## 2025-04-15 NOTE — TELEPHONE ENCOUNTER
No care due was identified.  Health [facility] Embedded Care Due Messages. Reference number: [id number].   4/15/2025 1:42:30 PM CDT

## 2025-04-15 NOTE — TELEPHONE ENCOUNTER
Care Due:                  Date            Visit Type   Department     Provider  --------------------------------------------------------------------------------                                FOREIGN      [facility]/OF  MEDICINE /  Last Visit: 08-      [facility] VISIT   INTERNAL MED   Marry Howard  Next Visit: None Scheduled  None         None Found                                                            Last  Test          Frequency    Reason                     Performed    Due Date  --------------------------------------------------------------------------------    CBC.........  12 months..  ibuprofen................  04- 03-    CMP.........  12 months..  hydroCHLOROthiazide,       04- 03-                             ibuprofen, lisinopriL....    [employer] Embedded Care Due Messages. Reference number: [id number].   4/15/2025 1:29:31 PM CDT

## 2025-04-15 NOTE — TELEPHONE ENCOUNTER
Refill Routing Note   Medication(s) are not appropriate for processing by [facility] for the following reason(s):        Required labs outdated    [facility] action(s):  Defer     Requires labs : Yes             Appointments  past 12m or future 3m with PCP    Date Provider   Last Visit   8/21/2024 aMrry Howard MD   Next Visit   Visit date not found Marry Howard MD   ED visits in past 90 days: 0        Note composed:1:40 PM 04/15/2025

## 2025-04-30 DIAGNOSIS — I10 ESSENTIAL HYPERTENSION, BENIGN: ICD-10-CM

## 2025-07-30 ENCOUNTER — TELEPHONE (OUTPATIENT)
Dept: FAMILY MEDICINE | Facility: CLINIC | Age: 65
End: 2025-07-30
Payer: MEDICARE

## 2025-07-30 NOTE — TELEPHONE ENCOUNTER
Copied from CRM #[id number]. Topic: General Inquiry - Patient Advice  >> Jul 30, 2025 11:28 AM Vanesa wrote:  Who called:self      What is the request in detail:pt would like for you to give her a call in regards of some insurance questions     Can the clinic reply by [facility]?     Would the patient rather a call back or a response via [facility]?callback      Best call back number:.[phone]       Additional Information:

## 2025-07-30 NOTE — TELEPHONE ENCOUNTER
Called patient and wanted to see if  is in the network for a possible new insurance . Explained to patient she will need to call the insurance to verify if  is in their network. She verbalized understanding.

## 2025-08-21 ENCOUNTER — OFFICE VISIT (OUTPATIENT)
Dept: FAMILY MEDICINE | Facility: CLINIC | Age: 65
End: 2025-08-21
Payer: MEDICARE

## 2025-08-21 VITALS
WEIGHT: 158.5 LBS | OXYGEN SATURATION: 97 % | HEART RATE: 94 BPM | TEMPERATURE: 98 F | DIASTOLIC BLOOD PRESSURE: 92 MMHG | SYSTOLIC BLOOD PRESSURE: 166 MMHG | BODY MASS INDEX: 27.21 KG/M2

## 2025-08-21 DIAGNOSIS — H53.8 BLURRED VISION, BILATERAL: ICD-10-CM

## 2025-08-21 DIAGNOSIS — E55.9 VITAMIN D INSUFFICIENCY: ICD-10-CM

## 2025-08-21 DIAGNOSIS — E21.0 PRIMARY HYPERPARATHYROIDISM: ICD-10-CM

## 2025-08-21 DIAGNOSIS — Z12.31 ENCOUNTER FOR SCREENING MAMMOGRAM FOR BREAST CANCER: ICD-10-CM

## 2025-08-21 DIAGNOSIS — I10 ESSENTIAL HYPERTENSION, BENIGN: ICD-10-CM

## 2025-08-21 DIAGNOSIS — N64.89 EDEMA OF BREAST: ICD-10-CM

## 2025-08-21 DIAGNOSIS — R79.9 ABNORMAL FINDING OF BLOOD CHEMISTRY, UNSPECIFIED: ICD-10-CM

## 2025-08-21 DIAGNOSIS — N64.4 BREAST PAIN, LEFT: Primary | ICD-10-CM

## 2025-08-21 PROCEDURE — 99999 PR PBB SHADOW E&M-EST. PATIENT-LVL IV: CPT | Mod: PBBFAC,,, | Performed by: INTERNAL MEDICINE

## 2025-08-21 RX ORDER — IBUPROFEN 800 MG/1
800 TABLET, FILM COATED ORAL 3 TIMES DAILY PRN
Qty: 30 TABLET | Refills: 2 | Status: SHIPPED | OUTPATIENT
Start: 2025-08-21

## 2025-08-21 RX ORDER — AMLODIPINE BESYLATE 10 MG/1
10 TABLET ORAL DAILY
Qty: 90 TABLET | Refills: 0 | Status: SHIPPED | OUTPATIENT
Start: 2025-08-21

## 2025-08-21 RX ORDER — ACETAMINOPHEN 500 MG
500 TABLET ORAL DAILY PRN
Status: CANCELLED | OUTPATIENT
Start: 2025-08-21

## 2025-08-21 RX ORDER — LISINOPRIL 40 MG/1
40 TABLET ORAL DAILY
Qty: 90 TABLET | Refills: 0 | Status: SHIPPED | OUTPATIENT
Start: 2025-08-21

## 2025-08-21 RX ORDER — HYDROCHLOROTHIAZIDE 25 MG/1
25 TABLET ORAL DAILY
Qty: 90 TABLET | Refills: 0 | Status: SHIPPED | OUTPATIENT
Start: 2025-08-21 | End: 2026-02-17

## 2025-08-26 ENCOUNTER — LAB VISIT (OUTPATIENT)
Dept: LAB | Facility: HOSPITAL | Age: 65
End: 2025-08-26
Attending: INTERNAL MEDICINE
Payer: MEDICARE

## 2025-09-03 ENCOUNTER — PATIENT OUTREACH (OUTPATIENT)
Dept: ADMINISTRATIVE | Facility: HOSPITAL | Age: 65
End: 2025-09-03
Payer: MEDICARE

## 2025-09-03 VITALS — DIASTOLIC BLOOD PRESSURE: 85 MMHG | SYSTOLIC BLOOD PRESSURE: 135 MMHG

## (undated) DEVICE — UNDERGLOVE BIOGEL PI SZ 6.5 LF

## (undated) DEVICE — SUT VICRYL 3-0 27 SH

## (undated) DEVICE — MARKER SKIN STND TIP BLUE BARR

## (undated) DEVICE — ADHESIVE DERMABOND ADVANCED

## (undated) DEVICE — SUT 2/0 30IN SILK BLK BRAI

## (undated) DEVICE — CONTAINER SPECIMEN OR STER 4OZ

## (undated) DEVICE — ELECTRODE BLADE INSULATED 1 IN

## (undated) DEVICE — SOL WATER STRL IRR 1000ML

## (undated) DEVICE — Device

## (undated) DEVICE — BRA CLASSIC COMFORT 42BLACK

## (undated) DEVICE — SPONGE SUPER KERLIX 6X6.75IN

## (undated) DEVICE — SYR B-D DISP CONTROL 10CC100/C

## (undated) DEVICE — APPLIER CLIP LIAGCLIP 9.375IN

## (undated) DEVICE — MARGIN MARKER STANDARD 6 COLOR

## (undated) DEVICE — SUT VICRYL CTD 2-0 GI 27 SH

## (undated) DEVICE — NDL HYPO REG 25G X 1 1/2

## (undated) DEVICE — DRAPE STERI INSTRUMENT 1018

## (undated) DEVICE — GLOVE BIOGEL SKINSENSE PI 6.5

## (undated) DEVICE — COVER PROBE US 5.5X58L NON LTX

## (undated) DEVICE — SYS CLSR DERMABOND PRINEO 22CM

## (undated) DEVICE — TOWEL OR DISP STRL BLUE 4/PK

## (undated) DEVICE — ELECTRODE REM PLYHSV RETURN 9

## (undated) DEVICE — SUT MONOCRYL 4-0 PS-2

## (undated) DEVICE — GOWN B1 X-LG X-LONG

## (undated) DEVICE — APPLICATOR CHLORAPREP ORN 26ML

## (undated) DEVICE — POWDER ARISTA AH 3G

## (undated) DEVICE — SHEATH GUIDE SCOUT SURG RADAR

## (undated) DEVICE — COVER PROBE SHEATH SURG 6X3IN

## (undated) DEVICE — GOWN POLY REINF X-LONG XL